# Patient Record
Sex: MALE | Race: BLACK OR AFRICAN AMERICAN | Employment: OTHER | URBAN - METROPOLITAN AREA
[De-identification: names, ages, dates, MRNs, and addresses within clinical notes are randomized per-mention and may not be internally consistent; named-entity substitution may affect disease eponyms.]

---

## 2019-02-22 ENCOUNTER — HOSPITAL ENCOUNTER (EMERGENCY)
Age: 54
Discharge: HOME | End: 2019-02-22
Attending: EMERGENCY MEDICINE
Payer: MEDICAID

## 2019-02-22 ENCOUNTER — APPOINTMENT (OUTPATIENT)
Dept: GENERAL RADIOLOGY | Age: 54
End: 2019-02-22
Attending: EMERGENCY MEDICINE
Payer: MEDICAID

## 2019-02-22 VITALS
SYSTOLIC BLOOD PRESSURE: 158 MMHG | OXYGEN SATURATION: 100 % | HEART RATE: 84 BPM | RESPIRATION RATE: 20 BRPM | WEIGHT: 253.09 LBS | TEMPERATURE: 98.4 F | HEIGHT: 75 IN | DIASTOLIC BLOOD PRESSURE: 108 MMHG | BODY MASS INDEX: 31.47 KG/M2

## 2019-02-22 DIAGNOSIS — I10 ESSENTIAL HYPERTENSION: ICD-10-CM

## 2019-02-22 DIAGNOSIS — R07.9 CHEST PAIN, UNSPECIFIED TYPE: Primary | ICD-10-CM

## 2019-02-22 LAB
ALBUMIN SERPL-MCNC: 3.8 G/DL (ref 3.5–5)
ALBUMIN/GLOB SERPL: 1.1 {RATIO} (ref 1.1–2.2)
ALP SERPL-CCNC: 84 U/L (ref 45–117)
ALT SERPL-CCNC: 73 U/L (ref 12–78)
ANION GAP SERPL CALC-SCNC: 9 MMOL/L (ref 5–15)
AST SERPL-CCNC: 51 U/L (ref 15–37)
ATRIAL RATE: 80 BPM
BASOPHILS # BLD: 0 K/UL (ref 0–0.1)
BASOPHILS NFR BLD: 1 % (ref 0–1)
BILIRUB SERPL-MCNC: 0.5 MG/DL (ref 0.2–1)
BUN SERPL-MCNC: 8 MG/DL (ref 6–20)
BUN/CREAT SERPL: 9 (ref 12–20)
CALCIUM SERPL-MCNC: 9.6 MG/DL (ref 8.5–10.1)
CALCULATED P AXIS, ECG09: 59 DEGREES
CALCULATED R AXIS, ECG10: 34 DEGREES
CALCULATED T AXIS, ECG11: 23 DEGREES
CHLORIDE SERPL-SCNC: 108 MMOL/L (ref 97–108)
CK MB CFR SERPL CALC: 0.4 % (ref 0–2.5)
CK MB SERPL-MCNC: 2.2 NG/ML (ref 5–25)
CK SERPL-CCNC: 622 U/L (ref 39–308)
CO2 SERPL-SCNC: 24 MMOL/L (ref 21–32)
CREAT SERPL-MCNC: 0.88 MG/DL (ref 0.7–1.3)
DIAGNOSIS, 93000: NORMAL
DIFFERENTIAL METHOD BLD: ABNORMAL
EOSINOPHIL # BLD: 0.3 K/UL (ref 0–0.4)
EOSINOPHIL NFR BLD: 3 % (ref 0–7)
ERYTHROCYTE [DISTWIDTH] IN BLOOD BY AUTOMATED COUNT: 14.4 % (ref 11.5–14.5)
GLOBULIN SER CALC-MCNC: 3.4 G/DL (ref 2–4)
GLUCOSE SERPL-MCNC: 93 MG/DL (ref 65–100)
HCT VFR BLD AUTO: 52.5 % (ref 36.6–50.3)
HGB BLD-MCNC: 17.5 G/DL (ref 12.1–17)
IMM GRANULOCYTES # BLD AUTO: 0 K/UL (ref 0–0.04)
IMM GRANULOCYTES NFR BLD AUTO: 0 % (ref 0–0.5)
LIPASE SERPL-CCNC: 265 U/L (ref 73–393)
LYMPHOCYTES # BLD: 2 K/UL (ref 0.8–3.5)
LYMPHOCYTES NFR BLD: 24 % (ref 12–49)
MCH RBC QN AUTO: 29.1 PG (ref 26–34)
MCHC RBC AUTO-ENTMCNC: 33.3 G/DL (ref 30–36.5)
MCV RBC AUTO: 87.2 FL (ref 80–99)
MONOCYTES # BLD: 0.6 K/UL (ref 0–1)
MONOCYTES NFR BLD: 7 % (ref 5–13)
NEUTS SEG # BLD: 5.4 K/UL (ref 1.8–8)
NEUTS SEG NFR BLD: 65 % (ref 32–75)
NRBC # BLD: 0 K/UL (ref 0–0.01)
NRBC BLD-RTO: 0 PER 100 WBC
P-R INTERVAL, ECG05: 172 MS
PLATELET # BLD AUTO: 231 K/UL (ref 150–400)
PMV BLD AUTO: 10.3 FL (ref 8.9–12.9)
POTASSIUM SERPL-SCNC: 4 MMOL/L (ref 3.5–5.1)
PROT SERPL-MCNC: 7.2 G/DL (ref 6.4–8.2)
Q-T INTERVAL, ECG07: 370 MS
QRS DURATION, ECG06: 80 MS
QTC CALCULATION (BEZET), ECG08: 426 MS
RBC # BLD AUTO: 6.02 M/UL (ref 4.1–5.7)
SODIUM SERPL-SCNC: 141 MMOL/L (ref 136–145)
TROPONIN I SERPL-MCNC: <0.05 NG/ML
VENTRICULAR RATE, ECG03: 80 BPM
WBC # BLD AUTO: 8.3 K/UL (ref 4.1–11.1)

## 2019-02-22 PROCEDURE — 84484 ASSAY OF TROPONIN QUANT: CPT

## 2019-02-22 PROCEDURE — 99284 EMERGENCY DEPT VISIT MOD MDM: CPT

## 2019-02-22 PROCEDURE — 80053 COMPREHEN METABOLIC PANEL: CPT

## 2019-02-22 PROCEDURE — 93005 ELECTROCARDIOGRAM TRACING: CPT

## 2019-02-22 PROCEDURE — 36415 COLL VENOUS BLD VENIPUNCTURE: CPT

## 2019-02-22 PROCEDURE — 82553 CREATINE MB FRACTION: CPT

## 2019-02-22 PROCEDURE — 83690 ASSAY OF LIPASE: CPT

## 2019-02-22 PROCEDURE — 82550 ASSAY OF CK (CPK): CPT

## 2019-02-22 PROCEDURE — 71046 X-RAY EXAM CHEST 2 VIEWS: CPT

## 2019-02-22 PROCEDURE — 85025 COMPLETE CBC W/AUTO DIFF WBC: CPT

## 2019-02-22 NOTE — ED PROVIDER NOTES
EMERGENCY DEPARTMENT HISTORY AND PHYSICAL EXAM 
 
 
Date: 2/22/2019 Patient Name: Aby Ulloa History of Presenting Illness Chief Complaint Patient presents with  Chest Pain Ambulatory to triage. c/o intermittent (for weeks) left sided chest pain radiating to left arm causing numbness to left hand. Recent bp medication additions/changes. h/o HTN. Recent stressors as he is raising a 9yr by himself as mother is drug addict-per pt.  Anxiety History Provided By: Patient HPI: Aby Ulloa, 48 y.o. male with PMHx significant for HTN, chronic back pain, arthritis, presents ambulatory to the ED with cc of intermittent, sharp L CP that lasted for <1 min, as well as LUE tightness onset PTA. Pt reports that he has been experiencing intermittent CP for the past few months. He reports being evaluated by [facility], but states that \"they only took [his] BP then sent [him] home. \" He states that he was seen by his PCP last week, and his BP meds changed. His PCP added Clonidine to his Lisinopril and Norvasc regimen, however, pt thought that he was suppose to only take Clonidine for his HTN meds. He states that he has been taking his prescribed HTN regimen for 3 days. He reports he has a PCP appt on 2/25. He also reports intermittent HA for the past week. Pt reports that he has been under a lot of stress at home lately. He denies lightheadedness or unilateral weakness. There are no other complaints, changes, or physical findings at this time. PCP: Bal Matthew MD 
 
Social Hx:  
Social History Tobacco Use Smoking Status Current Every Day Smoker  Packs/day: 1.50  Years: 10.00  Pack years: 15.00 Smokeless Tobacco Never Used  
,  
Social History Substance and Sexual Activity Alcohol Use Yes  Alcohol/week: 6.0 oz  Types: 12 Cans of beer per week  
, Social History Substance and Sexual Activity Drug Use No  
 
 
Past History Past Surgical History: Past Surgical History:  
Procedure Laterality Date  HX ORTHOPAEDIC Family History: 
History reviewed. No pertinent family history. Allergies: Allergies Allergen Reactions  Vicodin [Hydrocodone-Acetaminophen] Other (comments) Headache Review of Systems Review of Systems Constitutional: Negative for chills and fever. HENT: Negative for congestion, rhinorrhea and sore throat. Respiratory: Negative for cough and shortness of breath. Cardiovascular: Positive for chest pain (intermittent L). +Elevated BP Gastrointestinal: Negative for abdominal pain, nausea and vomiting. Genitourinary: Negative for dysuria and urgency. Musculoskeletal:  
     +LUE tightness, resolved Skin: Negative for rash. Neurological: Positive for numbness (L hand, resolved) and headaches (intermittent). Negative for dizziness and light-headedness. Psychiatric/Behavioral: The patient is nervous/anxious. All other systems reviewed and are negative. Physical Exam  
Physical Exam  
Constitutional: He is oriented to person, place, and time. He appears well-developed and well-nourished. No distress. HENT:  
Head: Normocephalic and atraumatic. Eyes: Conjunctivae and EOM are normal. Pupils are equal, round, and reactive to light. Neck: Normal range of motion. Cardiovascular: Normal rate, regular rhythm and intact distal pulses. Pulmonary/Chest: Effort normal and breath sounds normal. No stridor. No respiratory distress. Abdominal: Soft. He exhibits no distension. There is no tenderness. Musculoskeletal: Normal range of motion. Neurological: He is alert and oriented to person, place, and time. He has normal strength. No cranial nerve deficit or sensory deficit. Coordination normal. GCS eye subscore is 4. GCS verbal subscore is 5. GCS motor subscore is 6. Skin: Skin is warm and dry. Psychiatric: He has a normal mood and affect. Nursing note and vitals reviewed. Diagnostic Study Results Labs - Recent Results (from the past 12 hour(s)) EKG, 12 LEAD, INITIAL Collection Time: 02/22/19  1:48 AM  
Result Value Ref Range Ventricular Rate 80 BPM  
 Atrial Rate 80 BPM  
 P-R Interval 172 ms QRS Duration 80 ms  
 Q-T Interval 370 ms QTC Calculation (Bezet) 426 ms Calculated P Axis 59 degrees Calculated R Axis 34 degrees Calculated T Axis 23 degrees Diagnosis Normal sinus rhythm Normal ECG When compared with ECG of 29-APR-2013 09:01, No significant change was found CBC WITH AUTOMATED DIFF Collection Time: 02/22/19  2:58 AM  
Result Value Ref Range WBC 8.3 4.1 - 11.1 K/uL  
 RBC 6.02 (H) 4.10 - 5.70 M/uL  
 HGB 17.5 (H) 12.1 - 17.0 g/dL HCT 52.5 (H) 36.6 - 50.3 % MCV 87.2 80.0 - 99.0 FL  
 MCH 29.1 26.0 - 34.0 PG  
 MCHC 33.3 30.0 - 36.5 g/dL  
 RDW 14.4 11.5 - 14.5 % PLATELET 149 654 - 381 K/uL MPV 10.3 8.9 - 12.9 FL  
 NRBC 0.0 0  WBC ABSOLUTE NRBC 0.00 0.00 - 0.01 K/uL NEUTROPHILS 65 32 - 75 % LYMPHOCYTES 24 12 - 49 % MONOCYTES 7 5 - 13 % EOSINOPHILS 3 0 - 7 % BASOPHILS 1 0 - 1 % IMMATURE GRANULOCYTES 0 0.0 - 0.5 % ABS. NEUTROPHILS 5.4 1.8 - 8.0 K/UL  
 ABS. LYMPHOCYTES 2.0 0.8 - 3.5 K/UL  
 ABS. MONOCYTES 0.6 0.0 - 1.0 K/UL  
 ABS. EOSINOPHILS 0.3 0.0 - 0.4 K/UL  
 ABS. BASOPHILS 0.0 0.0 - 0.1 K/UL  
 ABS. IMM. GRANS. 0.0 0.00 - 0.04 K/UL  
 DF AUTOMATED METABOLIC PANEL, COMPREHENSIVE Collection Time: 02/22/19  2:58 AM  
Result Value Ref Range Sodium 141 136 - 145 mmol/L Potassium 4.0 3.5 - 5.1 mmol/L Chloride 108 97 - 108 mmol/L  
 CO2 24 21 - 32 mmol/L Anion gap 9 5 - 15 mmol/L Glucose 93 65 - 100 mg/dL BUN 8 6 - 20 MG/DL Creatinine 0.88 0.70 - 1.30 MG/DL  
 BUN/Creatinine ratio 9 (L) 12 - 20 GFR est AA >60 >60 ml/min/1.73m2 GFR est non-AA >60 >60 ml/min/1.73m2 Calcium 9.6 8.5 - 10.1 MG/DL  Bilirubin, total 0.5 0.2 - 1.0 MG/DL  
 ALT (SGPT) 73 12 - 78 U/L  
 AST (SGOT) 51 (H) 15 - 37 U/L Alk. phosphatase 84 45 - 117 U/L Protein, total 7.2 6.4 - 8.2 g/dL Albumin 3.8 3.5 - 5.0 g/dL Globulin 3.4 2.0 - 4.0 g/dL A-G Ratio 1.1 1.1 - 2.2 LIPASE Collection Time: 02/22/19  2:58 AM  
Result Value Ref Range Lipase 265 73 - 393 U/L  
TROPONIN I Collection Time: 02/22/19  2:58 AM  
Result Value Ref Range Troponin-I, Qt. <0.05 <0.05 ng/mL CK W/ REFLX CKMB Collection Time: 02/22/19  2:58 AM  
Result Value Ref Range  (H) 39 - 308 U/L  
CK-MB,QUANT. Collection Time: 02/22/19  2:58 AM  
Result Value Ref Range CK - MB 2.2 <3.6 NG/ML  
 CK-MB Index 0.4 0.0 - 2.5 Radiologic Studies -  
XR CHEST PA LAT Final Result IMPRESSION:  
  
Normal chest views. No change. CT Results  (Last 48 hours) None CXR Results  (Last 48 hours) 02/22/19 0343  XR CHEST PA LAT Final result Impression:  IMPRESSION:  
   
Normal chest views. No change. Narrative:  EXAM: XR CHEST PA LAT INDICATION: Left chest pain radiates into the left upper extremity COMPARISON: Chest views on 4/29/2013. TECHNIQUE: PA and lateral chest views FINDINGS: The cardiomediastinal and hilar contours are within normal limits. The  
pulmonary vasculature is within normal limits. The lungs and pleural spaces are clear. The visualized bones and upper abdomen  
are age-appropriate. Medical Decision Making I am the first provider for this patient. I reviewed the vital signs, available nursing notes, past medical history, past surgical history, family history and social history. Vital Signs-Reviewed the patient's vital signs. Patient Vitals for the past 12 hrs: 
 Temp Pulse Resp BP SpO2  
02/22/19 0400    (!) 158/108   
02/22/19 0341     100 % 02/22/19 0158 98.4 °F (36.9 °C) 84 20 (!) 194/128 97 % Pulse Oximetry Analysis - 100% on RA 
 
 EKG interpretation: (Preliminary) 01:48 Rhythm: normal sinus rhythm; and regular . Rate (approx.): 80; Axis: normal; DE interval: normal; QRS interval: normal ; ST/T wave: normal; Other findings: normal. 
Written by Julieta Guevara ED Scribe, as dictated by STEPHANIE Dempsey MD. Records Reviewed: Nursing Notes, Old Medical Records, Previous Radiology Studies and Previous Laboratory Studies Provider Notes (Medical Decision Making): DDx: Musculoskeletal CP, ACS, stress, anxiety The pt is unlikely to have PE. There is no pleuritic chest pain, no tachycardia, no hypoxia, pt non-smoker, no unilateral leg swelling, no hormone use, no recent travel/immobilization, no recent surgery. The pt is unlikely to have aortic dissection. The pulses are equal, there is no sharp tearing chest pain radiating to back. Zula Banana of brief, intermittent CP not consistent with PE, dissection, or ACS Will pursue cardiac work-up with EKG, troponin x 1 given sx duration, CXR. While the pt is less likely to have pneumonia as there is no cough and no fever, and less likely to have ptx, will order CXR to assess lung fields. Pt with a normal neuro exam, on further questioning, left hand \"numbness\" was more of a \"tightness\" no need for CT imaging at this time. ED Course:  
Initial assessment performed. The patients presenting problems have been discussed, and they are in agreement with the care plan formulated and outlined with them. I have encouraged them to ask questions as they arise throughout their visit. Critical Care Time:  
0 minutes. Disposition: 
Discharge Note: 
4:05 AM 
The patient has been re-evaluated and is ready for discharge. Reviewed available results with patient. Counseled patient/parent/guardian on diagnosis and care plan. Patient has expressed understanding, and all questions have been answered.  Patient agrees with plan and agrees to follow up as recommended, or return to the ED if their symptoms worsen. Discharge instructions have been provided and explained to the patient, along with reasons to return to the ED. PLAN: 
1. Current Discharge Medication List  
  
 
2. Follow-up Information Follow up With Specialties Details Why Contact Info Akosua Covarrubias MD Family Practice Schedule an appointment as soon as possible for a visit  [place] [address] 
[phone] [facility] EMERGENCY DEPT Emergency Medicine  As needed, If symptoms worsen [address] [facility] 
[phone] Return to ED if worse Diagnosis Clinical Impression: 1. Chest pain, unspecified type 2. Essential hypertension Attestations: This note is prepared by Betsy Price, acting as scribe for MD STEPHANIE Guy MD: The scribe's documentation has been prepared under my direction and personally reviewed by me in its entirety. I confirm that the note above accurately reflects all work, treatment, procedures, and medical decision making performed by me.

## 2019-02-22 NOTE — DISCHARGE INSTRUCTIONS
Patient Education   Local Primary Care Physicians     [facility] Physicians [phone]   Lenore Thorne, MD Bartolo Horton, MD Jeevan Andres MD [facility] Doctors [phone]   Lee Ayala, [facility]   Angel Gallo, MD Patrizia Stern MD [address] [facility] [phone]   Monet Bullock, MD Rica Waterman MD [address] [facility] [phone]   MD Aylin Bruce, MD Garcia Grullon MD     [facility] [phone]   OHRA DPTSXX BD, MD Katelyn Fulton, MD Jim Zamora, NP [address] [phone]   MD Ashly Dunn, MD Juju Wallaec, MD Ijeoma Dietz, MD Shirley Garzon, MD Susan Bermudez MD     [address] [facility]   Abimael Joseph MD    [facility] [phone]   MD Maya Glover, NP   Prasanna Weiner, MD Donna Andrade, MD Alphonso Mayorga, MD Stephanie Bruce MD   [address] [facility] [phone]   MD Osiris Young, [facility] Rodrigo, NP   MD Clive Maharaj, MD Andrea Ibarra MD   [facility] [phone]   Kirill Dacosta, MD Jackeline Beltrán, MD Mariella Bender, MD Mila Roth MD     [address] [phone]   MD Alma Delia Dong MD [place] [phone]   MD Angel Montes MD Hunt Ferron, MD     [facility] Physicians [phone]   Michael Curry, MD Terence Perea, MD Kaylen Berg, MD Violeta Cuellar, MD Bryon Solis, MD   Dorann EvelinaHODA sanchez MD     [address]   [phone]   MD Katie Bernal MD     [address] [facility] [phone]     Elizabeth Fritz MD Janelle Ayers, TAVO Cruz FNP Lonnie Lynn, PA-C Viki Moores, MD Lesli Postal, NP   Kristel Macias DO Miscellaneous:     [facility]    For adult and child immunizations, family planning, TB screening, STD testing and women's health services. [facility]: Meherrin [phone]   [facility] 25   657 [facility]   [address]   170 [facility]: Jacob 200 [facility] [phone]   [address] [facility]        [address] [place] [address]    For primary care services, woman and child wellness, and some clinics providing specialty care. [facility] -- [facility]. [address] [place] [address] [phone] [address] [facility],  [facility] [address] [place] [address] [phone]  339 [facility] [address] [place] [address] [phone]  [address]  [employer] [address] [place] [address] 5850  Community  [phone]  [address] [place] [address] [address] [place] [phone]  [facility] 81 [facility] [phone]  [address] [phone]  [facility]: [facility] [address], [address] [place] [address], [address] [phone]    [address] [phone]  [facility] Out[facility] 5850  Community  [phone]  [employer]  [address], [place] [address] ([email or web address]) [phone]       Sexual Health/Woman Wellness Clinics   For STD/HIV testing and treatment, pregnancy testing and services, men's health, birth control services, LGBT services, and hepatitis/HPV vaccine services. [employer] [facility] [employer] [address] [facility] 75 [facility] 1579 [address] [phone]  [address] [place] [address] Rd, 5th floor [phone]  P[facility] of 59 [facility] [address] [phone]       8260 [facility] 401 [facility],[address]   [phone]  [place]   [phone]    Women, Infant and Children's Services:   [address] [phone]  [address] [phone]  [facility]   [phone]  [address] [place] [address]   [phone]  [facility]     [phone]  [address] Crisis   [facility] 53 [phone]          Thank you for allowing us to provide you with excellent care today. We hope we addressed all of your concerns and needs. We strive to provide excellent quality care in the Emergency Department. Please rate us as excellent, as anything less than excellent does not meet our expectations. If you feel that you have not received excellent quality care or timely care, please ask to speak to the nurse manager. Please choose us in the future for your continued health care needs. The exam and treatment you received in the Emergency Department were for an urgent problem and are not intended as complete care. It is important that you follow up with a doctor, nurse practitioner, or physician assistant for ongoing care. If your symptoms become worse or you do not improve as expected and you are unable to reach your usual health care provider, you should return to the Emergency Department. We are available 24 hours a day. Take this sheet with you when you go to your follow-up visit. If you have any problem arranging the follow-up visit, contact the Emergency Department immediately. If a prescription has been provided, please have it filled as soon as possible to avoid a delay in treatment. Read the entire medication instruction sheet provided to you by the pharmacy.  If you have any questions or reservations about taking the medication due to side effects or interactions with other medications please call the ER or your primary care physician. Take this sheet with you when you go to your follow-up visit. Make an appointment with your family doctor or the physician you were referred to for follow-up of this visit, as this is mandatory follow-up. Return to the ER if you are unable to be seen or if you are unable to be seen in a timely manner. If you have any problem arranging the follow-up visit, contact the Emergency Department immediately. Chest Pain: Care Instructions  Your Care Instructions    There are many things that can cause chest pain. Some are not serious and will get better on their own in a few days. But some kinds of chest pain need more testing and treatment. Your doctor may have recommended a follow-up visit in the next 8 to 12 hours. If you are not getting better, you may need more tests or treatment. Even though your doctor has released you, you still need to watch for any problems. The doctor carefully checked you, but sometimes problems can develop later. If you have new symptoms or if your symptoms do not get better, get medical care right away. If you have worse or different chest pain or pressure that lasts more than 5 minutes or you passed out (lost consciousness), call 911 or seek other emergency help right away. A medical visit is only one step in your treatment. Even if you feel better, you still need to do what your doctor recommends, such as going to all suggested follow-up appointments and taking medicines exactly as directed. This will help you recover and help prevent future problems. How can you care for yourself at home? · Rest until you feel better. · Take your medicine exactly as prescribed. Call your doctor if you think you are having a problem with your medicine. · Do not drive after taking a prescription pain medicine.   When should you call for help? Call 911 if:    · You passed out (lost consciousness).     · You have severe difficulty breathing.     · You have symptoms of a heart attack. These may include:  ? Chest pain or pressure, or a strange feeling in your chest.  ? Sweating. ? Shortness of breath. ? Nausea or vomiting. ? Pain, pressure, or a strange feeling in your back, neck, jaw, or upper belly or in one or both shoulders or arms. ? Lightheadedness or sudden weakness. ? A fast or irregular heartbeat. After you call 911, the  may tell you to chew 1 adult-strength or 2 to 4 low-dose aspirin. Wait for an ambulance. Do not try to drive yourself.    Call your doctor today if:    · You have any trouble breathing.     · Your chest pain gets worse.     · You are dizzy or lightheaded, or you feel like you may faint.     · You are not getting better as expected.     · You are having new or different chest pain. Where can you learn more? Go to http://douglas-hans.info/. Enter A120 in the search box to learn more about \"Chest Pain: Care Instructions. \"  Current as of: September 23, 2018  Content Version: 11.9  © 4873-4374 [employer]. Care instructions adapted under license by [employer] (which disclaims liability or warranty for this information). If you have questions about a medical condition or this instruction, always ask your healthcare professional. [place] [address] any warranty or liability for your use of this information.

## 2019-03-10 ENCOUNTER — HOSPITAL ENCOUNTER (EMERGENCY)
Age: 54
Discharge: HOME | End: 2019-03-10
Attending: EMERGENCY MEDICINE
Payer: MEDICAID

## 2019-03-10 VITALS
WEIGHT: 252.21 LBS | BODY MASS INDEX: 31.36 KG/M2 | DIASTOLIC BLOOD PRESSURE: 86 MMHG | OXYGEN SATURATION: 98 % | RESPIRATION RATE: 20 BRPM | HEART RATE: 91 BPM | SYSTOLIC BLOOD PRESSURE: 140 MMHG | HEIGHT: 75 IN

## 2019-03-10 DIAGNOSIS — J30.9 ALLERGIC RHINITIS, UNSPECIFIED SEASONALITY, UNSPECIFIED TRIGGER: ICD-10-CM

## 2019-03-10 DIAGNOSIS — Z72.0 TOBACCO ABUSE: ICD-10-CM

## 2019-03-10 DIAGNOSIS — Z71.6 ENCOUNTER FOR SMOKING CESSATION COUNSELING: ICD-10-CM

## 2019-03-10 DIAGNOSIS — R09.81 SINUS CONGESTION: Primary | ICD-10-CM

## 2019-03-10 DIAGNOSIS — J34.2 DEVIATED SEPTUM: ICD-10-CM

## 2019-03-10 PROCEDURE — 74011250637 HC RX REV CODE- 250/637: Performed by: EMERGENCY MEDICINE

## 2019-03-10 PROCEDURE — 99283 EMERGENCY DEPT VISIT LOW MDM: CPT

## 2019-03-10 RX ORDER — METHYLPREDNISOLONE 4 MG/1
TABLET ORAL
Qty: 1 DOSE PACK | Refills: 0 | Status: SHIPPED | OUTPATIENT
Start: 2019-03-10 | End: 2019-04-15 | Stop reason: ALTCHOICE

## 2019-03-10 RX ORDER — OXYMETAZOLINE HCL 0.05 %
2 SPRAY, NON-AEROSOL (ML) NASAL ONCE
Status: COMPLETED | OUTPATIENT
Start: 2019-03-10 | End: 2019-03-10

## 2019-03-10 RX ADMIN — Medication 2 SPRAY: at 20:47

## 2019-03-11 ENCOUNTER — TELEPHONE (OUTPATIENT)
Dept: INTERNAL MEDICINE CLINIC | Age: 54
End: 2019-03-11

## 2019-03-11 NOTE — DISCHARGE INSTRUCTIONS
Thank you for allowing us to take care of you today! We hope we addressed all of your concerns and needs. We strive to provide excellent quality care in the Emergency Department. You will receive a survey after your visit to evaluate the care you were provided. Should you receive a survey from us, we invite you to share your experience and tell us what made it excellent. It was a pleasure serving you, we invite you to share your experience with us, in our pursuit for excellence, should you be selected to receive a survey. The exam and treatment you received in the Emergency Department were for an urgent problem and are not intended as complete care. It is important that you follow up with a doctor, nurse practitioner, or physician assistant for ongoing care. If your symptoms become worse or you do not improve as expected and you are unable to reach your usual health care provider, you should return to the Emergency Department. We are available 24 hours a day. Please take your discharge instructions with you when you go to your follow-up appointment. If you have any problem arranging a follow-up appointment, contact the Emergency Department immediately. If a prescription has been provided, please have it filled as soon as possible to prevent a delay in treatment. Read the entire medication instruction sheet provided to you by the pharmacy. If you have any questions or reservations about taking the medication due to side effects or interactions with other medications, please call your primary care physician or contact the ER to speak with the charge nurse. Make an appointment with your family doctor or the physician you were referred to for follow-up of this visit as instructed on your discharge paperwork, as this is mandatory follow-up. Return to the ER if you are unable to be seen or if you are unable to be seen in a timely manner.     If you have any problem arranging the follow-up visit, contact the Emergency Department immediately. I hope you feel better and thank you again for allow us to provide you with excellent care today at [facility]!       Warmest regards,    Aftab Meza MD  Emergency Medicine Physician  [facility]

## 2019-03-11 NOTE — ED PROVIDER NOTES
EMERGENCY DEPARTMENT HISTORY AND PHYSICAL EXAM      Date: 3/10/2019  Patient Name: Vinh Tena  History of Presenting Illness     Chief Complaint   Patient presents with    Sinus Pain     pt with c/o difficulty breathing out of his nose. pt denied chest pain. pt stated he has been seen and prescribed flonase and singulair with no relief. patient he received a steroid pack in the past and had some relief.  Anxiety     pt reported that his symptoms are causing anxiety       History Provided By: Patient    HPI: Vinh Tena, 48 y.o. male with PMHx significant for HTN, presents ambulatory to the ED with cc of new onset of nasal congestion. Pt c/o difficulty breathing out of his nose. Pt denies chest pain. Pt stated he has been seen his PCP and was prescribed flonase and singulair with no relief. Patient has steroid pack previously prescribed in the past and which provided some relief. Additionally, pt specifically denies any recent fever, chills, headache, nausea, vomiting, diarrhea, abdominal pain, CP, lightheadedness, SOB, dizziness, numbness, weakness, tingling, BLE swelling, heart palpitations, urinary sxs, changes in BM, changes in PO intake, melena, or hematochezia. PCP: Ivan Ibrahim MD    PMHx: Significant for HTN  Social Hx: +tobacco, +EtOH, -Illicit Drugs     There are no other complaints, changes or physical findings at this time. Past History   Past Medical History:  Past Medical History:   Diagnosis Date    Chronic low back pain     Hypertension        Past Surgical History:  Past Surgical History:   Procedure Laterality Date    HX ORTHOPAEDIC         Family History:  No family history on file. Social History:  Social History     Tobacco Use    Smoking status: Current Every Day Smoker     Packs/day: 1.50     Years: 10.00     Pack years: 15.00    Smokeless tobacco: Never Used   Substance Use Topics    Alcohol use:  Yes     Alcohol/week: 6.0 oz     Types: 12 Cans of beer per week    Drug use: No       Allergies: Allergies   Allergen Reactions    Vicodin [Hydrocodone-Acetaminophen] Other (comments)     Headache       Medications:  No current facility-administered medications on file prior to encounter. Current Outpatient Medications on File Prior to Encounter   Medication Sig Dispense Refill    aspirin 81 mg tablet Take 1 Tab by mouth daily. 30 Tab 1    lisinopril (PRINIVIL, ZESTRIL) 10 mg tablet Take 10 mg by mouth daily. Review of Systems   Review of Systems   Constitutional: Negative. Negative for chills and fever. HENT: Positive for congestion (nasal). Negative for facial swelling, rhinorrhea, sore throat, trouble swallowing and voice change. Eyes: Negative. Respiratory: Negative. Negative for apnea, cough, chest tightness, shortness of breath and wheezing. Cardiovascular: Negative. Negative for chest pain, palpitations and leg swelling. Gastrointestinal: Negative. Negative for abdominal distention, abdominal pain, blood in stool, constipation, diarrhea, nausea and vomiting. Endocrine: Negative. Negative for cold intolerance, heat intolerance and polyuria. Genitourinary: Negative. Negative for difficulty urinating, dysuria, flank pain, frequency, hematuria and urgency. Musculoskeletal: Negative. Negative for arthralgias, back pain, myalgias, neck pain and neck stiffness. Skin: Negative. Negative for color change and rash. Neurological: Negative. Negative for dizziness, syncope, facial asymmetry, speech difficulty, weakness, light-headedness, numbness and headaches. Hematological: Negative. Does not bruise/bleed easily. Psychiatric/Behavioral: Negative. Negative for confusion and self-injury. The patient is not nervous/anxious. Physical Exam   Physical Exam   Constitutional: He is oriented to person, place, and time. Vital signs are normal. He appears well-developed and well-nourished. He is cooperative.   Non-toxic appearance. HENT:   Head: Normocephalic and atraumatic. Mouth/Throat: Mucous membranes are normal. No posterior oropharyngeal erythema. Eyes: Conjunctivae and EOM are normal. Pupils are equal, round, and reactive to light. Neck: Normal range of motion. Cardiovascular: Normal rate, regular rhythm, normal heart sounds and intact distal pulses. Exam reveals no gallop and no friction rub. No murmur heard. Pulmonary/Chest: Effort normal and breath sounds normal. No respiratory distress. He has no wheezes. He has no rales. He exhibits no tenderness. Abdominal: Soft. Bowel sounds are normal. He exhibits no distension and no mass. There is no tenderness. There is no rebound and no guarding. Musculoskeletal: Normal range of motion. He exhibits no edema, tenderness or deformity. Neurological: He is alert and oriented to person, place, and time. He displays normal reflexes. No cranial nerve deficit. He exhibits normal muscle tone. Coordination normal.   Skin: Skin is warm. No rash noted. Psychiatric: His mood appears anxious. Nursing note and vitals reviewed. Medical Decision Making   I am the first provider for this patient. I reviewed the vital signs, available nursing notes, past medical history, past surgical history, family history and social history. Vital Signs-Reviewed the patient's vital signs. Patient Vitals for the past 24 hrs:   Pulse Resp BP SpO2   03/10/19 2025 91 20 140/86 98 %       Pulse Oximetry Analysis - 98% on RA    Cardiac Monitor:   Rate: 91 bpm  Rhythm: Normal Sinus Rhythm      Records Reviewed: Nursing Notes, Old Medical Records, Previous electrocardiograms, Previous Radiology Studies and Previous Laboratory Studies    Provider Notes (Medical Decision Making):   Pt presents with flu like symptoms including nasal congestion, rhinorrhea and sore throat. Pt also has non-productive cough without dyspnea or wheezing. Symptoms are consistent with an uncomplicated URI.   DDx: bacterial sinusitis vs. pharyngitis, migraine, flu. Symptomatic therapy suggested: antihistamine-decongestant of choice. Increase fluids, use vaporizer, stay in steamy bathroom tid 15 min prn severe cough, tylenol as needed, rest, avoid smoky areas. Lack of antibiotic effectiveness discussed with him. Symptomatic therapy suggested: gargle for sore throat, use mist at bedside for congestion. Apply facial warm packs for sinus pain or use nasal saline sprays. Follow up prn if not better in 72 hours. ED Course:   Initial assessment performed. The patients presenting problems have been discussed, and they are in agreement with the care plan formulated and outlined with them. I have encouraged them to ask questions as they arise throughout their visit. HYPERTENSION COUNSELING  Education was provided to the patient today regarding their hypertension. Patient is made aware of their elevated blood pressure and is instructed to follow up this week with their Primary Care for a recheck. Patient is counseled regarding consequences of chronic, uncontrolled hypertension including kidney disease, heart disease, stroke or even death. Patient states their understanding and agrees to follow up this week. Additionally, during their visit, I discussed sodium restriction, maintaining ideal body weight and regular exercise program as physiologic means to achieve blood pressure control. The patient will strive towards this. ALCOHOL/SUBSTANCE ABUSE COUNSELING:  Upon evaluation, pt endorsed recent alcohol/illicit drug use. For approximately 15 minutes, pt has been counseled on the dangers of alcohol and illicit drug use on their health, and they were encouraged to quit as soon as possible in order to decrease further risks to their health. Pt has conveyed their understanding of the risks involved should they continue to use these products.     TOBACCO COUNSELING:   Upon evaluation, pt expressed that they are a current tobacco user. For approximately 10 minutes, pt has been counseled on the dangers of smoking and was encouraged to quit as soon as possible in order to decrease further risks to their health. Pt has conveyed their understanding of the risks involved should they continue to use tobacco products. I reviewed our electronic medical record system for any past medical records that were available that may contribute to the patient's current condition, the nursing notes and vital signs from today's visit. Shaylee Koch MD    Medications Administered During ED Course:  Medications   oxymetazoline (AFRIN) 0.05 % nasal spray 2 Spray (2 Sprays Both Nostrils Given 3/10/19 2047)     Progress Note:  8:48 PM  Patient has been reassessed and reports feeling better and symptoms have improved after ED treatment. Nichol Jurado is able to tolerate PO and ambulate per baseline. Pierre Mi's final labs and imaging have been reviewed with him. He has been counseled regarding his diagnosis. He verbally conveys understanding and agreement of the signs, symptoms, diagnosis, treatment and prognosis and additionally agrees to follow up as recommended with Dr. Aury Adam MD in 24 - 48 hours. He also agrees with the care-plan and conveys that all of his questions have been answered. I have also put together some discharge instructions for him that include: 1) educational information regarding their diagnosis, 2) how to care for their diagnosis at home, as well a 3) list of reasons why they would want to return to the ED prior to their follow-up appointment, should their condition change. I have answered all questions to patient's satisfaction. He both understood and agreed with plan as discussed above. Vital signs stable for discharge. Critical Care Time: 0    Disposition:  Discharge Note:  8:48 PM  The pt is ready for discharge.  The pt's signs, symptoms, diagnosis, and discharge instructions have been discussed and pt has conveyed their understanding. The pt is to follow up as recommended or return to ER should their symptoms worsen. Plan has been discussed and pt is in agreement. PLAN:  1. Discharge Home  Discharge Medication List as of 3/10/2019  8:56 PM      CONTINUE these medications which have NOT CHANGED    Details   aspirin 81 mg tablet Take 1 Tab by mouth daily. Print, 81 mg, Disp-30 Tab, R-1      lisinopril (PRINIVIL, ZESTRIL) 10 mg tablet 10 mg, Oral, DAILY, Until Discontinued, Historical Med           2. Follow-up Information     Follow up With Specialties Details Why [address] [facility], [address] Viktoria Barajas MD [facility]   [address]  [phone]      [facility] [place] EM[facility]GENCY DEPT Emergency Medicine  As needed, If symptoms worsen [facility] [address] Chula Villarreal MD Otolaryngology Schedule an appointment as soon as possible for a visit in 1 day  [facility]  [phone]            Return to ED if worse  Diagnosis     Clinical Impression:   1. Sinus congestion    2. Deviated septum    3. Allergic rhinitis, unspecified seasonality, unspecified trigger    4. Encounter for smoking cessation counseling    5. Tobacco abuse        Attestations  This note is prepared by Oscar De Jesus, acting as Scribe for MD Radha Hernandez MD : The scribe's documentation has been prepared under my direction and personally reviewed by me in its entirety. I confirm that the note above accurately reflects all work, treatment, procedures, and medical decision making performed by me.    :  This note will not be viewable in [address] [place]

## 2019-03-14 ENCOUNTER — HOSPITAL ENCOUNTER (EMERGENCY)
Age: 54
Discharge: HOME | End: 2019-03-14
Attending: EMERGENCY MEDICINE
Payer: MEDICAID

## 2019-03-14 VITALS
DIASTOLIC BLOOD PRESSURE: 75 MMHG | HEIGHT: 75 IN | RESPIRATION RATE: 20 BRPM | OXYGEN SATURATION: 98 % | WEIGHT: 253.53 LBS | HEART RATE: 86 BPM | BODY MASS INDEX: 31.52 KG/M2 | SYSTOLIC BLOOD PRESSURE: 140 MMHG | TEMPERATURE: 97.5 F

## 2019-03-14 DIAGNOSIS — F17.200 SMOKER: ICD-10-CM

## 2019-03-14 DIAGNOSIS — J06.9 ACUTE URI: Primary | ICD-10-CM

## 2019-03-14 PROCEDURE — 74011000270 HC RX REV CODE- 270: Performed by: EMERGENCY MEDICINE

## 2019-03-14 PROCEDURE — 74011250637 HC RX REV CODE- 250/637: Performed by: EMERGENCY MEDICINE

## 2019-03-14 PROCEDURE — 99282 EMERGENCY DEPT VISIT SF MDM: CPT

## 2019-03-14 PROCEDURE — 94640 AIRWAY INHALATION TREATMENT: CPT

## 2019-03-14 RX ORDER — FLUTICASONE PROPIONATE 50 MCG
2 SPRAY, SUSPENSION (ML) NASAL DAILY
Qty: 1 BOTTLE | Refills: 0 | Status: SHIPPED | OUTPATIENT
Start: 2019-03-14 | End: 2019-07-17

## 2019-03-14 RX ORDER — ALBUTEROL SULFATE 90 UG/1
1 AEROSOL, METERED RESPIRATORY (INHALATION)
Status: COMPLETED | OUTPATIENT
Start: 2019-03-14 | End: 2019-03-14

## 2019-03-14 RX ADMIN — ALBUTEROL SULFATE 1 PUFF: 90 AEROSOL, METERED RESPIRATORY (INHALATION) at 13:07

## 2019-03-14 RX ADMIN — Medication: at 13:07

## 2019-03-14 NOTE — ED PROVIDER NOTES
EMERGENCY DEPARTMENT HISTORY AND PHYSICAL EXAM      Date: 3/14/2019  Patient Name: Jamra Solano    History of Presenting Illness     Chief Complaint   Patient presents with    Cough     Ambulatory c/o cough congestion x 4 AM today Pt seen in ED several days ago and Given steroid dose pack       History Provided By: Patient    HPI: Jamar Solano, 48 y.o. male  presents to the ED with URI complaints that started at 4 AM today. He was seen in the ED for the same thing several days ago but did not fill the steroid pack until yesterday. He is not taking any over the counter cough/cold medications for relief. He notes a cough and congestion and feels that he cannot breath through his nose due to the congestion. The patient notes that he does not work and is on disability. Thus, he is unable to afford his medications. He does have Medicaid and just wants prescriptions so that he does not have to pay for the medications. There are no other complaints, changes, or physical findings at this time. Social Hx: Tobacco (1 ppd), EtOH (social), Illicit drug use (denies)     PCP: Nakita Rodriguez MD    No current facility-administered medications on file prior to encounter. Current Outpatient Medications on File Prior to Encounter   Medication Sig Dispense Refill    methylPREDNISolone (MEDROL, ELISEO,) 4 mg tablet Take as prescribed 1 Dose Pack 0    Nasal Dilators (BREATHE RIGHT) strp 1 Box by Apply Externally route daily. 10 Strip 0    aspirin 81 mg tablet Take 1 Tab by mouth daily. 30 Tab 1    lisinopril (PRINIVIL, ZESTRIL) 10 mg tablet Take 10 mg by mouth daily. Past History     Past Medical History:  Past Medical History:   Diagnosis Date    Chronic low back pain     Hypertension        Past Surgical History:  Past Surgical History:   Procedure Laterality Date    HX ORTHOPAEDIC         Family History:  History reviewed. No pertinent family history.     Social History:  Social History Tobacco Use    Smoking status: Current Every Day Smoker     Packs/day: 1.50     Years: 10.00     Pack years: 15.00    Smokeless tobacco: Never Used   Substance Use Topics    Alcohol use: Yes     Alcohol/week: 6.0 oz     Types: 12 Cans of beer per week    Drug use: No       Allergies: Allergies   Allergen Reactions    Vicodin [Hydrocodone-Acetaminophen] Other (comments)     Headache         Review of Systems   Review of Systems   Constitutional: Negative for chills, diaphoresis and fever. HENT: Positive for congestion and rhinorrhea. Negative for ear pain and sore throat. Respiratory: Positive for cough. Negative for shortness of breath. Cardiovascular: Negative for chest pain. Gastrointestinal: Negative for abdominal pain, constipation, diarrhea, nausea and vomiting. Genitourinary: Negative for difficulty urinating, dysuria, frequency and hematuria. Musculoskeletal: Negative for arthralgias and myalgias. Neurological: Negative for headaches. All other systems reviewed and are negative. Physical Exam   Physical Exam   Constitutional: He is oriented to person, place, and time. He appears well-developed and well-nourished. No distress. 48 y.o. -American male    HENT:   Head: Normocephalic and atraumatic. Right Ear: Tympanic membrane, external ear and ear canal normal. No middle ear effusion. Left Ear: Tympanic membrane, external ear and ear canal normal.  No middle ear effusion. Nose: Nose normal. No rhinorrhea. Right sinus exhibits no maxillary sinus tenderness and no frontal sinus tenderness. Left sinus exhibits no maxillary sinus tenderness and no frontal sinus tenderness. Mouth/Throat: Uvula is midline, oropharynx is clear and moist and mucous membranes are normal. No oropharyngeal exudate, posterior oropharyngeal edema or posterior oropharyngeal erythema. Eyes: Conjunctivae are normal. Right eye exhibits no discharge. Left eye exhibits no discharge.    Neck: Normal range of motion. Neck supple. Cardiovascular: Normal rate, regular rhythm and normal heart sounds. No murmur heard. Pulmonary/Chest: Effort normal and breath sounds normal. No respiratory distress. The patient did not cough throughout the patient encounter. Lymphadenopathy:     He has no cervical adenopathy. Neurological: He is alert and oriented to person, place, and time. Skin: Skin is warm and dry. He is not diaphoretic. Psychiatric: He has a normal mood and affect. His behavior is normal.   Nursing note and vitals reviewed. Diagnostic Study Results     Labs - None    Radiologic Studies - None    Medical Decision Making   I am the first provider for this patient. I reviewed the vital signs, available nursing notes, past medical history, past surgical history, family history and social history. Vital Signs-Reviewed the patient's vital signs. Patient Vitals for the past 12 hrs:   Temp Pulse Resp BP SpO2   03/14/19 1248 97.5 °F (36.4 °C) 86 20 140/75 98 %       Records Reviewed: Nursing Notes and Old Medical Records    Provider Notes (Medical Decision Making):   Bronchitis, pneumonia, upper respiratory infection, sinusitis, seasonal allergies, allergic rhinitis,    ED Course:   Initial assessment performed. The patients presenting problems have been discussed, and they are in agreement with the care plan formulated and outlined with them. I have encouraged them to ask questions as they arise throughout their visit. Tobacco Counseling  Discussed the risks of smoking and the benefits of smoking cessation as well as the long term sequelae of smoking with the patient. The patient verbalized their understanding. Counseled patient for approximately 3 minutes. Critical Care Time: None    Disposition:  DISCHARGE NOTE:  1:11 PM  The pt is ready for discharge.  The pt's signs, symptoms, diagnosis, and discharge instructions have been discussed and pt has conveyed their understanding. The pt is to follow up as recommended or return to ER should their symptoms worsen. Plan has been discussed and pt is in agreement. PLAN:  1. Current Discharge Medication List      START taking these medications    Details   fluticasone propionate (FLONASE) 50 mcg/actuation nasal spray 2 Sprays by Both Nostrils route daily. Qty: 1 Bottle, Refills: 0      dextromethorphan-guaifenesin (ROBITUSSIN COUGH-CHEST DAVID DM) 5-50 mg/5 mL liqd Take 10 mL by mouth two (2) times a day. Qty: 1 Bottle, Refills: 0         CONTINUE these medications which have NOT CHANGED    Details   methylPREDNISolone (MEDROL, ELISEO,) 4 mg tablet Take as prescribed  Qty: 1 Dose Pack, Refills: 0      Nasal Dilators (BREATHE RIGHT) strp 1 Box by Apply Externally route daily. Qty: 10 Strip, Refills: 0      aspirin 81 mg tablet Take 1 Tab by mouth daily. Qty: 30 Tab, Refills: 1      lisinopril (PRINIVIL, ZESTRIL) 10 mg tablet Take 10 mg by mouth daily. 2.   Follow-up Information     Follow up With Specialties Details Why Contact Info    Adam Lundberg MD Family Practice In 1 week As needed [address] [facility]  [phone]          Return to ED if worse     Diagnosis     Clinical Impression:   1. Acute URI    2. Smoker        This note will not be viewable in [address]

## 2019-03-14 NOTE — DISCHARGE INSTRUCTIONS
Patient Education        Upper Respiratory Infection (Cold): Care Instructions  Your Care Instructions    An upper respiratory infection, or URI, is an infection of the nose, sinuses, or throat. URIs are spread by coughs, sneezes, and direct contact. The common cold is the most frequent kind of URI. The flu and sinus infections are other kinds of URIs. Almost all URIs are caused by viruses. Antibiotics won't cure them. But you can treat most infections with home care. This may include drinking lots of fluids and taking over-the-counter pain medicine. You will probably feel better in 4 to 10 days. The doctor has checked you carefully, but problems can develop later. If you notice any problems or new symptoms, get medical treatment right away. Follow-up care is a key part of your treatment and safety. Be sure to make and go to all appointments, and call your doctor if you are having problems. It's also a good idea to know your test results and keep a list of the medicines you take. How can you care for yourself at home? · To prevent dehydration, drink plenty of fluids, enough so that your urine is light yellow or clear like water. Choose water and other caffeine-free clear liquids until you feel better. If you have kidney, heart, or liver disease and have to limit fluids, talk with your doctor before you increase the amount of fluids you drink. · Take an over-the-counter pain medicine, such as acetaminophen (Tylenol), ibuprofen (Advil, Motrin), or naproxen (Aleve). Read and follow all instructions on the label. · Before you use cough and cold medicines, check the label. These medicines may not be safe for young children or for people with certain health problems. · Be careful when taking over-the-counter cold or flu medicines and Tylenol at the same time. Many of these medicines have acetaminophen, which is Tylenol. Read the labels to make sure that you are not taking more than the recommended dose.  Too much acetaminophen (Tylenol) can be harmful. · Get plenty of rest.  · Do not smoke or allow others to smoke around you. If you need help quitting, talk to your doctor about stop-smoking programs and medicines. These can increase your chances of quitting for good. When should you call for help? Call 911 anytime you think you may need emergency care. For example, call if:    · You have severe trouble breathing.    Call your doctor now or seek immediate medical care if:    · You seem to be getting much sicker.     · You have new or worse trouble breathing.     · You have a new or higher fever.     · You have a new rash.    Watch closely for changes in your health, and be sure to contact your doctor if:    · You have a new symptom, such as a sore throat, an earache, or sinus pain.     · You cough more deeply or more often, especially if you notice more mucus or a change in the color of your mucus.     · You do not get better as expected. Where can you learn more? Go to http://douglas-hans.info/. Enter K218 in the search box to learn more about \"Upper Respiratory Infection (Cold): Care Instructions. \"  Current as of: September 5, 2018  Content Version: 11.9  © 8714-4378 [employer]. Care instructions adapted under license by [employer] (which disclaims liability or warranty for this information). If you have questions about a medical condition or this instruction, always ask your healthcare professional. [place] [address] any warranty or liability for your use of this information.

## 2019-04-15 ENCOUNTER — OFFICE VISIT (OUTPATIENT)
Dept: INTERNAL MEDICINE CLINIC | Age: 54
End: 2019-04-15

## 2019-04-15 VITALS
HEART RATE: 94 BPM | SYSTOLIC BLOOD PRESSURE: 112 MMHG | TEMPERATURE: 97.9 F | OXYGEN SATURATION: 97 % | RESPIRATION RATE: 16 BRPM | HEIGHT: 75 IN | BODY MASS INDEX: 31.08 KG/M2 | DIASTOLIC BLOOD PRESSURE: 80 MMHG | WEIGHT: 250 LBS

## 2019-04-15 DIAGNOSIS — M51.36 DDD (DEGENERATIVE DISC DISEASE), LUMBAR: ICD-10-CM

## 2019-04-15 DIAGNOSIS — M54.50 CHRONIC MIDLINE LOW BACK PAIN WITHOUT SCIATICA: ICD-10-CM

## 2019-04-15 DIAGNOSIS — I10 ESSENTIAL HYPERTENSION: Primary | ICD-10-CM

## 2019-04-15 DIAGNOSIS — K21.9 GASTROESOPHAGEAL REFLUX DISEASE, ESOPHAGITIS PRESENCE NOT SPECIFIED: ICD-10-CM

## 2019-04-15 DIAGNOSIS — Z72.0 TOBACCO ABUSE: ICD-10-CM

## 2019-04-15 DIAGNOSIS — F41.9 ANXIETY: ICD-10-CM

## 2019-04-15 DIAGNOSIS — G89.29 CHRONIC MIDLINE LOW BACK PAIN WITHOUT SCIATICA: ICD-10-CM

## 2019-04-15 RX ORDER — CLONIDINE HYDROCHLORIDE 0.2 MG/1
0.2 TABLET ORAL 2 TIMES DAILY
COMMUNITY
End: 2020-01-15 | Stop reason: SDUPTHER

## 2019-04-15 RX ORDER — NICOTINE 7MG/24HR
1 PATCH, TRANSDERMAL 24 HOURS TRANSDERMAL EVERY 24 HOURS
Qty: 30 PATCH | Refills: 0 | Status: SHIPPED | OUTPATIENT
Start: 2019-04-15 | End: 2019-05-15

## 2019-04-15 RX ORDER — AMLODIPINE BESYLATE 10 MG/1
10 TABLET ORAL DAILY
COMMUNITY
End: 2019-11-12

## 2019-04-15 RX ORDER — CLONAZEPAM 1 MG/1
1 TABLET ORAL 2 TIMES DAILY
Qty: 60 TAB | Refills: 5 | Status: SHIPPED | OUTPATIENT
Start: 2019-04-15 | End: 2019-06-13 | Stop reason: DRUGHIGH

## 2019-04-15 RX ORDER — PANTOPRAZOLE SODIUM 40 MG/1
40 TABLET, DELAYED RELEASE ORAL DAILY
COMMUNITY
End: 2019-12-31 | Stop reason: SDUPTHER

## 2019-04-15 RX ORDER — MONTELUKAST SODIUM 10 MG/1
10 TABLET, FILM COATED ORAL DAILY
COMMUNITY
End: 2020-06-11 | Stop reason: SDUPTHER

## 2019-04-15 NOTE — PROGRESS NOTES
Reviewed record in preparation for visit and have obtained necessary documentation. Identified pt with two pt identifiers(name and ). Chief Complaint Patient presents with [address] [place] [address] Health Maintenance Due Topic Date Due  
 Hepatitis C Test  1965  Pneumococcal Vaccine (1 of 1 - PPSV23) 1971  
 DTaP/Tdap/Td  (1 - Tdap) 1986  Shingles Vaccine (1 of 2) 2015  Stool testing for trace blood  2015 Mr. Mell Otto has a reminder for a \"due or due soon\" health maintenance. I have asked that he discuss this further with his primary care provider for follow-up on this health maintenance. Coordination of Care Questionnaire: 
:  
 
1) Have you been to an emergency room, urgent care clinic since your last visit? no  
Hospitalized since your last visit? no          
 
2) Have you seen or consulted any other health care providers outside of [address] [place] [address] since your last visit? no  (Include any pap smears or colon screenings in this section.) 3) In the event something were to happen to you and you were unable to speak on your behalf, do you have an Advance Directive/ Living Will in place stating your wishes? NO Do you have an Advance Directive on file? no 
 
4) Are you interested in receiving information on Advance Directives? NO Patient is accompanied by self I have received verbal consent from Danya Brittle to discuss any/all medical information while they are present in the room.

## 2019-04-15 NOTE — PATIENT INSTRUCTIONS
Back Pain, Emergency or Urgent Symptoms: Care Instructions Your Care Instructions Many people have back pain at one time or another. In most cases, pain gets better with self-care that includes over-the-counter pain medicine, ice, heat, and exercises. Unless you have symptoms of a severe injury or heart attack, you may be able to give yourself a few days before you call a doctor. But some back problems are very serious. Do not ignore symptoms that need to be checked right away. Follow-up care is a key part of your treatment and safety. Be sure to make and go to all appointments, and call your doctor if you are having problems. It's also a good idea to know your test results and keep a list of the medicines you take. How can you care for yourself at home? · Sit or lie in positions that are most comfortable and that reduce your pain. Try one of these positions when you lie down: ? Lie on your back with your knees bent and supported by large pillows. ? Lie on the floor with your legs on the seat of a sofa or chair. ? Lie on your side with your knees and hips bent and a pillow between your legs. ? Lie on your stomach if it does not make pain worse. · Do not sit up in bed, and avoid soft couches and twisted positions. Bed rest can help relieve pain at first, but it delays healing. Avoid bed rest after the first day. · Change positions every 30 minutes. If you must sit for long periods of time, take breaks from sitting. Get up and walk around, or lie flat. · Try using a heating pad on a low or medium setting, for 15 to 20 minutes every 2 or 3 hours. Try a warm shower in place of one session with the heating pad. You can also buy single-use heat wraps that last up to 8 hours. You can also try ice or cold packs on your back for 10 to 20 minutes at a time, several times a day. (Put a thin cloth between the ice pack and your skin.) This reduces pain and makes it easier to be active and exercise. · Take pain medicines exactly as directed. ? If the doctor gave you a prescription medicine for pain, take it as prescribed. ? If you are not taking a prescription pain medicine, ask your doctor if you can take an over-the-counter medicine. When should you call for help? Call 911 anytime you think you may need emergency care. For example, call if: 
  · You are unable to move a leg at all.  
  · You have back pain with severe belly pain.  
  · You have symptoms of a heart attack. These may include: 
? Chest pain or pressure, or a strange feeling in the chest. 
? Sweating. ? Shortness of breath. ? Nausea or vomiting. ? Pain, pressure, or a strange feeling in the back, neck, jaw, or upper belly or in one or both shoulders or arms. ? Lightheadedness or sudden weakness. ? A fast or irregular heartbeat. After you call 911, the  may tell you to chew 1 adult-strength or 2 to 4 low-dose aspirin. Wait for an ambulance. Do not try to drive yourself.  
 Call your doctor now or seek immediate medical care if: 
  · You have new or worse symptoms in your arms, legs, chest, belly, or buttocks. Symptoms may include: 
? Numbness or tingling. ? Weakness. ? Pain.  
  · You lose bladder or bowel control.  
  · You have back pain and: ? You have injured your back while lifting or doing some other activity. Call if the pain is severe, has not gone away after 1 or 2 days, and you cannot do your normal daily activities. ? You have had a back injury before that needed treatment. ? Your pain has lasted longer than 4 weeks. ? You have had weight loss you cannot explain. ? You have a fever. ? You are age 48 or older. ? You have cancer now or have had it before.  
 Watch closely for changes in your health, and be sure to contact your doctor if you are not getting better as expected. Where can you learn more? Go to http://douglas-hans.info/. Enter D466 in the search box to learn more about \"Back Pain, Emergency or Urgent Symptoms: Care Instructions. \" Current as of: September 23, 2018 Content Version: 11.9 © 1519-3550 [employer]. Care instructions adapted under license by [employer] (which disclaims liability or warranty for this information). If you have questions about a medical condition or this instruction, always ask your healthcare professional. [place] [address] any warranty or liability for your use of this information.

## 2019-04-15 NOTE — PROGRESS NOTES
Anoop Krueger is a 48 y.o. male who presents for evaluation of new pt visit. Used to see dr Sayda Erickson at [facility], last saw him about 3 months ago. On disability from chronic LBP from DDD. Has never been to a spine specialist.  States [facility] Had been on ultram for back pain, with some minimal relief. No issues with loss of control of bowels or bladder. Used to be on klonopin to help with anxiety, but he could not longer afford to see psychiatry, dr Ilia Monae. Has cut back on smoking considerably, down to 2-3 cigarettes daily. His fiance is also my pt, pasquale starr. ROS: 
Constitutional: negative for fevers, chills, anorexia and weight loss Eyes:   negative for visual disturbance and irritation ENT:   negative for tinnitus,sore throat,nasal congestion,ear pain,hoarseness Respiratory:  negative for cough, hemoptysis, dyspnea,wheezing CV:   negative for chest pain, palpitations, lower extremity edema GI:   negative for nausea, vomiting, diarrhea, abdominal pain,melena Genitourinary: negative for frequency, dysuria and hematuria Musculoskel: negative for myalgias, arthralgias,  muscle weakness, joint pain. ++LBP Neurological:  negative for headaches, dizziness, focal weakness, numbness Psychiatric:     Negative for depression or anxiety Past Medical History:  
Diagnosis Date  Chronic low back pain  Hypertension Past Surgical History:  
Procedure Laterality Date  HX ORTHOPAEDIC Family History Problem Relation Age of Onset  Cancer Mother  Heart Disease Father Social History Socioeconomic History  Marital status: SINGLE Spouse name: Not on file  Number of children: Not on file  Years of education: Not on file  Highest education level: Not on file Occupational History  Not on file Social Needs  Financial resource strain: Not on file  Food insecurity: Worry: Not on file Inability: Not on file  Transportation needs:  
  Medical: Not on file Non-medical: Not on file Tobacco Use  Smoking status: Current Every Day Smoker Packs/day: 1.50 Years: 10.00 Pack years: 15.00  Smokeless tobacco: Never Used Substance and Sexual Activity  Alcohol use: Yes  Drug use: No  
 Sexual activity: Not on file Lifestyle  Physical activity:  
  Days per week: Not on file Minutes per session: Not on file  Stress: Not on file Relationships  Social connections:  
  Talks on phone: Not on file Gets together: Not on file Attends Spiritism service: Not on file Active member of club or organization: Not on file Attends meetings of clubs or organizations: Not on file Relationship status: Not on file  Intimate partner violence:  
  Fear of current or ex partner: Not on file Emotionally abused: Not on file Physically abused: Not on file Forced sexual activity: Not on file Other Topics Concern  Not on file Social History Narrative  Not on file Visit Vitals /80 (BP 1 Location: Left arm, BP Patient Position: Sitting) Pulse 94 Temp 97.9 °F (36.6 °C) (Oral) Resp 16 Ht 6' 2.5\" (1.892 m) Wt 250 lb (113.4 kg) SpO2 97% BMI 31.67 kg/m² Physical Examination:  
General - Well appearing male HEENT - PERRL, TM no erythema/opacification, normal nasal turbinates, no oropharyngeal erythema or exudate, MMM Neck - supple, no bruits, no thyroidomegaly, no lymphadenopathy Pulm - clear to auscultation bilaterally Cardio - RRR, normal S1 S2, no murmur Abd - soft, nontender, no masses, no HSM Extrem - no edema, +2 distal pulses Neuro-  No focal deficits, CN intact Assessment/Plan: 1.  htn--well controlled with norvasc, lisinopril, clonidine. Check tsh 2.  Tobacco abuse--rx given for nicotine patches 3.  Anxiety--rx for klonopin, which he has used before with good results 4. Chronic lumbar DDD, with low back pain--get records from [facility]. Referral to neurosx, dr Delaney Mejia 5.  gerd--on protonix 6. Seasonal allergies--continue flonase, singulair 
 
rtc 4 months. Had colon done at [facility].  
 
 
 
Marquis Ambriz III, DO

## 2019-04-16 LAB
CHOLEST SERPL-MCNC: 201 MG/DL (ref 100–199)
EST. AVERAGE GLUCOSE BLD GHB EST-MCNC: 120 MG/DL
HBA1C MFR BLD: 5.8 % (ref 4.8–5.6)
HCV AB S/CO SERPL IA: <0.1 S/CO RATIO (ref 0–0.9)
HDLC SERPL-MCNC: 55 MG/DL
HIV 1+2 AB+HIV1 P24 AG SERPL QL IA: NON REACTIVE
LDLC SERPL CALC-MCNC: 124 MG/DL (ref 0–99)
PSA SERPL-MCNC: 3.4 NG/ML (ref 0–4)
TRIGL SERPL-MCNC: 108 MG/DL (ref 0–149)
TSH SERPL DL<=0.005 MIU/L-ACNC: 0.9 UIU/ML (ref 0.45–4.5)
VLDLC SERPL CALC-MCNC: 22 MG/DL (ref 5–40)

## 2019-04-16 NOTE — PROGRESS NOTES
Cholesterol levels bit elevated, as is sugars, consistent with pre/borderline diabetes. Would benefit from some dietary education. Other labs look fine.

## 2019-04-16 NOTE — PROGRESS NOTES
Mobile: [phone] pt results note from Dr. Pippa Zarate. Pt scheduled appt for prediabetes education on 4/30/19 at 9:00.

## 2019-04-30 ENCOUNTER — TELEPHONE (OUTPATIENT)
Dept: INTERNAL MEDICINE CLINIC | Age: 54
End: 2019-04-30

## 2019-04-30 ENCOUNTER — PATIENT OUTREACH (OUTPATIENT)
Dept: INTERNAL MEDICINE CLINIC | Age: 54
End: 2019-04-30

## 2019-04-30 NOTE — PROGRESS NOTES
Pt missed appt this morning for prediabetes self management education. Pt rescheduled for Monday May 6th at 9:00. Pt stated when he gets his work schedule he will call if he has to work that day.

## 2019-05-01 RX ORDER — ALBUTEROL SULFATE 90 UG/1
AEROSOL, METERED RESPIRATORY (INHALATION)
COMMUNITY
Start: 2019-04-28 | End: 2019-05-01 | Stop reason: SDUPTHER

## 2019-05-02 RX ORDER — ALBUTEROL SULFATE 90 UG/1
2 AEROSOL, METERED RESPIRATORY (INHALATION)
Qty: 1 INHALER | Refills: 1 | Status: SHIPPED | OUTPATIENT
Start: 2019-05-02 | End: 2019-06-25 | Stop reason: SDUPTHER

## 2019-05-03 ENCOUNTER — PATIENT OUTREACH (OUTPATIENT)
Dept: INTERNAL MEDICINE CLINIC | Age: 54
End: 2019-05-03

## 2019-05-05 ENCOUNTER — APPOINTMENT (OUTPATIENT)
Dept: GENERAL RADIOLOGY | Age: 54
End: 2019-05-05
Attending: EMERGENCY MEDICINE
Payer: MEDICAID

## 2019-05-05 ENCOUNTER — HOSPITAL ENCOUNTER (EMERGENCY)
Age: 54
Discharge: HOME | End: 2019-05-05
Attending: EMERGENCY MEDICINE
Payer: MEDICAID

## 2019-05-05 VITALS
HEIGHT: 74 IN | BODY MASS INDEX: 31.77 KG/M2 | RESPIRATION RATE: 18 BRPM | SYSTOLIC BLOOD PRESSURE: 141 MMHG | HEART RATE: 82 BPM | DIASTOLIC BLOOD PRESSURE: 88 MMHG | WEIGHT: 247.58 LBS | OXYGEN SATURATION: 97 % | TEMPERATURE: 98 F

## 2019-05-05 DIAGNOSIS — K59.00 CONSTIPATION, UNSPECIFIED CONSTIPATION TYPE: Primary | ICD-10-CM

## 2019-05-05 DIAGNOSIS — I10 ACCELERATED HYPERTENSION: ICD-10-CM

## 2019-05-05 DIAGNOSIS — R10.9 ACUTE ABDOMINAL PAIN: ICD-10-CM

## 2019-05-05 DIAGNOSIS — R11.0 NAUSEA WITHOUT VOMITING: ICD-10-CM

## 2019-05-05 LAB
ALBUMIN SERPL-MCNC: 3.9 G/DL (ref 3.5–5)
ALBUMIN/GLOB SERPL: 1 {RATIO} (ref 1.1–2.2)
ALP SERPL-CCNC: 96 U/L (ref 45–117)
ALT SERPL-CCNC: 59 U/L (ref 12–78)
ANION GAP SERPL CALC-SCNC: 4 MMOL/L (ref 5–15)
APPEARANCE UR: CLEAR
AST SERPL-CCNC: 38 U/L (ref 15–37)
BASOPHILS # BLD: 0.1 K/UL (ref 0–0.1)
BASOPHILS NFR BLD: 1 % (ref 0–1)
BILIRUB SERPL-MCNC: 0.8 MG/DL (ref 0.2–1)
BILIRUB UR QL CFM: NEGATIVE
BUN SERPL-MCNC: 9 MG/DL (ref 6–20)
BUN/CREAT SERPL: 10 (ref 12–20)
CALCIUM SERPL-MCNC: 9.4 MG/DL (ref 8.5–10.1)
CHLORIDE SERPL-SCNC: 108 MMOL/L (ref 97–108)
CO2 SERPL-SCNC: 27 MMOL/L (ref 21–32)
COLOR UR: ABNORMAL
CREAT SERPL-MCNC: 0.92 MG/DL (ref 0.7–1.3)
DIFFERENTIAL METHOD BLD: ABNORMAL
EOSINOPHIL # BLD: 0.9 K/UL (ref 0–0.4)
EOSINOPHIL NFR BLD: 10 % (ref 0–7)
ERYTHROCYTE [DISTWIDTH] IN BLOOD BY AUTOMATED COUNT: 14.1 % (ref 11.5–14.5)
GLOBULIN SER CALC-MCNC: 3.8 G/DL (ref 2–4)
GLUCOSE SERPL-MCNC: 92 MG/DL (ref 65–100)
GLUCOSE UR STRIP.AUTO-MCNC: NEGATIVE MG/DL
HCT VFR BLD AUTO: 51 % (ref 36.6–50.3)
HGB BLD-MCNC: 17 G/DL (ref 12.1–17)
HGB UR QL STRIP: NEGATIVE
IMM GRANULOCYTES # BLD AUTO: 0 K/UL (ref 0–0.04)
IMM GRANULOCYTES NFR BLD AUTO: 0 % (ref 0–0.5)
KETONES UR QL STRIP.AUTO: ABNORMAL MG/DL
LEUKOCYTE ESTERASE UR QL STRIP.AUTO: NEGATIVE
LYMPHOCYTES # BLD: 1.7 K/UL (ref 0.8–3.5)
LYMPHOCYTES NFR BLD: 19 % (ref 12–49)
MCH RBC QN AUTO: 29.1 PG (ref 26–34)
MCHC RBC AUTO-ENTMCNC: 33.3 G/DL (ref 30–36.5)
MCV RBC AUTO: 87.2 FL (ref 80–99)
MONOCYTES # BLD: 0.7 K/UL (ref 0–1)
MONOCYTES NFR BLD: 8 % (ref 5–13)
NEUTS SEG # BLD: 5.5 K/UL (ref 1.8–8)
NEUTS SEG NFR BLD: 62 % (ref 32–75)
NITRITE UR QL STRIP.AUTO: NEGATIVE
NRBC # BLD: 0 K/UL (ref 0–0.01)
NRBC BLD-RTO: 0 PER 100 WBC
PH UR STRIP: 5.5 [PH] (ref 5–8)
PLATELET # BLD AUTO: 264 K/UL (ref 150–400)
PMV BLD AUTO: 10.5 FL (ref 8.9–12.9)
POTASSIUM SERPL-SCNC: 3.9 MMOL/L (ref 3.5–5.1)
PROT SERPL-MCNC: 7.7 G/DL (ref 6.4–8.2)
PROT UR STRIP-MCNC: NEGATIVE MG/DL
RBC # BLD AUTO: 5.85 M/UL (ref 4.1–5.7)
SODIUM SERPL-SCNC: 139 MMOL/L (ref 136–145)
SP GR UR REFRACTOMETRY: 1.03 (ref 1–1.03)
UROBILINOGEN UR QL STRIP.AUTO: 1 EU/DL (ref 0.2–1)
WBC # BLD AUTO: 8.9 K/UL (ref 4.1–11.1)

## 2019-05-05 PROCEDURE — 36415 COLL VENOUS BLD VENIPUNCTURE: CPT

## 2019-05-05 PROCEDURE — 80053 COMPREHEN METABOLIC PANEL: CPT

## 2019-05-05 PROCEDURE — 74011250637 HC RX REV CODE- 250/637: Performed by: EMERGENCY MEDICINE

## 2019-05-05 PROCEDURE — 99283 EMERGENCY DEPT VISIT LOW MDM: CPT

## 2019-05-05 PROCEDURE — 85025 COMPLETE CBC W/AUTO DIFF WBC: CPT

## 2019-05-05 PROCEDURE — 81003 URINALYSIS AUTO W/O SCOPE: CPT

## 2019-05-05 PROCEDURE — 74019 RADEX ABDOMEN 2 VIEWS: CPT

## 2019-05-05 RX ORDER — POLYETHYLENE GLYCOL 3350 17 G/17G
17 POWDER, FOR SOLUTION ORAL DAILY
Qty: 10 PACKET | Refills: 0 | Status: SHIPPED | OUTPATIENT
Start: 2019-05-05 | End: 2019-07-18 | Stop reason: SDUPTHER

## 2019-05-05 RX ORDER — DICLOFENAC SODIUM 50 MG/1
50 TABLET, DELAYED RELEASE ORAL 2 TIMES DAILY
Qty: 20 TAB | Refills: 0 | Status: SHIPPED | OUTPATIENT
Start: 2019-05-05 | End: 2019-06-13

## 2019-05-05 RX ORDER — MAGNESIUM CITRATE
296 SOLUTION, ORAL ORAL
Status: COMPLETED | OUTPATIENT
Start: 2019-05-05 | End: 2019-05-05

## 2019-05-05 RX ORDER — ONDANSETRON 2 MG/ML
4 INJECTION INTRAMUSCULAR; INTRAVENOUS
Status: DISCONTINUED | OUTPATIENT
Start: 2019-05-05 | End: 2019-05-05

## 2019-05-05 RX ORDER — DICLOFENAC SODIUM 10 MG/G
GEL TOPICAL 4 TIMES DAILY
Qty: 1 EACH | Refills: 0 | Status: SHIPPED | OUTPATIENT
Start: 2019-05-05 | End: 2019-06-13 | Stop reason: DRUGHIGH

## 2019-05-05 RX ADMIN — MAGNESIUM CITRATE 296 ML: 1.75 LIQUID ORAL at 19:52

## 2019-05-05 NOTE — ED NOTES
Assumed care from triage. Patient comes to the ED complaining of tightness in the patients abdomen. The patient has been not having regular bowel movements. Patient has been taking laxatives and will have a bowel movement. Patient states that he normally has a bowel movement once a day and now cant. Patient states that his abdomen is tight.

## 2019-05-05 NOTE — DISCHARGE INSTRUCTIONS
Thank you for allowing us to take care of you today! We hope we addressed all of your concerns and needs. We strive to provide excellent quality care in the Emergency Department. You will receive a survey after your visit to evaluate the care you were provided. Should you receive a survey from us, we invite you to share your experience and tell us what made it excellent. It was a pleasure serving you, we invite you to share your experience with us, in our pursuit for excellence, should you be selected to receive a survey. The exam and treatment you received in the Emergency Department were for an urgent problem and are not intended as complete care. It is important that you follow up with a doctor, nurse practitioner, or physician assistant for ongoing care. If your symptoms become worse or you do not improve as expected and you are unable to reach your usual health care provider, you should return to the Emergency Department. We are available 24 hours a day. Please take your discharge instructions with you when you go to your follow-up appointment. If you have any problem arranging a follow-up appointment, contact the Emergency Department immediately. If a prescription has been provided, please have it filled as soon as possible to prevent a delay in treatment. Read the entire medication instruction sheet provided to you by the pharmacy. If you have any questions or reservations about taking the medication due to side effects or interactions with other medications, please call your primary care physician or contact the ER to speak with the charge nurse. Make an appointment with your family doctor or the physician you were referred to for follow-up of this visit as instructed on your discharge paperwork, as this is mandatory follow-up. Return to the ER if you are unable to be seen or if you are unable to be seen in a timely manner.     If you have any problem arranging the follow-up visit, contact the Emergency Department immediately. I hope you feel better and thank you again for allow us to provide you with excellent care today at [facility]! Warmest regards,    Bhavesh Spain MD  Emergency Medicine Physician  [facility]              Patient Education        Constipation: Care Instructions  Your Care Instructions    Constipation means that you have a hard time passing stools (bowel movements). People pass stools from 3 times a day to once every 3 days. What is normal for you may be different. Constipation may occur with pain in the rectum and cramping. The pain may get worse when you try to pass stools. Sometimes there are small amounts of bright red blood on toilet paper or the surface of stools. This is because of enlarged veins near the rectum (hemorrhoids). A few changes in your diet and lifestyle may help you avoid ongoing constipation. Your doctor may also prescribe medicine to help loosen your stool. Some medicines can cause constipation. These include pain medicines and antidepressants. Tell your doctor about all the medicines you take. Your doctor may want to make a medicine change to ease your symptoms. Follow-up care is a key part of your treatment and safety. Be sure to make and go to all appointments, and call your doctor if you are having problems. It's also a good idea to know your test results and keep a list of the medicines you take. How can you care for yourself at home? · Drink plenty of fluids, enough so that your urine is light yellow or clear like water. If you have kidney, heart, or liver disease and have to limit fluids, talk with your doctor before you increase the amount of fluids you drink. · Include high-fiber foods in your diet each day. These include fruits, vegetables, beans, and whole grains. · Get at least 30 minutes of exercise on most days of the week. Walking is a good choice. You also may want to do other activities, such as running, swimming, cycling, or playing tennis or team sports. · Take a fiber supplement, such as Citrucel or Metamucil, every day. Read and follow all instructions on the label. · Schedule time each day for a bowel movement. A daily routine may help. Take your time having your bowel movement. · Support your feet with a small step stool when you sit on the toilet. This helps flex your hips and places your pelvis in a squatting position. · Your doctor may recommend an over-the-counter laxative to relieve your constipation. Examples are Milk of Magnesia and MiraLax. Read and follow all instructions on the label. Do not use laxatives on a long-term basis. When should you call for help? Call your doctor now or seek immediate medical care if:    · You have new or worse belly pain.     · You have new or worse nausea or vomiting.     · You have blood in your stools.    Watch closely for changes in your health, and be sure to contact your doctor if:    · Your constipation is getting worse.     · You do not get better as expected. Where can you learn more? Go to http://douglas-hans.info/. Enter 21 [phone] in the search box to learn more about \"Constipation: Care Instructions. \"  Current as of: September 23, 2018  Content Version: 11.9  © 3868-0180 [employer]. Care instructions adapted under license by [employer] (which disclaims liability or warranty for this information). If you have questions about a medical condition or this instruction, always ask your healthcare professional. [place] [address] any warranty or liability for your use of this information.

## 2019-05-06 ENCOUNTER — TELEPHONE (OUTPATIENT)
Dept: INTERNAL MEDICINE CLINIC | Age: 54
End: 2019-05-06

## 2019-05-06 NOTE — TELEPHONE ENCOUNTER
----- Message from Nataliia Jang sent at 5/3/2019  5:26 PM EDT -----  Regarding: Dr. Isael Ulloa   Pt is requesting a Rx that can help with is stomach issues. Patient also state he needs to discuss getting something prescribed for his constipation as Over The Counter is not working. Best contact number is [phone].       Message copied/pasted from Cayla Mcdaniel

## 2019-05-09 ENCOUNTER — TELEPHONE (OUTPATIENT)
Dept: INTERNAL MEDICINE CLINIC | Age: 54
End: 2019-05-09

## 2019-05-09 NOTE — TELEPHONE ENCOUNTER
Pt stated he went to [facility] ER a few days ago for constipation. He stated they wanted him to see a gastro doctor. He is requesting for you to call him. Pts number is [phone].        Message received & copied from [facility]

## 2019-05-10 NOTE — TELEPHONE ENCOUNTER
Called, spoke to pt. Two identifiers confirmed. Contact information given for Dr. Toshia Mitchell. Pt verbalized understanding of information discussed w/ no further questions at this time.

## 2019-05-12 NOTE — ED PROVIDER NOTES
EMERGENCY DEPARTMENT HISTORY AND PHYSICAL EXAM 
 
 
Date: 5/5/2019 Patient Name: Mandy Villanueva 
Patient Age and Sex: 48 y.o. male History of Presenting Illness Chief Complaint Patient presents with  Abdominal Pain  
  feels tight trying linzess; nausea no vomiting; the linzess worked but now not working  Constipation History Provided By: Patient HPI: Mandy Villanueva, 48 y.o. male with PMHx significant for chronic low back pain, HTN presents with c/o of two days of generalized abdominal cramping with constipation. Pt states he tried OTC Linzess with minimal relief of symptoms. He reports associated nausea but no emesis. His reports the pain is generalized and currently 4/10 intensity. Pt reports having irregular bowel movements and reports usually having at least one BM a day. He denies any chronic abdominal issues or seeing a GI. Pt denies any other alleviating or exacerbating factors. Additionally, pt specifically denies any recent fever, chills, headache, vomiting, diarrhea, CP, SOB, lightheadedness, dizziness, numbness, weakness, tingling, BLE swelling, heart palpitations, urinary sxs, changes in PO intake, melena, hematochezia, cough, or congestion. PCP: Herson Coles,  There are no other complaints, changes or physical findings at this time. Past History Past Medical History: 
Past Medical History:  
Diagnosis Date  Chronic low back pain  Hypertension Past Surgical History: 
Past Surgical History:  
Procedure Laterality Date  HX ORTHOPAEDIC Family History: 
Family History Problem Relation Age of Onset  Cancer Mother  Heart Disease Father Social History: 
Social History Tobacco Use  Smoking status: Current Every Day Smoker Packs/day: 1.50 Years: 10.00 Pack years: 15.00  Smokeless tobacco: Never Used Substance Use Topics  Alcohol use: Yes  Drug use: No  
 
 
Allergies: Allergies Allergen Reactions  Vicodin [Hydrocodone-Acetaminophen] Other (comments) Headache Medications: No current facility-administered medications on file prior to encounter. Current Outpatient Medications on File Prior to Encounter Medication Sig Dispense Refill  albuterol (PROVENTIL HFA, VENTOLIN HFA, PROAIR HFA) 90 mcg/actuation inhaler Take 2 Puffs by inhalation every six (6) hours as needed for Wheezing. 1 Inhaler 1  
 amLODIPine (NORVASC) 5 mg tablet Take 5 mg by mouth daily.  cloNIDine HCl (CATAPRES) 0.2 mg tablet Take  by mouth two (2) times a day.  montelukast (SINGULAIR) 10 mg tablet Take 10 mg by mouth daily.  pantoprazole (PROTONIX) 20 mg tablet Take 20 mg by mouth daily.  clonazePAM (KLONOPIN) 1 mg tablet Take 1 Tab by mouth two (2) times a day. Max Daily Amount: 2 mg. 60 Tab 5  
 nicotine (NICODERM CQ) 7 mg/24 hr 1 Patch by TransDERmal route every twenty-four (24) hours for 30 days. 30 Patch 0  
 fluticasone propionate (FLONASE) 50 mcg/actuation nasal spray 2 Sprays by Both Nostrils route daily. 1 Bottle 0  
 Nasal Dilators (BREATHE RIGHT) strp 1 Box by Apply Externally route daily. 10 Strip 0  
 aspirin 81 mg tablet Take 1 Tab by mouth daily. 30 Tab 1  
 lisinopril (PRINIVIL, ZESTRIL) 10 mg tablet Take 10 mg by mouth daily. Review of Systems Review of Systems Constitutional: Negative. Negative for chills and fever. HENT: Negative. Negative for congestion, facial swelling, rhinorrhea, sore throat, trouble swallowing and voice change. Eyes: Negative. Respiratory: Negative. Negative for apnea, cough, chest tightness, shortness of breath and wheezing. Cardiovascular: Negative. Negative for chest pain, palpitations and leg swelling. Gastrointestinal: Positive for abdominal pain, constipation and nausea. Negative for abdominal distention, blood in stool, diarrhea and vomiting. Endocrine: Negative. Negative for cold intolerance, heat intolerance and polyuria. Genitourinary: Negative. Negative for difficulty urinating, dysuria, flank pain, frequency, hematuria and urgency. Musculoskeletal: Negative. Negative for arthralgias, back pain, myalgias, neck pain and neck stiffness. Skin: Negative. Negative for color change and rash. Neurological: Negative. Negative for dizziness, syncope, facial asymmetry, speech difficulty, weakness, light-headedness, numbness and headaches. Hematological: Negative. Does not bruise/bleed easily. Psychiatric/Behavioral: Negative. Negative for confusion and self-injury. The patient is not nervous/anxious. Physical Exam  
Physical Exam  
Constitutional: He is oriented to person, place, and time. Vital signs are normal. He appears well-developed and well-nourished. He is cooperative. Non-toxic appearance. HENT:  
Head: Normocephalic and atraumatic. Mouth/Throat: Mucous membranes are normal. No posterior oropharyngeal erythema. Eyes: Pupils are equal, round, and reactive to light. Conjunctivae and EOM are normal.  
Neck: Normal range of motion. Cardiovascular: Normal rate, regular rhythm, normal heart sounds and intact distal pulses. Exam reveals no gallop and no friction rub. No murmur heard. Pulmonary/Chest: Effort normal and breath sounds normal. No respiratory distress. He has no wheezes. He has no rales. He exhibits no tenderness. Abdominal: Soft. Bowel sounds are normal. He exhibits no distension and no mass. There is no tenderness. There is no rebound and no guarding. Genitourinary:  
Genitourinary Comments: Procedure Note - Rectal Exam:  
Performed by: Chapin Quick MD 
Chaperoned by: Nikita Martinez Rectal exam performed. Brown stool was collected. Stool was collected and sent to the lab for Hemoccult testing. Other findings: no hard stool, no impaction, no hemorrhoids. The procedure took 1-15 minutes, and pt tolerated well. Musculoskeletal: Normal range of motion. He exhibits no edema, tenderness or deformity. Neurological: He is alert and oriented to person, place, and time. He displays normal reflexes. No cranial nerve deficit. He exhibits normal muscle tone. Coordination normal.  
Skin: Skin is warm. No rash noted. Psychiatric: He has a normal mood and affect. Nursing note and vitals reviewed. Diagnostic Study Results Labs - No results found for this or any previous visit (from the past 24 hour(s)). Radiologic Studies -  
XR ABD FLAT/ ERECT Final Result IMPRESSION: No evidence for bowel obstruction or free intraperitoneal air. CT Results  (Last 48 hours) None CXR Results  (Last 48 hours) None Medical Decision Making I am the first provider for this patient. I reviewed the vital signs, available nursing notes, past medical history, past surgical history, family history and social history. Vital Signs-Reviewed the patient's vital signs. Visit Vitals /88 Pulse 82 Temp 98 °F (36.7 °C) Resp 18 Ht 6' 2\" (1.88 m) Wt 112.3 kg (247 lb 9.2 oz) SpO2 97% BMI 31.79 kg/m² Pulse Oximetry Analysis - 97% on RA Cardiac Monitor:  
Rate: 82 bpm 
Rhythm: Normal Sinus Rhythm Records Reviewed: Nursing Notes, Old Medical Records, Previous electrocardiograms, Previous Radiology Studies and Previous Laboratory Studies Provider Notes (Medical Decision Making):  
Patient presents with decreased BM. Stable vitals and benign abdominal exam.   DDx: constipation, SBO, volvulus, hemorrhoids. Will obtain AXR series to r/o surgical pathology. Will perform rectal to evaluate for fecal impaction. Treat symptomatically and reassess.  
 
For the constipation, patient counseled to push fluids, use Dulcolax oral and/or suppository until bowel movement occurs, then Colace or Surfak bid-tid to maintain soft bowel movement until normal pattern ensues. Maintain a high fiber diet with plenty of roughage, and 6-8 large glasses of water daily to avoid constipation in the future. Timing elimination to occur after meals, or after a hot drink, can also improve the situation long term. 1 or 2 Fleet enemas may be necessary. Patient will call PCP symptoms persist or worsen. ED Course:  
Initial assessment performed. The patients presenting problems have been discussed, and they are in agreement with the care plan formulated and outlined with them. I have encouraged them to ask questions as they arise throughout their visit. TOBACCO COUNSELING:  
Upon evaluation, pt expressed that they are a current tobacco user. For approximately 10 minutes, pt has been counseled on the dangers of smoking and was encouraged to quit as soon as possible in order to decrease further risks to their health. Pt has conveyed their understanding of the risks involved should they continue to use tobacco products. ALCOHOL/SUBSTANCE ABUSE COUNSELING: 
Upon evaluation, pt endorsed recent alcohol/illicit drug use. For approximately 15 minutes, pt has been counseled on the dangers of alcohol and illicit drug use on their health, and they were encouraged to quit as soon as possible in order to decrease further risks to their health. Pt has conveyed their understanding of the risks involved should they continue to use these products. HYPERTENSION COUNSELING Education was provided to the patient today regarding their hypertension. Patient is made aware of their elevated blood pressure and is instructed to follow up this week with their Primary Care for a recheck. Patient is counseled regarding consequences of chronic, uncontrolled hypertension including kidney disease, heart disease, stroke or even death. Patient states their understanding and agrees to follow up this week. Additionally, during their visit, I discussed sodium restriction, maintaining ideal body weight and regular exercise program as physiologic means to achieve blood pressure control. The patient will strive towards this. I reviewed our electronic medical record system for any past medical records that were available that may contribute to the patient's current condition, the nursing notes and vital signs from today's visit. Esme Rodriguez MD 
Medications Administered During ED Course: 
Medications  
magnesium citrate solution 296 mL (296 mL Oral Given 5/5/19 1952) Procedure Note - Rectal Exam:  
Performed by: Celso Horne MD 
Chaperoned by: Tory Scott Rectal exam performed. Brown stool was collected. Stool was collected and sent to the lab for Hemoccult testing. Other findings: no hard stool, no impaction, no hemorrhoids. The procedure took 1-15 minutes, and pt tolerated well. Progress Note: 
Patient has been reassessed and reports feeling better and symptoms have improved after ED treatment. Tracey Cobb is able to tolerate PO and ambulate per baseline. Aravind Malave Jr's final labs and imaging have been reviewed with him. He has been counseled regarding his diagnosis. He verbally conveys understanding and agreement of the signs, symptoms, diagnosis, treatment and prognosis and additionally agrees to follow up as recommended with Dr. Lolly Meza DO in 24 - 48 hours. He also agrees with the care-plan and conveys that all of his questions have been answered. I have also put together some discharge instructions for him that include: 1) educational information regarding their diagnosis, 2) how to care for their diagnosis at home, as well a 3) list of reasons why they would want to return to the ED prior to their follow-up appointment, should their condition change. I have answered all questions to the patient's satisfaction.  Strict return precautions given. He both understood and agreed with plan as discussed above. Vital signs stable for discharge. Disposition: DISCHARGE The pt is ready for discharge. The pt's signs, symptoms, diagnosis, and discharge instructions have been discussed and pt has conveyed their understanding. The pt is to follow up as recommended or return to ER should their symptoms worsen. Plan has been discussed and pt is in agreement. PLAN: 
1. Discharge Medication List as of 5/5/2019  7:59 PM  
  
CONTINUE these medications which have NOT CHANGED Details  
albuterol (PROVENTIL HFA, VENTOLIN HFA, PROAIR HFA) 90 mcg/actuation inhaler Take 2 Puffs by inhalation every six (6) hours as needed for Wheezing., Normal, Disp-1 Inhaler, R-1  
  
amLODIPine (NORVASC) 5 mg tablet Take 5 mg by mouth daily. , Historical Med  
  
cloNIDine HCl (CATAPRES) 0.2 mg tablet Take  by mouth two (2) times a day., Historical Med  
  
montelukast (SINGULAIR) 10 mg tablet Take 10 mg by mouth daily. , Historical Med  
  
pantoprazole (PROTONIX) 20 mg tablet Take 20 mg by mouth daily. , Historical Med  
  
clonazePAM (KLONOPIN) 1 mg tablet Take 1 Tab by mouth two (2) times a day. Max Daily Amount: 2 mg., Print, Disp-60 Tab, R-5  
  
nicotine (NICODERM CQ) 7 mg/24 hr 1 Patch by TransDERmal route every twenty-four (24) hours for 30 days. , Normal, Disp-30 Patch, R-0  
  
fluticasone propionate (FLONASE) 50 mcg/actuation nasal spray 2 Sprays by Both Nostrils route daily. , Normal, Disp-1 Bottle, R-0 Nasal Dilators (BREATHE RIGHT) strp 1 Box by Apply Externally route daily. , Print, Disp-10 Strip, R-0  
  
aspirin 81 mg tablet Take 1 Tab by mouth daily. Print, 81 mg, Disp-30 Tab, R-1  
  
lisinopril (PRINIVIL, ZESTRIL) 10 mg tablet 10 mg, Oral, DAILY, Until Discontinued, Historical Med 2. Follow-up Information Follow up With Specialties Details Why Contact Info Reggie Brizuela, DO Internal Medicine   [address] [facility] 
[phone] [facility] EMERGENCY DEPT Emergency Medicine  As needed, If symptoms worsen [address] [facility] 
[phone] Return to ED if worse Diagnosis Clinical Impression: 1. Constipation, unspecified constipation type 2. Acute abdominal pain 3. Nausea without vomiting 4. Accelerated hypertension Attestation: 
 
I personally performed the services described in this documentation on this date 5/5/2019 for patient Link Becka. I have reviewed and verified that the information is accurate and complete. Pat Greene MD 
 
Please note that this dictation was completed with [employer] the computer voice recognition software. Quite often unanticipated grammatical, syntax, homophones, and other interpretive errors are inadvertently transcribed by the computer software. Please disregard these errors. Please excuse any errors that have escaped final proofreading. Thank you. This note will not be viewable in [address]

## 2019-05-23 ENCOUNTER — APPOINTMENT (OUTPATIENT)
Dept: GENERAL RADIOLOGY | Age: 54
End: 2019-05-23
Attending: EMERGENCY MEDICINE
Payer: MEDICAID

## 2019-05-23 ENCOUNTER — HOSPITAL ENCOUNTER (EMERGENCY)
Age: 54
Discharge: HOME | End: 2019-05-23
Attending: EMERGENCY MEDICINE
Payer: MEDICAID

## 2019-05-23 VITALS
DIASTOLIC BLOOD PRESSURE: 90 MMHG | RESPIRATION RATE: 13 BRPM | TEMPERATURE: 98.4 F | SYSTOLIC BLOOD PRESSURE: 144 MMHG | HEART RATE: 91 BPM | BODY MASS INDEX: 30.48 KG/M2 | WEIGHT: 245.15 LBS | HEIGHT: 75 IN | OXYGEN SATURATION: 95 %

## 2019-05-23 DIAGNOSIS — R06.03 ACUTE RESPIRATORY DISTRESS: Primary | ICD-10-CM

## 2019-05-23 LAB
ALBUMIN SERPL-MCNC: 4.2 G/DL (ref 3.5–5)
ALBUMIN/GLOB SERPL: 1.1 {RATIO} (ref 1.1–2.2)
ALP SERPL-CCNC: 94 U/L (ref 45–117)
ALT SERPL-CCNC: 62 U/L (ref 12–78)
ANION GAP SERPL CALC-SCNC: 5 MMOL/L (ref 5–15)
AST SERPL-CCNC: 40 U/L (ref 15–37)
ATRIAL RATE: 88 BPM
BASOPHILS # BLD: 0.1 K/UL (ref 0–0.1)
BASOPHILS NFR BLD: 1 % (ref 0–1)
BILIRUB SERPL-MCNC: 0.5 MG/DL (ref 0.2–1)
BNP SERPL-MCNC: 46 PG/ML
BUN SERPL-MCNC: 7 MG/DL (ref 6–20)
BUN/CREAT SERPL: 8 (ref 12–20)
CALCIUM SERPL-MCNC: 9.4 MG/DL (ref 8.5–10.1)
CALCULATED P AXIS, ECG09: 65 DEGREES
CALCULATED R AXIS, ECG10: 60 DEGREES
CALCULATED T AXIS, ECG11: 45 DEGREES
CHLORIDE SERPL-SCNC: 111 MMOL/L (ref 97–108)
CO2 SERPL-SCNC: 27 MMOL/L (ref 21–32)
CREAT SERPL-MCNC: 0.93 MG/DL (ref 0.7–1.3)
DIAGNOSIS, 93000: NORMAL
DIFFERENTIAL METHOD BLD: ABNORMAL
EOSINOPHIL # BLD: 1.3 K/UL (ref 0–0.4)
EOSINOPHIL NFR BLD: 15 % (ref 0–7)
ERYTHROCYTE [DISTWIDTH] IN BLOOD BY AUTOMATED COUNT: 14.7 % (ref 11.5–14.5)
GLOBULIN SER CALC-MCNC: 3.7 G/DL (ref 2–4)
GLUCOSE SERPL-MCNC: 104 MG/DL (ref 65–100)
HCT VFR BLD AUTO: 51.9 % (ref 36.6–50.3)
HGB BLD-MCNC: 17.2 G/DL (ref 12.1–17)
IMM GRANULOCYTES # BLD AUTO: 0 K/UL (ref 0–0.04)
IMM GRANULOCYTES NFR BLD AUTO: 0 % (ref 0–0.5)
LYMPHOCYTES # BLD: 2 K/UL (ref 0.8–3.5)
LYMPHOCYTES NFR BLD: 23 % (ref 12–49)
MCH RBC QN AUTO: 29.2 PG (ref 26–34)
MCHC RBC AUTO-ENTMCNC: 33.1 G/DL (ref 30–36.5)
MCV RBC AUTO: 88.1 FL (ref 80–99)
MONOCYTES # BLD: 0.5 K/UL (ref 0–1)
MONOCYTES NFR BLD: 6 % (ref 5–13)
NEUTS SEG # BLD: 4.9 K/UL (ref 1.8–8)
NEUTS SEG NFR BLD: 55 % (ref 32–75)
NRBC # BLD: 0 K/UL (ref 0–0.01)
NRBC BLD-RTO: 0 PER 100 WBC
P-R INTERVAL, ECG05: 140 MS
PLATELET # BLD AUTO: 254 K/UL (ref 150–400)
PMV BLD AUTO: 10.5 FL (ref 8.9–12.9)
POTASSIUM SERPL-SCNC: 4.1 MMOL/L (ref 3.5–5.1)
PROT SERPL-MCNC: 7.9 G/DL (ref 6.4–8.2)
Q-T INTERVAL, ECG07: 378 MS
QRS DURATION, ECG06: 78 MS
QTC CALCULATION (BEZET), ECG08: 452 MS
RBC # BLD AUTO: 5.89 M/UL (ref 4.1–5.7)
RBC MORPH BLD: ABNORMAL
SODIUM SERPL-SCNC: 143 MMOL/L (ref 136–145)
VENTRICULAR RATE, ECG03: 86 BPM
WBC # BLD AUTO: 8.8 K/UL (ref 4.1–11.1)

## 2019-05-23 PROCEDURE — 94640 AIRWAY INHALATION TREATMENT: CPT

## 2019-05-23 PROCEDURE — 85025 COMPLETE CBC W/AUTO DIFF WBC: CPT

## 2019-05-23 PROCEDURE — 80053 COMPREHEN METABOLIC PANEL: CPT

## 2019-05-23 PROCEDURE — 74011000250 HC RX REV CODE- 250: Performed by: EMERGENCY MEDICINE

## 2019-05-23 PROCEDURE — 71045 X-RAY EXAM CHEST 1 VIEW: CPT

## 2019-05-23 PROCEDURE — 77030029684 HC NEB SM VOL KT MONA -A

## 2019-05-23 PROCEDURE — 93005 ELECTROCARDIOGRAM TRACING: CPT

## 2019-05-23 PROCEDURE — 99284 EMERGENCY DEPT VISIT MOD MDM: CPT

## 2019-05-23 PROCEDURE — 83880 ASSAY OF NATRIURETIC PEPTIDE: CPT

## 2019-05-23 PROCEDURE — 36415 COLL VENOUS BLD VENIPUNCTURE: CPT

## 2019-05-23 PROCEDURE — 96374 THER/PROPH/DIAG INJ IV PUSH: CPT

## 2019-05-23 PROCEDURE — 74011250636 HC RX REV CODE- 250/636

## 2019-05-23 RX ORDER — NEBULIZER AND COMPRESSOR
1 EACH MISCELLANEOUS
Qty: 1 EACH | Refills: 0 | Status: SHIPPED | OUTPATIENT
Start: 2019-05-23 | End: 2019-06-13

## 2019-05-23 RX ORDER — ALBUTEROL SULFATE 1.25 MG/3ML
1.25 SOLUTION RESPIRATORY (INHALATION)
Qty: 25 EACH | Refills: 0 | Status: SHIPPED | OUTPATIENT
Start: 2019-05-23 | End: 2019-09-18 | Stop reason: SDUPTHER

## 2019-05-23 RX ORDER — PREDNISONE 20 MG/1
20 TABLET ORAL DAILY
Qty: 7 TAB | Refills: 0 | Status: SHIPPED | OUTPATIENT
Start: 2019-05-23 | End: 2019-05-30

## 2019-05-23 RX ORDER — ALBUTEROL SULFATE 0.83 MG/ML
5 SOLUTION RESPIRATORY (INHALATION)
Status: COMPLETED | OUTPATIENT
Start: 2019-05-23 | End: 2019-05-23

## 2019-05-23 RX ORDER — IPRATROPIUM BROMIDE AND ALBUTEROL SULFATE 2.5; .5 MG/3ML; MG/3ML
3 SOLUTION RESPIRATORY (INHALATION)
Status: COMPLETED | OUTPATIENT
Start: 2019-05-23 | End: 2019-05-23

## 2019-05-23 RX ADMIN — IPRATROPIUM BROMIDE AND ALBUTEROL SULFATE 3 ML: .5; 3 SOLUTION RESPIRATORY (INHALATION) at 03:09

## 2019-05-23 RX ADMIN — IPRATROPIUM BROMIDE AND ALBUTEROL SULFATE 3 ML: .5; 3 SOLUTION RESPIRATORY (INHALATION) at 02:25

## 2019-05-23 RX ADMIN — METHYLPREDNISOLONE SODIUM SUCCINATE 125 MG: 125 INJECTION, POWDER, FOR SOLUTION INTRAMUSCULAR; INTRAVENOUS at 02:25

## 2019-05-23 RX ADMIN — ALBUTEROL SULFATE 5 MG: 2.5 SOLUTION RESPIRATORY (INHALATION) at 03:40

## 2019-05-23 NOTE — DISCHARGE INSTRUCTIONS
Patient Education        Wheezing or Bronchoconstriction: Care Instructions  Your Care Instructions  Wheezing is a whistling noise made during breathing. It occurs when the small airways, or bronchial tubes, that lead to your lungs swell or contract (spasm) and become narrow. This narrowing is called bronchoconstriction. When your airways constrict, it is hard for air to pass through and this makes it hard for you to breathe. Wheezing and bronchoconstriction can be caused by many problems, including:  · An infection such as the flu or a cold. · Allergies such as hay fever. · Diseases such as asthma or chronic obstructive pulmonary disease. · Smoking. Treatment for your wheezing depends on what is causing the problem. Your wheezing may get better without treatment. But you may need to pay attention to things that cause your wheezing and avoid them. Or you may need medicine to help treat the wheezing and to reduce the swelling or to relieve spasms in your lungs. Follow-up care is a key part of your treatment and safety. Be sure to make and go to all appointments, and call your doctor if you are having problems. It is also a good idea to know your test results and keep a list of the medicines you take. How can you care for yourself at home? · Take your medicine exactly as prescribed. Call your doctor if you think you are having a problem with your medicine. You will get more details on the specific medicine your doctor prescribes. · If your doctor prescribed antibiotics, take them as directed. Do not stop taking them just because you feel better. You need to take the full course of antibiotics. · Breathe moist air from a humidifier, hot shower, or sink filled with hot water. This may help ease your symptoms and make it easier for you to breathe. · If you have congestion in your nose and throat, drinking plenty of fluids, especially hot fluids, may help relieve your symptoms.  If you have kidney, heart, or liver disease and have to limit fluids, talk with your doctor before you increase the amount of fluids you drink. · If you have mucus in your airways, it may help to breathe deeply and cough. · Do not smoke or allow others to smoke around you. Smoking can make your wheezing worse. If you need help quitting, talk to your doctor about stop-smoking programs and medicines. These can increase your chances of quitting for good. · Avoid things that may cause your wheezing. These may include colds, smoke, air pollution, dust, pollen, pets, cockroaches, stress, and cold air. When should you call for help? Call 911 anytime you think you may need emergency care. For example, call if:    · You have severe trouble breathing.     · You passed out (lost consciousness).    Call your doctor now or seek immediate medical care if:    · You cough up yellow, dark brown, or bloody mucus (sputum).     · You have new or worse shortness of breath.     · Your wheezing is not getting better or it gets worse after you start taking your medicine.    Watch closely for changes in your health, and be sure to contact your doctor if:    · You do not get better as expected. Where can you learn more? Go to http://douglas-hans.info/. Enter 997 8510 in the search box to learn more about \"Wheezing or Bronchoconstriction: Care Instructions. \"  Current as of: September 5, 2018  Content Version: 11.9  © 7859-6424 [employer]. Care instructions adapted under license by [employer] (which disclaims liability or warranty for this information). If you have questions about a medical condition or this instruction, always ask your healthcare professional. [place] [address] any warranty or liability for your use of this information. Patient Education        Bronchitis: Care Instructions  Your Care Instructions    Bronchitis is inflammation of the bronchial tubes, which carry air to the lungs.  The tubes swell and produce mucus, or phlegm. The mucus and inflamed bronchial tubes make you cough. You may have trouble breathing. Most cases of bronchitis are caused by viruses like those that cause colds. Antibiotics usually do not help and they may be harmful. Bronchitis usually develops rapidly and lasts about 2 to 3 weeks in otherwise healthy people. Follow-up care is a key part of your treatment and safety. Be sure to make and go to all appointments, and call your doctor if you are having problems. It's also a good idea to know your test results and keep a list of the medicines you take. How can you care for yourself at home? · Take all medicines exactly as prescribed. Call your doctor if you think you are having a problem with your medicine. · Get some extra rest.  · Take an over-the-counter pain medicine, such as acetaminophen (Tylenol), ibuprofen (Advil, Motrin), or naproxen (Aleve) to reduce fever and relieve body aches. Read and follow all instructions on the label. · Do not take two or more pain medicines at the same time unless the doctor told you to. Many pain medicines have acetaminophen, which is Tylenol. Too much acetaminophen (Tylenol) can be harmful. · Take an over-the-counter cough medicine that contains dextromethorphan to help quiet a dry, hacking cough so that you can sleep. Avoid cough medicines that have more than one active ingredient. Read and follow all instructions on the label. · Breathe moist air from a humidifier, hot shower, or sink filled with hot water. The heat and moisture will thin mucus so you can cough it out. · Do not smoke. Smoking can make bronchitis worse. If you need help quitting, talk to your doctor about stop-smoking programs and medicines. These can increase your chances of quitting for good. When should you call for help? Call 911 anytime you think you may need emergency care.  For example, call if:    · You have severe trouble breathing.    Call your doctor now or seek immediate medical care if:    · You have new or worse trouble breathing.     · You cough up dark brown or bloody mucus (sputum).     · You have a new or higher fever.     · You have a new rash.    Watch closely for changes in your health, and be sure to contact your doctor if:    · You cough more deeply or more often, especially if you notice more mucus or a change in the color of your mucus.     · You are not getting better as expected. Where can you learn more? Go to http://douglas-hans.info/. Enter H333 in the search box to learn more about \"Bronchitis: Care Instructions. \"  Current as of: September 5, 2018  Content Version: 11.9  © 9226-1545 [employer]. Care instructions adapted under license by [employer] (which disclaims liability or warranty for this information). If you have questions about a medical condition or this instruction, always ask your healthcare professional. [address] any warranty or liability for your use of this information.

## 2019-05-23 NOTE — ED NOTES
Pt c/o SOB, inspiratory and expiratory wheezes noted in all lung fields. Pt reports he is currently taking antibiotics for bronchitis but is not aware of what day he began them. Pt reports \"quitting smoking today\".

## 2019-05-23 NOTE — ED PROVIDER NOTES
EMERGENCY DEPARTMENT HISTORY AND PHYSICAL EXAM          Date: 5/23/2019  Patient Name: Santa Kirkpatrick    History of Presenting Illness     Chief Complaint   Patient presents with    Cough     pt c/o cough; pt \"quit\" smoking today; pt on antibiotics for bronchitis       History Provided By: Patient    HPI: Santa Kirkpatrick is a 47 y.o. male, pmhx HTN, chronic pain and constipation, who presents ambulatory to the ED c/o SOB    Patient states he has been SOB since waking up on Wednesday. He denies any fever, chills, leg swelling, nausea, vomiting, diarrhea (after taking lynzess for constipation) but admits to cough with phlegm. He started abx and albuterol MDI which he received from the [facility] emergency room but doesn't remember when he went to the doctor and doesn't know the name of the medications. PCP: Zenaida Parker DO    Allergies: vicodin  Social Hx: quit todaytobacco, occasional EtOH, occasional Illicit Drugs (currently on suboxone but used heroine earlier today)    There are no other complaints, changes, or physical findings at this time. Current Outpatient Medications   Medication Sig Dispense Refill    albuterol (ACCUNEB) 1.25 mg/3 mL nebu Take 3 mL by inhalation every four (4) hours as needed for Cough (wheezing, shortness of breath). 25 Each 0    Nebulizer & Compressor machine 1 Each by Does Not Apply route every four (4) hours as needed for Cough. As directed 1 Each 0    predniSONE (DELTASONE) 20 mg tablet Take 20 mg by mouth daily for 7 days. With Breakfast 7 Tab 0    polyethylene glycol (MIRALAX) 17 gram packet Take 1 Packet by mouth daily. 10 Packet 0    diclofenac EC (VOLTAREN) 50 mg EC tablet Take 1 Tab by mouth two (2) times a day. 20 Tab 0    diclofenac (VOLTAREN) 1 % gel Apply  to affected area four (4) times daily.  1 Each 0    albuterol (PROVENTIL HFA, VENTOLIN HFA, PROAIR HFA) 90 mcg/actuation inhaler Take 2 Puffs by inhalation every six (6) hours as needed for Wheezing. 1 Inhaler 1    amLODIPine (NORVASC) 5 mg tablet Take 5 mg by mouth daily.  cloNIDine HCl (CATAPRES) 0.2 mg tablet Take  by mouth two (2) times a day.  montelukast (SINGULAIR) 10 mg tablet Take 10 mg by mouth daily.  pantoprazole (PROTONIX) 20 mg tablet Take 20 mg by mouth daily.  clonazePAM (KLONOPIN) 1 mg tablet Take 1 Tab by mouth two (2) times a day. Max Daily Amount: 2 mg. 60 Tab 5    fluticasone propionate (FLONASE) 50 mcg/actuation nasal spray 2 Sprays by Both Nostrils route daily. 1 Bottle 0    Nasal Dilators (BREATHE RIGHT) strp 1 Box by Apply Externally route daily. 10 Strip 0    aspirin 81 mg tablet Take 1 Tab by mouth daily. 30 Tab 1    lisinopril (PRINIVIL, ZESTRIL) 10 mg tablet Take 10 mg by mouth daily. Past History     Past Medical History:  Past Medical History:   Diagnosis Date    Chronic low back pain     Hypertension        Past Surgical History:  Past Surgical History:   Procedure Laterality Date    HX ORTHOPAEDIC         Family History:  Family History   Problem Relation Age of Onset    Cancer Mother     Heart Disease Father        Social History:  Social History     Tobacco Use    Smoking status: Current Every Day Smoker     Packs/day: 1.50     Years: 10.00     Pack years: 15.00    Smokeless tobacco: Never Used   Substance Use Topics    Alcohol use: Yes    Drug use: No       Allergies: Allergies   Allergen Reactions    Vicodin [Hydrocodone-Acetaminophen] Other (comments)     Headache         Review of Systems   Review of Systems   Constitutional: Negative for activity change, appetite change, chills, fever and unexpected weight change. HENT: Negative for congestion. Eyes: Negative for pain and visual disturbance. Respiratory: Positive for cough and shortness of breath. Cardiovascular: Negative for chest pain, palpitations and leg swelling. Gastrointestinal: Negative for abdominal pain, diarrhea, nausea and vomiting. Genitourinary: Negative for dysuria. Musculoskeletal: Negative for back pain. Skin: Negative for rash. Neurological: Negative for headaches. Physical Exam   Physical Exam   Constitutional: He is oriented to person, place, and time. He appears well-developed and well-nourished. Tall, middle aged, overweight male currently in mild distress. HENT:   Head: Normocephalic and atraumatic. Mouth/Throat: Oropharynx is clear and moist.   Eyes: Pupils are equal, round, and reactive to light. Conjunctivae and EOM are normal. Right eye exhibits no discharge. Left eye exhibits no discharge. Neck: Normal range of motion. Neck supple. Cardiovascular: Normal rate, regular rhythm, normal heart sounds and intact distal pulses. Exam reveals no gallop and no friction rub. No murmur heard. Pulmonary/Chest: He is in respiratory distress (mild). He has wheezes. He has no rales. Abdominal: Soft. Bowel sounds are normal. He exhibits no distension and no mass. There is no tenderness. There is no rebound and no guarding. Musculoskeletal: Normal range of motion. He exhibits no edema. Neurological: He is alert and oriented to person, place, and time. No cranial nerve deficit. He exhibits normal muscle tone. Skin: Skin is warm and dry. No rash noted. He is not diaphoretic. Abrasions rt pretibial region   Nursing note and vitals reviewed. Diagnostic Study Results     Labs -   No results found for this or any previous visit (from the past 12 hour(s)). Radiologic Studies -   XR CHEST PORT   Final Result   No acute process. CT Results  (Last 48 hours)    None        CXR Results  (Last 48 hours)               05/23/19 0235  XR CHEST PORT Final result    Impression:  No acute process.                         Narrative:  PORTABLE CHEST RADIOGRAPH/S: 5/23/2019 2:35 AM       Clinical history: Dyspnea   INDICATION:   sob   COMPARISON: 2/22/2019       FINDINGS:   AP portable upright view of the chest demonstrates stable  cardiopericardial   silhouette. The lungs are adequately expanded. There is no edema, effusion,   consolidation, or pneumothorax. The osseous structures are unremarkable. Patient   is on a cardiac monitor. Medical Decision Making   I am the first provider for this patient. I reviewed the vital signs, available nursing notes, past medical history, past surgical history, family history and social history. Vital Signs-Reviewed the patient's vital signs. No data found. Pulse Oximetry Analysis - 95% on RA    Cardiac Monitor:   Rate: 84bpm  Rhythm: Normal Sinus Rhythm      Records Reviewed: Nursing Notes, Old Medical Records, Previous Radiology Studies and Previous Laboratory Studies    Provider Notes (Medical Decision Making):   MDM: Smoker presenting with acute distress; history difficult to obtain as patient does not know his medications and does not seem to be compliant as he cannot tell me what he has been taking her home any days. He does continue to smoke but states he just stopped tonight as he knows that he needs to stop smoking. Low suspicion for pneumonia will initiate treatment for reactive airway disease and monitor for need for admission. ED Course:   Initial assessment performed. The patients presenting problems have been discussed, and they are in agreement with the care plan formulated and outlined with them. I have encouraged them to ask questions as they arise throughout their visit. PROGRESS NOTE:  3:45 AM  Pt appears improved. He is breathing easier and able to speak in full sentences. Discussed home medications as well as follow-up and the importance of taking his daily medications. Nebulizer machine prescribed with solution to be used as outpatient. Critical Care Time:   0      Diagnosis     Clinical Impression:   1. Acute respiratory distress        PLAN:  1.    Discharge Medication List as of 5/23/2019  3:47 AM      START taking these medications    Details   albuterol (ACCUNEB) 1.25 mg/3 mL nebu Take 3 mL by inhalation every four (4) hours as needed for Cough (wheezing, shortness of breath). , Normal, Disp-25 Each, R-0      Nebulizer & Compressor machine 1 Each by Does Not Apply route every four (4) hours as needed for Cough. As directed, Normal, Disp-1 Each, R-0      predniSONE (DELTASONE) 20 mg tablet Take 20 mg by mouth daily for 7 days. With Breakfast, Normal, Disp-7 Tab, R-0         CONTINUE these medications which have NOT CHANGED    Details   polyethylene glycol (MIRALAX) 17 gram packet Take 1 Packet by mouth daily. , Print, Disp-10 Packet, R-0      diclofenac EC (VOLTAREN) 50 mg EC tablet Take 1 Tab by mouth two (2) times a day., Print, Disp-20 Tab, R-0      diclofenac (VOLTAREN) 1 % gel Apply  to affected area four (4) times daily. , Print, Disp-1 Each, R-0      albuterol (PROVENTIL HFA, VENTOLIN HFA, PROAIR HFA) 90 mcg/actuation inhaler Take 2 Puffs by inhalation every six (6) hours as needed for Wheezing., Normal, Disp-1 Inhaler, R-1      amLODIPine (NORVASC) 5 mg tablet Take 5 mg by mouth daily. , Historical Med      cloNIDine HCl (CATAPRES) 0.2 mg tablet Take  by mouth two (2) times a day., Historical Med      montelukast (SINGULAIR) 10 mg tablet Take 10 mg by mouth daily. , Historical Med      pantoprazole (PROTONIX) 20 mg tablet Take 20 mg by mouth daily. , Historical Med      clonazePAM (KLONOPIN) 1 mg tablet Take 1 Tab by mouth two (2) times a day. Max Daily Amount: 2 mg., Print, Disp-60 Tab, R-5      fluticasone propionate (FLONASE) 50 mcg/actuation nasal spray 2 Sprays by Both Nostrils route daily. , Normal, Disp-1 Bottle, R-0      Nasal Dilators (BREATHE RIGHT) strp 1 Box by Apply Externally route daily. , Print, Disp-10 Strip, R-0      aspirin 81 mg tablet Take 1 Tab by mouth daily. Print, 81 mg, Disp-30 Tab, R-1      lisinopril (PRINIVIL, ZESTRIL) 10 mg tablet 10 mg, Oral, DAILY, Until Discontinued, Historical Med 2.   Follow-up Information     Follow up With Specialties Details Why Contact Info    [facility] EMERGENCY DEPT Emergency Medicine  If symptoms worsen [address] [address]  [address], [address] Internal Medicine Schedule an appointment as soon as possible for a visit today for further pulmonology care [address] [address]  [address]  [phone]          Return to ED if worse     Disposition:  home      Please note, this dictation was completed with [employer], the computer voice recognition software. Quite often unanticipated grammatical, syntax, homophones, and other interpretive errors are inadvertently transcribed by the computer software. Please disregard these errors. Please excuse any errors that have escaped final proof reading.

## 2019-05-24 ENCOUNTER — TELEPHONE (OUTPATIENT)
Dept: INTERNAL MEDICINE CLINIC | Age: 54
End: 2019-05-24

## 2019-05-24 NOTE — TELEPHONE ENCOUNTER
Patient states he needs a call back to get an appt for a ED [facility] ER 5/23/19 Follow up for Breathing issues. No appts available. Please call to discuss.  Thank you

## 2019-06-03 NOTE — TELEPHONE ENCOUNTER
#[phone] pt states he missed 5-31-19 with the pulmonologist and needs that name and number as he doesn't remember who it was.      Pt states he needs an appt with Dr. Gabi Ordonez to fill out a form for a lifetime fishing license     Pt is asking for a call back on these matters

## 2019-06-04 ENCOUNTER — PATIENT OUTREACH (OUTPATIENT)
Dept: INTERNAL MEDICINE CLINIC | Age: 54
End: 2019-06-04

## 2019-06-04 NOTE — PROGRESS NOTES
Pt has an appointment on 7/17/19 at 8:00 to see Dr. Renea Bravo and will see this CDE at 9:00 for prediabetes education. Regarding the pulmonary appt pt stated he missed on 5/31/19, pt was given the phone number to pulmonary associates [phone] to call. It is unknown by pt who he missed the appt with and there is no information in his chart. Regarding the form for a life time fishing license, advised pt that a $25 fee may incur to complete the form and to allow 7-10 business days if he drops the form off. Pt stated he does not have the money to pay for form completion at this time. [employer] (1:43 PM)         #[phone] pt states you have been trying to reach him for an appt for education. Please call him. Thanks.

## 2019-06-04 NOTE — TELEPHONE ENCOUNTER
Called, spoke to pt. Two identifiers confirmed. Notified pt I am unable to find who he was referred to for pulmonary. Pt stated he was referred by the ED. After looking again through ED records, still unable to find. Offered to give contact information to another pulm office. Pt declined.

## 2019-06-12 ENCOUNTER — HOSPITAL ENCOUNTER (OUTPATIENT)
Age: 54
Setting detail: OBSERVATION
Discharge: HOME | DRG: 133 | End: 2019-06-14
Attending: EMERGENCY MEDICINE | Admitting: INTERNAL MEDICINE
Payer: MEDICAID

## 2019-06-12 DIAGNOSIS — J96.01 ACUTE RESPIRATORY FAILURE WITH HYPOXIA (HCC): Primary | ICD-10-CM

## 2019-06-12 PROCEDURE — 94640 AIRWAY INHALATION TREATMENT: CPT

## 2019-06-12 PROCEDURE — 99281 EMR DPT VST MAYX REQ PHY/QHP: CPT

## 2019-06-12 PROCEDURE — 99285 EMERGENCY DEPT VISIT HI MDM: CPT

## 2019-06-12 PROCEDURE — 93005 ELECTROCARDIOGRAM TRACING: CPT

## 2019-06-13 ENCOUNTER — APPOINTMENT (OUTPATIENT)
Dept: GENERAL RADIOLOGY | Age: 54
DRG: 133 | End: 2019-06-13
Attending: EMERGENCY MEDICINE
Payer: MEDICAID

## 2019-06-13 PROBLEM — J20.9 ACUTE BRONCHITIS: Status: ACTIVE | Noted: 2019-06-13

## 2019-06-13 PROBLEM — J96.01 ACUTE RESPIRATORY FAILURE WITH HYPOXIA (HCC): Status: ACTIVE | Noted: 2019-06-13

## 2019-06-13 LAB
ALBUMIN SERPL-MCNC: 4.1 G/DL (ref 3.5–5)
ALBUMIN/GLOB SERPL: 1.1 {RATIO} (ref 1.1–2.2)
ALP SERPL-CCNC: 87 U/L (ref 45–117)
ALT SERPL-CCNC: 51 U/L (ref 12–78)
ANION GAP SERPL CALC-SCNC: 7 MMOL/L (ref 5–15)
AST SERPL-CCNC: 30 U/L (ref 15–37)
ATRIAL RATE: 101 BPM
BASOPHILS # BLD: 0 K/UL (ref 0–0.1)
BASOPHILS NFR BLD: 0 % (ref 0–1)
BILIRUB SERPL-MCNC: 0.4 MG/DL (ref 0.2–1)
BUN SERPL-MCNC: 11 MG/DL (ref 6–20)
BUN/CREAT SERPL: 10 (ref 12–20)
CALCIUM SERPL-MCNC: 9.6 MG/DL (ref 8.5–10.1)
CALCULATED P AXIS, ECG09: 78 DEGREES
CALCULATED R AXIS, ECG10: 73 DEGREES
CALCULATED T AXIS, ECG11: 48 DEGREES
CHLORIDE SERPL-SCNC: 108 MMOL/L (ref 97–108)
CK MB CFR SERPL CALC: 1.2 % (ref 0–2.5)
CK MB SERPL-MCNC: 5.9 NG/ML (ref 5–25)
CK SERPL-CCNC: 434 U/L (ref 39–308)
CK SERPL-CCNC: 513 U/L (ref 39–308)
CO2 SERPL-SCNC: 25 MMOL/L (ref 21–32)
CREAT SERPL-MCNC: 1.14 MG/DL (ref 0.7–1.3)
DIAGNOSIS, 93000: NORMAL
DIFFERENTIAL METHOD BLD: ABNORMAL
EOSINOPHIL # BLD: 0.9 K/UL (ref 0–0.4)
EOSINOPHIL NFR BLD: 9 % (ref 0–7)
ERYTHROCYTE [DISTWIDTH] IN BLOOD BY AUTOMATED COUNT: 14 % (ref 11.5–14.5)
GLOBULIN SER CALC-MCNC: 3.6 G/DL (ref 2–4)
GLUCOSE SERPL-MCNC: 105 MG/DL (ref 65–100)
HCT VFR BLD AUTO: 49.4 % (ref 36.6–50.3)
HGB BLD-MCNC: 16 G/DL (ref 12.1–17)
IMM GRANULOCYTES # BLD AUTO: 0 K/UL (ref 0–0.04)
IMM GRANULOCYTES NFR BLD AUTO: 0 % (ref 0–0.5)
LYMPHOCYTES # BLD: 1.5 K/UL (ref 0.8–3.5)
LYMPHOCYTES NFR BLD: 15 % (ref 12–49)
MCH RBC QN AUTO: 28.7 PG (ref 26–34)
MCHC RBC AUTO-ENTMCNC: 32.4 G/DL (ref 30–36.5)
MCV RBC AUTO: 88.7 FL (ref 80–99)
MONOCYTES # BLD: 0.8 K/UL (ref 0–1)
MONOCYTES NFR BLD: 8 % (ref 5–13)
NEUTS SEG # BLD: 7 K/UL (ref 1.8–8)
NEUTS SEG NFR BLD: 68 % (ref 32–75)
NRBC # BLD: 0 K/UL (ref 0–0.01)
NRBC BLD-RTO: 0 PER 100 WBC
P-R INTERVAL, ECG05: 148 MS
PLATELET # BLD AUTO: 245 K/UL (ref 150–400)
PMV BLD AUTO: 10.5 FL (ref 8.9–12.9)
POTASSIUM SERPL-SCNC: 3.9 MMOL/L (ref 3.5–5.1)
PROT SERPL-MCNC: 7.7 G/DL (ref 6.4–8.2)
Q-T INTERVAL, ECG07: 342 MS
QRS DURATION, ECG06: 80 MS
QTC CALCULATION (BEZET), ECG08: 443 MS
RBC # BLD AUTO: 5.57 M/UL (ref 4.1–5.7)
SODIUM SERPL-SCNC: 140 MMOL/L (ref 136–145)
TROPONIN I SERPL-MCNC: <0.05 NG/ML
TROPONIN I SERPL-MCNC: <0.05 NG/ML
VENTRICULAR RATE, ECG03: 101 BPM
WBC # BLD AUTO: 10.3 K/UL (ref 4.1–11.1)

## 2019-06-13 PROCEDURE — 82550 ASSAY OF CK (CPK): CPT

## 2019-06-13 PROCEDURE — 85025 COMPLETE CBC W/AUTO DIFF WBC: CPT

## 2019-06-13 PROCEDURE — 96361 HYDRATE IV INFUSION ADD-ON: CPT

## 2019-06-13 PROCEDURE — 74011000250 HC RX REV CODE- 250: Performed by: INTERNAL MEDICINE

## 2019-06-13 PROCEDURE — 71046 X-RAY EXAM CHEST 2 VIEWS: CPT

## 2019-06-13 PROCEDURE — 96375 TX/PRO/DX INJ NEW DRUG ADDON: CPT

## 2019-06-13 PROCEDURE — 96376 TX/PRO/DX INJ SAME DRUG ADON: CPT

## 2019-06-13 PROCEDURE — 74011000250 HC RX REV CODE- 250: Performed by: EMERGENCY MEDICINE

## 2019-06-13 PROCEDURE — 74011250636 HC RX REV CODE- 250/636: Performed by: INTERNAL MEDICINE

## 2019-06-13 PROCEDURE — 96372 THER/PROPH/DIAG INJ SC/IM: CPT

## 2019-06-13 PROCEDURE — 96365 THER/PROPH/DIAG IV INF INIT: CPT

## 2019-06-13 PROCEDURE — 65660000000 HC RM CCU STEPDOWN

## 2019-06-13 PROCEDURE — 74011250636 HC RX REV CODE- 250/636: Performed by: EMERGENCY MEDICINE

## 2019-06-13 PROCEDURE — 74011250637 HC RX REV CODE- 250/637: Performed by: EMERGENCY MEDICINE

## 2019-06-13 PROCEDURE — 82553 CREATINE MB FRACTION: CPT

## 2019-06-13 PROCEDURE — 94640 AIRWAY INHALATION TREATMENT: CPT

## 2019-06-13 PROCEDURE — 74011636637 HC RX REV CODE- 636/637: Performed by: EMERGENCY MEDICINE

## 2019-06-13 PROCEDURE — 99218 HC RM OBSERVATION: CPT

## 2019-06-13 PROCEDURE — 36415 COLL VENOUS BLD VENIPUNCTURE: CPT

## 2019-06-13 PROCEDURE — 80053 COMPREHEN METABOLIC PANEL: CPT

## 2019-06-13 PROCEDURE — 77010033678 HC OXYGEN DAILY

## 2019-06-13 PROCEDURE — 87070 CULTURE OTHR SPECIMN AEROBIC: CPT

## 2019-06-13 PROCEDURE — 84484 ASSAY OF TROPONIN QUANT: CPT

## 2019-06-13 PROCEDURE — 74011250637 HC RX REV CODE- 250/637: Performed by: INTERNAL MEDICINE

## 2019-06-13 PROCEDURE — 77030029684 HC NEB SM VOL KT MONA -A

## 2019-06-13 RX ORDER — PANTOPRAZOLE SODIUM 40 MG/1
40 TABLET, DELAYED RELEASE ORAL DAILY
Status: DISCONTINUED | OUTPATIENT
Start: 2019-06-13 | End: 2019-06-14 | Stop reason: HOSPADM

## 2019-06-13 RX ORDER — CLONAZEPAM 1 MG/1
1 TABLET ORAL
COMMUNITY
End: 2019-11-12 | Stop reason: SDUPTHER

## 2019-06-13 RX ORDER — BUPRENORPHINE AND NALOXONE 8; 2 MG/1; MG/1
1 FILM, SOLUBLE BUCCAL; SUBLINGUAL 2 TIMES DAILY
COMMUNITY
End: 2019-07-17

## 2019-06-13 RX ORDER — IPRATROPIUM BROMIDE AND ALBUTEROL SULFATE 2.5; .5 MG/3ML; MG/3ML
3 SOLUTION RESPIRATORY (INHALATION) ONCE
Status: COMPLETED | OUTPATIENT
Start: 2019-06-13 | End: 2019-06-13

## 2019-06-13 RX ORDER — ALBUTEROL SULFATE 0.83 MG/ML
SOLUTION RESPIRATORY (INHALATION)
Status: DISPENSED
Start: 2019-06-13 | End: 2019-06-14

## 2019-06-13 RX ORDER — FLUTICASONE FUROATE AND VILANTEROL 200; 25 UG/1; UG/1
1 POWDER RESPIRATORY (INHALATION) DAILY
Status: DISCONTINUED | OUTPATIENT
Start: 2019-06-13 | End: 2019-06-14 | Stop reason: HOSPADM

## 2019-06-13 RX ORDER — POLYETHYLENE GLYCOL 3350 17 G/17G
17 POWDER, FOR SOLUTION ORAL DAILY
Status: DISCONTINUED | OUTPATIENT
Start: 2019-06-14 | End: 2019-06-14 | Stop reason: HOSPADM

## 2019-06-13 RX ORDER — PREDNISONE 20 MG/1
60 TABLET ORAL
Status: COMPLETED | OUTPATIENT
Start: 2019-06-13 | End: 2019-06-13

## 2019-06-13 RX ORDER — SODIUM CHLORIDE 0.9 % (FLUSH) 0.9 %
5-40 SYRINGE (ML) INJECTION EVERY 8 HOURS
Status: DISCONTINUED | OUTPATIENT
Start: 2019-06-13 | End: 2019-06-14 | Stop reason: HOSPADM

## 2019-06-13 RX ORDER — ENOXAPARIN SODIUM 100 MG/ML
40 INJECTION SUBCUTANEOUS EVERY 24 HOURS
Status: DISCONTINUED | OUTPATIENT
Start: 2019-06-13 | End: 2019-06-14 | Stop reason: HOSPADM

## 2019-06-13 RX ORDER — IPRATROPIUM BROMIDE 0.5 MG/2.5ML
SOLUTION RESPIRATORY (INHALATION)
Status: DISPENSED
Start: 2019-06-13 | End: 2019-06-14

## 2019-06-13 RX ORDER — LEVOFLOXACIN 750 MG/1
750 TABLET ORAL ONCE
Status: COMPLETED | OUTPATIENT
Start: 2019-06-13 | End: 2019-06-13

## 2019-06-13 RX ORDER — BENZONATATE 100 MG/1
200 CAPSULE ORAL
Status: DISCONTINUED | OUTPATIENT
Start: 2019-06-13 | End: 2019-06-14 | Stop reason: HOSPADM

## 2019-06-13 RX ORDER — CLONAZEPAM 0.5 MG/1
1 TABLET ORAL
Status: DISCONTINUED | OUTPATIENT
Start: 2019-06-13 | End: 2019-06-13

## 2019-06-13 RX ORDER — SODIUM CHLORIDE 0.9 % (FLUSH) 0.9 %
5-40 SYRINGE (ML) INJECTION AS NEEDED
Status: DISCONTINUED | OUTPATIENT
Start: 2019-06-13 | End: 2019-06-14 | Stop reason: HOSPADM

## 2019-06-13 RX ORDER — CITALOPRAM 10 MG/1
10 TABLET ORAL DAILY
COMMUNITY
End: 2019-07-17

## 2019-06-13 RX ORDER — ACETAMINOPHEN 325 MG/1
650 TABLET ORAL
Status: DISCONTINUED | OUTPATIENT
Start: 2019-06-13 | End: 2019-06-14 | Stop reason: HOSPADM

## 2019-06-13 RX ORDER — CLONIDINE HYDROCHLORIDE 0.1 MG/1
0.2 TABLET ORAL 2 TIMES DAILY
Status: DISCONTINUED | OUTPATIENT
Start: 2019-06-13 | End: 2019-06-14 | Stop reason: HOSPADM

## 2019-06-13 RX ORDER — CALCIUM CARBONATE 200(500)MG
200 TABLET,CHEWABLE ORAL ONCE
Status: COMPLETED | OUTPATIENT
Start: 2019-06-13 | End: 2019-06-13

## 2019-06-13 RX ORDER — ALBUTEROL SULFATE 0.83 MG/ML
5 SOLUTION RESPIRATORY (INHALATION)
Status: COMPLETED | OUTPATIENT
Start: 2019-06-13 | End: 2019-06-13

## 2019-06-13 RX ORDER — IBUPROFEN 600 MG/1
600 TABLET ORAL
Status: DISCONTINUED | OUTPATIENT
Start: 2019-06-13 | End: 2019-06-14 | Stop reason: HOSPADM

## 2019-06-13 RX ORDER — AMLODIPINE BESYLATE 5 MG/1
5 TABLET ORAL DAILY
Status: DISCONTINUED | OUTPATIENT
Start: 2019-06-13 | End: 2019-06-14 | Stop reason: HOSPADM

## 2019-06-13 RX ORDER — LEVOFLOXACIN 5 MG/ML
500 INJECTION, SOLUTION INTRAVENOUS EVERY 24 HOURS
Status: DISCONTINUED | OUTPATIENT
Start: 2019-06-14 | End: 2019-06-14 | Stop reason: HOSPADM

## 2019-06-13 RX ORDER — CLONAZEPAM 1 MG/1
1 TABLET ORAL
Status: DISCONTINUED | OUTPATIENT
Start: 2019-06-13 | End: 2019-06-14 | Stop reason: HOSPADM

## 2019-06-13 RX ORDER — GUAIFENESIN 600 MG/1
600 TABLET, EXTENDED RELEASE ORAL EVERY 12 HOURS
Status: DISCONTINUED | OUTPATIENT
Start: 2019-06-13 | End: 2019-06-14 | Stop reason: HOSPADM

## 2019-06-13 RX ORDER — DICLOFENAC SODIUM 10 MG/G
GEL TOPICAL
COMMUNITY
End: 2019-10-14

## 2019-06-13 RX ORDER — ONDANSETRON 2 MG/ML
4 INJECTION INTRAMUSCULAR; INTRAVENOUS
Status: DISCONTINUED | OUTPATIENT
Start: 2019-06-13 | End: 2019-06-14 | Stop reason: HOSPADM

## 2019-06-13 RX ORDER — LISINOPRIL 10 MG/1
10 TABLET ORAL DAILY
Status: DISCONTINUED | OUTPATIENT
Start: 2019-06-13 | End: 2019-06-14 | Stop reason: HOSPADM

## 2019-06-13 RX ORDER — MAGNESIUM SULFATE HEPTAHYDRATE 40 MG/ML
2 INJECTION, SOLUTION INTRAVENOUS ONCE
Status: COMPLETED | OUTPATIENT
Start: 2019-06-13 | End: 2019-06-13

## 2019-06-13 RX ORDER — IPRATROPIUM BROMIDE AND ALBUTEROL SULFATE 2.5; .5 MG/3ML; MG/3ML
3 SOLUTION RESPIRATORY (INHALATION)
Status: DISCONTINUED | OUTPATIENT
Start: 2019-06-13 | End: 2019-06-14 | Stop reason: HOSPADM

## 2019-06-13 RX ORDER — GUAIFENESIN 100 MG/5ML
81 LIQUID (ML) ORAL DAILY
Status: DISCONTINUED | OUTPATIENT
Start: 2019-06-13 | End: 2019-06-14 | Stop reason: HOSPADM

## 2019-06-13 RX ADMIN — Medication 10 ML: at 21:03

## 2019-06-13 RX ADMIN — IPRATROPIUM BROMIDE AND ALBUTEROL SULFATE 3 ML: .5; 3 SOLUTION RESPIRATORY (INHALATION) at 08:41

## 2019-06-13 RX ADMIN — CALCIUM CARBONATE (ANTACID) CHEW TAB 500 MG 200 MG: 500 CHEW TAB at 12:39

## 2019-06-13 RX ADMIN — MAGNESIUM SULFATE HEPTAHYDRATE 2 G: 40 INJECTION, SOLUTION INTRAVENOUS at 00:13

## 2019-06-13 RX ADMIN — ACETAMINOPHEN 650 MG: 325 TABLET ORAL at 22:53

## 2019-06-13 RX ADMIN — IPRATROPIUM BROMIDE AND ALBUTEROL SULFATE 3 ML: .5; 3 SOLUTION RESPIRATORY (INHALATION) at 12:11

## 2019-06-13 RX ADMIN — IPRATROPIUM BROMIDE AND ALBUTEROL SULFATE 3 ML: .5; 3 SOLUTION RESPIRATORY (INHALATION) at 00:14

## 2019-06-13 RX ADMIN — IPRATROPIUM BROMIDE AND ALBUTEROL SULFATE 3 ML: .5; 3 SOLUTION RESPIRATORY (INHALATION) at 15:51

## 2019-06-13 RX ADMIN — UMECLIDINIUM 1 PUFF: 62.5 AEROSOL, POWDER ORAL at 08:42

## 2019-06-13 RX ADMIN — ASPIRIN 81 MG 81 MG: 81 TABLET ORAL at 08:41

## 2019-06-13 RX ADMIN — GUAIFENESIN 600 MG: 600 TABLET, EXTENDED RELEASE ORAL at 08:41

## 2019-06-13 RX ADMIN — FLUTICASONE FUROATE AND VILANTEROL TRIFENATATE 1 PUFF: 200; 25 POWDER RESPIRATORY (INHALATION) at 08:42

## 2019-06-13 RX ADMIN — GUAIFENESIN 600 MG: 600 TABLET, EXTENDED RELEASE ORAL at 21:02

## 2019-06-13 RX ADMIN — METHYLPREDNISOLONE SODIUM SUCCINATE 60 MG: 125 INJECTION, POWDER, FOR SOLUTION INTRAMUSCULAR; INTRAVENOUS at 12:09

## 2019-06-13 RX ADMIN — CLONIDINE HYDROCHLORIDE 0.2 MG: 0.1 TABLET ORAL at 17:44

## 2019-06-13 RX ADMIN — ALBUTEROL SULFATE 5 MG: 2.5 SOLUTION RESPIRATORY (INHALATION) at 05:47

## 2019-06-13 RX ADMIN — IPRATROPIUM BROMIDE AND ALBUTEROL SULFATE 3 ML: .5; 3 SOLUTION RESPIRATORY (INHALATION) at 00:13

## 2019-06-13 RX ADMIN — PREDNISONE 60 MG: 20 TABLET ORAL at 00:14

## 2019-06-13 RX ADMIN — METHYLPREDNISOLONE SODIUM SUCCINATE 60 MG: 125 INJECTION, POWDER, FOR SOLUTION INTRAMUSCULAR; INTRAVENOUS at 23:01

## 2019-06-13 RX ADMIN — CLONIDINE HYDROCHLORIDE 0.2 MG: 0.1 TABLET ORAL at 08:41

## 2019-06-13 RX ADMIN — LISINOPRIL 10 MG: 10 TABLET ORAL at 08:41

## 2019-06-13 RX ADMIN — METHYLPREDNISOLONE SODIUM SUCCINATE 60 MG: 125 INJECTION, POWDER, FOR SOLUTION INTRAMUSCULAR; INTRAVENOUS at 06:10

## 2019-06-13 RX ADMIN — LINACLOTIDE 290 MCG: 290 CAPSULE, GELATIN COATED ORAL at 17:44

## 2019-06-13 RX ADMIN — PANTOPRAZOLE SODIUM 40 MG: 40 TABLET, DELAYED RELEASE ORAL at 08:41

## 2019-06-13 RX ADMIN — ENOXAPARIN SODIUM 40 MG: 40 INJECTION SUBCUTANEOUS at 08:41

## 2019-06-13 RX ADMIN — SODIUM CHLORIDE 1000 ML: 900 INJECTION, SOLUTION INTRAVENOUS at 05:47

## 2019-06-13 RX ADMIN — IPRATROPIUM BROMIDE AND ALBUTEROL SULFATE 3 ML: .5; 3 SOLUTION RESPIRATORY (INHALATION) at 21:26

## 2019-06-13 RX ADMIN — AMLODIPINE BESYLATE 5 MG: 5 TABLET ORAL at 08:41

## 2019-06-13 RX ADMIN — Medication 10 ML: at 15:43

## 2019-06-13 RX ADMIN — LEVOFLOXACIN 750 MG: 750 TABLET, FILM COATED ORAL at 05:47

## 2019-06-13 RX ADMIN — SODIUM CHLORIDE 1000 ML: 900 INJECTION, SOLUTION INTRAVENOUS at 01:00

## 2019-06-13 RX ADMIN — Medication 10 ML: at 11:05

## 2019-06-13 RX ADMIN — METHYLPREDNISOLONE SODIUM SUCCINATE 60 MG: 125 INJECTION, POWDER, FOR SOLUTION INTRAMUSCULAR; INTRAVENOUS at 17:45

## 2019-06-13 NOTE — PROGRESS NOTES
Bedside shift change report given to [facility] (oncoming nurse) by Adamaris Mckenna (offgoing nurse). Report included the following information SBAR, Kardex, Procedure Summary, Intake/Output, MAR and Recent Results.

## 2019-06-13 NOTE — H&P
Hospitalist Admission Note    NAME: Suzan Olivas   :  1965   MRN:  [id number]     Date/Time:  2019 5:53 AM    Patient PCP: Wing Roberts, DO  ______________________________________________________________________  Given the patient's current clinical presentation, I have a high level of concern for decompensation if discharged from the emergency department. Complex decision making was performed, which includes reviewing the patient's available past medical records, laboratory results, and x-ray films. My assessment of this patient's clinical condition and my plan of care is as follows. Assessment / Plan:  Hypoxia on exertion (O2 sat upper 80s per ED report upon ambulation)  Due to Acute Bronchitis with suspect underlying COPD with exacerbation (active chronic smoker)  Failed OP treatment (was seen in ED couple of weeks ago)  Admit  IV Steroids  IV Levaquin  Schaduled Nebs  Mucolytics and antitussives  Started Breo, Recently started on singular and flonase, will stop those as doesn't seem to have asthma  Will need PFTs as an OP when feeling better  Try to wean off O2 as able to  CXR without ASD    Elevated CK / Mild Rhabdomyolysis  Suspect due to above illness  Not on statins  Given multiple IVF boluses in ED, will trend CK    Tobacco abuse  Counseled cesation    Essential Hypertension   Continue Clonidine, Norvasc and Lisinopril  PRN nitropaste    GERD (gastroesophageal reflux disease)   Continue PPIs    Code Status: Full  Surrogate Decision Maker: Dave Fonseca     DVT Prophylaxis: Lovenox    Baseline: functional      Subjective:   CHIEF COMPLAINT: cough and shortness of breath    HISTORY OF PRESENT ILLNESS:     Linda Sheehan is a 47 y.o.  male who presents with cough and shortness of breath. As per patient, he is been feeling sick for past couple of weeks.  He reported worsening non productive cough, difficulty breathing with worsening on exertion. Pt denies any fever, chills, nausea, vomiting, diarrhea, chest pain, abdominal pain, problems urination. Pt was seen in ED couple of weeks ago and discharge from ED with steroids and Nebs but pt reported never felt better instead continue to get worsen. Pt noted O2 sat upper 80s upon ambulation per ED report and started on O2. We were asked to admit for work up and evaluation of the above problems. Past Medical History:   Diagnosis Date    Chronic low back pain     Hypertension         Past Surgical History:   Procedure Laterality Date    HX ORTHOPAEDIC         Social History     Tobacco Use    Smoking status: Current Every Day Smoker     Packs/day: 1.50     Years: 10.00     Pack years: 15.00    Smokeless tobacco: Never Used   Substance Use Topics    Alcohol use: Yes        Family History   Problem Relation Age of Onset    Cancer Mother     Heart Disease Father      Allergies   Allergen Reactions    Vicodin [Hydrocodone-Acetaminophen] Other (comments)     Headache        Prior to Admission medications    Medication Sig Start Date End Date Taking? Authorizing Provider   albuterol (ACCUNEB) 1.25 mg/3 mL nebu Take 3 mL by inhalation every four (4) hours as needed for Cough (wheezing, shortness of breath). 5/23/19   Debbie Moe MD   Nebulizer & Compressor machine 1 Each by Does Not Apply route every four (4) hours as needed for Cough. As directed 5/23/19   Debbie Moe MD   polyethylene glycol (MIRALAX) 17 gram packet Take 1 Packet by mouth daily. 5/5/19   Sushma Harrington MD   diclofenac EC (VOLTAREN) 50 mg EC tablet Take 1 Tab by mouth two (2) times a day. 5/5/19   Sushma Harrington MD   diclofenac (VOLTAREN) 1 % gel Apply  to affected area four (4) times daily. 5/5/19   Sushma Harrington MD   albuterol (PROVENTIL HFA, VENTOLIN HFA, PROAIR HFA) 90 mcg/actuation inhaler Take 2 Puffs by inhalation every six (6) hours as needed for Wheezing.  5/2/19   Lul Ann DO   amLODIPine (NORVASC) 5 mg tablet Take 5 mg by mouth daily. Provider, Historical   cloNIDine HCl (CATAPRES) 0.2 mg tablet Take  by mouth two (2) times a day. Provider, Historical   montelukast (SINGULAIR) 10 mg tablet Take 10 mg by mouth daily. Provider, Historical   pantoprazole (PROTONIX) 20 mg tablet Take 20 mg by mouth daily. Provider, Historical   clonazePAM (KLONOPIN) 1 mg tablet Take 1 Tab by mouth two (2) times a day. Max Daily Amount: 2 mg. Patient taking differently: Take 1 mg by mouth two (2) times daily as needed. 4/15/19   Lakhwinder Rivas III, DO   fluticasone propionate (FLONASE) 50 mcg/actuation nasal spray 2 Sprays by Both Nostrils route daily. 3/14/19   MARIEL Laura   Nasal Dilators (BREATHE RIGHT) strp 1 Box by Apply Externally route daily. 3/10/19   Didi Hernandez MD   aspirin 81 mg tablet Take 1 Tab by mouth daily. 4/29/13   Alex Worley MD   lisinopril (PRINIVIL, ZESTRIL) 10 mg tablet Take 10 mg by mouth daily. Other, MD Estrada       REVIEW OF SYSTEMS:     I am not able to complete the review of systems because:    The patient is intubated and sedated    The patient has altered mental status due to his acute medical problems    The patient has baseline aphasia from prior stroke(s)    The patient has baseline dementia and is not reliable historian    The patient is in acute medical distress and unable to provide information           Total of 12 systems reviewed as follows:       POSITIVE= underlined text  Negative = text not underlined  General:  fever, chills, sweats, generalized weakness, weight loss/gain,      loss of appetite   Eyes:    blurred vision, eye pain, loss of vision, double vision  ENT:    rhinorrhea, pharyngitis   Respiratory:   cough, sputum production, SOB, BRICENO, wheezing, pleuritic pain   Cardiology:   chest pain, palpitations, orthopnea, PND, edema, syncope   Gastrointestinal:  abdominal pain , N/V, diarrhea, dysphagia, constipation, bleeding   Genitourinary:  frequency, urgency, dysuria, hematuria, incontinence   Muskuloskeletal :  arthralgia, myalgia, back pain  Hematology:  easy bruising, nose or gum bleeding, lymphadenopathy   Dermatological: rash, ulceration, pruritis, color change / jaundice  Endocrine:   hot flashes or polydipsia   Neurological:  headache, dizziness, confusion, focal weakness, paresthesia,     Speech difficulties, memory loss, gait difficulty  Psychological: Feelings of anxiety, depression, agitation    Objective:   VITALS:    Visit Vitals  /90 (BP 1 Location: Left arm, BP Patient Position: At rest)   Pulse 97   Temp 97.9 °F (36.6 °C)   Resp 20   Ht 6' 3\" (1.905 m)   Wt 109.8 kg (242 lb)   SpO2 97%   BMI 30.25 kg/m²       PHYSICAL EXAM:    General:    Alert, cooperative, no distress, appears stated age. HEENT: Atraumatic, anicteric sclerae, pink conjunctivae     No oral ulcers, mucosa dry, throat clear, dentition fair  Neck:  Supple, symmetrical,  thyroid: non tender  Lungs:   Wheezing. No rales. Chest wall:  No tenderness  No Accessory muscle use. Heart:   Regular  rhythm,  No  murmur   No edema  Abdomen:   Soft, non-tender. Not distended. Bowel sounds normal  Extremities: No cyanosis. No clubbing,      Skin turgor normal, Capillary refill normal, Radial dial pulse 2+  Skin:     Not pale. Not Jaundiced  No rashes   Psych:  Good insight. Not depressed. Not anxious or agitated. Neurologic: EOMs intact. No facial asymmetry. No aphasia or slurred speech. Symmetrical strength, Sensation grossly intact.  Alert and oriented X 4.     _______________________________________________________________________  Care Plan discussed with:    Comments   Patient y    Family      RN y    Care Manager                    Consultant:  quin WEINER physician   _______________________________________________________________________  Expected  Disposition:   Home with Family y   HH/PT/OT/RN    ELIZABETH/LTC    JAISON ________________________________________________________________________  TOTAL TIME: 60 Minutes    Critical Care Provided     Minutes non procedure based      Comments    y Reviewed previous records   >50% of visit spent in counseling and coordination of care y Discussion with patient and questions answered       ________________________________________________________________________  Signed: Moncho Cadet MD    Procedures: see electronic medical records for all procedures/Xrays and details which were not copied into this note but were reviewed prior to creation of Plan. LAB DATA REVIEWED:    Recent Results (from the past 24 hour(s))   EKG, 12 LEAD, INITIAL    Collection Time: 06/12/19 11:28 PM   Result Value Ref Range    Ventricular Rate 101 BPM    Atrial Rate 101 BPM    P-R Interval 148 ms    QRS Duration 80 ms    Q-T Interval 342 ms    QTC Calculation (Bezet) 443 ms    Calculated P Axis 78 degrees    Calculated R Axis 73 degrees    Calculated T Axis 48 degrees    Diagnosis       Sinus tachycardia  When compared with ECG of 23-MAY-2019 02:27,  No significant change was found     CBC WITH AUTOMATED DIFF    Collection Time: 06/13/19 12:01 AM   Result Value Ref Range    WBC 10.3 4.1 - 11.1 K/uL    RBC 5.57 4.10 - 5.70 M/uL    HGB 16.0 12.1 - 17.0 g/dL    HCT 49.4 36.6 - 50.3 %    MCV 88.7 80.0 - 99.0 FL    MCH 28.7 26.0 - 34.0 PG    MCHC 32.4 30.0 - 36.5 g/dL    RDW 14.0 11.5 - 14.5 %    PLATELET 146 823 - 736 K/uL    MPV 10.5 8.9 - 12.9 FL    NRBC 0.0 0  WBC    ABSOLUTE NRBC 0.00 0.00 - 0.01 K/uL    NEUTROPHILS 68 32 - 75 %    LYMPHOCYTES 15 12 - 49 %    MONOCYTES 8 5 - 13 %    EOSINOPHILS 9 (H) 0 - 7 %    BASOPHILS 0 0 - 1 %    IMMATURE GRANULOCYTES 0 0.0 - 0.5 %    ABS. NEUTROPHILS 7.0 1.8 - 8.0 K/UL    ABS. LYMPHOCYTES 1.5 0.8 - 3.5 K/UL    ABS. MONOCYTES 0.8 0.0 - 1.0 K/UL    ABS. EOSINOPHILS 0.9 (H) 0.0 - 0.4 K/UL    ABS. BASOPHILS 0.0 0.0 - 0.1 K/UL    ABS. IMM.  GRANS. 0.0 0.00 - 0.04 K/UL    DF AUTOMATED     METABOLIC PANEL, COMPREHENSIVE    Collection Time: 06/13/19 12:01 AM   Result Value Ref Range    Sodium 140 136 - 145 mmol/L    Potassium 3.9 3.5 - 5.1 mmol/L    Chloride 108 97 - 108 mmol/L    CO2 25 21 - 32 mmol/L    Anion gap 7 5 - 15 mmol/L    Glucose 105 (H) 65 - 100 mg/dL    BUN 11 6 - 20 MG/DL    Creatinine 1.14 0.70 - 1.30 MG/DL    BUN/Creatinine ratio 10 (L) 12 - 20      GFR est AA >60 >60 ml/min/1.73m2    GFR est non-AA >60 >60 ml/min/1.73m2    Calcium 9.6 8.5 - 10.1 MG/DL    Bilirubin, total 0.4 0.2 - 1.0 MG/DL    ALT (SGPT) 51 12 - 78 U/L    AST (SGOT) 30 15 - 37 U/L    Alk. phosphatase 87 45 - 117 U/L    Protein, total 7.7 6.4 - 8.2 g/dL    Albumin 4.1 3.5 - 5.0 g/dL    Globulin 3.6 2.0 - 4.0 g/dL    A-G Ratio 1.1 1.1 - 2.2     TROPONIN I    Collection Time: 06/13/19 12:01 AM   Result Value Ref Range    Troponin-I, Qt. <0.05 <0.05 ng/mL   CK W/ REFLX CKMB    Collection Time: 06/13/19 12:01 AM   Result Value Ref Range     (H) 39 - 308 U/L   CK-MB,QUANT.     Collection Time: 06/13/19 12:01 AM   Result Value Ref Range    CK - MB 5.9 (H) <3.6 NG/ML    CK-MB Index 1.2 0.0 - 2.5     CK    Collection Time: 06/13/19  5:02 AM   Result Value Ref Range     (H) 39 - 308 U/L   TROPONIN I    Collection Time: 06/13/19  5:02 AM   Result Value Ref Range    Troponin-I, Qt. <0.05 <0.05 ng/mL

## 2019-06-13 NOTE — PROGRESS NOTES
TRANSFER - OUT REPORT:    Verbal report given to wORTH(name) on Suzanna Nichols  being transferred to Neuro(unit) for routine progression of care       Report consisted of patients Situation, Background, Assessment and   Recommendations(SBAR). Information from the following report(s) SBAR, Kardex, Intake/Output and MAR was reviewed with the receiving nurse. Lines:   Peripheral IV 06/12/19 Right Antecubital (Active)   Site Assessment Clean, dry, & intact 6/13/2019 11:05 AM   Phlebitis Assessment 0 6/13/2019 11:05 AM   Infiltration Assessment 0 6/13/2019 11:05 AM   Dressing Status Clean, dry, & intact 6/13/2019 11:05 AM   Dressing Type Tape;Transparent 6/13/2019 11:05 AM   Hub Color/Line Status Green 6/13/2019 11:05 AM        Opportunity for questions and clarification was provided.       Patient transported with:   [employer]

## 2019-06-13 NOTE — PROGRESS NOTES
Paged Dr Sarah Santiago. Patient complains of stomach tightening. Patient states he takes Linzess 290 daily and miralax QID. Waiting for a call back to [phone]: Dr Sarah Santiago made aware.  Stated she will put in orders after she has reviewed the chart

## 2019-06-13 NOTE — PROGRESS NOTES
Pharmacy Clarification of the Prior to Admission Medication Regimen Retrospective to the Admission Medication Reconciliation    The patient was interviewed regarding clarification of the prior to admission medication regimen and was questioned regarding use of any other inhalers, topical products, over the counter medications, herbal medications, vitamin products or ophthalmic/nasal/otic medication use. MHT called the outpatient pharmacy, [facility], [phone], and spoke with Ciera Warren, [facility], who was able to verify the patient's Suboxone Dose and directions. [facility] confirmed the patients dose as being 1 (8-2mg) Film TID. Patient stated he only takes it BID. Information Obtained From: RX Query, Patient, Outpatient Pharmacy    Recommendations/Findings: The following amendments were made to the patient's active medication list on file at [address]:     1) Additions:   buprenorphine-naloxone (SUBOXONE) 8-2 mg film sublingaul film  citalopram (CELEXA) 10 mg tablet      2) Removals:   Diclofenac 50mg      3) Changes:  amLODIPine (NORVASC) (Old regimen: (strength 5mg) Take 5 mg by mouth daily. Vasquez Yarelis regimen: (strength 10mg) Take 10 mg by mouth daily.)  cloNIDine HCl (CATAPRES) 0.2 mg tablet (Old regimen: BID /New regimen: Take 0.2 mg by mouth two (2) times a day.)  lisinopril (PRINIVIL, ZESTRIL) (Old regimen: (strength 10mg) Take 10 mg by mouth daily. Vasquez Yarelis regimen:  (strength 20mg)  Take 20 mg by mouth daily.)  pantoprazole (PROTONIX) (Old regimen: (strength 20mg) Take 20 mg by mouth daily. Vasquez Yarelis regimen: (strength 40mg) Take 40 mg by mouth daily.)      4) Pertinent Pharmacy Findings:  buprenorphine-naloxone (SUBOXONE) 8-2 mg film sublingaul film : patient stated he ran out of this agent on 6/11/19. Patient stated he only takes this agent BID. Patient was not a reliable historian due to him falling asleep multiple times during the interview.  MHT had to keep waking patient up and when he woke he appeared confused and could not remember the last question MHT had asked him. PTA medication list was corrected to the following:     Prior to Admission Medications   Prescriptions Last Dose Informant Patient Reported? Taking? albuterol (ACCUNEB) 1.25 mg/3 mL nebu 2019 at Unknown time Self No Yes   Sig: Take 3 mL by inhalation every four (4) hours as needed for Cough (wheezing, shortness of breath). albuterol (PROVENTIL HFA, VENTOLIN HFA, PROAIR HFA) 90 mcg/actuation inhaler 2019 at Unknown time Self No Yes   Sig: Take 2 Puffs by inhalation every six (6) hours as needed for Wheezing. amLODIPine (NORVASC) 10 mg tablet 2019 at Unknown time Self Yes Yes   Sig: Take 10 mg by mouth daily. aspirin 81 mg tablet 2019 at Unknown time Self No Yes   Sig: Take 1 Tab by mouth daily. buprenorphine-naloxone (SUBOXONE) 8-2 mg film sublingaul film 6/10/2019 at Unknown time Self Yes Yes   Si Film by SubLINGual route two (2) times a day. citalopram (CELEXA) 10 mg tablet 2019 at Unknown time Self Yes Yes   Sig: Take 10 mg by mouth daily. cloNIDine HCl (CATAPRES) 0.2 mg tablet 2019 at Unknown time Self Yes Yes   Sig: Take 0.2 mg by mouth two (2) times a day. clonazePAM (KLONOPIN) 1 mg tablet 2019 at Unknown time Self Yes Yes   Sig: Take 1 mg by mouth two (2) times daily as needed (Anxiety). diclofenac (VOLTAREN) 1 % gel 6/10/2019 at Unknown time Self Yes Yes   Sig: Apply  to affected area four (4) times daily as needed for Pain. fluticasone propionate (FLONASE) 50 mcg/actuation nasal spray 2019 at Unknown time Self No Yes   Si Sprays by Both Nostrils route daily. linaclotide (LINZESS) 290 mcg cap capsule 2019 at Unknown time Self Yes Yes   Sig: Take 290 mcg by mouth Daily (before breakfast). lisinopril (PRINIVIL, ZESTRIL) 20 mg tablet 2019 at Unknown time Self Yes Yes   Sig: Take 20 mg by mouth daily.    montelukast (SINGULAIR) 10 mg tablet 2019 at Unknown time Self Yes Yes   Sig: Take 10 mg by mouth daily. pantoprazole (PROTONIX) 40 mg tablet 6/12/2019 at Unknown time Self Yes Yes   Sig: Take 40 mg by mouth daily. polyethylene glycol (MIRALAX) 17 gram packet 6/12/2019 at Unknown time Self No Yes   Sig: Take 1 Packet by mouth daily.       Facility-Administered Medications: None          Thank you,  Jada Brown) Lynda Bullock CPhT  Medication History Pharmacy Technician

## 2019-06-13 NOTE — ED NOTES
Pt sleeping at this time. Pt has n/c for comfort. Pt denies needs at this time. Pt on monitors x 2. Lights off for comfort. Call bell within reach.

## 2019-06-13 NOTE — PROGRESS NOTES
Patient reports that he had been on Suboxone but was kicked out of the program so has not been on it at this point until he finds a new program.  He has a problem with constipation and is requesting to resume his Linzess and would like to continue care physician gives him he is trying to quit smoking. He has had a cough productive of some green gray-yellow sputum this morning. Breathing is still tough but improving and is still requiring oxygen. We will continue nebulizers, IV steroids and antibiotics. We will try to obtain a sputum specimen and wean his oxygen as tolerated. Encourage smoking cessation. Chest x-ray was negative. And will mobilize as tolerated. Please see this morning's H&P for further care plan details.

## 2019-06-13 NOTE — ED PROVIDER NOTES
EMERGENCY DEPARTMENT HISTORY AND PHYSICAL EXAM      Date: 6/12/2019  Patient Name: Santa Kirkpatrick    History of Presenting Illness     Chief Complaint   Patient presents with    Shortness of Breath     onset 2 weeks ago which is gradually getting worse. No hx of COPD or asthma    Cough       History Provided By: Patient    HPI: Santa Kirkpatrick, 47 y.o. male former smoker presents to the ED with worsening SOB, chest tightness, wheezing. He has a h/o COPD and was seen in the ED less than a week ago for same symptoms and diagnosed with a copd exacerbation. He has never been intubated and has infrequent flairs of the disease. However, over the past few weeks, he has been repeatedly exposed to gasoline smell and exhaust smell, which he believes triggered his sob a week ago. He was then treated in the ED with nebs and steroids, went home feeling better. Over the past few days, he has once again been exposed to the same caustic fumes at work. He began feeling SOB yesterday, but this time, his q2h albuterol at home no longer helped with his symptoms. No fevers/chills. Dry cough. No chest pain other than the chest tightness which he usually feels during a copd exacerbation. No abdominal pain, n/v. There are no other complaints, changes, or physical findings at this time. PCP: Zenaida Parker, DO    No current facility-administered medications on file prior to encounter. Current Outpatient Medications on File Prior to Encounter   Medication Sig Dispense Refill    albuterol (ACCUNEB) 1.25 mg/3 mL nebu Take 3 mL by inhalation every four (4) hours as needed for Cough (wheezing, shortness of breath). 25 Each 0    Nebulizer & Compressor machine 1 Each by Does Not Apply route every four (4) hours as needed for Cough. As directed 1 Each 0    polyethylene glycol (MIRALAX) 17 gram packet Take 1 Packet by mouth daily.  10 Packet 0    diclofenac EC (VOLTAREN) 50 mg EC tablet Take 1 Tab by mouth two (2) times a day. 20 Tab 0    diclofenac (VOLTAREN) 1 % gel Apply  to affected area four (4) times daily. 1 Each 0    albuterol (PROVENTIL HFA, VENTOLIN HFA, PROAIR HFA) 90 mcg/actuation inhaler Take 2 Puffs by inhalation every six (6) hours as needed for Wheezing. 1 Inhaler 1    amLODIPine (NORVASC) 5 mg tablet Take 5 mg by mouth daily.  cloNIDine HCl (CATAPRES) 0.2 mg tablet Take  by mouth two (2) times a day.  montelukast (SINGULAIR) 10 mg tablet Take 10 mg by mouth daily.  pantoprazole (PROTONIX) 20 mg tablet Take 20 mg by mouth daily.  clonazePAM (KLONOPIN) 1 mg tablet Take 1 Tab by mouth two (2) times a day. Max Daily Amount: 2 mg. 60 Tab 5    fluticasone propionate (FLONASE) 50 mcg/actuation nasal spray 2 Sprays by Both Nostrils route daily. 1 Bottle 0    Nasal Dilators (BREATHE RIGHT) strp 1 Box by Apply Externally route daily. 10 Strip 0    aspirin 81 mg tablet Take 1 Tab by mouth daily. 30 Tab 1    lisinopril (PRINIVIL, ZESTRIL) 10 mg tablet Take 10 mg by mouth daily. Past History     Past Medical History:  Past Medical History:   Diagnosis Date    Chronic low back pain     Hypertension        Past Surgical History:  Past Surgical History:   Procedure Laterality Date    HX ORTHOPAEDIC         Family History:  Family History   Problem Relation Age of Onset    Cancer Mother     Heart Disease Father        Social History:  Social History     Tobacco Use    Smoking status: Current Every Day Smoker     Packs/day: 1.50     Years: 10.00     Pack years: 15.00    Smokeless tobacco: Never Used   Substance Use Topics    Alcohol use: Yes    Drug use: No       Allergies: Allergies   Allergen Reactions    Vicodin [Hydrocodone-Acetaminophen] Other (comments)     Headache         Review of Systems   Review of Systems   Constitutional: Positive for chills, diaphoresis, fatigue and fever. Negative for appetite change.    Respiratory: Positive for cough, chest tightness and shortness of breath. Cardiovascular: Negative for chest pain, palpitations and leg swelling. Gastrointestinal: Negative for abdominal distention, abdominal pain, blood in stool, constipation, diarrhea, nausea and vomiting. Genitourinary: Negative for decreased urine volume, difficulty urinating, dysuria, flank pain, frequency and hematuria. Musculoskeletal: Negative for arthralgias, joint swelling, myalgias, neck pain and neck stiffness. Skin: Negative for color change. Neurological: Negative for dizziness, weakness, light-headedness, numbness and headaches. Hematological: Negative for adenopathy. All other systems reviewed and are negative. Physical Exam   Physical Exam   Constitutional: He is oriented to person, place, and time. He appears well-developed and well-nourished. He appears distressed. HENT:   Head: Atraumatic. Mouth/Throat: Oropharynx is clear and moist.   Eyes: Pupils are equal, round, and reactive to light. Conjunctivae and EOM are normal. No scleral icterus. Neck: Normal range of motion. Neck supple. No JVD present. No tracheal deviation present. Cardiovascular: Normal rate, regular rhythm, normal heart sounds and intact distal pulses. Exam reveals no gallop and no friction rub. No murmur heard. Pulmonary/Chest: No stridor. He is in respiratory distress. He has wheezes. He has no rales. He exhibits no tenderness. Increased work of breathing with mild supraclavicular retractions. Speaks in 3-4 word sentences. Expiratory phase of respiratory cycle markedly prolonged. Diffuse expiratory wheezes throughout both lung fields. Abdominal: Soft. Bowel sounds are normal. He exhibits no distension. There is no tenderness. Musculoskeletal: Normal range of motion. He exhibits no edema or tenderness. Neurological: He is alert and oriented to person, place, and time. No cranial nerve deficit. Skin: Skin is warm and dry. He is not diaphoretic.    Nursing note and vitals reviewed. Diagnostic Study Results     Labs -     Recent Results (from the past 24 hour(s))   EKG, 12 LEAD, INITIAL    Collection Time: 06/12/19 11:28 PM   Result Value Ref Range    Ventricular Rate 101 BPM    Atrial Rate 101 BPM    P-R Interval 148 ms    QRS Duration 80 ms    Q-T Interval 342 ms    QTC Calculation (Bezet) 443 ms    Calculated P Axis 78 degrees    Calculated R Axis 73 degrees    Calculated T Axis 48 degrees    Diagnosis       Sinus tachycardia  When compared with ECG of 23-MAY-2019 02:27,  No significant change was found     CBC WITH AUTOMATED DIFF    Collection Time: 06/13/19 12:01 AM   Result Value Ref Range    WBC 10.3 4.1 - 11.1 K/uL    RBC 5.57 4.10 - 5.70 M/uL    HGB 16.0 12.1 - 17.0 g/dL    HCT 49.4 36.6 - 50.3 %    MCV 88.7 80.0 - 99.0 FL    MCH 28.7 26.0 - 34.0 PG    MCHC 32.4 30.0 - 36.5 g/dL    RDW 14.0 11.5 - 14.5 %    PLATELET 507 330 - 829 K/uL    MPV 10.5 8.9 - 12.9 FL    NRBC 0.0 0  WBC    ABSOLUTE NRBC 0.00 0.00 - 0.01 K/uL    NEUTROPHILS 68 32 - 75 %    LYMPHOCYTES 15 12 - 49 %    MONOCYTES 8 5 - 13 %    EOSINOPHILS 9 (H) 0 - 7 %    BASOPHILS 0 0 - 1 %    IMMATURE GRANULOCYTES 0 0.0 - 0.5 %    ABS. NEUTROPHILS 7.0 1.8 - 8.0 K/UL    ABS. LYMPHOCYTES 1.5 0.8 - 3.5 K/UL    ABS. MONOCYTES 0.8 0.0 - 1.0 K/UL    ABS. EOSINOPHILS 0.9 (H) 0.0 - 0.4 K/UL    ABS. BASOPHILS 0.0 0.0 - 0.1 K/UL    ABS. IMM.  GRANS. 0.0 0.00 - 0.04 K/UL    DF AUTOMATED     METABOLIC PANEL, COMPREHENSIVE    Collection Time: 06/13/19 12:01 AM   Result Value Ref Range    Sodium 140 136 - 145 mmol/L    Potassium 3.9 3.5 - 5.1 mmol/L    Chloride 108 97 - 108 mmol/L    CO2 25 21 - 32 mmol/L    Anion gap 7 5 - 15 mmol/L    Glucose 105 (H) 65 - 100 mg/dL    BUN 11 6 - 20 MG/DL    Creatinine 1.14 0.70 - 1.30 MG/DL    BUN/Creatinine ratio 10 (L) 12 - 20      GFR est AA >60 >60 ml/min/1.73m2    GFR est non-AA >60 >60 ml/min/1.73m2    Calcium 9.6 8.5 - 10.1 MG/DL    Bilirubin, total 0.4 0.2 - 1.0 MG/DL    ALT (SGPT) 51 12 - 78 U/L    AST (SGOT) 30 15 - 37 U/L    Alk. phosphatase 87 45 - 117 U/L    Protein, total 7.7 6.4 - 8.2 g/dL    Albumin 4.1 3.5 - 5.0 g/dL    Globulin 3.6 2.0 - 4.0 g/dL    A-G Ratio 1.1 1.1 - 2.2     TROPONIN I    Collection Time: 06/13/19 12:01 AM   Result Value Ref Range    Troponin-I, Qt. <0.05 <0.05 ng/mL   CK W/ REFLX CKMB    Collection Time: 06/13/19 12:01 AM   Result Value Ref Range     (H) 39 - 308 U/L   CK-MB,QUANT. Collection Time: 06/13/19 12:01 AM   Result Value Ref Range    CK - MB 5.9 (H) <3.6 NG/ML    CK-MB Index 1.2 0.0 - 2.5     CK    Collection Time: 06/13/19  5:02 AM   Result Value Ref Range     (H) 39 - 308 U/L   TROPONIN I    Collection Time: 06/13/19  5:02 AM   Result Value Ref Range    Troponin-I, Qt. <0.05 <0.05 ng/mL       Radiologic Studies -   XR CHEST PA LAT   Final Result   IMPRESSION: No acute cardiopulmonary disease. CT Results  (Last 48 hours)    None        CXR Results  (Last 48 hours)    None            Medical Decision Making   I am the first provider for this patient. I reviewed the vital signs, available nursing notes, past medical history, past surgical history, family history and social history. Vital Signs-Reviewed the patient's vital signs.   Patient Vitals for the past 24 hrs:   Temp Pulse Resp BP SpO2   06/13/19 1025  (!) 106 17 115/51 95 %   06/13/19 1000  (!) 104 18 96/48 95 %   06/13/19 0900 97.6 °F (36.4 °C) (!) 101 16 148/69 97 %   06/13/19 0800    152/90 96 %   06/13/19 0700    136/66 99 %   06/13/19 0605 97.6 °F (36.4 °C) 98 18 (!) 157/94 96 %   06/13/19 0330  97 20 128/90 97 %   06/13/19 0300  (!) 108 22 141/78 96 %   06/13/19 0230  (!) 102  115/76 90 %   06/13/19 0130  100 20 123/53    06/13/19 0100    117/52 90 %   06/13/19 0030  94 22 124/63 100 %   06/13/19 0024     94 %   06/13/19 0007    149/80 92 %   06/12/19 2325 97.9 °F (36.6 °C) (!) 107 26 161/79 93 %       Records Reviewed: Nursing Notes and Old Medical Records    Differential Diagnosis: copd exacerbation, pna, pneumothorax, mi    Provider Notes (Medical Decision Making):   Mr. Thierno Sanderson returns to the ED in hypoxemic respiratory failure after being discharged from here less than a week ago for similar, but milder symptoms. He is a former smoker and has mild to moderate copd with infrequent flair-ups that have never required prolonged hospitalization or intubation. However, over the past week, he has been exposed to noxious fumes that seem to have been the trigger leading to a copd exacerbation. On arrival to the ED, his oxygen saturation was in the low 90s and he initially had quite poor air movement throughout both lungs. This significantly improved after getting 3 nebs, magnesium and steroids, however, at the end of the treatments, he is still wheezing, working to breathe and his oxygen saturation drops to 89% and hr increases to 120s with even minimal exertion. Based on this, he will require hospital admission for regular neb treatments and close observation. He has been stable in the ED and his oxygen requirement at rest has been max 2LNC. I believe he is stable for further mgmt on the floor. Of note, his ck was slightly elevated. I have sent a repeat level after he has had some fluids. Results still pending. Doubt rhabdo, but please follow up with results. Thank you for resuming the care of this patient. Please don't hesitate to contact me in the emergency department if you  have any additional questions. Hannah Muñoz MD      ED Course:     Initial assessment performed. The patients presenting problems have been discussed, and they are in agreement with the care plan formulated and outlined with them. I have encouraged them to ask questions as they arise throughout their visit.     ED Course as of Jun 13 1039   Thu Jun 13, 2019   0523 Tried to ambulate patient, oxygen drops down to 89% with minimal exertion and HR increased to 120s. No chest pain. Still diffuse expiratory wheezes with prolonged expiratory phase. I have a low suspicion for PE as he has no chest pain and his history of exposure to toxic fumes in setting of known obstructive pulmonary disease makes COPD exacerbation much more likely. He has also improved significantly with nebs. Given that he is here for the second time a in a week, I recommend inpatient treatment with q4h nebs, close observation. [TZ]      ED Course User Index  [TZ] Radha Carbajal MD       Critical Care Time:     0    Disposition:  admit      Diagnosis     Clinical Impression:   1.  Acute respiratory failure with hypoxia ([facility] [address]

## 2019-06-13 NOTE — ED NOTES
Pt presents to ED c/o SOB, pt audibly wheezing at this time. Pt states SOB x 1 week Pt denies CP, denies n/v/d. PT AOX4 and ambulatory. Pt placed on monitors x 2. WCTM. Call bell within reach.

## 2019-06-13 NOTE — TELEPHONE ENCOUNTER
Patient is requesting to be put on a waiting list if a earlier new patient appt opens up before April the 15 Best contact [phone]       [facility] & [facility]
Returned call to patient. Notified patient our office does not have a waiting list and advised patient to call office to inquire about new patient cancellations. Patient verbalized understanding and does not have any further questions or concerns at this time.
- - -

## 2019-06-13 NOTE — PROGRESS NOTES
1456- attempted to call and get report. No answer from the ED. My ext is     TRANSFER - IN REPORT:    Verbal report received from Heath(name) on Suzan Olivas  being received from ED(unit) for routine progression of care      Report consisted of patients Situation, Background, Assessment and   Recommendations(SBAR). Information from the following report(s) SBAR, Kardex, ED Summary, MAR, Recent Results and Cardiac Rhythm Sinus Tach was reviewed with the receiving nurse. Opportunity for questions and clarification was provided.       Assessment completed upon patients arrival to unit and care assumed.         '

## 2019-06-14 VITALS
HEART RATE: 99 BPM | SYSTOLIC BLOOD PRESSURE: 113 MMHG | WEIGHT: 242 LBS | HEIGHT: 75 IN | BODY MASS INDEX: 30.09 KG/M2 | OXYGEN SATURATION: 95 % | RESPIRATION RATE: 23 BRPM | DIASTOLIC BLOOD PRESSURE: 63 MMHG | TEMPERATURE: 97.9 F

## 2019-06-14 LAB
ALBUMIN SERPL-MCNC: 3.4 G/DL (ref 3.5–5)
ALBUMIN/GLOB SERPL: 0.9 {RATIO} (ref 1.1–2.2)
ALP SERPL-CCNC: 78 U/L (ref 45–117)
ALT SERPL-CCNC: 41 U/L (ref 12–78)
ANION GAP SERPL CALC-SCNC: 8 MMOL/L (ref 5–15)
AST SERPL-CCNC: 21 U/L (ref 15–37)
BASOPHILS # BLD: 0 K/UL (ref 0–0.1)
BASOPHILS NFR BLD: 0 % (ref 0–1)
BILIRUB SERPL-MCNC: 0.3 MG/DL (ref 0.2–1)
BUN SERPL-MCNC: 13 MG/DL (ref 6–20)
BUN/CREAT SERPL: 14 (ref 12–20)
CALCIUM SERPL-MCNC: 9.1 MG/DL (ref 8.5–10.1)
CHLORIDE SERPL-SCNC: 107 MMOL/L (ref 97–108)
CK MB CFR SERPL CALC: 1.1 % (ref 0–2.5)
CK MB SERPL-MCNC: 2.5 NG/ML (ref 5–25)
CK SERPL-CCNC: 228 U/L (ref 39–308)
CO2 SERPL-SCNC: 23 MMOL/L (ref 21–32)
CREAT SERPL-MCNC: 0.95 MG/DL (ref 0.7–1.3)
DIFFERENTIAL METHOD BLD: ABNORMAL
EOSINOPHIL # BLD: 0 K/UL (ref 0–0.4)
EOSINOPHIL NFR BLD: 0 % (ref 0–7)
ERYTHROCYTE [DISTWIDTH] IN BLOOD BY AUTOMATED COUNT: 14 % (ref 11.5–14.5)
GLOBULIN SER CALC-MCNC: 3.6 G/DL (ref 2–4)
GLUCOSE SERPL-MCNC: 142 MG/DL (ref 65–100)
HCT VFR BLD AUTO: 48.2 % (ref 36.6–50.3)
HGB BLD-MCNC: 15.8 G/DL (ref 12.1–17)
IMM GRANULOCYTES # BLD AUTO: 0.2 K/UL (ref 0–0.04)
IMM GRANULOCYTES NFR BLD AUTO: 1 % (ref 0–0.5)
LYMPHOCYTES # BLD: 0.9 K/UL (ref 0.8–3.5)
LYMPHOCYTES NFR BLD: 5 % (ref 12–49)
MAGNESIUM SERPL-MCNC: 2.2 MG/DL (ref 1.6–2.4)
MCH RBC QN AUTO: 28.8 PG (ref 26–34)
MCHC RBC AUTO-ENTMCNC: 32.8 G/DL (ref 30–36.5)
MCV RBC AUTO: 88 FL (ref 80–99)
MONOCYTES # BLD: 0.3 K/UL (ref 0–1)
MONOCYTES NFR BLD: 2 % (ref 5–13)
NEUTS SEG # BLD: 16 K/UL (ref 1.8–8)
NEUTS SEG NFR BLD: 92 % (ref 32–75)
NRBC # BLD: 0 K/UL (ref 0–0.01)
NRBC BLD-RTO: 0 PER 100 WBC
PHOSPHATE SERPL-MCNC: 2.3 MG/DL (ref 2.6–4.7)
PLATELET # BLD AUTO: 257 K/UL (ref 150–400)
PMV BLD AUTO: 11.1 FL (ref 8.9–12.9)
POTASSIUM SERPL-SCNC: 4.7 MMOL/L (ref 3.5–5.1)
PROT SERPL-MCNC: 7 G/DL (ref 6.4–8.2)
RBC # BLD AUTO: 5.48 M/UL (ref 4.1–5.7)
RBC MORPH BLD: ABNORMAL
SODIUM SERPL-SCNC: 138 MMOL/L (ref 136–145)
WBC # BLD AUTO: 17.4 K/UL (ref 4.1–11.1)

## 2019-06-14 PROCEDURE — 36415 COLL VENOUS BLD VENIPUNCTURE: CPT

## 2019-06-14 PROCEDURE — 74011250636 HC RX REV CODE- 250/636: Performed by: INTERNAL MEDICINE

## 2019-06-14 PROCEDURE — 94640 AIRWAY INHALATION TREATMENT: CPT

## 2019-06-14 PROCEDURE — 82550 ASSAY OF CK (CPK): CPT

## 2019-06-14 PROCEDURE — 74011000250 HC RX REV CODE- 250: Performed by: INTERNAL MEDICINE

## 2019-06-14 PROCEDURE — 82553 CREATINE MB FRACTION: CPT

## 2019-06-14 PROCEDURE — 99218 HC RM OBSERVATION: CPT

## 2019-06-14 PROCEDURE — 96376 TX/PRO/DX INJ SAME DRUG ADON: CPT

## 2019-06-14 PROCEDURE — 74011250637 HC RX REV CODE- 250/637: Performed by: EMERGENCY MEDICINE

## 2019-06-14 PROCEDURE — 85025 COMPLETE CBC W/AUTO DIFF WBC: CPT

## 2019-06-14 PROCEDURE — 77030027138 HC INCENT SPIROMETER -A

## 2019-06-14 PROCEDURE — 74011250637 HC RX REV CODE- 250/637: Performed by: INTERNAL MEDICINE

## 2019-06-14 PROCEDURE — 84100 ASSAY OF PHOSPHORUS: CPT

## 2019-06-14 PROCEDURE — 80053 COMPREHEN METABOLIC PANEL: CPT

## 2019-06-14 PROCEDURE — 83735 ASSAY OF MAGNESIUM: CPT

## 2019-06-14 PROCEDURE — 94760 N-INVAS EAR/PLS OXIMETRY 1: CPT

## 2019-06-14 RX ORDER — LEVOFLOXACIN 750 MG/1
750 TABLET ORAL DAILY
Qty: 5 TAB | Refills: 0 | Status: SHIPPED | OUTPATIENT
Start: 2019-06-14 | End: 2019-07-17 | Stop reason: ALTCHOICE

## 2019-06-14 RX ORDER — GUAIFENESIN 600 MG/1
600 TABLET, EXTENDED RELEASE ORAL EVERY 12 HOURS
Qty: 14 TAB | Refills: 0 | Status: SHIPPED | OUTPATIENT
Start: 2019-06-14 | End: 2020-03-04

## 2019-06-14 RX ORDER — PREDNISONE 20 MG/1
20 TABLET ORAL
Qty: 37 TAB | Refills: 0 | Status: SHIPPED | OUTPATIENT
Start: 2019-06-14 | End: 2019-07-17 | Stop reason: ALTCHOICE

## 2019-06-14 RX ADMIN — Medication 10 ML: at 14:17

## 2019-06-14 RX ADMIN — POLYETHYLENE GLYCOL 3350 17 G: 17 POWDER, FOR SOLUTION ORAL at 08:24

## 2019-06-14 RX ADMIN — Medication 10 ML: at 05:09

## 2019-06-14 RX ADMIN — IPRATROPIUM BROMIDE AND ALBUTEROL SULFATE 3 ML: .5; 3 SOLUTION RESPIRATORY (INHALATION) at 11:27

## 2019-06-14 RX ADMIN — AMLODIPINE BESYLATE 5 MG: 5 TABLET ORAL at 08:23

## 2019-06-14 RX ADMIN — PANTOPRAZOLE SODIUM 40 MG: 40 TABLET, DELAYED RELEASE ORAL at 08:23

## 2019-06-14 RX ADMIN — GUAIFENESIN 600 MG: 600 TABLET, EXTENDED RELEASE ORAL at 08:24

## 2019-06-14 RX ADMIN — IPRATROPIUM BROMIDE AND ALBUTEROL SULFATE 3 ML: .5; 3 SOLUTION RESPIRATORY (INHALATION) at 08:00

## 2019-06-14 RX ADMIN — CLONAZEPAM 1 MG: 1 TABLET ORAL at 08:38

## 2019-06-14 RX ADMIN — UMECLIDINIUM 1 PUFF: 62.5 AEROSOL, POWDER ORAL at 14:16

## 2019-06-14 RX ADMIN — METHYLPREDNISOLONE SODIUM SUCCINATE 60 MG: 125 INJECTION, POWDER, FOR SOLUTION INTRAMUSCULAR; INTRAVENOUS at 12:09

## 2019-06-14 RX ADMIN — FLUTICASONE FUROATE AND VILANTEROL TRIFENATATE 1 PUFF: 200; 25 POWDER RESPIRATORY (INHALATION) at 09:29

## 2019-06-14 RX ADMIN — ACETAMINOPHEN 650 MG: 325 TABLET ORAL at 08:24

## 2019-06-14 RX ADMIN — LEVOFLOXACIN 500 MG: 5 INJECTION, SOLUTION INTRAVENOUS at 03:23

## 2019-06-14 RX ADMIN — METHYLPREDNISOLONE SODIUM SUCCINATE 60 MG: 125 INJECTION, POWDER, FOR SOLUTION INTRAMUSCULAR; INTRAVENOUS at 05:09

## 2019-06-14 RX ADMIN — ASPIRIN 81 MG 81 MG: 81 TABLET ORAL at 08:23

## 2019-06-14 RX ADMIN — IPRATROPIUM BROMIDE AND ALBUTEROL SULFATE 3 ML: .5; 3 SOLUTION RESPIRATORY (INHALATION) at 03:18

## 2019-06-14 RX ADMIN — CLONIDINE HYDROCHLORIDE 0.2 MG: 0.1 TABLET ORAL at 08:23

## 2019-06-14 RX ADMIN — LISINOPRIL 10 MG: 10 TABLET ORAL at 08:23

## 2019-06-14 NOTE — PROGRESS NOTES
Patient does not wants to stay in the hospital.He wants to go home,saying that he has all the equipment needed for treatment at home.     Lanre

## 2019-06-14 NOTE — PROGRESS NOTES
All in agreement with d/c to home today without d/c needs. Please have pt contact family for transport.

## 2019-06-14 NOTE — PROGRESS NOTES
Prescriptions, discharge instructions and follow up appointments reviewed with patient who verbalized an understanding. An opportunity for questions and clarification was provided for.

## 2019-06-14 NOTE — DISCHARGE SUMMARY
Discharge Summary    Patient: Lorena Bob               Sex: male          DOA: 6/12/2019         YOB: 1965      Age:  47 y.o.        LOS:  LOS: 1 day                Admit Date: 6/12/2019    Discharge Date: 6/14/2019    Admission Diagnoses: Acute respiratory failure with hypoxia ([facility] [address] [J96.01]    Discharge Diagnoses:    Problem List as of 6/14/2019 Date Reviewed: 6/13/2019          Codes Class Noted - Resolved    Acute respiratory failure with hypoxia [facility]) ICD-10-CM: J96.01  ICD-9-CM: 518.81  6/13/2019 - Present        Acute bronchitis ICD-10-CM: J20.9  ICD-9-CM: 466.0  6/13/2019 - Present        Tobacco abuse (Chronic) ICD-10-CM: Z72.0  ICD-9-CM: 305.1  4/15/2019 - Present        Essential hypertension (Chronic) ICD-10-CM: I10  ICD-9-CM: 401.9  4/15/2019 - Present        GERD (gastroesophageal reflux disease) (Chronic) ICD-10-CM: K21.9  ICD-9-CM: 530.81  4/15/2019 - Present        Chronic midline low back pain without sciatica (Chronic) ICD-10-CM: M54.5, G89.29  ICD-9-CM: 724.2, 338.29  4/15/2019 - Present              Discharge Medications:     Current Discharge Medication List      START taking these medications    Details   guaiFENesin ER (MUCINEX) 600 mg ER tablet Take 1 Tab by mouth every twelve (12) hours. Qty: 14 Tab, Refills: 0      predniSONE (DELTASONE) 20 mg tablet Take 20 mg by mouth daily (with breakfast). 60 mg po daily x 1 day,then 50 mg po daily x 2 days,then 40 mg po daily x 2 days,then 30 mg po daily x 2 days,then 20 mg po daily x 2 days,then 10 mg po daily x 2 days. Qty: 37 Tab, Refills: 0      levoFLOXacin (LEVAQUIN) 750 mg tablet Take 1 Tab by mouth daily. Qty: 5 Tab, Refills: 0         CONTINUE these medications which have NOT CHANGED    Details   linaclotide (LINZESS) 290 mcg cap capsule Take 290 mcg by mouth Daily (before breakfast). clonazePAM (KLONOPIN) 1 mg tablet Take 1 mg by mouth two (2) times daily as needed (Anxiety).       buprenorphine-naloxone (SUBOXONE) 8-2 mg film sublingaul film 1 Film by SubLINGual route two (2) times a day. Comments: .      citalopram (CELEXA) 10 mg tablet Take 10 mg by mouth daily. diclofenac (VOLTAREN) 1 % gel Apply  to affected area four (4) times daily as needed for Pain. albuterol (ACCUNEB) 1.25 mg/3 mL nebu Take 3 mL by inhalation every four (4) hours as needed for Cough (wheezing, shortness of breath). Qty: 25 Each, Refills: 0      polyethylene glycol (MIRALAX) 17 gram packet Take 1 Packet by mouth daily. Qty: 10 Packet, Refills: 0      albuterol (PROVENTIL HFA, VENTOLIN HFA, PROAIR HFA) 90 mcg/actuation inhaler Take 2 Puffs by inhalation every six (6) hours as needed for Wheezing. Qty: 1 Inhaler, Refills: 1      amLODIPine (NORVASC) 10 mg tablet Take 10 mg by mouth daily. cloNIDine HCl (CATAPRES) 0.2 mg tablet Take 0.2 mg by mouth two (2) times a day. montelukast (SINGULAIR) 10 mg tablet Take 10 mg by mouth daily. pantoprazole (PROTONIX) 40 mg tablet Take 40 mg by mouth daily. fluticasone propionate (FLONASE) 50 mcg/actuation nasal spray 2 Sprays by Both Nostrils route daily. Qty: 1 Bottle, Refills: 0      aspirin 81 mg tablet Take 1 Tab by mouth daily. Qty: 30 Tab, Refills: 1      lisinopril (PRINIVIL, ZESTRIL) 20 mg tablet Take 20 mg by mouth daily.              Follow-up:PCP    Discharge Condition: Stable    Activity: Activity as tolerated    Diet: Resume previous diet    Wound Care: None needed    Labs:  Labs: Results:       Chemistry Recent Labs     06/14/19 0317 06/13/19  0001   * 105*    140   K 4.7 3.9    108   CO2 23 25   BUN 13 11   CREA 0.95 1.14   CA 9.1 9.6   AGAP 8 7   BUCR 14 10*   AP 78 87   TP 7.0 7.7   ALB 3.4* 4.1   GLOB 3.6 3.6   AGRAT 0.9* 1.1      CBC w/Diff Recent Labs     06/14/19  0317 06/13/19  0001   WBC 17.4* 10.3   RBC 5.48 5.57   HGB 15.8 16.0   HCT 48.2 49.4    245   GRANS 92* 68   LYMPH 5* 15   EOS 0 9*      Cardiac Enzymes Recent Labs     06/14/19  0317 06/13/19  0502    434*      Coagulation No results for input(s): PTP, INR, APTT in the last 72 hours. No lab exists for component: INREXT    Lipid Panel Lab Results   Component Value Date/Time    Cholesterol, total 201 (H) 04/15/2019 10:40 AM    HDL Cholesterol 55 04/15/2019 10:40 AM    LDL, calculated 124 (H) 04/15/2019 10:40 AM    VLDL, calculated 22 04/15/2019 10:40 AM    Triglyceride 108 04/15/2019 10:40 AM      BNP No results for input(s): BNPP in the last 72 hours. Liver Enzymes Recent Labs     06/14/19 0317   TP 7.0   ALB 3.4*   AP 78   SGOT 21      Thyroid Studies Lab Results   Component Value Date/Time    TSH 0.899 04/15/2019 10:40 AM          Imaging:  CXR   No acute cardiopulmonary disease    Consults: None    Treatment Team: Treatment Team: Attending Provider: Elvira Purdy MD; Utilization Review: Melody Boudreaux    Significant Diagnostic Studies: labs: see recent results    HISTORY OF PRESENT ILLNESS:     Janae Hall is a 47 y.o.  male who presents with cough and shortness of breath. As per patient, he is been feeling sick for past couple of weeks. He reported worsening non productive cough, difficulty breathing with worsening on exertion. Pt denies any fever, chills, nausea, vomiting, diarrhea, chest pain, abdominal pain, problems urination. Pt was seen in ED couple of weeks ago and discharge from ED with steroids and Nebs but pt reported never felt better instead continue to get worsen.  Pt noted O2 sat upper 80s upon ambulation per ED report and started on O2.      Hospital Course:  Hypoxia on exertion (O2 sat upper 80s per ED report upon ambulation)  Due to Acute Bronchitis with suspect underlying COPD with exacerbation (active chronic smoker)  Failed OP treatment (was seen in ED couple of weeks ago)  Admitted  IV Steroids  IV Levaquin  Scheduled Nebs  Mucolytics and antitussives  Started Breo, Recently started on singular and flonase, will stop those as doesn't seem to have asthma  Will need PFTs as an OP when feeling better  CXR without ASD  Today patient says that he is feeling better and wants to be discharged. He says that he has all the nebulizers at home and can continue his treatment. Patient tolerated the walking challenge with O2sat 93% on RA  He is instructed to continue prednisone as prescribed and atbx     Elevated CK / Mild Rhabdomyolysis  Resolved  Not on statins  Given multiple IVF boluses in ED, will trend CK     Tobacco abuse  Counseled cesation     Essential Hypertension   Continue Clonidine, Norvasc and Lisinopril     GERD (gastroesophageal reflux disease)   Continue PPIs    Patient evaluated and he is stable. He is advised to continue treatment in hospital for 1 more day but declined,wants to continue treatment at home. He will be discharged. Discharged plan discussed with patient,nurse,cm. Time for discharge:40 minutes.     Angel Campos MD  June 14, 2019

## 2019-06-14 NOTE — PROGRESS NOTES
Bedside and Verbal shift change report given to [place] (oncoming nurse) by Azael Carreno RN (offgoing nurse). Report included the following information SBAR, Kardex, MAR and Recent Results.

## 2019-06-15 LAB
BACTERIA SPEC CULT: NORMAL
GRAM STN SPEC: NORMAL
SERVICE CMNT-IMP: NORMAL

## 2019-07-17 ENCOUNTER — OFFICE VISIT (OUTPATIENT)
Dept: INTERNAL MEDICINE CLINIC | Age: 54
End: 2019-07-17

## 2019-07-17 VITALS
WEIGHT: 256 LBS | DIASTOLIC BLOOD PRESSURE: 76 MMHG | TEMPERATURE: 97.8 F | SYSTOLIC BLOOD PRESSURE: 118 MMHG | OXYGEN SATURATION: 98 % | RESPIRATION RATE: 18 BRPM | BODY MASS INDEX: 31.83 KG/M2 | HEART RATE: 86 BPM | HEIGHT: 75 IN

## 2019-07-17 DIAGNOSIS — Z72.0 TOBACCO ABUSE: ICD-10-CM

## 2019-07-17 DIAGNOSIS — M51.36 DDD (DEGENERATIVE DISC DISEASE), LUMBAR: ICD-10-CM

## 2019-07-17 DIAGNOSIS — G89.29 CHRONIC MIDLINE LOW BACK PAIN WITHOUT SCIATICA: Primary | ICD-10-CM

## 2019-07-17 DIAGNOSIS — Z12.11 SCREEN FOR COLON CANCER: ICD-10-CM

## 2019-07-17 DIAGNOSIS — M54.50 CHRONIC MIDLINE LOW BACK PAIN WITHOUT SCIATICA: Primary | ICD-10-CM

## 2019-07-17 DIAGNOSIS — I10 ESSENTIAL HYPERTENSION: ICD-10-CM

## 2019-07-17 DIAGNOSIS — J42 CHRONIC BRONCHITIS, UNSPECIFIED CHRONIC BRONCHITIS TYPE (HCC): ICD-10-CM

## 2019-07-17 RX ORDER — TRAMADOL HYDROCHLORIDE 50 MG/1
50 TABLET ORAL
Qty: 90 TAB | Refills: 3 | Status: SHIPPED | OUTPATIENT
Start: 2019-07-17 | End: 2019-08-16

## 2019-07-17 NOTE — PATIENT INSTRUCTIONS
Office Policies    Phone calls/patient messages:            Please allow up to 24 hours for someone in the office to contact you about your call or message. Be mindful your provider may be out of the office or your message may require further review. We encourage you to use [employer] for your messages as this is a faster, more efficient way to communicate with our office                         Medication Refills:            Prescription medications require 48-72 business hours to process. We encourage you to use [employer] for your refills. For controlled medications: Please allow 72 business hours to process. Certain medications may require you to  a written prescription at our office. NO narcotic/controlled medications will be prescribed after 4pm Monday through Friday or on weekends              Form/Paperwork Completion:            Please note a $25 fee may incur for all paperwork for completed by our providers. We ask that you allow 7-10 business days. Pre-payment is due prior to picking up/faxing the completed form. You may also download your forms to [employer] to have your doctor print off. Using a Metered-Dose Inhaler: Care Instructions  Your Care Instructions    A metered-dose inhaler lets you breathe medicine into your lungs quickly. Inhaled medicine works faster than the same medicine in a pill. An inhaler allows you to take less medicine than you would need if you took it as a pill. \"Metered-dose\" means that the inhaler gives a measured amount of medicine each time you use it. A metered-dose inhaler gives medicine in the form of a liquid mist.  Your doctor may want you to use a spacer with your inhaler. A spacer is a chamber that you attach to the inhaler. The chamber holds the medicine before you inhale it. That way, you can inhale the medicine in as many breaths as you need.  Doctors recommend using a spacer with most metered-dose inhalers, especially those with corticosteroid medicines. Follow-up care is a key part of your treatment and safety. Be sure to make and go to all appointments, and call your doctor if you are having problems. It's also a good idea to know your test results and keep a list of the medicines you take. How can you care for yourself at home? To get started using your inhaler  · Talk with your health care provider to be sure you are using your inhaler the right way. It might help if you practice using it in front of a mirror. Use the inhaler exactly as prescribed. · Check that you have the correct medicine. If you use more than one inhaler, put a label on each one. This will let you know which one to use at the right time. · Keep track of how much medicine is in the inhaler. Check the label to see how many doses are in the container. If you know how many puffs you can take, you can replace the inhaler before you run out. Ask your health care provider how you can keep track of how much medicine is left. · Use a spacer if you have problems pressing the inhaler and breathing in at the same time. You also may need a spacer if you are using corticosteroid medicines. · If you are using a corticosteroid inhaler, gargle and rinse out your mouth with water after use. Do not swallow the water. Swallowing the water will increase the chance that the medicine will get into your bloodstream. This may make it more likely that you will have side effects. To use a spacer with an inhaler  1. Shake the inhaler. Remove the inhaler cap, and place the mouthpiece of the inhaler into the spacer. Check the inhaler instructions to see if you need to prime your inhaler before you use it. If it needs priming, follow the instructions on how to prime your inhaler. 2. Remove the cap from the spacer. 3. Hold the inhaler upright with the mouthpiece at the bottom. 4. Tilt your head back a little, and breathe out slowly and completely.   5. Place the spacer's mouthpiece in your mouth.  6. Press down on the inhaler to spray one puff of medicine into the spacer, and then start breathing in slowly. Wait to inhale until after you have pressed down on the inhaler. Some spacers have a whistle. If you hear it, you should breathe in more slowly. 7. Hold your breath for 10 seconds. This will let the medicine settle in your lungs. 8. If you need to take a second dose, wait 30 to 60 seconds to allow the inhaler valve to refill. To use an inhaler without a spacer  1. Shake the inhaler as directed. Remove the cap. Check the instructions to see if you need to prime your inhaler before you use it. If it needs priming, follow the instructions on how to prime your inhaler. 2. Hold the inhaler upright with the mouthpiece at the bottom. 3. Tilt your head back a little, and breathe out slowly and completely. 4. Position the inhaler in one of two ways:  ? You can place the inhaler in your mouth. This is easier for most people. And it lowers the risk that any of the medicine will get into your eyes. ? Or you can place the inhaler 1 to 2 inches in front of your open mouth, without closing your lips over it. Try to open your mouth as wide as you can. Placing the inhaler in front of your open mouth may be better for getting the medicine into your lungs. But some people may find this too hard to do. 5. Start taking slow, even breaths through your mouth. Press down on the inhaler once, then inhale fully. 6. Hold your breath for 10 seconds. This will let the medicine settle in your lungs. 7. If you need to take a second dose, wait 30 to 60 seconds to allow the inhaler valve to refill. Where can you learn more? Go to http://douglas-hans.info/. Enter K111 in the search box to learn more about \"Using a Metered-Dose Inhaler: Care Instructions. \"  Current as of: September 5, 2018  Content Version: 11.9  © 1677-6853 [employer], [place].  Care instructions adapted under license by [employer] (which disclaims liability or warranty for this information). If you have questions about a medical condition or this instruction, always ask your healthcare professional. [address] any warranty or liability for your use of this information.

## 2019-07-17 NOTE — PROGRESS NOTES
Rupesh Pulido is a 47 y.o. male who presents for evaluation of hospital follow up. Last seen by me April 15, 2019 in npv. Was inpt June 12-14 with acute bronchitis, copd exac. Much better now, finished levaquin and prednisone taper. Still smokes some, but continues to work on cutting back. Has chronic back pain, asks for ultram.      ROS:  Constitutional: negative for fevers, chills, anorexia and weight loss  Eyes:   negative for visual disturbance and irritation  ENT:   negative for tinnitus,sore throat,nasal congestion,ear pain,hoarseness  Respiratory:  negative for cough, hemoptysis, dyspnea,wheezing  CV:   negative for chest pain, palpitations, lower extremity edema  GI:   negative for nausea, vomiting, diarrhea, abdominal pain,melena  Genitourinary: negative for frequency, dysuria and hematuria  Musculoskel: negative for myalgias, arthralgias,  muscle weakness, joint pain.   ++chronic back pain  Neurological:  negative for headaches, dizziness, focal weakness, numbness  Psychiatric:     Negative for depression or anxiety--doing well with klonopin      Past Medical History:   Diagnosis Date    Chronic low back pain     Hypertension        Past Surgical History:   Procedure Laterality Date    HX ORTHOPAEDIC         Family History   Problem Relation Age of Onset    Cancer Mother     Heart Disease Father        Social History     Socioeconomic History    Marital status: SINGLE     Spouse name: Not on file    Number of children: Not on file    Years of education: Not on file    Highest education level: Not on file   Occupational History    Not on file   Social Needs    Financial resource strain: Not on file    Food insecurity:     Worry: Not on file     Inability: Not on file    Transportation needs:     Medical: Not on file     Non-medical: Not on file   Tobacco Use    Smoking status: Current Some Day Smoker     Packs/day: 0.25     Years: 10.00     Pack years: 2.50    Smokeless tobacco: Never Used   Substance and Sexual Activity    Alcohol use: Yes    Drug use: No    Sexual activity: Yes     Partners: Female   Lifestyle    Physical activity:     Days per week: Not on file     Minutes per session: Not on file    Stress: Not on file   Relationships    Social connections:     Talks on phone: Not on file     Gets together: Not on file     Attends [employer] service: Not on file     Active member of club or organization: Not on file     Attends meetings of clubs or organizations: Not on file     Relationship status: Not on file    Intimate partner violence:     Fear of current or ex partner: Not on file     Emotionally abused: Not on file     Physically abused: Not on file     Forced sexual activity: Not on file   Other Topics Concern    Not on file   Social History Narrative    Not on file            Visit Vitals  /76 (BP 1 Location: Right arm, BP Patient Position: Sitting)   Pulse 86   Temp 97.8 °F (36.6 °C) (Oral)   Resp 18   Ht 6' 3\" (1.905 m)   Wt 256 lb (116.1 kg)   SpO2 98%   BMI 32.00 kg/m²       Physical Examination:   General - Well appearing male  HEENT - PERRL, TM no erythema/opacification, normal nasal turbinates, no oropharyngeal erythema or exudate, MMM  Neck - supple, no bruits, no thyroidomegaly, no lymphadenopathy  Pulm - clear to auscultation bilaterally  Cardio - RRR, normal S1 S2, no murmur  Abd - soft, nontender, no masses, no HSM  Extrem - no edema, +2 distal pulses  Neuro-  No focal deficits, CN intact     Assessment/Plan:    1. Copd--sounds like he is on advair now, and prn albuterol. Follows with pulm, dr Jerrod Encarnacion  2. Tobacco abuse--encouraged to keep working on quitting  3. Chronic back pain--rx given for ultram q 8 hours  4.  htn--controlled with norvasc, clonidine, lisinopril  5. Anxiety--doing well with klonopin  6. Seasonal allergies--was told in past he had deviated septum and needed sx. Referral to gi for screening colon.   rtc 3 months        Maxine John Evelyn CAMARGO, DO

## 2019-07-17 NOTE — PROGRESS NOTES
Reviewed record in preparation for visit and have obtained necessary documentation. Identified pt with two pt identifiers(name and ). Chief Complaint   Patient presents with    Hypertension     3 month follow up       Health Maintenance Due   Topic Date Due    Pneumococcal 0-64 years (1 of 1 - PPSV23) 1971    DTaP/Tdap/Td series (1 - Tdap) 1986    Shingrix Vaccine Age 50> (1 of 2) 2015    FOBT Q 1 YEAR AGE 50-75  2015       Mr. Keyona Thao has a reminder for a \"due or due soon\" health maintenance. I have asked that he discuss health maintenance topic(s) due with His  primary care provider. Coordination of Care Questionnaire:  :     1) Have you been to an emergency room, urgent care clinic since your last visit? no   Hospitalized since your last visit? no             2) Have you seen or consulted any other health care providers outside of [address] [place] [address] since your last visit? no  (Include any pap smears or colon screenings in this section.)    3) Do you have an Advance Directive on file? no    4) Are you interested in receiving information on Advance Directives? NO    Patient is accompanied by self I have received verbal consent from Andreas Ware to discuss any/all medical information while they are present in the room.

## 2019-07-18 RX ORDER — POLYETHYLENE GLYCOL 3350 17 G/17G
17 POWDER, FOR SOLUTION ORAL DAILY
Qty: 30 PACKET | Refills: 11 | Status: SHIPPED | OUTPATIENT
Start: 2019-07-18 | End: 2020-03-04 | Stop reason: SDUPTHER

## 2019-07-22 ENCOUNTER — PATIENT OUTREACH (OUTPATIENT)
Dept: INTERNAL MEDICINE CLINIC | Age: 54
End: 2019-07-22

## 2019-07-22 NOTE — PROGRESS NOTES
Pt could not stay for his diabetes education appt on 7/17/19 and was supposed to call to confirm that he could make an appt on 7/24/19 at 9 AM. Pt has not called. Left message for pt reminding him of a tentative appt for him for diabetes self management education on 7/24/19 at 9 AM and asked pt to please call back to confirm.

## 2019-07-24 ENCOUNTER — PATIENT OUTREACH (OUTPATIENT)
Dept: INTERNAL MEDICINE CLINIC | Age: 54
End: 2019-07-24

## 2019-07-26 ENCOUNTER — TELEPHONE (OUTPATIENT)
Dept: INTERNAL MEDICINE CLINIC | Age: 54
End: 2019-07-26

## 2019-07-26 NOTE — TELEPHONE ENCOUNTER
#[phone] pt is requesting a new DMV handi cap placard form as his placard has . Pt is also requesting a life time fishing license. Pt wants to know if we have those forms? If not please call him so he can get the form.

## 2019-07-26 NOTE — TELEPHONE ENCOUNTER
#[phone] pt is requesting a scrip for Cialis. He states there may need to be a prior auth done through insurance.

## 2019-07-29 RX ORDER — TADALAFIL 5 MG/1
5 TABLET ORAL
Qty: 20 TAB | Refills: 3 | Status: SHIPPED | OUTPATIENT
Start: 2019-07-29 | End: 2019-11-12 | Stop reason: SDUPTHER

## 2019-07-29 NOTE — TELEPHONE ENCOUNTER
Monica Aldana, DO Jacob Olivia LPN 3 days ago     I read thru my last note, and don't know why he needs either the dmv or free fishing license.     Routing comment

## 2019-08-09 RX ORDER — DICLOFENAC SODIUM 50 MG/1
50 TABLET, DELAYED RELEASE ORAL 2 TIMES DAILY
Qty: 60 TAB | Refills: 1 | Status: SHIPPED | OUTPATIENT
Start: 2019-08-09 | End: 2019-10-14

## 2019-08-19 ENCOUNTER — HOSPITAL ENCOUNTER (EMERGENCY)
Age: 54
Discharge: HOME | End: 2019-08-19
Attending: EMERGENCY MEDICINE
Payer: MEDICAID

## 2019-08-19 ENCOUNTER — APPOINTMENT (OUTPATIENT)
Dept: GENERAL RADIOLOGY | Age: 54
End: 2019-08-19
Attending: EMERGENCY MEDICINE
Payer: MEDICAID

## 2019-08-19 VITALS
HEART RATE: 101 BPM | SYSTOLIC BLOOD PRESSURE: 156 MMHG | OXYGEN SATURATION: 96 % | RESPIRATION RATE: 26 BRPM | BODY MASS INDEX: 32.33 KG/M2 | DIASTOLIC BLOOD PRESSURE: 93 MMHG | WEIGHT: 260 LBS | TEMPERATURE: 98.2 F | HEIGHT: 75 IN

## 2019-08-19 DIAGNOSIS — R06.02 SHORTNESS OF BREATH: ICD-10-CM

## 2019-08-19 DIAGNOSIS — F19.10 SUBSTANCE ABUSE (HCC): ICD-10-CM

## 2019-08-19 DIAGNOSIS — F17.200 TOBACCO DEPENDENCE: ICD-10-CM

## 2019-08-19 DIAGNOSIS — J45.41 MODERATE PERSISTENT ASTHMA WITH ACUTE EXACERBATION: Primary | ICD-10-CM

## 2019-08-19 LAB
ALBUMIN SERPL-MCNC: 4.1 G/DL (ref 3.5–5)
ALBUMIN/GLOB SERPL: 1.2 {RATIO} (ref 1.1–2.2)
ALP SERPL-CCNC: 77 U/L (ref 45–117)
ALT SERPL-CCNC: 52 U/L (ref 12–78)
ANION GAP SERPL CALC-SCNC: 6 MMOL/L (ref 5–15)
AST SERPL-CCNC: 30 U/L (ref 15–37)
ATRIAL RATE: 99 BPM
BASOPHILS # BLD: 0 K/UL (ref 0–0.1)
BASOPHILS NFR BLD: 1 % (ref 0–1)
BILIRUB SERPL-MCNC: 0.4 MG/DL (ref 0.2–1)
BUN SERPL-MCNC: 11 MG/DL (ref 6–20)
BUN/CREAT SERPL: 10 (ref 12–20)
CALCIUM SERPL-MCNC: 9 MG/DL (ref 8.5–10.1)
CALCULATED P AXIS, ECG09: 76 DEGREES
CALCULATED R AXIS, ECG10: 66 DEGREES
CALCULATED T AXIS, ECG11: 54 DEGREES
CHLORIDE SERPL-SCNC: 106 MMOL/L (ref 97–108)
CO2 SERPL-SCNC: 26 MMOL/L (ref 21–32)
CREAT SERPL-MCNC: 1.09 MG/DL (ref 0.7–1.3)
D DIMER PPP FEU-MCNC: 0.26 MG/L FEU (ref 0–0.65)
DIAGNOSIS, 93000: NORMAL
DIFFERENTIAL METHOD BLD: ABNORMAL
EOSINOPHIL # BLD: 0.7 K/UL (ref 0–0.4)
EOSINOPHIL NFR BLD: 8 % (ref 0–7)
ERYTHROCYTE [DISTWIDTH] IN BLOOD BY AUTOMATED COUNT: 13.4 % (ref 11.5–14.5)
GLOBULIN SER CALC-MCNC: 3.4 G/DL (ref 2–4)
GLUCOSE SERPL-MCNC: 103 MG/DL (ref 65–100)
HCT VFR BLD AUTO: 46.8 % (ref 36.6–50.3)
HGB BLD-MCNC: 15.5 G/DL (ref 12.1–17)
IMM GRANULOCYTES # BLD AUTO: 0 K/UL (ref 0–0.04)
IMM GRANULOCYTES NFR BLD AUTO: 0 % (ref 0–0.5)
LYMPHOCYTES # BLD: 2.2 K/UL (ref 0.8–3.5)
LYMPHOCYTES NFR BLD: 26 % (ref 12–49)
MCH RBC QN AUTO: 29.2 PG (ref 26–34)
MCHC RBC AUTO-ENTMCNC: 33.1 G/DL (ref 30–36.5)
MCV RBC AUTO: 88.3 FL (ref 80–99)
MONOCYTES # BLD: 0.8 K/UL (ref 0–1)
MONOCYTES NFR BLD: 9 % (ref 5–13)
NEUTS SEG # BLD: 4.7 K/UL (ref 1.8–8)
NEUTS SEG NFR BLD: 56 % (ref 32–75)
NRBC # BLD: 0 K/UL (ref 0–0.01)
NRBC BLD-RTO: 0 PER 100 WBC
P-R INTERVAL, ECG05: 146 MS
PLATELET # BLD AUTO: 233 K/UL (ref 150–400)
PMV BLD AUTO: 10.2 FL (ref 8.9–12.9)
POTASSIUM SERPL-SCNC: 3.7 MMOL/L (ref 3.5–5.1)
PROT SERPL-MCNC: 7.5 G/DL (ref 6.4–8.2)
Q-T INTERVAL, ECG07: 340 MS
QRS DURATION, ECG06: 82 MS
QTC CALCULATION (BEZET), ECG08: 436 MS
RBC # BLD AUTO: 5.3 M/UL (ref 4.1–5.7)
SODIUM SERPL-SCNC: 138 MMOL/L (ref 136–145)
TROPONIN I SERPL-MCNC: <0.05 NG/ML
VENTRICULAR RATE, ECG03: 99 BPM
WBC # BLD AUTO: 8.4 K/UL (ref 4.1–11.1)

## 2019-08-19 PROCEDURE — 74011000250 HC RX REV CODE- 250: Performed by: EMERGENCY MEDICINE

## 2019-08-19 PROCEDURE — 94640 AIRWAY INHALATION TREATMENT: CPT

## 2019-08-19 PROCEDURE — 85379 FIBRIN DEGRADATION QUANT: CPT

## 2019-08-19 PROCEDURE — 80053 COMPREHEN METABOLIC PANEL: CPT

## 2019-08-19 PROCEDURE — 93005 ELECTROCARDIOGRAM TRACING: CPT

## 2019-08-19 PROCEDURE — 85025 COMPLETE CBC W/AUTO DIFF WBC: CPT

## 2019-08-19 PROCEDURE — 36415 COLL VENOUS BLD VENIPUNCTURE: CPT

## 2019-08-19 PROCEDURE — 99285 EMERGENCY DEPT VISIT HI MDM: CPT

## 2019-08-19 PROCEDURE — 84484 ASSAY OF TROPONIN QUANT: CPT

## 2019-08-19 PROCEDURE — 96374 THER/PROPH/DIAG INJ IV PUSH: CPT

## 2019-08-19 PROCEDURE — 77030029684 HC NEB SM VOL KT MONA -A

## 2019-08-19 PROCEDURE — 71046 X-RAY EXAM CHEST 2 VIEWS: CPT

## 2019-08-19 PROCEDURE — 74011250636 HC RX REV CODE- 250/636: Performed by: EMERGENCY MEDICINE

## 2019-08-19 RX ORDER — IPRATROPIUM BROMIDE AND ALBUTEROL SULFATE 2.5; .5 MG/3ML; MG/3ML
3 SOLUTION RESPIRATORY (INHALATION)
Status: COMPLETED | OUTPATIENT
Start: 2019-08-19 | End: 2019-08-19

## 2019-08-19 RX ORDER — PREDNISONE 10 MG/1
TABLET ORAL
Qty: 48 TAB | Refills: 0 | Status: SHIPPED | OUTPATIENT
Start: 2019-08-19 | End: 2019-11-12 | Stop reason: ALTCHOICE

## 2019-08-19 RX ORDER — CODEINE PHOSPHATE AND GUAIFENESIN 10; 100 MG/5ML; MG/5ML
5 SOLUTION ORAL
Qty: 118 ML | Refills: 0 | Status: SHIPPED | OUTPATIENT
Start: 2019-08-19 | End: 2019-08-22

## 2019-08-19 RX ADMIN — IPRATROPIUM BROMIDE AND ALBUTEROL SULFATE 3 ML: .5; 3 SOLUTION RESPIRATORY (INHALATION) at 02:07

## 2019-08-19 RX ADMIN — IPRATROPIUM BROMIDE AND ALBUTEROL SULFATE 3 ML: .5; 3 SOLUTION RESPIRATORY (INHALATION) at 04:20

## 2019-08-19 RX ADMIN — IPRATROPIUM BROMIDE AND ALBUTEROL SULFATE 3 ML: .5; 3 SOLUTION RESPIRATORY (INHALATION) at 05:40

## 2019-08-19 RX ADMIN — SODIUM CHLORIDE 1000 ML: 900 INJECTION, SOLUTION INTRAVENOUS at 04:18

## 2019-08-19 RX ADMIN — METHYLPREDNISOLONE SODIUM SUCCINATE 125 MG: 125 INJECTION, POWDER, FOR SOLUTION INTRAMUSCULAR; INTRAVENOUS at 02:07

## 2019-08-19 RX ADMIN — SODIUM CHLORIDE 1000 ML: 900 INJECTION, SOLUTION INTRAVENOUS at 02:07

## 2019-08-19 NOTE — DISCHARGE INSTRUCTIONS
Patient Education        Asthma Attack: Care Instructions  Your Care Instructions    During an asthma attack, the airways swell and narrow. This makes it hard to breathe. Severe asthma attacks can be life-threatening, but you can help prevent them by keeping your asthma under control and treating symptoms before they get bad. Symptoms include being short of breath, having chest tightness, coughing, and wheezing. Noting and treating these symptoms can also help you avoid future trips to the emergency room. The doctor has checked you carefully, but problems can develop later. If you notice any problems or new symptoms, get medical treatment right away. Follow-up care is a key part of your treatment and safety. Be sure to make and go to all appointments, and call your doctor if you are having problems. It's also a good idea to know your test results and keep a list of the medicines you take. How can you care for yourself at home? · Follow your asthma action plan to prevent and treat attacks. If you don't have an asthma action plan, work with your doctor to create one. · Take your asthma medicines exactly as prescribed. Talk to your doctor right away if you have any questions about how to take them. ? Use your quick-relief medicine when you have symptoms of an attack. Quick-relief medicine is usually an albuterol inhaler. Some people need to use quick-relief medicine before they exercise. ? Take your controller medicine every day, not just when you have symptoms. Controller medicine is usually an inhaled corticosteroid. The goal is to prevent problems before they occur. Don't use your controller medicine to treat an attack that has already started. It doesn't work fast enough to help. ? If your doctor prescribed corticosteroid pills to use during an attack, take them exactly as prescribed. It may take hours for the pills to work, but they may make the episode shorter and help you breathe better. ?  Keep your quick-relief medicine with you at all times. · Talk to your doctor before using other medicines. Some medicines, such as aspirin, can cause asthma attacks in some people. · If you have a peak flow meter, use it to check how well you are breathing. This can help you predict when an asthma attack is going to occur. Then you can take medicine to prevent the asthma attack or make it less severe. · Do not smoke or allow others to smoke around you. Avoid smoky places. Smoking makes asthma worse. If you need help quitting, talk to your doctor about stop-smoking programs and medicines. These can increase your chances of quitting for good. · Learn what triggers an asthma attack for you, and avoid the triggers when you can. Common triggers include colds, smoke, air pollution, dust, pollen, mold, pets, cockroaches, stress, and cold air. · Avoid colds and the flu. Get a pneumococcal vaccine shot. If you have had one before, ask your doctor if you need a second dose. Get a flu vaccine every fall. If you must be around people with colds or the flu, wash your hands often. When should you call for help? Call 911 anytime you think you may need emergency care. For example, call if:    · You have severe trouble breathing.    Call your doctor now or seek immediate medical care if:    · Your symptoms do not get better after you have followed your asthma action plan.     · You have new or worse trouble breathing.     · Your coughing and wheezing get worse.     · You cough up dark brown or bloody mucus (sputum).     · You have a new or higher fever.    Watch closely for changes in your health, and be sure to contact your doctor if:    · You need to use quick-relief medicine on more than 2 days a week (unless it is just for exercise).     · You cough more deeply or more often, especially if you notice more mucus or a change in the color of your mucus.     · You are not getting better as expected. Where can you learn more?   Go to http://douglas-hans.info/. Enter I097 in the search box to learn more about \"Asthma Attack: Care Instructions. \"  Current as of: September 5, 2018  Content Version: 12.1  © 9158-1402 [employer]. Care instructions adapted under license by [employer] (which disclaims liability or warranty for this information). If you have questions about a medical condition or this instruction, always ask your healthcare professional. [place] [address] any warranty or liability for your use of this information. Patient Education        Asthma in Adults: Care Instructions  Your Care Instructions    During an asthma attack, your airways swell and narrow as a reaction to certain things (triggers). This makes it hard to breathe. You may be able to prevent asthma attacks if you avoid the things that set off your asthma symptoms. Keeping your asthma under control and treating symptoms before they get bad can help you avoid severe attacks. If you can control your asthma, you may be able to do all of your normal daily activities. You may also avoid asthma attacks and trips to the hospital.  Follow-up care is a key part of your treatment and safety. Be sure to make and go to all appointments, and call your doctor if you are having problems. It's also a good idea to know your test results and keep a list of the medicines you take. How can you care for yourself at home? · Follow your asthma action plan so you can manage your symptoms at home. An asthma action plan will help you prevent and control airway reactions and will tell you what to do during an asthma attack. If you do not have an asthma action plan, work with your doctor to build one. · Take your asthma medicine exactly as prescribed. Medicine plays an important role in controlling asthma. Talk to your doctor right away if you have any questions about what to take and how to take it. ?  Use your quick-relief medicine when you have symptoms of an attack. Quick-relief medicine often is an albuterol inhaler. Some people need to use quick-relief medicine before they exercise. ? Take your controller medicine every day, not just when you have symptoms. Controller medicine is usually an inhaled corticosteroid. The goal is to prevent problems before they occur. Do not use your controller medicine to try to treat an attack that has already started. It does not work fast enough to help. ? If your doctor prescribed corticosteroid pills to use during an attack, take them as directed. They may take hours to work, but they may shorten the attack and help you breathe better. ? Keep your quick-relief medicine with you at all times. · Talk to your doctor before using other medicines. Some medicines, such as aspirin, can cause asthma attacks in some people. · Check yourself for asthma symptoms to know which step to follow in your action plan. Watch for things like being short of breath, having chest tightness, coughing, and wheezing. Also notice if symptoms wake you up at night or if you get tired quickly when you exercise. · If you have a peak flow meter, use it to check how well you are breathing. This can help you predict when an asthma attack is going to occur. Then you can take medicine to prevent the asthma attack or make it less severe. · See your doctor regularly. These visits will help you learn more about asthma and what you can do to control it. Your doctor will monitor your treatment to make sure the medicine is helping you. · Keep track of your asthma attacks and your treatment. After you have had an attack, write down what triggered it, what helped end it, and any concerns you have about your asthma action plan. Take your diary when you see your doctor. You can then review your asthma action plan and decide if it is working. · Do not smoke or allow others to smoke around you. Avoid smoky places.  Smoking makes asthma worse. If you need help quitting, talk to your doctor about stop-smoking programs and medicines. These can increase your chances of quitting for good. · Learn what triggers an asthma attack for you, and avoid the triggers when you can. Common triggers include colds, smoke, air pollution, dust, pollen, mold, pets, cockroaches, stress, and cold air. · Avoid colds and the flu. Get a pneumococcal vaccine shot. If you have had one before, ask your doctor whether you need a second dose. Get a flu vaccine every fall. If you must be around people with colds or the flu, wash your hands often. When should you call for help? Call 911 anytime you think you may need emergency care. For example, call if:    · You have severe trouble breathing.    Call your doctor now or seek immediate medical care if:    · Your symptoms do not get better after you have followed your asthma action plan.     · You cough up yellow, dark brown, or bloody mucus (sputum).    Watch closely for changes in your health, and be sure to contact your doctor if:    · Your coughing and wheezing get worse.     · You need to use quick-relief medicine on more than 2 days a week (unless it is just for exercise).     · You need help figuring out what is triggering your asthma attacks. Where can you learn more? Go to http://douglas-hans.info/. Enter P597 in the search box to learn more about \"Asthma in Adults: Care Instructions. \"  Current as of: September 5, 2018  Content Version: 12.1  © 9955-0828 [employer]. Care instructions adapted under license by [employer] (which disclaims liability or warranty for this information). If you have questions about a medical condition or this instruction, always ask your healthcare professional. [place] [address] any warranty or liability for your use of this information.          Patient Education        Shortness of Breath: Care Instructions  Your Care Instructions  Shortness of breath has many causes. Sometimes conditions such as anxiety can lead to shortness of breath. Some people get mild shortness of breath when they exercise. Trouble breathing also can be a symptom of a serious problem, such as asthma, lung disease, emphysema, heart problems, and pneumonia. If your shortness of breath continues, you may need tests and treatment. Watch for any changes in your breathing and other symptoms. Follow-up care is a key part of your treatment and safety. Be sure to make and go to all appointments, and call your doctor if you are having problems. It's also a good idea to know your test results and keep a list of the medicines you take. How can you care for yourself at home? · Do not smoke or allow others to smoke around you. If you need help quitting, talk to your doctor about stop-smoking programs and medicines. These can increase your chances of quitting for good. · Get plenty of rest and sleep. · Take your medicines exactly as prescribed. Call your doctor if you think you are having a problem with your medicine. · Find healthy ways to deal with stress. ? Exercise daily. ? Get plenty of sleep. ? Eat regularly and well. When should you call for help? Call 911 anytime you think you may need emergency care. For example, call if:    · You have severe shortness of breath.     · You have symptoms of a heart attack. These may include:  ? Chest pain or pressure, or a strange feeling in the chest.  ? Sweating. ? Shortness of breath. ? Nausea or vomiting. ? Pain, pressure, or a strange feeling in the back, neck, jaw, or upper belly or in one or both shoulders or arms. ? Lightheadedness or sudden weakness. ? A fast or irregular heartbeat. After you call 911, the  may tell you to chew 1 adult-strength or 2 to 4 low-dose aspirin. Wait for an ambulance.  Do not try to drive yourself.    Call your doctor now or seek immediate medical care if:    · Your shortness of breath gets worse or you start to wheeze. Wheezing is a high-pitched sound when you breathe.     · You wake up at night out of breath or have to prop your head up on several pillows to breathe.     · You are short of breath after only light activity or while at rest.    Watch closely for changes in your health, and be sure to contact your doctor if:    · You do not get better over the next 1 to 2 days. Where can you learn more? Go to http://douglas-hans.info/. Enter S780 in the search box to learn more about \"Shortness of Breath: Care Instructions. \"  Current as of: September 5, 2018  Content Version: 12.1  © 8526-3650 [employer]. Care instructions adapted under license by [employer] (which disclaims liability or warranty for this information). If you have questions about a medical condition or this instruction, always ask your healthcare professional. [place] [address] any warranty or liability for your use of this information. Patient Education        Learning About Nebulizers  What is a nebulizer? A nebulizer is a tool that delivers liquid medicine as a fine mist. You breathe in the medicine through a mouthpiece or face mask. This sends the medicine directly to your airways and lungs. You breathe in the medicine for a few minutes. What is it used for? A nebulizer may be used to treat respiratory problems. These include asthma and chronic obstructive pulmonary disease (COPD). If you have asthma, it can be used with daily controller medicines or with quick-relief medicine during an attack or flare-up. A nebulizer can make inhaling medicines easier. It may be very helpful if it is hard for you to breathe or use an inhaler. How do you use a nebulizer? 1. Put the medicine into the medicine container. Be sure to measure the right amount. 2. Make sure that the container is connected to the mouthpiece or face mask.   3. Turn on the air compressor. 4. Take deep, slow breaths through the mouthpiece or mask. Hold each breath for about 2 seconds. 5. Continue breathing until the medicine is gone from the container. When the medicine is gone, there will be no more mist coming out. This may take about 10 minutes. 6. Shake the container to make sure you get all the medicine. Where can you learn more? Go to http://douglas-hans.info/. Enter E584 in the search box to learn more about \"Learning About Nebulizers. \"  Current as of: September 5, 2018  Content Version: 12.1  © 4618-7692 [employer]. Care instructions adapted under license by [employer] (which disclaims liability or warranty for this information). If you have questions about a medical condition or this instruction, always ask your healthcare professional. [address] any warranty or liability for your use of this information.

## 2019-08-19 NOTE — ED NOTES
Dr. Lazara Rodas has reviewed discharge instructions with the patient and family. The patient and family verbalized understanding. Patient ambulated to waiting room with family in no distress.

## 2019-08-19 NOTE — LETTER
[facility] EMERGENCY DEPT 
[address] [facility] [place] [id number] [phone] Work/School Note Date: 8/19/2019 To Whom It May concern: 
 
Bertha Nagy was seen and treated today in the emergency room by the following provider(s): 
Attending Provider: Yari Garcia MD. Bertha Nagy may return to work on 8/20/19.  
 
Sincerely, 
 
 
 
 
Josie Salas MD

## 2019-08-19 NOTE — ED PROVIDER NOTES
EMERGENCY DEPARTMENT HISTORY AND PHYSICAL EXAM           Date: 8/19/2019  Patient Name: Rolande Harada    History of Presenting Illness     Chief Complaint   Patient presents with    Shortness of Breath     Pt is coming from home. Reports SOB for a couple days. Little relief with neb txs/albuterol inhaler. Reports SOB. Denies N/V/HA. Hx asthma. Reports heroin usage yesterday. History Provided By:  Patient    HPI: Rolande Harada is a 47 y.o. male, with significant pmhx of Asthma, HTN, who presents ambulatory to the ED with c/o shortness of breath for the last 2 days. Recent diagnosis of asthma. Patient reports having nebulizers and rescue inhalers at home but he is used 4-5 times today without much relief. Patient reports having a few minutes of \"nagging type pain\" to the left chest area 3 days ago with spontaneous resolution. Patient notes associated cough which is productive of yellow sputum at times. Patient specifically denies any associated fevers, chills, nausea, vomiting, diarrhea, abd pain, CP, urinary sxs, changes in BM, or headache. PCP: Linh Carter DO    Social Hx: + tobacco  Daily 24oz EtOH , heroin, snorting, last use yesterday afternoon Illicit Drugs    There are no other complaints, changes, or physical findings at this time. Allergies   Allergen Reactions    Vicodin [Hydrocodone-Acetaminophen] Other (comments)     Headache         Current Outpatient Medications   Medication Sig Dispense Refill    predniSONE (STERAPRED DS) 10 mg dose pack Take as directed on packaging 48 Tab 0    guaiFENesin-codeine (CHERATUSSIN AC) 100-10 mg/5 mL solution Take 5 mL by mouth three (3) times daily as needed for Cough for up to 3 days. Max Daily Amount: 15 mL. 118 mL 0    diclofenac EC (VOLTAREN) 50 mg EC tablet Take 1 Tab by mouth two (2) times a day. 60 Tab 1    tadalafil (CIALIS) 5 mg tablet Take 1 Tab by mouth daily as needed (ed).  20 Tab 3    polyethylene glycol (MIRALAX) 17 gram packet Take 1 Packet by mouth daily. 30 Packet 11    albuterol (PROVENTIL HFA, VENTOLIN HFA, PROAIR HFA) 90 mcg/actuation inhaler Take 2 Puffs by inhalation every six (6) hours as needed for Wheezing. 1 Inhaler 1    guaiFENesin ER (MUCINEX) 600 mg ER tablet Take 1 Tab by mouth every twelve (12) hours. 14 Tab 0    clonazePAM (KLONOPIN) 1 mg tablet Take 1 mg by mouth two (2) times daily as needed (Anxiety).  diclofenac (VOLTAREN) 1 % gel Apply  to affected area four (4) times daily as needed for Pain.  albuterol (ACCUNEB) 1.25 mg/3 mL nebu Take 3 mL by inhalation every four (4) hours as needed for Cough (wheezing, shortness of breath). 25 Each 0    amLODIPine (NORVASC) 10 mg tablet Take 10 mg by mouth daily.  cloNIDine HCl (CATAPRES) 0.2 mg tablet Take 0.2 mg by mouth two (2) times a day.  montelukast (SINGULAIR) 10 mg tablet Take 10 mg by mouth daily.  pantoprazole (PROTONIX) 40 mg tablet Take 40 mg by mouth daily.  aspirin 81 mg tablet Take 1 Tab by mouth daily. 30 Tab 1    lisinopril (PRINIVIL, ZESTRIL) 20 mg tablet Take 20 mg by mouth daily. Past History     Past Medical History:  Past Medical History:   Diagnosis Date    Arthritis     Chronic low back pain     Hypertension        Past Surgical History:  Past Surgical History:   Procedure Laterality Date    HX ORTHOPAEDIC         Family History:  Family History   Problem Relation Age of Onset    Cancer Mother     Heart Disease Father        Social History:  Social History     Tobacco Use    Smoking status: Current Some Day Smoker     Packs/day: 0.25     Years: 10.00     Pack years: 2.50    Smokeless tobacco: Never Used   Substance Use Topics    Alcohol use: Yes    Drug use: No       Allergies: Allergies   Allergen Reactions    Vicodin [Hydrocodone-Acetaminophen] Other (comments)     Headache         Review of Systems   Review of Systems   Constitutional: Negative for chills and fever.    HENT: Negative. Eyes: Negative. Respiratory: Positive for shortness of breath. Negative for cough and chest tightness. Cardiovascular: Negative for chest pain and leg swelling. Gastrointestinal: Negative for abdominal pain, diarrhea, nausea and vomiting. Endocrine: Negative. Genitourinary: Negative for difficulty urinating and dysuria. Musculoskeletal: Negative for myalgias. Skin: Negative. Neurological: Negative. Psychiatric/Behavioral: Negative. All other systems reviewed and are negative. Physical Exam   Physical Exam   Constitutional: He is oriented to person, place, and time. He appears well-developed and well-nourished. No distress. HENT:   Head: Normocephalic and atraumatic. Nose: Nose normal.   Mouth/Throat: No oropharyngeal exudate. Eyes: Pupils are equal, round, and reactive to light. Conjunctivae and EOM are normal.   Neck: Normal range of motion. Neck supple. No JVD present. Cardiovascular: Normal rate, regular rhythm, normal heart sounds and intact distal pulses. Exam reveals no friction rub. No murmur heard. Pulmonary/Chest: Effort normal. No stridor. Tachypnea noted. No respiratory distress. He has wheezes. He has no rales. Abdominal: Soft. Bowel sounds are normal. He exhibits no distension. There is no tenderness. There is no rebound. Musculoskeletal: Normal range of motion. He exhibits no tenderness. Neurological: He is alert and oriented to person, place, and time. No cranial nerve deficit. Skin: Skin is warm and dry. No rash noted. He is not diaphoretic. Psychiatric: He has a normal mood and affect. His speech is normal and behavior is normal. Judgment and thought content normal. Cognition and memory are normal.   Nursing note and vitals reviewed.         Diagnostic Study Results     Labs -     Recent Results (from the past 12 hour(s))   EKG, 12 LEAD, INITIAL    Collection Time: 08/19/19 12:59 AM   Result Value Ref Range    Ventricular Rate 99 BPM Atrial Rate 99 BPM    P-R Interval 146 ms    QRS Duration 82 ms    Q-T Interval 340 ms    QTC Calculation (Bezet) 436 ms    Calculated P Axis 76 degrees    Calculated R Axis 66 degrees    Calculated T Axis 54 degrees    Diagnosis       Normal sinus rhythm  Normal ECG  When compared with ECG of 12-JUN-2019 23:28,  No significant change was found     CBC WITH AUTOMATED DIFF    Collection Time: 08/19/19  1:06 AM   Result Value Ref Range    WBC 8.4 4.1 - 11.1 K/uL    RBC 5.30 4. 10 - 5.70 M/uL    HGB 15.5 12.1 - 17.0 g/dL    HCT 46.8 36.6 - 50.3 %    MCV 88.3 80.0 - 99.0 FL    MCH 29.2 26.0 - 34.0 PG    MCHC 33.1 30.0 - 36.5 g/dL    RDW 13.4 11.5 - 14.5 %    PLATELET 956 979 - 640 K/uL    MPV 10.2 8.9 - 12.9 FL    NRBC 0.0 0  WBC    ABSOLUTE NRBC 0.00 0.00 - 0.01 K/uL    NEUTROPHILS 56 32 - 75 %    LYMPHOCYTES 26 12 - 49 %    MONOCYTES 9 5 - 13 %    EOSINOPHILS 8 (H) 0 - 7 %    BASOPHILS 1 0 - 1 %    IMMATURE GRANULOCYTES 0 0.0 - 0.5 %    ABS. NEUTROPHILS 4.7 1.8 - 8.0 K/UL    ABS. LYMPHOCYTES 2.2 0.8 - 3.5 K/UL    ABS. MONOCYTES 0.8 0.0 - 1.0 K/UL    ABS. EOSINOPHILS 0.7 (H) 0.0 - 0.4 K/UL    ABS. BASOPHILS 0.0 0.0 - 0.1 K/UL    ABS. IMM. GRANS. 0.0 0.00 - 0.04 K/UL    DF AUTOMATED     METABOLIC PANEL, COMPREHENSIVE    Collection Time: 08/19/19  1:06 AM   Result Value Ref Range    Sodium 138 136 - 145 mmol/L    Potassium 3.7 3.5 - 5.1 mmol/L    Chloride 106 97 - 108 mmol/L    CO2 26 21 - 32 mmol/L    Anion gap 6 5 - 15 mmol/L    Glucose 103 (H) 65 - 100 mg/dL    BUN 11 6 - 20 MG/DL    Creatinine 1.09 0.70 - 1.30 MG/DL    BUN/Creatinine ratio 10 (L) 12 - 20      GFR est AA >60 >60 ml/min/1.73m2    GFR est non-AA >60 >60 ml/min/1.73m2    Calcium 9.0 8.5 - 10.1 MG/DL    Bilirubin, total 0.4 0.2 - 1.0 MG/DL    ALT (SGPT) 52 12 - 78 U/L    AST (SGOT) 30 15 - 37 U/L    Alk.  phosphatase 77 45 - 117 U/L    Protein, total 7.5 6.4 - 8.2 g/dL    Albumin 4.1 3.5 - 5.0 g/dL    Globulin 3.4 2.0 - 4.0 g/dL    A-G Ratio 1.2 1.1 - 2.2     TROPONIN I    Collection Time: 08/19/19  1:06 AM   Result Value Ref Range    Troponin-I, Qt. <0.05 <0.05 ng/mL   D DIMER    Collection Time: 08/19/19  2:08 AM   Result Value Ref Range    D-dimer 0.26 0.00 - 0.65 mg/L FEU       Radiologic Studies -   XR CHEST PA LAT   Final Result   IMPRESSION: No acute process. Stable exam.            CT Results  (Last 48 hours)    None        CXR Results  (Last 48 hours)               08/19/19 0217  XR CHEST PA LAT Final result    Impression:  IMPRESSION: No acute process. Stable exam.            Narrative:  EXAM:  CR chest PA lateral       INDICATION: Shortness of breath       COMPARISON: 6/13/2019. TECHNIQUE: Frontal and lateral chest views       FINDINGS: The cardiomediastinal contours are stable. The lungs and pleural   spaces are clear. There is no pneumothorax. The bones and upper abdomen are   stable. Medical Decision Making   I am the first provider for this patient. I reviewed the vital signs, available nursing notes, past medical history, past surgical history, family history and social history. Vital Signs-Reviewed the patient's vital signs.   Patient Vitals for the past 12 hrs:   Temp Pulse Resp BP SpO2   08/19/19 0615  (!) 101 26  96 %   08/19/19 0600  (!) 112 21 (!) 156/93 91 %   08/19/19 0530  (!) 113 21 (!) 170/94 97 %   08/19/19 0500  (!) 107 20 160/77 96 %   08/19/19 0416     93 %   08/19/19 0414     (!) 88 %   08/19/19 0330    131/76 90 %   08/19/19 0300  98 16 137/82 92 %   08/19/19 0230    128/72 97 %   08/19/19 0130    (!) 141/102 (!) 89 %   08/19/19 0100    141/83 91 %   08/19/19 0050     92 %   08/19/19 0046 98.2 °F (36.8 °C) 100 26 138/78 93 %       Pulse Oximetry Analysis - 92% on RA    Cardiac Monitor:   Rate: 98 bpm  Rhythm: nsr    Records Reviewed: Nursing Notes, Old Medical Records, Previous Radiology Studies and Previous Laboratory Studies    Provider Notes (Medical Decision Making):     DDX:  Acute asthma exacerbation, pneumonia, COPD, pleural effusion, arrhythmia, polysubstance abuse    Plan:  EKG, labs, chest x-ray, DuoNeb, steroids, IV fluids    Impression:  Acute asthma exacerbation    ED Course:   Initial assessment performed. The patients presenting problems have been discussed, and they are in agreement with the care plan formulated and outlined with them. I have encouraged them to ask questions as they arise throughout their visit. I reviewed our electronic medical record system for any past medical records that were available that may contribute to the patients current condition, the nursing notes and and vital signs from today's visit    Nursing notes will be reviewed as they become available in realtime while the pt has been in the ED. Lu Holder MD      TOBACCO COUNSELING:  During evaluation pt reported that they are a current tobacco user. I have spent 3 minutes discussing the medical risks of prolonged smoking habits and advised the patient of the benefits of the cessation of smoking, providing specific suggestions on how to quit. Pt has been counseled and encouraged to quit as soon as possible in order to decrease further risks to their health. Pt has conveyed their understanding of the risks involved should they continue to use tobacco products. Lu Holder MD    EKG interpretation 8436: NSR, nl Axis, rate 99; , QRS 82, QTc 436; no acute ischemia; interpreted by Lu Holder MD    I personally reviewed pt's imaging. Official read by radiology noted above. Lu Holder MD    PROGRESS NOTE:  4:00 AM  Pt noted to have some relief after initial DuoNeb. Will provide second and reevaluate. Lu Holder MD             6:39 AM  Progress note:  Pt noted to be feeling better after multiple duo nebs although mildly tachycardic (likely side effect of albuterol.) Pt ready for discharge.  Discussed lab and imaging findings with pt and/or family, specifically noting negative cxr and d dimer. Pt will follow up with PCP as instructed. All questions have been answered, pt voiced understanding and agreement with plan. Specific return precautions provided in addition to instructions for pt to return to the ED immediately should sx worsen at any time. Benton Borrero MD      Critical Care Time:     none      Diagnosis     Clinical Impression:   1. Moderate persistent asthma with acute exacerbation    2. Shortness of breath    3. Substance abuse ([address]    4. Tobacco dependence        PLAN:  1. Discharge Medication List as of 8/19/2019  6:53 AM      START taking these medications    Details   predniSONE (STERAPRED DS) 10 mg dose pack Take as directed on packaging, Normal, Disp-48 Tab, R-0      guaiFENesin-codeine (CHERATUSSIN AC) 100-10 mg/5 mL solution Take 5 mL by mouth three (3) times daily as needed for Cough for up to 3 days. Max Daily Amount: 15 mL. , Print, Disp-118 mL, R-0         CONTINUE these medications which have NOT CHANGED    Details   diclofenac EC (VOLTAREN) 50 mg EC tablet Take 1 Tab by mouth two (2) times a day., Normal, Disp-60 Tab, R-1      tadalafil (CIALIS) 5 mg tablet Take 1 Tab by mouth daily as needed (ed)., Normal, Disp-20 Tab, R-3      polyethylene glycol (MIRALAX) 17 gram packet Take 1 Packet by mouth daily. , Normal, Disp-30 Packet, R-11      albuterol (PROVENTIL HFA, VENTOLIN HFA, PROAIR HFA) 90 mcg/actuation inhaler Take 2 Puffs by inhalation every six (6) hours as needed for Wheezing., Normal, Disp-1 Inhaler, R-1      guaiFENesin ER (MUCINEX) 600 mg ER tablet Take 1 Tab by mouth every twelve (12) hours. , Print, Disp-14 Tab, R-0      clonazePAM (KLONOPIN) 1 mg tablet Take 1 mg by mouth two (2) times daily as needed (Anxiety). , Historical Med      diclofenac (VOLTAREN) 1 % gel Apply  to affected area four (4) times daily as needed for Pain., Historical Med      albuterol (ACCUNEB) 1.25 mg/3 mL nebu Take 3 mL by inhalation every four (4) hours as needed for Cough (wheezing, shortness of breath). , Normal, Disp-25 Each, R-0      amLODIPine (NORVASC) 10 mg tablet Take 10 mg by mouth daily. , Historical Med      cloNIDine HCl (CATAPRES) 0.2 mg tablet Take 0.2 mg by mouth two (2) times a day., Historical Med      montelukast (SINGULAIR) 10 mg tablet Take 10 mg by mouth daily. , Historical Med      pantoprazole (PROTONIX) 40 mg tablet Take 40 mg by mouth daily. , Historical Med      aspirin 81 mg tablet Take 1 Tab by mouth daily. , Print, Disp-30 Tab, R-1      lisinopril (PRINIVIL, ZESTRIL) 20 mg tablet Take 20 mg by mouth daily. , Historical Med           2. Follow-up Information     Follow up With Specialties Details Why Contact Pat Rodriguez DO Internal Medicine   [address]  [facility] [address]  [address] [facility]      Layla Blunt MD Pulmonary Disease In 2 days  [address]  [facility]  Luis Mccarty [address] [place] [address]  [phone]          Return to ED if worse     Disposition:  1785  The patient's results have been reviewed with family and/or caregiver. They verbally convey their understanding and agreement of the patient's signs, symptoms, diagnosis, treatment and prognosis and additionally agree to follow up as recommended in the discharge instructions or to return to the Emergency Room should the patient's condition change prior to their follow-up appointment. The family and/or caregiver verbally agrees with the care-plan and all of their questions have been answered. The discharge instructions have also been provided to the them with educational information regarding the patient's diagnosis as well a list of reasons why the patient would want to return to the ER prior to their follow-up appointment should their condition change. Santi Cobian MD          Please note that this dictation was completed with [employer], the [employer] voice recognition software. Quite often unanticipated grammatical, syntax, homophones, and other interpretive errors are inadvertently transcribed by the computer software. Please disregard these errors. Please excuse any errors that have escaped final proofreading    This note will not be viewable in [employer].

## 2019-08-30 RX ORDER — LISINOPRIL 20 MG/1
20 TABLET ORAL DAILY
Qty: 90 TAB | Refills: 3 | Status: SHIPPED | OUTPATIENT
Start: 2019-08-30 | End: 2019-12-31 | Stop reason: SDUPTHER

## 2019-09-03 ENCOUNTER — TELEPHONE (OUTPATIENT)
Dept: INTERNAL MEDICINE CLINIC | Age: 54
End: 2019-09-03

## 2019-09-03 DIAGNOSIS — N52.8 OTHER MALE ERECTILE DYSFUNCTION: Primary | ICD-10-CM

## 2019-09-03 RX ORDER — TADALAFIL 20 MG/1
20 TABLET ORAL AS NEEDED
Qty: 20 TAB | Refills: 3 | Status: SHIPPED | OUTPATIENT
Start: 2019-09-03 | End: 2020-03-02 | Stop reason: DRUGHIGH

## 2019-09-03 NOTE — TELEPHONE ENCOUNTER
Called, spoke to pt. Two identifiers confirmed. Appointment scheduled for 9/23 @ 130 with Dr. Hiwot Gan.   Pt also requesting a stronger dose of cialis. Pt stated the 5mg does not help at all. Notified pt I would send request to Dr. Hiwot Gan.   Pt verbalized understanding of information discussed w/ no further questions at this time.

## 2019-09-03 NOTE — TELEPHONE ENCOUNTER
----- Message from Angelita Stephens sent at 9/3/2019 10:40 AM EDT -----  Regarding: Dr. Tomy Carter  Appointment not available    Caller's first and last name and relationship to patient (if not the patient):      Best contact number:      Preferred date and time:      Scheduled appointment date and time:      Reason for appointment: meds running out      Details to clarify the request: Patient would like to be seen sooner than October 23, 2019 he will run out of his medications. His number is [phone].       Message copied/pasted from [employer]

## 2019-09-13 ENCOUNTER — TELEPHONE (OUTPATIENT)
Dept: INTERNAL MEDICINE CLINIC | Age: 54
End: 2019-09-13

## 2019-09-13 NOTE — TELEPHONE ENCOUNTER
----- Message from Damion Silva sent at 9/13/2019  2:53 PM EDT -----  Regarding: Dr. Uriel Fischer Message/Vendor Calls    Caller's first and last name:      Reason for call:      Jasvir Nava required yes/no and why: yes      Best contact number(s): [phone]      Details to clarify the request: Patient needs to have the fluid drawn from his knee please call him to set up the process.       Damion Silva

## 2019-09-18 NOTE — TELEPHONE ENCOUNTER
Pt request for a call back from the practice in regards to a status update for his nebulizer. Best contact number is [phone].      Message received & copied from [facility]

## 2019-09-19 RX ORDER — ALBUTEROL SULFATE 1.25 MG/3ML
SOLUTION RESPIRATORY (INHALATION)
Qty: 150 ML | Refills: 0 | Status: SHIPPED | OUTPATIENT
Start: 2019-09-19 | End: 2019-10-04 | Stop reason: SDUPTHER

## 2019-09-25 NOTE — TELEPHONE ENCOUNTER
Patient states he needs a call back to discuss getting an ER Follow up appt than his upcoming appt on 10/23/19. Please call. Thank you    Patient also states he needs to know what Lisinopril dosage to take.

## 2019-09-27 NOTE — TELEPHONE ENCOUNTER
Called, spoke to pt. Two identifiers confirmed. Appointment scheduled for 10/3 @ 21 [phone] with Dr. Brian Rothman.   Pt verbalized understanding of information discussed w/ no further questions at this time.

## 2019-09-27 NOTE — TELEPHONE ENCOUNTER
Patient states he's returning your call in reference to getting an appt sooner than available for ER follow up. Please call. Thank you      Patient states he also needs to check the Status of getting a Tramadol refill.

## 2019-09-30 ENCOUNTER — TELEPHONE (OUTPATIENT)
Dept: INTERNAL MEDICINE CLINIC | Age: 54
End: 2019-09-30

## 2019-09-30 NOTE — TELEPHONE ENCOUNTER
Called, spoke to pt. Two identifiers confirmed. Pt stated his GI doctor filled medication. Pt verbalized understanding of information discussed w/ no further questions at this time.

## 2019-09-30 NOTE — TELEPHONE ENCOUNTER
----- Message from Lasha Valentino sent at 9/30/2019  7:38 AM EDT -----  Regarding: GAUDENCIO Mares/ Rx Request  Contact: [phone]  Medication Refill    Best contact number(s): [phone]      Name of medication and dosage if known: LINZESS, 290MCG CAPS, ONE EVERYDAY BEFORE BREAKFAST (30 CAPSULES)      Is patient out of this medication (yes/no): YES      Pharmacy name: [facility] listed in chart? (yes/no): YES  Pharmacy phone number: [phone]    Details to clarify the request: Patient need a refill on his medication. The medication was given to him by his Gastrologist however he does not have the number to her. The patient's insurance company needs a prior authorization every time this Rx needs a refill. Patient is requesting if Dr. Madhu Townsend could fill the Rx for him at his pharmacy, he is out of the medication and needs a refill. Patient would like a call regarding this issue.       Lasha Valentino        Copy/paste envera

## 2019-10-03 ENCOUNTER — OFFICE VISIT (OUTPATIENT)
Dept: INTERNAL MEDICINE CLINIC | Age: 54
End: 2019-10-03

## 2019-10-06 RX ORDER — ALBUTEROL SULFATE 1.25 MG/3ML
SOLUTION RESPIRATORY (INHALATION)
Qty: 150 ML | Refills: 0 | Status: SHIPPED | OUTPATIENT
Start: 2019-10-06 | End: 2019-11-19 | Stop reason: SDUPTHER

## 2019-10-07 RX ORDER — CLONAZEPAM 1 MG/1
TABLET ORAL
Qty: 60 TAB | Refills: 0 | OUTPATIENT
Start: 2019-10-07

## 2019-10-07 RX ORDER — ALBUTEROL SULFATE 1.25 MG/3ML
SOLUTION RESPIRATORY (INHALATION)
Qty: 150 ML | Refills: 0 | Status: CANCELLED | OUTPATIENT
Start: 2019-10-07

## 2019-10-07 NOTE — TELEPHONE ENCOUNTER
Called, spoke to pt. Two identifiers confirmed. Notified pt he missed the last 2 scheduled appointments therefore Dr. Campos Overall will not fill medications until he comes in. Pt scheduled 10/23. Also advised pt d/t requesting an increased dose of klonopin, pt should start looking for a psychiatrist again. Pt stated he will get information when he comes in for appointment. Pt verbalized understanding of information discussed w/ no further questions at this time.

## 2019-10-07 NOTE — TELEPHONE ENCOUNTER
Pt states he is waiting for this medication to be filled as he is out. Pt needs this as soon as possible. Pt states he needs at least qty of 90 per month as he has to take that extra pill due to anxiety with his breathing. He states this calms him down with his breathing. Pt also has to have the neb treatment medication as well. Pt wants all of this today he states. Pt would like a call to let him know what is going on with this he states.

## 2019-10-13 ENCOUNTER — HOSPITAL ENCOUNTER (EMERGENCY)
Age: 54
Discharge: HOME | End: 2019-10-14
Attending: EMERGENCY MEDICINE
Payer: MEDICAID

## 2019-10-13 DIAGNOSIS — M54.2 NECK PAIN: ICD-10-CM

## 2019-10-13 DIAGNOSIS — M79.89 LEG SWELLING: Primary | ICD-10-CM

## 2019-10-13 DIAGNOSIS — M50.30 DEGENERATIVE DISC DISEASE, CERVICAL: ICD-10-CM

## 2019-10-13 LAB
APPEARANCE UR: CLEAR
BACTERIA URNS QL MICRO: NEGATIVE /HPF
BILIRUB UR QL CFM: NEGATIVE
COLOR UR: ABNORMAL
EPITH CASTS URNS QL MICRO: ABNORMAL /LPF
GLUCOSE UR STRIP.AUTO-MCNC: NEGATIVE MG/DL
HGB UR QL STRIP: NEGATIVE
HYALINE CASTS URNS QL MICRO: ABNORMAL /LPF (ref 0–5)
KETONES UR QL STRIP.AUTO: NEGATIVE MG/DL
LEUKOCYTE ESTERASE UR QL STRIP.AUTO: NEGATIVE
NITRITE UR QL STRIP.AUTO: NEGATIVE
PH UR STRIP: 5 [PH] (ref 5–8)
PROT UR STRIP-MCNC: NEGATIVE MG/DL
RBC #/AREA URNS HPF: ABNORMAL /HPF (ref 0–5)
SP GR UR REFRACTOMETRY: >1.03 (ref 1–1.03)
UA: UC IF INDICATED,UAUC: ABNORMAL
UROBILINOGEN UR QL STRIP.AUTO: 0.2 EU/DL (ref 0.2–1)
WBC URNS QL MICRO: ABNORMAL /HPF (ref 0–4)

## 2019-10-13 PROCEDURE — 99283 EMERGENCY DEPT VISIT LOW MDM: CPT

## 2019-10-13 PROCEDURE — 96375 TX/PRO/DX INJ NEW DRUG ADDON: CPT

## 2019-10-13 PROCEDURE — 81001 URINALYSIS AUTO W/SCOPE: CPT

## 2019-10-13 PROCEDURE — 96374 THER/PROPH/DIAG INJ IV PUSH: CPT

## 2019-10-13 RX ORDER — KETOROLAC TROMETHAMINE 30 MG/ML
15 INJECTION, SOLUTION INTRAMUSCULAR; INTRAVENOUS
Status: COMPLETED | OUTPATIENT
Start: 2019-10-13 | End: 2019-10-14

## 2019-10-13 RX ORDER — IPRATROPIUM BROMIDE AND ALBUTEROL SULFATE 2.5; .5 MG/3ML; MG/3ML
3 SOLUTION RESPIRATORY (INHALATION)
Status: COMPLETED | OUTPATIENT
Start: 2019-10-13 | End: 2019-10-14

## 2019-10-13 RX ORDER — DIAZEPAM 10 MG/2ML
2 INJECTION INTRAMUSCULAR
Status: COMPLETED | OUTPATIENT
Start: 2019-10-13 | End: 2019-10-14

## 2019-10-14 ENCOUNTER — APPOINTMENT (OUTPATIENT)
Dept: GENERAL RADIOLOGY | Age: 54
End: 2019-10-14
Attending: EMERGENCY MEDICINE
Payer: MEDICAID

## 2019-10-14 VITALS
DIASTOLIC BLOOD PRESSURE: 104 MMHG | WEIGHT: 271.83 LBS | SYSTOLIC BLOOD PRESSURE: 160 MMHG | BODY MASS INDEX: 33.8 KG/M2 | RESPIRATION RATE: 20 BRPM | OXYGEN SATURATION: 97 % | HEIGHT: 75 IN | HEART RATE: 86 BPM | TEMPERATURE: 98.2 F

## 2019-10-14 LAB
ALBUMIN SERPL-MCNC: 3.5 G/DL (ref 3.5–5)
ALBUMIN/GLOB SERPL: 1.1 {RATIO} (ref 1.1–2.2)
ALP SERPL-CCNC: 68 U/L (ref 45–117)
ALT SERPL-CCNC: 43 U/L (ref 12–78)
ANION GAP SERPL CALC-SCNC: 3 MMOL/L (ref 5–15)
AST SERPL-CCNC: 24 U/L (ref 15–37)
ATRIAL RATE: 81 BPM
BASOPHILS # BLD: 0 K/UL (ref 0–0.1)
BASOPHILS NFR BLD: 1 % (ref 0–1)
BILIRUB SERPL-MCNC: 0.4 MG/DL (ref 0.2–1)
BNP SERPL-MCNC: 20 PG/ML
BUN SERPL-MCNC: 9 MG/DL (ref 6–20)
BUN/CREAT SERPL: 10 (ref 12–20)
CALCIUM SERPL-MCNC: 8.7 MG/DL (ref 8.5–10.1)
CALCULATED P AXIS, ECG09: 66 DEGREES
CALCULATED R AXIS, ECG10: 32 DEGREES
CALCULATED T AXIS, ECG11: 25 DEGREES
CHLORIDE SERPL-SCNC: 111 MMOL/L (ref 97–108)
CK MB CFR SERPL CALC: 1.1 % (ref 0–2.5)
CK MB SERPL-MCNC: 3.8 NG/ML (ref 5–25)
CK SERPL-CCNC: 353 U/L (ref 39–308)
CO2 SERPL-SCNC: 28 MMOL/L (ref 21–32)
CREAT SERPL-MCNC: 0.91 MG/DL (ref 0.7–1.3)
DIAGNOSIS, 93000: NORMAL
DIFFERENTIAL METHOD BLD: ABNORMAL
EOSINOPHIL # BLD: 0.9 K/UL (ref 0–0.4)
EOSINOPHIL NFR BLD: 14 % (ref 0–7)
ERYTHROCYTE [DISTWIDTH] IN BLOOD BY AUTOMATED COUNT: 12.9 % (ref 11.5–14.5)
GLOBULIN SER CALC-MCNC: 3.2 G/DL (ref 2–4)
GLUCOSE SERPL-MCNC: 103 MG/DL (ref 65–100)
HCT VFR BLD AUTO: 43.2 % (ref 36.6–50.3)
HGB BLD-MCNC: 14.3 G/DL (ref 12.1–17)
IMM GRANULOCYTES # BLD AUTO: 0 K/UL (ref 0–0.04)
IMM GRANULOCYTES NFR BLD AUTO: 0 % (ref 0–0.5)
LYMPHOCYTES # BLD: 1.5 K/UL (ref 0.8–3.5)
LYMPHOCYTES NFR BLD: 23 % (ref 12–49)
MCH RBC QN AUTO: 29.5 PG (ref 26–34)
MCHC RBC AUTO-ENTMCNC: 33.1 G/DL (ref 30–36.5)
MCV RBC AUTO: 89.3 FL (ref 80–99)
MONOCYTES # BLD: 0.6 K/UL (ref 0–1)
MONOCYTES NFR BLD: 10 % (ref 5–13)
NEUTS SEG # BLD: 3.4 K/UL (ref 1.8–8)
NEUTS SEG NFR BLD: 52 % (ref 32–75)
NRBC # BLD: 0 K/UL (ref 0–0.01)
NRBC BLD-RTO: 0 PER 100 WBC
P-R INTERVAL, ECG05: 154 MS
PLATELET # BLD AUTO: 220 K/UL (ref 150–400)
PMV BLD AUTO: 9.9 FL (ref 8.9–12.9)
POTASSIUM SERPL-SCNC: 3.9 MMOL/L (ref 3.5–5.1)
PROT SERPL-MCNC: 6.7 G/DL (ref 6.4–8.2)
Q-T INTERVAL, ECG07: 394 MS
QRS DURATION, ECG06: 80 MS
QTC CALCULATION (BEZET), ECG08: 457 MS
RBC # BLD AUTO: 4.84 M/UL (ref 4.1–5.7)
SODIUM SERPL-SCNC: 142 MMOL/L (ref 136–145)
TROPONIN I SERPL-MCNC: <0.05 NG/ML
VENTRICULAR RATE, ECG03: 81 BPM
WBC # BLD AUTO: 6.5 K/UL (ref 4.1–11.1)

## 2019-10-14 PROCEDURE — 77030029684 HC NEB SM VOL KT MONA -A

## 2019-10-14 PROCEDURE — 84484 ASSAY OF TROPONIN QUANT: CPT

## 2019-10-14 PROCEDURE — 80053 COMPREHEN METABOLIC PANEL: CPT

## 2019-10-14 PROCEDURE — 85025 COMPLETE CBC W/AUTO DIFF WBC: CPT

## 2019-10-14 PROCEDURE — 36415 COLL VENOUS BLD VENIPUNCTURE: CPT

## 2019-10-14 PROCEDURE — 93005 ELECTROCARDIOGRAM TRACING: CPT

## 2019-10-14 PROCEDURE — 82550 ASSAY OF CK (CPK): CPT

## 2019-10-14 PROCEDURE — 83880 ASSAY OF NATRIURETIC PEPTIDE: CPT

## 2019-10-14 PROCEDURE — 74011000250 HC RX REV CODE- 250: Performed by: EMERGENCY MEDICINE

## 2019-10-14 PROCEDURE — 74011250636 HC RX REV CODE- 250/636: Performed by: EMERGENCY MEDICINE

## 2019-10-14 PROCEDURE — 72050 X-RAY EXAM NECK SPINE 4/5VWS: CPT

## 2019-10-14 RX ORDER — DICLOFENAC SODIUM 50 MG/1
50 TABLET, DELAYED RELEASE ORAL 2 TIMES DAILY
Qty: 60 TAB | Refills: 1 | Status: SHIPPED | OUTPATIENT
Start: 2019-10-14 | End: 2020-03-04

## 2019-10-14 RX ORDER — CYCLOBENZAPRINE HCL 10 MG
10 TABLET ORAL
Qty: 10 TAB | Refills: 0 | Status: SHIPPED | OUTPATIENT
Start: 2019-10-14 | End: 2020-01-15 | Stop reason: SDUPTHER

## 2019-10-14 RX ORDER — LIDOCAINE 50 MG/G
PATCH TOPICAL
Qty: 5 EACH | Refills: 0 | Status: SHIPPED | OUTPATIENT
Start: 2019-10-14 | End: 2020-01-15 | Stop reason: SDUPTHER

## 2019-10-14 RX ADMIN — KETOROLAC TROMETHAMINE 15 MG: 30 INJECTION, SOLUTION INTRAMUSCULAR at 00:17

## 2019-10-14 RX ADMIN — DIAZEPAM 2 MG: 5 INJECTION, SOLUTION INTRAMUSCULAR; INTRAVENOUS at 00:17

## 2019-10-14 RX ADMIN — IPRATROPIUM BROMIDE AND ALBUTEROL SULFATE 3 ML: .5; 3 SOLUTION RESPIRATORY (INHALATION) at 00:17

## 2019-10-14 NOTE — ED PROVIDER NOTES
EMERGENCY DEPARTMENT HISTORY AND PHYSICAL EXAM      Date: 10/13/2019  Patient Name: Titus Lazar. History of Presenting Illness     Chief Complaint   Patient presents with    Ankle swelling     Ankle swelling since yesterday. He also had some SOB.  Wheezing     He was recently diagnosed with asthma.  Shoulder Pain     Left shoulder pain for a couple of weeks       History Provided By: Patient    HPI: Titus Lazar., 47 y.o. male with PMHx significant for asthma, opiate dependence, hypertension, and anxiety presents to the emergency room with chief complaint of bilateral leg swelling (right greater than left). 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  Some started the week that he was working there. He also complains of left shoulder and neck pain that he describes as a squeezing him in a tingling that feels like muscle spasms and some tingling that goes down his arm. He denies any chest pain,nausea, vomiting, diarrhea. He does report some shortness of breath and wheezing that has been going on throughout the day that he attributes to smoking marijuana earlier. He has been using his nebulizer without significant relief. Patient reports he is on chronic methadone and was not taking it last week when he was out of town working in [place]. He was started back on it today. He is also out of his Klonopin that he takes for anxiety and feels like he has been having increased anxiety. PCP: Jackie Moreau, DO    No current facility-administered medications on file prior to encounter. Current Outpatient Medications on File Prior to Encounter   Medication Sig Dispense Refill    lisinopril (PRINIVIL, ZESTRIL) 20 mg tablet Take 1 Tab by mouth daily. 90 Tab 3    clonazePAM (KLONOPIN) 1 mg tablet Take 1 mg by mouth two (2) times daily as needed (Anxiety).       amLODIPine (NORVASC) 10 mg tablet Take 10 mg by mouth daily.  cloNIDine HCl (CATAPRES) 0.2 mg tablet Take 0.2 mg by mouth two (2) times a day.  montelukast (SINGULAIR) 10 mg tablet Take 10 mg by mouth daily.  aspirin 81 mg tablet Take 1 Tab by mouth daily. 30 Tab 1    albuterol (ACCUNEB) 1.25 mg/3 mL nebu USE 1 VIAL, VIA NEBULIZER TREATMENT EVERY 4 HOURS AS NEEDED FOR COUGH 150 mL 0    tadalafil (CIALIS) 20 mg tablet Take 1 Tab by mouth as needed (erectile dysfunction). 20 Tab 3    predniSONE (STERAPRED DS) 10 mg dose pack Take as directed on packaging 48 Tab 0    [DISCONTINUED] diclofenac EC (VOLTAREN) 50 mg EC tablet Take 1 Tab by mouth two (2) times a day. 60 Tab 1    tadalafil (CIALIS) 5 mg tablet Take 1 Tab by mouth daily as needed (ed). 20 Tab 3    polyethylene glycol (MIRALAX) 17 gram packet Take 1 Packet by mouth daily. 30 Packet 11    albuterol (PROVENTIL HFA, VENTOLIN HFA, PROAIR HFA) 90 mcg/actuation inhaler Take 2 Puffs by inhalation every six (6) hours as needed for Wheezing. 1 Inhaler 1    guaiFENesin ER (MUCINEX) 600 mg ER tablet Take 1 Tab by mouth every twelve (12) hours. 14 Tab 0    [DISCONTINUED] diclofenac (VOLTAREN) 1 % gel Apply  to affected area four (4) times daily as needed for Pain.  pantoprazole (PROTONIX) 40 mg tablet Take 40 mg by mouth daily. Past History     Past Medical History:  Past Medical History:   Diagnosis Date    Arthritis     Chronic low back pain     Hypertension        Past Surgical History:  Past Surgical History:   Procedure Laterality Date    HX ORTHOPAEDIC         Family History:  Family History   Problem Relation Age of Onset    Cancer Mother     Heart Disease Father        Social History:  Social History     Tobacco Use    Smoking status: Current Some Day Smoker     Packs/day: 0.25     Years: 10.00     Pack years: 2.50    Smokeless tobacco: Never Used   Substance Use Topics    Alcohol use: Yes    Drug use: No       Allergies:   Allergies   Allergen Reactions    Vicodin [Hydrocodone-Acetaminophen] Other (comments)     Headache         Review of Systems   Review of Systems   Constitutional: Negative for chills and fever. HENT: Negative for congestion, ear pain, sinus pain and sore throat. Eyes: Negative. Respiratory: Positive for shortness of breath and wheezing. Negative for cough. Cardiovascular: Negative for chest pain, palpitations and leg swelling. Gastrointestinal: Negative for abdominal pain, diarrhea, nausea and vomiting. Genitourinary: Negative for dysuria, flank pain and hematuria. Musculoskeletal: Positive for arthralgias, myalgias and neck pain. Negative for back pain, joint swelling and neck stiffness. Skin: Negative for rash and wound. Neurological: Negative for dizziness, seizures, syncope and headaches. Psychiatric/Behavioral: Negative for confusion. The patient is nervous/anxious.           Physical Exam    General appearance - well nourished, well appearing, and in no distress  Eyes - pupils equal and reactive, extraocular eye movements intact  ENT - mucous membranes moist, pharynx normal without lesions  Neck - supple, no significant adenopathy; tender to palpation left paracervical musculature   chest - clear to auscultation, no wheezes, rales or rhonchi; non-tender to palpation  Heart - normal rate and regular rhythm, S1 and S2 normal, no murmurs noted  Abdomen - soft, nontender, nondistended, no masses or organomegaly  Musculoskeletal - no joint tenderness, deformity or swelling; normal ROM  Extremities - peripheral pulses normal, 1+ bilateral pedal edema  Skin - normal coloration and turgor, no rashes  Neurological - alert, oriented x3, normal speech, no focal findings or movement disorder noted    Diagnostic Study Results     Labs -     Recent Results (from the past 12 hour(s))   URINALYSIS W/ REFLEX CULTURE    Collection Time: 10/13/19 10:42 PM   Result Value Ref Range    Color YELLOW/STRAW      Appearance CLEAR CLEAR      Specific gravity >1.030 (H) 1.003 - 1.030    pH (UA) 5.0 5.0 - 8.0      Protein NEGATIVE  NEG mg/dL    Glucose NEGATIVE  NEG mg/dL    Ketone NEGATIVE  NEG mg/dL    Blood NEGATIVE  NEG      Urobilinogen 0.2 0.2 - 1.0 EU/dL    Nitrites NEGATIVE  NEG      Leukocyte Esterase NEGATIVE  NEG      UA:UC IF INDICATED CULTURE NOT INDICATED BY UA RESULT CNI      WBC 0-4 0 - 4 /hpf    RBC 0-5 0 - 5 /hpf    Epithelial cells FEW FEW /lpf    Bacteria NEGATIVE  NEG /hpf    Hyaline cast 0-2 0 - 5 /lpf   BILIRUBIN, CONFIRM    Collection Time: 10/13/19 10:42 PM   Result Value Ref Range    Bilirubin UA, confirm NEGATIVE  NEG     CBC WITH AUTOMATED DIFF    Collection Time: 10/14/19 12:03 AM   Result Value Ref Range    WBC 6.5 4.1 - 11.1 K/uL    RBC 4.84 4.10 - 5.70 M/uL    HGB 14.3 12.1 - 17.0 g/dL    HCT 43.2 36.6 - 50.3 %    MCV 89.3 80.0 - 99.0 FL    MCH 29.5 26.0 - 34.0 PG    MCHC 33.1 30.0 - 36.5 g/dL    RDW 12.9 11.5 - 14.5 %    PLATELET 448 462 - 963 K/uL    MPV 9.9 8.9 - 12.9 FL    NRBC 0.0 0  WBC    ABSOLUTE NRBC 0.00 0.00 - 0.01 K/uL    NEUTROPHILS 52 32 - 75 %    LYMPHOCYTES 23 12 - 49 %    MONOCYTES 10 5 - 13 %    EOSINOPHILS 14 (H) 0 - 7 %    BASOPHILS 1 0 - 1 %    IMMATURE GRANULOCYTES 0 0.0 - 0.5 %    ABS. NEUTROPHILS 3.4 1.8 - 8.0 K/UL    ABS. LYMPHOCYTES 1.5 0.8 - 3.5 K/UL    ABS. MONOCYTES 0.6 0.0 - 1.0 K/UL    ABS. EOSINOPHILS 0.9 (H) 0.0 - 0.4 K/UL    ABS. BASOPHILS 0.0 0.0 - 0.1 K/UL    ABS. IMM.  GRANS. 0.0 0.00 - 0.04 K/UL    DF AUTOMATED     METABOLIC PANEL, COMPREHENSIVE    Collection Time: 10/14/19 12:03 AM   Result Value Ref Range    Sodium 142 136 - 145 mmol/L    Potassium 3.9 3.5 - 5.1 mmol/L    Chloride 111 (H) 97 - 108 mmol/L    CO2 28 21 - 32 mmol/L    Anion gap 3 (L) 5 - 15 mmol/L    Glucose 103 (H) 65 - 100 mg/dL    BUN 9 6 - 20 MG/DL    Creatinine 0.91 0.70 - 1.30 MG/DL    BUN/Creatinine ratio 10 (L) 12 - 20      GFR est AA >60 >60 ml/min/1.73m2    GFR est non-AA >60 >60 ml/min/1.73m2 Calcium 8.7 8.5 - 10.1 MG/DL    Bilirubin, total 0.4 0.2 - 1.0 MG/DL    ALT (SGPT) 43 12 - 78 U/L    AST (SGOT) 24 15 - 37 U/L    Alk. phosphatase 68 45 - 117 U/L    Protein, total 6.7 6.4 - 8.2 g/dL    Albumin 3.5 3.5 - 5.0 g/dL    Globulin 3.2 2.0 - 4.0 g/dL    A-G Ratio 1.1 1.1 - 2.2     NT-PRO BNP    Collection Time: 10/14/19 12:03 AM   Result Value Ref Range    NT pro-BNP 20 <125 PG/ML   CK W/ CKMB & INDEX    Collection Time: 10/14/19 12:03 AM   Result Value Ref Range     (H) 39 - 308 U/L    CK - MB 3.8 (H) <3.6 NG/ML    CK-MB Index 1.1 0.0 - 2.5     TROPONIN I    Collection Time: 10/14/19 12:03 AM   Result Value Ref Range    Troponin-I, Qt. <0.05 <0.05 ng/mL   EKG, 12 LEAD, INITIAL    Collection Time: 10/14/19 12:32 AM   Result Value Ref Range    Ventricular Rate 81 BPM    Atrial Rate 81 BPM    P-R Interval 154 ms    QRS Duration 80 ms    Q-T Interval 394 ms    QTC Calculation (Bezet) 457 ms    Calculated P Axis 66 degrees    Calculated R Axis 32 degrees    Calculated T Axis 25 degrees    Diagnosis       Normal sinus rhythm  Normal ECG  When compared with ECG of 19-AUG-2019 00:59,  No significant change was found         Radiologic Studies -   XR SPINE CERV 4 OR 5 V   Final Result   IMPRESSION: Multilevel degenerative disc disease. No acute fracture seen. Incidental bilateral coronary artery disease. CT Results  (Last 48 hours)    None        CXR Results  (Last 48 hours)    None            Medical Decision Making   I am the first provider for this patient. I reviewed the vital signs, available nursing notes, past medical history, past surgical history, family history and social history. Vital Signs-Reviewed the patient's vital signs.   Patient Vitals for the past 12 hrs:   Temp Pulse Resp BP SpO2   10/13/19 2246     100 %   10/13/19 2219 98.2 °F (36.8 °C) 86 20 (!) 160/104 96 %       EKG: Normal sinus rhythm, 81 bpm, normal axis, normal ND, QRS, QTc intervals, no ischemic changes. Records Reviewed: Nursing Notes and Old Medical Records    Provider Notes (Medical Decision Making):   Differential diagnosis: Dependent edema, CHF, arthritis, cervical radiculopathy, asthma exacerbation    ED Course:   Initial assessment performed. The patients presenting problems have been discussed, and they are in agreement with the care plan formulated and outlined with them. I have encouraged them to ask questions as they arise throughout their visit. Progress Notes:   X-ray shows degenerative disease in cervical spine. Lab work is unremarkable. Will encourage patient to keep legs elevated and to wear support stockings if he is going to be on his feet. Will treat with analgesics, muscle relaxers, and lighted Ornelas patch for his cervical pain. Patient will follow up with methadone clinic in the morning. Disposition:  Discharge home    PLAN:  1. Current Discharge Medication List      START taking these medications    Details   lidocaine (LIDODERM) 5 % Apply patch to the affected area for 12 hours a day and remove for 12 hours a day. Qty: 5 Each, Refills: 0      cyclobenzaprine (FLEXERIL) 10 mg tablet Take 1 Tab by mouth three (3) times daily as needed for Muscle Spasm(s). Qty: 10 Tab, Refills: 0         CONTINUE these medications which have CHANGED    Details   diclofenac EC (VOLTAREN) 50 mg EC tablet Take 1 Tab by mouth two (2) times a day.   Qty: 60 Tab, Refills: 1         STOP taking these medications       diclofenac (VOLTAREN) 1 % gel Comments:   Reason for Stoppin.   Follow-up Information     Follow up With Specialties Details Why Contact Info    [facility] EMERGENCY DEPT Emergency Medicine  If symptoms worsen [address] [place] [address]  [phone]    Marjorie Veras, DO Internal Medicine Call today  [address]  [place] [address]  [address] [phone]          Return to ED if worse     Diagnosis     Clinical Impression:   1. Leg swelling    2. Neck pain    3.  Degenerative disc disease, cervical

## 2019-10-14 NOTE — DISCHARGE INSTRUCTIONS
Patient Education   Patient Education        Leg and Ankle Edema: Care Instructions  Your Care Instructions  Swelling in the legs, ankles, and feet is called edema. It is common after you sit or stand for a while. Long plane flights or car rides often cause swelling in the legs and feet. You may also have swelling if you have to stand for long periods of time at your job. Problems with the veins in the legs (varicose veins) and changes in hormones can also cause swelling. Sometimes the swelling in the ankles and feet is caused by a more serious problem, such as heart failure, infection, blood clots, or liver or kidney disease. Follow-up care is a key part of your treatment and safety. Be sure to make and go to all appointments, and call your doctor if you are having problems. It's also a good idea to know your test results and keep a list of the medicines you take. How can you care for yourself at home? · If your doctor gave you medicine, take it as prescribed. Call your doctor if you think you are having a problem with your medicine. · Whenever you are resting, raise your legs up. Try to keep the swollen area higher than the level of your heart. · Take breaks from standing or sitting in one position. ? Walk around to increase the blood flow in your lower legs. ? Move your feet and ankles often while you stand, or tighten and relax your leg muscles. · Wear support stockings. Put them on in the morning, before swelling gets worse. · Eat a balanced diet. Lose weight if you need to. · Limit the amount of salt (sodium) in your diet. Salt holds fluid in the body and may increase swelling. When should you call for help? Call 911 anytime you think you may need emergency care. For example, call if:    · You have symptoms of a blood clot in your lung (called a pulmonary embolism). These may include:  ? Sudden chest pain. ? Trouble breathing. ?  Coughing up blood.    Call your doctor now or seek immediate medical care if:    · You have signs of a blood clot, such as:  ? Pain in your calf, back of the knee, thigh, or groin. ? Redness and swelling in your leg or groin.     · You have symptoms of infection, such as:  ? Increased pain, swelling, warmth, or redness. ? Red streaks or pus. ? A fever.    Watch closely for changes in your health, and be sure to contact your doctor if:    · Your swelling is getting worse.     · You have new or worsening pain in your legs.     · You do not get better as expected. Where can you learn more? Go to http://douglas-hans.info/. Enter Q907 in the search box to learn more about \"Leg and Ankle Edema: Care Instructions. \"  Current as of: June 26, 2019  Content Version: 12.2  © 3353-6013 [employer]. Care instructions adapted under license by [employer] (which disclaims liability or warranty for this information). If you have questions about a medical condition or this instruction, always ask your healthcare professional. [place] [address] any warranty or liability for your use of this information. Neck Pain: Care Instructions  Your Care Instructions    You can have neck pain anywhere from the bottom of your head to the top of your shoulders. It can spread to the upper back or arms. Injuries, painting a ceiling, sleeping with your neck twisted, staying in one position for too long, and many other activities can cause neck pain. Most neck pain gets better with home care. Your doctor may recommend medicine to relieve pain or relax your muscles. He or she may suggest exercise and physical therapy to increase flexibility and relieve stress. You may need to wear a special (cervical) collar to support your neck for a day or two. Follow-up care is a key part of your treatment and safety. Be sure to make and go to all appointments, and call your doctor if you are having problems.  It's also a good idea to know your test results and keep a list of the medicines you take. How can you care for yourself at home? · Try using a heating pad on a low or medium setting for 15 to 20 minutes every 2 or 3 hours. Try a warm shower in place of one session with the heating pad. · You can also try an ice pack for 10 to 15 minutes every 2 to 3 hours. Put a thin cloth between the ice and your skin. · Take pain medicines exactly as directed. ? If the doctor gave you a prescription medicine for pain, take it as prescribed. ? If you are not taking a prescription pain medicine, ask your doctor if you can take an over-the-counter medicine. · If your doctor recommends a cervical collar, wear it exactly as directed. When should you call for help? Call your doctor now or seek immediate medical care if:    · You have new or worsening numbness in your arms, buttocks or legs.     · You have new or worsening weakness in your arms or legs. (This could make it hard to stand up.)     · You lose control of your bladder or bowels.    Watch closely for changes in your health, and be sure to contact your doctor if:    · Your neck pain is getting worse.     · You are not getting better after 1 week.     · You do not get better as expected. Where can you learn more? Go to http://douglas-hans.info/. Enter [id number] in the search box to learn more about \"Neck Pain: Care Instructions. \"  Current as of: June 26, 2019  Content Version: 12.2  © 1390-3153 [employer]. Care instructions adapted under license by [employer] (which disclaims liability or warranty for this information). If you have questions about a medical condition or this instruction, always ask your healthcare professional. [place] [address] any warranty or liability for your use of this information.

## 2019-10-16 NOTE — TELEPHONE ENCOUNTER
Reason for call:   Medication     Callback required yes/no and why:   Yes, pt second attempt to reach office.  Pt states they will not be able to wait until appt date for refill     Best contact number(s):   [phone]     Details to clarify the request:       Shlomo Harmon

## 2019-10-18 RX ORDER — CLONAZEPAM 1 MG/1
1 TABLET ORAL
Status: CANCELLED | OUTPATIENT
Start: 2019-10-18

## 2019-10-18 RX ORDER — CLONAZEPAM 1 MG/1
TABLET ORAL
Qty: 60 TAB | Refills: 0 | OUTPATIENT
Start: 2019-10-18

## 2019-10-18 NOTE — TELEPHONE ENCOUNTER
Pt states he is leaving [facility] now and needs an appt as soon as possible. Also needs this script to last until that appt. He can come to any appt. Any day/anytime. Please call pt to let him know. Pt states he is really having high anxiety attacks due to no medication. He is trying everything to help the anxiety but it isn't working. Pt is requesting a call back today he states.

## 2019-10-18 NOTE — TELEPHONE ENCOUNTER
Kelsie Dae [facility] Front Office Pool             Pt called to let the office know that he missed his appointment because he had an asthma attack and was in hospital, he was just released on 10/18. He is requested the next available appointment. I tried to connect him to the office but was told it was not a triage call.  Best contact number is [phone]       Message received & copied from [facility]

## 2019-10-22 NOTE — TELEPHONE ENCOUNTER
Pt states he wants to see Dr. Luisa Suazo and wants to know why he was turned away. Pt states he is still trying to get his medication and an appt with Dr. Luisa Suazo.     #[phone]

## 2019-10-31 ENCOUNTER — TELEPHONE (OUTPATIENT)
Dept: INTERNAL MEDICINE CLINIC | Age: 54
End: 2019-10-31

## 2019-10-31 NOTE — TELEPHONE ENCOUNTER
Pt already has a year supply on prescription medications. Dr. Marivel Strickland will not be refilling narcotics for pt.

## 2019-10-31 NOTE — TELEPHONE ENCOUNTER
Pt states he needs one month of all medications sent to [facility] [place]. He is only asking for one month until he finds another pcp. Pt did not give me any medications.   Just said \"all of them\"

## 2019-11-01 ENCOUNTER — HOSPITAL ENCOUNTER (EMERGENCY)
Age: 54
Discharge: HOME | End: 2019-11-02
Attending: EMERGENCY MEDICINE
Payer: MEDICAID

## 2019-11-01 ENCOUNTER — APPOINTMENT (OUTPATIENT)
Dept: GENERAL RADIOLOGY | Age: 54
End: 2019-11-01
Attending: EMERGENCY MEDICINE
Payer: MEDICAID

## 2019-11-01 DIAGNOSIS — J45.21 MILD INTERMITTENT ASTHMA WITH ACUTE EXACERBATION: Primary | ICD-10-CM

## 2019-11-01 DIAGNOSIS — R60.9 PERIPHERAL EDEMA: ICD-10-CM

## 2019-11-01 DIAGNOSIS — Z72.0 TOBACCO ABUSE: ICD-10-CM

## 2019-11-01 DIAGNOSIS — I10 ACCELERATED HYPERTENSION: ICD-10-CM

## 2019-11-01 DIAGNOSIS — F41.1 ANXIETY STATE: ICD-10-CM

## 2019-11-01 LAB
ALBUMIN SERPL-MCNC: 3.6 G/DL (ref 3.5–5)
ALBUMIN/GLOB SERPL: 1 {RATIO} (ref 1.1–2.2)
ALP SERPL-CCNC: 92 U/L (ref 45–117)
ALT SERPL-CCNC: 51 U/L (ref 12–78)
ANION GAP SERPL CALC-SCNC: 6 MMOL/L (ref 5–15)
AST SERPL-CCNC: 39 U/L (ref 15–37)
BASOPHILS # BLD: 0 K/UL (ref 0–0.1)
BASOPHILS NFR BLD: 0 % (ref 0–1)
BILIRUB SERPL-MCNC: 0.4 MG/DL (ref 0.2–1)
BUN SERPL-MCNC: 9 MG/DL (ref 6–20)
BUN/CREAT SERPL: 10 (ref 12–20)
CALCIUM SERPL-MCNC: 9 MG/DL (ref 8.5–10.1)
CHLORIDE SERPL-SCNC: 105 MMOL/L (ref 97–108)
CO2 SERPL-SCNC: 27 MMOL/L (ref 21–32)
CREAT SERPL-MCNC: 0.9 MG/DL (ref 0.7–1.3)
DIFFERENTIAL METHOD BLD: ABNORMAL
EOSINOPHIL # BLD: 0.6 K/UL (ref 0–0.4)
EOSINOPHIL NFR BLD: 5 % (ref 0–7)
ERYTHROCYTE [DISTWIDTH] IN BLOOD BY AUTOMATED COUNT: 12.3 % (ref 11.5–14.5)
GLOBULIN SER CALC-MCNC: 3.6 G/DL (ref 2–4)
GLUCOSE SERPL-MCNC: 96 MG/DL (ref 65–100)
HCT VFR BLD AUTO: 46.4 % (ref 36.6–50.3)
HGB BLD-MCNC: 15 G/DL (ref 12.1–17)
IMM GRANULOCYTES # BLD AUTO: 0 K/UL (ref 0–0.04)
IMM GRANULOCYTES NFR BLD AUTO: 0 % (ref 0–0.5)
LYMPHOCYTES # BLD: 1.9 K/UL (ref 0.8–3.5)
LYMPHOCYTES NFR BLD: 17 % (ref 12–49)
MCH RBC QN AUTO: 29 PG (ref 26–34)
MCHC RBC AUTO-ENTMCNC: 32.3 G/DL (ref 30–36.5)
MCV RBC AUTO: 89.7 FL (ref 80–99)
MONOCYTES # BLD: 0.9 K/UL (ref 0–1)
MONOCYTES NFR BLD: 8 % (ref 5–13)
NEUTS SEG # BLD: 7.8 K/UL (ref 1.8–8)
NEUTS SEG NFR BLD: 70 % (ref 32–75)
NRBC # BLD: 0 K/UL (ref 0–0.01)
NRBC BLD-RTO: 0 PER 100 WBC
PLATELET # BLD AUTO: 256 K/UL (ref 150–400)
PMV BLD AUTO: 11 FL (ref 8.9–12.9)
POTASSIUM SERPL-SCNC: 4.6 MMOL/L (ref 3.5–5.1)
PROT SERPL-MCNC: 7.2 G/DL (ref 6.4–8.2)
RBC # BLD AUTO: 5.17 M/UL (ref 4.1–5.7)
SODIUM SERPL-SCNC: 138 MMOL/L (ref 136–145)
WBC # BLD AUTO: 11.3 K/UL (ref 4.1–11.1)

## 2019-11-01 PROCEDURE — 80053 COMPREHEN METABOLIC PANEL: CPT

## 2019-11-01 PROCEDURE — 99284 EMERGENCY DEPT VISIT MOD MDM: CPT

## 2019-11-01 PROCEDURE — 36415 COLL VENOUS BLD VENIPUNCTURE: CPT

## 2019-11-01 PROCEDURE — 71046 X-RAY EXAM CHEST 2 VIEWS: CPT

## 2019-11-01 PROCEDURE — 96375 TX/PRO/DX INJ NEW DRUG ADDON: CPT

## 2019-11-01 PROCEDURE — 96374 THER/PROPH/DIAG INJ IV PUSH: CPT

## 2019-11-01 PROCEDURE — 83880 ASSAY OF NATRIURETIC PEPTIDE: CPT

## 2019-11-01 PROCEDURE — 94640 AIRWAY INHALATION TREATMENT: CPT

## 2019-11-01 PROCEDURE — 85025 COMPLETE CBC W/AUTO DIFF WBC: CPT

## 2019-11-01 PROCEDURE — 74011000250 HC RX REV CODE- 250: Performed by: EMERGENCY MEDICINE

## 2019-11-01 PROCEDURE — 74011250636 HC RX REV CODE- 250/636: Performed by: EMERGENCY MEDICINE

## 2019-11-01 RX ORDER — LORAZEPAM 2 MG/ML
0.5 INJECTION INTRAMUSCULAR ONCE
Status: COMPLETED | OUTPATIENT
Start: 2019-11-01 | End: 2019-11-01

## 2019-11-01 RX ORDER — IPRATROPIUM BROMIDE AND ALBUTEROL SULFATE 2.5; .5 MG/3ML; MG/3ML
3 SOLUTION RESPIRATORY (INHALATION)
Status: COMPLETED | OUTPATIENT
Start: 2019-11-01 | End: 2019-11-01

## 2019-11-01 RX ORDER — FUROSEMIDE 10 MG/ML
40 INJECTION INTRAMUSCULAR; INTRAVENOUS
Status: COMPLETED | OUTPATIENT
Start: 2019-11-01 | End: 2019-11-01

## 2019-11-01 RX ADMIN — FUROSEMIDE 40 MG: 10 INJECTION, SOLUTION INTRAMUSCULAR; INTRAVENOUS at 23:56

## 2019-11-01 RX ADMIN — IPRATROPIUM BROMIDE AND ALBUTEROL SULFATE 3 ML: .5; 3 SOLUTION RESPIRATORY (INHALATION) at 23:54

## 2019-11-01 RX ADMIN — LORAZEPAM 0.5 MG: 2 INJECTION INTRAMUSCULAR; INTRAVENOUS at 00:00

## 2019-11-02 ENCOUNTER — APPOINTMENT (OUTPATIENT)
Dept: ULTRASOUND IMAGING | Age: 54
End: 2019-11-02
Attending: EMERGENCY MEDICINE
Payer: MEDICAID

## 2019-11-02 VITALS
TEMPERATURE: 97.9 F | WEIGHT: 276.68 LBS | HEART RATE: 84 BPM | RESPIRATION RATE: 24 BRPM | BODY MASS INDEX: 34.4 KG/M2 | OXYGEN SATURATION: 92 % | DIASTOLIC BLOOD PRESSURE: 96 MMHG | SYSTOLIC BLOOD PRESSURE: 165 MMHG | HEIGHT: 75 IN

## 2019-11-02 LAB — BNP SERPL-MCNC: 15 PG/ML

## 2019-11-02 PROCEDURE — 74011000250 HC RX REV CODE- 250: Performed by: EMERGENCY MEDICINE

## 2019-11-02 PROCEDURE — 96375 TX/PRO/DX INJ NEW DRUG ADDON: CPT

## 2019-11-02 PROCEDURE — 93970 EXTREMITY STUDY: CPT

## 2019-11-02 PROCEDURE — 74011250636 HC RX REV CODE- 250/636: Performed by: EMERGENCY MEDICINE

## 2019-11-02 RX ORDER — PREDNISONE 10 MG/1
TABLET ORAL
Qty: 21 TAB | Refills: 0 | Status: SHIPPED | OUTPATIENT
Start: 2019-11-02 | End: 2019-11-12 | Stop reason: ALTCHOICE

## 2019-11-02 RX ORDER — IPRATROPIUM BROMIDE AND ALBUTEROL SULFATE 2.5; .5 MG/3ML; MG/3ML
SOLUTION RESPIRATORY (INHALATION)
Status: DISCONTINUED
Start: 2019-11-02 | End: 2019-11-02 | Stop reason: HOSPADM

## 2019-11-02 RX ORDER — IPRATROPIUM BROMIDE AND ALBUTEROL SULFATE 2.5; .5 MG/3ML; MG/3ML
3 SOLUTION RESPIRATORY (INHALATION)
Status: COMPLETED | OUTPATIENT
Start: 2019-11-02 | End: 2019-11-02

## 2019-11-02 RX ORDER — ALBUTEROL SULFATE 0.83 MG/ML
2.5 SOLUTION RESPIRATORY (INHALATION)
Qty: 30 NEBULE | Refills: 0 | Status: SHIPPED | OUTPATIENT
Start: 2019-11-02 | End: 2019-11-19 | Stop reason: SDUPTHER

## 2019-11-02 RX ORDER — HYDROXYZINE 50 MG/1
50 TABLET, FILM COATED ORAL
Qty: 20 TAB | Refills: 0 | Status: SHIPPED | OUTPATIENT
Start: 2019-11-02 | End: 2019-11-12

## 2019-11-02 RX ORDER — FUROSEMIDE 20 MG/1
20 TABLET ORAL DAILY
Qty: 20 TAB | Refills: 0 | Status: SHIPPED | OUTPATIENT
Start: 2019-11-02 | End: 2019-11-12 | Stop reason: SDUPTHER

## 2019-11-02 RX ADMIN — IPRATROPIUM BROMIDE AND ALBUTEROL SULFATE 3 ML: .5; 3 SOLUTION RESPIRATORY (INHALATION) at 01:23

## 2019-11-02 RX ADMIN — METHYLPREDNISOLONE SODIUM SUCCINATE 125 MG: 125 INJECTION, POWDER, FOR SOLUTION INTRAMUSCULAR; INTRAVENOUS at 01:23

## 2019-11-02 NOTE — DISCHARGE INSTRUCTIONS
Thank you for allowing us to take care of you today! We hope we addressed all of your concerns and needs. We strive to provide excellent quality care in the Emergency Department. You will receive a survey after your visit to evaluate the care you were provided. Should you receive a survey from us, we invite you to share your experience and tell us what made it excellent. It was a pleasure serving you, we invite you to share your experience with us, in our pursuit for excellence, should you be selected to receive a survey. The exam and treatment you received in the Emergency Department were for an urgent problem and are not intended as complete care. It is important that you follow up with a doctor, nurse practitioner, or physician assistant for ongoing care. If your symptoms become worse or you do not improve as expected and you are unable to reach your usual health care provider, you should return to the Emergency Department. We are available 24 hours a day. Please take your discharge instructions with you when you go to your follow-up appointment. If you have any problem arranging a follow-up appointment, contact the Emergency Department immediately. If a prescription has been provided, please have it filled as soon as possible to prevent a delay in treatment. Read the entire medication instruction sheet provided to you by the pharmacy. If you have any questions or reservations about taking the medication due to side effects or interactions with other medications, please call your primary care physician or contact the ER to speak with the charge nurse. Make an appointment with your family doctor or the physician you were referred to for follow-up of this visit as instructed on your discharge paperwork, as this is mandatory follow-up. Return to the ER if you are unable to be seen or if you are unable to be seen in a timely manner.     If you have any problem arranging the follow-up visit, contact the Emergency Department immediately. I hope you feel better and thank you again for allow us to provide you with excellent care today at [facility]! Warmest regards,    Elizabeth Mancuso MD  Emergency Medicine Physician  [facility]        _____________________________________________________________________________________________________________    Vitals:    11/02/19 0200 11/02/19 0201 11/02/19 0203 11/02/19 0215   BP: (!) 165/96      BP 1 Location:       BP Patient Position:       Pulse:       Resp:       Temp:       SpO2: 92% (!) 88% 95% 92%   Weight:       Height:           Recent Results (from the past 12 hour(s))   CBC WITH AUTOMATED DIFF    Collection Time: 11/01/19 10:06 PM   Result Value Ref Range    WBC 11.3 (H) 4.1 - 11.1 K/uL    RBC 5.17 4.10 - 5.70 M/uL    HGB 15.0 12.1 - 17.0 g/dL    HCT 46.4 36.6 - 50.3 %    MCV 89.7 80.0 - 99.0 FL    MCH 29.0 26.0 - 34.0 PG    MCHC 32.3 30.0 - 36.5 g/dL    RDW 12.3 11.5 - 14.5 %    PLATELET 855 031 - 370 K/uL    MPV 11.0 8.9 - 12.9 FL    NRBC 0.0 0  WBC    ABSOLUTE NRBC 0.00 0.00 - 0.01 K/uL    NEUTROPHILS 70 32 - 75 %    LYMPHOCYTES 17 12 - 49 %    MONOCYTES 8 5 - 13 %    EOSINOPHILS 5 0 - 7 %    BASOPHILS 0 0 - 1 %    IMMATURE GRANULOCYTES 0 0.0 - 0.5 %    ABS. NEUTROPHILS 7.8 1.8 - 8.0 K/UL    ABS. LYMPHOCYTES 1.9 0.8 - 3.5 K/UL    ABS. MONOCYTES 0.9 0.0 - 1.0 K/UL    ABS. EOSINOPHILS 0.6 (H) 0.0 - 0.4 K/UL    ABS. BASOPHILS 0.0 0.0 - 0.1 K/UL    ABS. IMM.  GRANS. 0.0 0.00 - 0.04 K/UL    DF AUTOMATED     METABOLIC PANEL, COMPREHENSIVE    Collection Time: 11/01/19 10:06 PM   Result Value Ref Range    Sodium 138 136 - 145 mmol/L    Potassium 4.6 3.5 - 5.1 mmol/L    Chloride 105 97 - 108 mmol/L    CO2 27 21 - 32 mmol/L    Anion gap 6 5 - 15 mmol/L    Glucose 96 65 - 100 mg/dL    BUN 9 6 - 20 MG/DL    Creatinine 0.90 0.70 - 1.30 MG/DL    BUN/Creatinine ratio 10 (L) 12 - 20 GFR est AA >60 >60 ml/min/1.73m2    GFR est non-AA >60 >60 ml/min/1.73m2    Calcium 9.0 8.5 - 10.1 MG/DL    Bilirubin, total 0.4 0.2 - 1.0 MG/DL    ALT (SGPT) 51 12 - 78 U/L    AST (SGOT) 39 (H) 15 - 37 U/L    Alk. phosphatase 92 45 - 117 U/L    Protein, total 7.2 6.4 - 8.2 g/dL    Albumin 3.6 3.5 - 5.0 g/dL    Globulin 3.6 2.0 - 4.0 g/dL    A-G Ratio 1.0 (L) 1.1 - 2.2     NT-PRO BNP    Collection Time: 11/01/19 10:06 PM   Result Value Ref Range    NT pro-BNP 15 <125 PG/ML       XR CHEST PA LAT   Final Result    impression: No acute changes.         CT Results  (Last 48 hours)    None          Local Primary Care Physicians   [facility] [phone]  MD Lulu Rosas MD Eldonna Banco, MD [facility] [phone]  Rufina Spurling, [facility]  MD Margaux Mijares MD Marquette Kid, MD [address] [address] [phone]  [place] [address] [facility] [phone]  MD Lorene Goode MD Namon Flora, MD Marcelyn Haley, MD   [facility] [phone]  [facility] AFDYXRENEE KEITA, MD Mandeep Ballard, NP [address] [phone]  MD Randy Dyer MD Ellin Argyle, MD Juanita Isbell, MD Nita Charles, MD Shin Mchugh MD   [facility]  Richi Paige MD [facility] [phone]  MD Zurdo Brown, NP  Erik Brody, MD Thom Lesch, MD Maurita Console, MD Janes Bruno MD   [address] [facility] [phone]  Armani Junior, MD Rosy Hoffman, FNP  Jolanta Harp, NP  MD Darinel Glasgow MD Lael Berger, MD Alexia Netter, MD [facility] [phone]  MD Leonardo Allison, MD Andrew Najera MD Alyne Chapel, MD   [address] [phone]  Susan Lagos, MD Wilfrido Loja MD [facility] [phone]  MD Akhil Adams, MD Kavon Ivy MD   [facility] [phone]  MD Daniel Guzmán, MD Roldan Saavge, MD Marja Spurling, MD Delma Kc, NP  Wandy Smith MD [address] [facility]   [phone]  MD Dang Slade MD Neysa Dais, MD     [address] [facility] [phone]  Matheus Coughlin, MD Hugo Fitch, FNP  Aurelia Plants, PAEdiliaC  Aurelia Plants, FNP  [facility], PAEdiliaC  MD Bert oS, HODA Russo DO   Miscellaneous:  Jolayne Rubinstein, MD [facility]   For adult and child immunizations, family planning, TB screening, STD testing and women's health services. [facility] [place] [phone]     [place] [address]   [address]: Raysa Maki [address] [facility] [address] [phone]     [address] [facility]        [address] [address]  For primary care services, woman and child wellness, and some clinics providing specialty care. [facility] [address] [facility]. [address] [place] [address] [phone]   [address] [place] [address] [facility] [address] [facility] [address] [facility] [phone]   [address] [place] [address] [address]  [phone]   [address] [facility] [address] [place] [address] [address]  Community  [phone]   [address] [facility] [address]  [address] [address] [phone]   [address] [phone]   [facility] [address] [place] [address] [phone]   [facility]: [facility]     [place] [facility]  [phone]   [employer]  [address] ([email or web address]/[email or web address])         Sexual Health/Woman Wellness Clinics   For STD/HIV testing and treatment, pregnancy testing and services, men's health, birth control services, LGBT services, and hepatitis/HPV vaccine services. [employer] [facility] [employer] [address] [facility] [address] [facility] [address] [facility] [phone]   [facility] [address], 5th floor [phone]   Pregnancy [address] [facility] [address] [phone]        [place] [address] [facility] [address]   [phone]   [place]   [phone]   Women, Infant and Children's Services: [address] [phone]       [facility] [address]    [phone]   [address]   [phone]   [address]   [address] [facility] [address]       Patient Education        Anxiety Disorder: Care Instructions  Your Care Instructions    Anxiety is a normal reaction to stress. Difficult situations can cause you to have symptoms such as sweaty palms and a nervous feeling. In an anxiety disorder, the symptoms are far more severe. Constant worry, muscle tension, trouble sleeping, nausea and diarrhea, and other symptoms can make normal daily activities difficult or impossible. These symptoms may occur for no reason, and they can affect your work, school, or social life. Medicines, counseling, and self-care can all help. Follow-up care is a key part of your treatment and safety. Be sure to make and go to all appointments, and call your doctor if you are having problems. It's also a good idea to know your test results and keep a list of the medicines you take.   How can you care for yourself at home? · Take medicines exactly as directed. Call your doctor if you think you are having a problem with your medicine. · Go to your counseling sessions and follow-up appointments. · Recognize and accept your anxiety. Then, when you are in a situation that makes you anxious, say to yourself, \"This is not an emergency. I feel uncomfortable, but I am not in danger. I can keep going even if I feel anxious. \"  · Be kind to your body:  ? Relieve tension with exercise or a massage. ? Get enough rest.  ? Avoid alcohol, caffeine, nicotine, and illegal drugs. They can increase your anxiety level and cause sleep problems. ? Learn and do relaxation techniques. See below for more about these techniques. · Engage your mind. Get out and do something you enjoy. Go to a funny movie, or take a walk or hike. Plan your day. Having too much or too little to do can make you anxious. · Keep a record of your symptoms. Discuss your fears with a good friend or family member, or join a support group for people with similar problems. Talking to others sometimes relieves stress. · Get involved in social groups, or volunteer to help others. Being alone sometimes makes things seem worse than they are. · Get at least 30 minutes of exercise on most days of the week to relieve stress. Walking is a good choice. You also may want to do other activities, such as running, swimming, cycling, or playing tennis or team sports. Relaxation techniques  Do relaxation exercises 10 to 20 minutes a day. You can play soothing, relaxing music while you do them, if you wish. · Tell others in your house that you are going to do your relaxation exercises. Ask them not to disturb you. · Find a comfortable place, away from all distractions and noise. · Lie down on your back, or sit with your back straight. · Focus on your breathing. Make it slow and steady. · Breathe in through your nose.  Breathe out through either your nose or mouth.  · Breathe deeply, filling up the area between your navel and your rib cage. Breathe so that your belly goes up and down. · Do not hold your breath. · Breathe like this for 5 to 10 minutes. Notice the feeling of calmness throughout your whole body. As you continue to breathe slowly and deeply, relax by doing the following for another 5 to 10 minutes:  · Tighten and relax each muscle group in your body. You can begin at your toes and work your way up to your head. · Imagine your muscle groups relaxing and becoming heavy. · Empty your mind of all thoughts. · Let yourself relax more and more deeply. · Become aware of the state of calmness that surrounds you. · When your relaxation time is over, you can bring yourself back to alertness by moving your fingers and toes and then your hands and feet and then stretching and moving your entire body. Sometimes people fall asleep during relaxation, but they usually wake up shortly afterward. · Always give yourself time to return to full alertness before you drive a car or do anything that might cause an accident if you are not fully alert. Never play a relaxation tape while you drive a car. When should you call for help? Call 911 anytime you think you may need emergency care. For example, call if:    · You feel you cannot stop from hurting yourself or someone else.   Derrell Moritz the numbers for these national suicide hotlines: 6-122-905-TALK (0-890.853.4724) and 1-763-TDPMOXB (8-132.456.8208). If you or someone you know talks about suicide or feeling hopeless, get help right away.   Watch closely for changes in your health, and be sure to contact your doctor if:    · You have anxiety or fear that affects your life.     · You have symptoms of anxiety that are new or different from those you had before. Where can you learn more? Go to http://douglas-hans.info/.   Enter P754 in the search box to learn more about \"Anxiety Disorder: Care Instructions. \"  Current as of: May 28, 2019  Content Version: 12.2  © 9863-4701 [employer]. Care instructions adapted under license by [employer] (which disclaims liability or warranty for this information). If you have questions about a medical condition or this instruction, always ask your healthcare professional. [address] any warranty or liability for your use of this information. Patient Education     Asthma Action Plan: After Your Visit  Your Care Instructions  An asthma action plan is based on peak flow and asthma symptoms. Sorting symptoms and peak flow into red, yellow, and green \"zones\" can help you know how bad your asthma is and what actions you should take. Work with your doctor to make your plan. An action plan may include:  · The peak flow readings and symptoms for each zone. · What medicines to take in each zone. · When to call a doctor. · A list of emergency contact numbers. · A list of your asthma triggers. Follow-up care is a key part of your treatment and safety. Be sure to make and go to all appointments, and call your doctor if you are having problems. It's also a good idea to know your test results and keep a list of the medicines you take. How can you care for yourself at home? · Take your daily medicines to help minimize long-term damage and avoid asthma attacks. · Check your peak flow every morning and evening. This is the best way to know how well your lungs are working. · Check your action plan to see what zone you are in.  ¨ If you are in the green zone, keep taking your daily asthma medicines as prescribed. ¨ If you are in the yellow zone, you may be having or will soon have an asthma attack. You may not have any symptoms, but your lungs are not working as well as they should. Take the medicines listed in your action plan. If you stay in the yellow zone, your doctor may need to increase the dose or add a medicine.   ¨ If you are in the red zone, follow your action plan. If your symptoms or peak flow don't improve soon, you may need to go to the emergency room or be admitted to the hospital.  · Use an asthma diary. Write down your peak flow readings in the asthma diary. If you have an attack, write down what caused it (if you know), the symptoms, and what medicine you took. · Make sure you know how and when to call your doctor or go to the hospital.  · Take both the asthma action plan and the asthma diary--along with your peak flow meter and medicines--when you see your doctor. Tell your doctor if you are having trouble following your action plan. When should you call for help? Call 911 anytime you think you may need emergency care. For example, call if:  · You have severe trouble breathing. Call your doctor now or seek immediate medical care if:  · Your symptoms do not get better after you have followed your asthma action plan. · You cough up yellow, dark brown, or bloody mucus (sputum). Watch closely for changes in your health, and be sure to contact your doctor if:  · Your coughing and wheezing get worse. · You need to use quick-relief medicine on more than 2 days a week (unless it is just for exercise). · You need help figuring out what is triggering your asthma attacks. Where can you learn more? Go to [employer].be  Enter B511 in the search box to learn more about \"Asthma Action Plan: After Your Visit. \"   © 2734-5753 Healthwise, Incorporated. Care instructions adapted under license by Fouzia Anderson (which disclaims liability or warranty for this information). This care instruction is for use with your licensed healthcare professional. If you have questions about a medical condition or this instruction, always ask your healthcare professional. [place] [address] any warranty or liability for your use of this information. Content Version: 90.4.567907;  Last Revised: March 9, 2012

## 2019-11-02 NOTE — ED NOTES
Pt presents c/obilateral leg edema for the past couple of days, sts after walking back from ay he is now SOB and feels like he needs a breathing treatment - hx asthma. Pt sts he was dropped from his PCP about a week ago and has been out of all medications and inhalers for approx. 1.5wks.

## 2019-11-02 NOTE — ED NOTES
Dr. Emiliana Garland updated pt and informed him that he would like to admit the patient for asthma exacerbation. Pt refusing at this time. RN to trial pt back off of O2.

## 2019-11-02 NOTE — ED NOTES
Dr. Jhonny Cross has reviewed discharge instructions with the patient. The patient verbalized understanding and awareness of risks of leaving at this time. Pt left with stable vital signs.

## 2019-11-02 NOTE — ED PROVIDER NOTES
EMERGENCY DEPARTMENT HISTORY AND PHYSICAL EXAM      Please note that this dictation was completed with [employer], the computer voice recognition software. Quite often unanticipated grammatical, syntax, homophones, and other interpretive errors are inadvertently transcribed by the computer software. Please disregard these errors and any errors that have escaped final proofreading. Thank you. Date: 11/1/2019  Patient Name: Kinza Ford  Patient Age and Sex: 47 y.o. male    History of Presenting Illness     Chief Complaint   Patient presents with    Foot Swelling      swelling to BL LE x several days; pt denies hx of CHF; denies hx of swelling in the past       History Provided By: Patient    HPI: Kinza Ford, 47 y.o. male with past medical history as documented below presents to the ED with c/o of 2 days of progressive bilateral lower extremity edema. Patient states that he is also wheezing and feels short of breath. Patient states that he was dropped from his primary care doctor about a week ago and has been out of his medications including his inhalers. He reports edema is worse at nighttime. He denies any pleuritic type chest pain. He denies any recent prolonged travel, history of DVT or PE. He does continue to smoke cigarettes but has had to cut back the past couple days secondary to his symptoms. Pt also states that he feels very anxious and has been out of his anxiety medications for several months now. Denies any fevers, rash. Pt denies any other alleviating or exacerbating factors. Additionally, pt specifically denies any recent fever, chills, headache, nausea, vomiting, abdominal pain, CP, lightheadedness, dizziness, numbness, weakness, heart palpitations, urinary sxs, diarrhea, constipation, melena, hematochezia. There are no other complaints, changes or physical findings at this time.      PCP: Sirena Lemus DO    Past History   Past Medical History:  Past Medical History:   Diagnosis Date    Arthritis     Chronic low back pain     Hypertension        Past Surgical History:  Past Surgical History:   Procedure Laterality Date    HX ORTHOPAEDIC         Family History:  Family History   Problem Relation Age of Onset    Cancer Mother     Heart Disease Father        Social History:  Social History     Tobacco Use    Smoking status: Current Some Day Smoker     Packs/day: 0.25     Years: 10.00     Pack years: 2.50    Smokeless tobacco: Never Used   Substance Use Topics    Alcohol use: Yes    Drug use: No       Allergies: Allergies   Allergen Reactions    Vicodin [Hydrocodone-Acetaminophen] Other (comments)     Headache       Current Medications:  No current facility-administered medications on file prior to encounter. Current Outpatient Medications on File Prior to Encounter   Medication Sig Dispense Refill    diclofenac EC (VOLTAREN) 50 mg EC tablet Take 1 Tab by mouth two (2) times a day. 60 Tab 1    lidocaine (LIDODERM) 5 % Apply patch to the affected area for 12 hours a day and remove for 12 hours a day. 5 Each 0    cyclobenzaprine (FLEXERIL) 10 mg tablet Take 1 Tab by mouth three (3) times daily as needed for Muscle Spasm(s). 10 Tab 0    albuterol (ACCUNEB) 1.25 mg/3 mL nebu USE 1 VIAL, VIA NEBULIZER TREATMENT EVERY 4 HOURS AS NEEDED FOR COUGH 150 mL 0    tadalafil (CIALIS) 20 mg tablet Take 1 Tab by mouth as needed (erectile dysfunction). 20 Tab 3    lisinopril (PRINIVIL, ZESTRIL) 20 mg tablet Take 1 Tab by mouth daily. 90 Tab 3    predniSONE (STERAPRED DS) 10 mg dose pack Take as directed on packaging 48 Tab 0    tadalafil (CIALIS) 5 mg tablet Take 1 Tab by mouth daily as needed (ed). 20 Tab 3    polyethylene glycol (MIRALAX) 17 gram packet Take 1 Packet by mouth daily. 30 Packet 11    albuterol (PROVENTIL HFA, VENTOLIN HFA, PROAIR HFA) 90 mcg/actuation inhaler Take 2 Puffs by inhalation every six (6) hours as needed for Wheezing.  1 Inhaler 1    guaiFENesin ER (MUCINEX) 600 mg ER tablet Take 1 Tab by mouth every twelve (12) hours. 14 Tab 0    clonazePAM (KLONOPIN) 1 mg tablet Take 1 mg by mouth two (2) times daily as needed (Anxiety).  amLODIPine (NORVASC) 10 mg tablet Take 10 mg by mouth daily.  cloNIDine HCl (CATAPRES) 0.2 mg tablet Take 0.2 mg by mouth two (2) times a day.  montelukast (SINGULAIR) 10 mg tablet Take 10 mg by mouth daily.  pantoprazole (PROTONIX) 40 mg tablet Take 40 mg by mouth daily.  aspirin 81 mg tablet Take 1 Tab by mouth daily. 30 Tab 1       Review of Systems   Review of Systems   Constitutional: Negative. Negative for chills and fever. HENT: Negative. Negative for congestion, facial swelling, rhinorrhea, sore throat, trouble swallowing and voice change. Eyes: Negative. Respiratory: Positive for cough and wheezing. Negative for apnea, chest tightness and shortness of breath. Cardiovascular: Positive for leg swelling. Negative for chest pain and palpitations. Gastrointestinal: Negative. Negative for abdominal distention, abdominal pain, blood in stool, constipation, diarrhea, nausea and vomiting. Endocrine: Negative. Negative for cold intolerance, heat intolerance and polyuria. Genitourinary: Negative. Negative for difficulty urinating, dysuria, flank pain, frequency, hematuria and urgency. Musculoskeletal: Negative. Negative for arthralgias, back pain, myalgias, neck pain and neck stiffness. Skin: Negative. Negative for color change and rash. Neurological: Negative. Negative for dizziness, syncope, facial asymmetry, speech difficulty, weakness, light-headedness, numbness and headaches. Hematological: Negative. Does not bruise/bleed easily. Psychiatric/Behavioral: Negative. Negative for confusion and self-injury. The patient is not nervous/anxious. Physical Exam   Physical Exam   Constitutional: He is oriented to person, place, and time.  Vital signs are normal. He appears well-developed and well-nourished. He is cooperative. Non-toxic appearance. HENT:   Head: Normocephalic and atraumatic. Mouth/Throat: Mucous membranes are normal. No posterior oropharyngeal erythema. Eyes: Pupils are equal, round, and reactive to light. Conjunctivae and EOM are normal.   Neck: Normal range of motion. Cardiovascular: Normal rate, regular rhythm, normal heart sounds and intact distal pulses. Exam reveals no gallop and no friction rub. No murmur heard. Pulmonary/Chest: Effort normal. No respiratory distress. He has wheezes. He has no rales. He exhibits no tenderness. Abdominal: Soft. Bowel sounds are normal. He exhibits no distension and no mass. There is no tenderness. There is no rebound and no guarding. Musculoskeletal: Normal range of motion. He exhibits no edema, tenderness or deformity. Neurological: He is alert and oriented to person, place, and time. He displays normal reflexes. No cranial nerve deficit. He exhibits normal muscle tone. Coordination normal.   Skin: Skin is warm. No rash noted. Psychiatric: He has a normal mood and affect. Nursing note and vitals reviewed. Diagnostic Study Results     Labs -  Recent Results (from the past 24 hour(s))   CBC WITH AUTOMATED DIFF    Collection Time: 11/01/19 10:06 PM   Result Value Ref Range    WBC 11.3 (H) 4.1 - 11.1 K/uL    RBC 5.17 4.10 - 5.70 M/uL    HGB 15.0 12.1 - 17.0 g/dL    HCT 46.4 36.6 - 50.3 %    MCV 89.7 80.0 - 99.0 FL    MCH 29.0 26.0 - 34.0 PG    MCHC 32.3 30.0 - 36.5 g/dL    RDW 12.3 11.5 - 14.5 %    PLATELET 804 626 - 307 K/uL    MPV 11.0 8.9 - 12.9 FL    NRBC 0.0 0  WBC    ABSOLUTE NRBC 0.00 0.00 - 0.01 K/uL    NEUTROPHILS 70 32 - 75 %    LYMPHOCYTES 17 12 - 49 %    MONOCYTES 8 5 - 13 %    EOSINOPHILS 5 0 - 7 %    BASOPHILS 0 0 - 1 %    IMMATURE GRANULOCYTES 0 0.0 - 0.5 %    ABS. NEUTROPHILS 7.8 1.8 - 8.0 K/UL    ABS. LYMPHOCYTES 1.9 0.8 - 3.5 K/UL    ABS.  MONOCYTES 0.9 0.0 - 1.0 K/UL ABS. EOSINOPHILS 0.6 (H) 0.0 - 0.4 K/UL    ABS. BASOPHILS 0.0 0.0 - 0.1 K/UL    ABS. IMM. GRANS. 0.0 0.00 - 0.04 K/UL    DF AUTOMATED     METABOLIC PANEL, COMPREHENSIVE    Collection Time: 11/01/19 10:06 PM   Result Value Ref Range    Sodium 138 136 - 145 mmol/L    Potassium 4.6 3.5 - 5.1 mmol/L    Chloride 105 97 - 108 mmol/L    CO2 27 21 - 32 mmol/L    Anion gap 6 5 - 15 mmol/L    Glucose 96 65 - 100 mg/dL    BUN 9 6 - 20 MG/DL    Creatinine 0.90 0.70 - 1.30 MG/DL    BUN/Creatinine ratio 10 (L) 12 - 20      GFR est AA >60 >60 ml/min/1.73m2    GFR est non-AA >60 >60 ml/min/1.73m2    Calcium 9.0 8.5 - 10.1 MG/DL    Bilirubin, total 0.4 0.2 - 1.0 MG/DL    ALT (SGPT) 51 12 - 78 U/L    AST (SGOT) 39 (H) 15 - 37 U/L    Alk. phosphatase 92 45 - 117 U/L    Protein, total 7.2 6.4 - 8.2 g/dL    Albumin 3.6 3.5 - 5.0 g/dL    Globulin 3.6 2.0 - 4.0 g/dL    A-G Ratio 1.0 (L) 1.1 - 2.2     NT-PRO BNP    Collection Time: 11/01/19 10:06 PM   Result Value Ref Range    NT pro-BNP 15 <125 PG/ML       Radiologic Studies -   XR CHEST PA LAT   Final Result    impression: No acute changes. CT Results  (Last 48 hours)    None        CXR Results  (Last 48 hours)               11/01/19 2316  XR CHEST PA LAT Final result    Impression:   impression: No acute changes. Narrative:  Clinical indication: Swelling. Frontal and lateral views of the chest obtained compared to August 19. The    heart size is normal. There is no acute infiltrate. Medical Decision Making   I am the first provider for this patient. I reviewed the vital signs, available nursing notes, past medical history, past surgical history, family history and social history. Vital Signs-Reviewed the patient's vital signs.   Patient Vitals for the past 24 hrs:   Temp Pulse Resp BP SpO2   11/02/19 0215     92 %   11/02/19 0203     95 %   11/02/19 0201     (!) 88 %   11/02/19 0200    (!) 165/96 92 %   11/02/19 0157     92 % 11/02/19 0155     92 %   11/02/19 0126     90 %   11/02/19 0125     91 %   11/02/19 0111     90 %   11/02/19 0109     90 %   11/02/19 0104     90 %   11/02/19 0100    (!) 156/99 92 %   11/02/19 0055     90 %   11/02/19 0052     91 %   11/02/19 0051     (!) 88 %   11/02/19 0050     (!) 88 %   11/01/19 2345     90 %   11/01/19 2330  84  146/82 90 %   11/01/19 2201 97.9 °F (36.6 °C) 86 24 146/82 95 %       Pulse Oximetry Analysis - 95% on RA    Cardiac Monitor:   Rate: 86 bpm  Rhythm: Normal Sinus Rhythm      Records Reviewed: Nursing Notes, Old Medical Records, Previous electrocardiograms, Previous Radiology Studies and Previous Laboratory Studies    Provider Notes (Medical Decision Making):   Patient presents with acute dyspnea. DDx: asthma, copd, pna, pulmonary edema, acute bronchitis, ACS, ptx, pna. Will obtain EKG, labs, CXR, provide O2 as needed for hypoxia, treat symptomatically and reassess. Will continue to monitor closely in ED. Pt presents with acute leg swelling; stable vitals; PMS intact in affected limb. DDx: lymphedema, muscle strain vs sprain. The pt is unlikely to have DVT. There is no recent travel, h/o PE/DVT, neg mike, no hormone use, non-smoker. Will rule out with duplex U/S. There is no trauma, deformity to warrant x-ray. The leg is not cold to touch, there is strong peripheral pulse, no paralysis or parathesia, no pallor to suggest compartment syndrome. There are no rashes, excessive warmth, fever, induration of skin to suggest cellulitis/osteo. The pt is motor and sensory intact. Unlikely to be strong or compressed nerve. Will provide symptomatic treatment and reassess. Anticipate discharge home with close PCP follow-up. ED Course:   Initial assessment performed. The patients presenting problems have been discussed, and they are in agreement with the care plan formulated and outlined with them.   I have encouraged them to ask questions as they arise throughout their visit. TOBACCO COUNSELING:   Upon evaluation, pt expressed that they are a current tobacco user. For approximately 10 minutes, pt has been counseled on the dangers of smoking and was encouraged to quit as soon as possible in order to decrease further risks to their health. Pt has conveyed their understanding of the risks involved should they continue to use tobacco products. ALCOHOL/SUBSTANCE ABUSE COUNSELING:  Upon evaluation, pt endorsed recent alcohol/illicit drug use. For approximately 15 minutes, pt has been counseled on the dangers of alcohol and illicit drug use on their health, and they were encouraged to quit as soon as possible in order to decrease further risks to their health. Pt has conveyed their understanding of the risks involved should they continue to use these products. HYPERTENSION COUNSELING   Education was provided to the patient today regarding their hypertension. Patient is made aware of their elevated blood pressure and is instructed to follow up this week with their Primary Care for a recheck. Patient is counseled regarding consequences of chronic, uncontrolled hypertension including kidney disease, heart disease, stroke or even death. Patient states their understanding and agrees to follow up this week. Additionally, during their visit, I discussed sodium restriction, maintaining ideal body weight and regular exercise program as physiologic means to achieve blood pressure control. The patient will strive towards this. I reviewed our electronic medical record system for any past medical records that were available that may contribute to the patient's current condition, the nursing notes and vital signs from today's visit.   Abel Warner MD    ED Orders Placed :  Orders Placed This Encounter    XR CHEST PA LAT    CBC WITH AUTOMATED DIFF    METABOLIC PANEL, COMPREHENSIVE    NT-PRO BNP    INSERT PERIPHERAL IV ONE TIME STAT    albuterol-ipratropium (DUO-NEB) 2.5 MG-0.5 MG/3 ML    furosemide (LASIX) injection 40 mg    LORazepam (ATIVAN) injection 0.5 mg    albuterol-ipratropium (DUO-NEB) 2.5 MG-0.5 MG/3 ML    methylPREDNISolone (PF) (Solu-MEDROL) injection 125 mg    DISCONTD: methylPREDNISolone (PF) (Solu-MEDROL) 125 mg/2 mL injection    DISCONTD: albuterol-ipratropium (DUO-NEB) 2.5 mg-0.5 mg/3 ml nebulizer solution    albuterol (PROVENTIL VENTOLIN) 2.5 mg /3 mL (0.083 %) nebu    albuterol sulfate 90 mcg/actuation aepb    predniSONE (STERAPRED DS) 10 mg dose pack    furosemide (LASIX) 20 mg tablet    hydrOXYzine HCl (ATARAX) 50 mg tablet     ED Medications Administered:  Medications   albuterol-ipratropium (DUO-NEB) 2.5 MG-0.5 MG/3 ML (3 mL Nebulization Given 11/1/19 2354)   furosemide (LASIX) injection 40 mg (40 mg IntraVENous Given 11/1/19 2356)   LORazepam (ATIVAN) injection 0.5 mg (0.5 mg IntraVENous Given 11/1/19 0000)   albuterol-ipratropium (DUO-NEB) 2.5 MG-0.5 MG/3 ML (3 mL Nebulization Given 11/2/19 0123)   methylPREDNISolone (PF) (Solu-MEDROL) injection 125 mg (125 mg IntraVENous Given 11/2/19 0123)         Progress Note:  The patient has been re-examined after nebulizer treatments and states that they are feeling better and have no new complaints. Stable vitals without hypoxia. On auscultation, wheezing is significantly improved. The patient expresses understanding and agreement with diagnosis, care plan; including oral steroids, nebulizer or inhaler use, follow up and return instructions. The patient agrees to return in 24 hours if their symptoms are not improving or immediately if they have any change in their condition including worsening wheezing or any signs of increasing work of breathing. Progress Note:  Patient has been reassessed and reports feeling better and symptoms have improved significantly after ED treatment. Patient feels comfortable going home with close follow-up.  Jeancarlos Martines Jr's final labs and imaging have been reviewed with him and available family and/or caregiver. They have been counseled regarding his diagnosis. He verbally conveys understanding and agreement of the signs, symptoms, diagnosis, treatment and prognosis and additionally agrees to follow up as recommended with Dr. Paola Wright,  and/or specialist in 24 - 48 hours. He also agrees with the care-plan we created together and conveys that all of his questions have been answered. I have also put together some discharge instructions for him that include: 1) educational information regarding their diagnosis, 2) how to care for their diagnosis at home, as well a 3) list of reasons why they would want to return to the ED prior to their follow-up appointment should the patient's condition change or symptoms worsen. I have answered all questions to the patient's satisfaction. Strict return precautions given. He both understood and agreed with plan as discussed. Vital signs stable for discharge. Disposition: DISCHARGE  The pt is ready for discharge. The pt's signs, symptoms, diagnosis, and discharge instructions have been discussed and pt has conveyed their understanding. The pt is to follow up as recommended or return to ER should their symptoms worsen. Plan has been discussed and pt is in agreement. PLAN:  1. Return precautions as discussed. 2.   Discharge Medication List as of 11/2/2019  2:28 AM      CONTINUE these medications which have NOT CHANGED    Details   diclofenac EC (VOLTAREN) 50 mg EC tablet Take 1 Tab by mouth two (2) times a day., Print, Disp-60 Tab, R-1      lidocaine (LIDODERM) 5 % Apply patch to the affected area for 12 hours a day and remove for 12 hours a day., Print, Disp-5 Each, R-0      cyclobenzaprine (FLEXERIL) 10 mg tablet Take 1 Tab by mouth three (3) times daily as needed for Muscle Spasm(s). , Print, Disp-10 Tab, R-0      albuterol (ACCUNEB) 1.25 mg/3 mL nebu USE 1 VIAL, VIA NEBULIZER TREATMENT EVERY 4 HOURS AS NEEDED FOR COUGH, Normal$Disp-150 mL, R-0      !! tadalafil (CIALIS) 20 mg tablet Take 1 Tab by mouth as needed (erectile dysfunction). , Normal, Disp-20 Tab, R-3      lisinopril (PRINIVIL, ZESTRIL) 20 mg tablet Take 1 Tab by mouth daily. , Normal, Disp-90 Tab, R-3      predniSONE (STERAPRED DS) 10 mg dose pack Take as directed on packaging, Normal, Disp-48 Tab, R-0      !! tadalafil (CIALIS) 5 mg tablet Take 1 Tab by mouth daily as needed (ed)., Normal, Disp-20 Tab, R-3      polyethylene glycol (MIRALAX) 17 gram packet Take 1 Packet by mouth daily. , Normal, Disp-30 Packet, R-11      albuterol (PROVENTIL HFA, VENTOLIN HFA, PROAIR HFA) 90 mcg/actuation inhaler Take 2 Puffs by inhalation every six (6) hours as needed for Wheezing., Normal, Disp-1 Inhaler, R-1      guaiFENesin ER (MUCINEX) 600 mg ER tablet Take 1 Tab by mouth every twelve (12) hours. , Print, Disp-14 Tab, R-0      clonazePAM (KLONOPIN) 1 mg tablet Take 1 mg by mouth two (2) times daily as needed (Anxiety). , Historical Med      amLODIPine (NORVASC) 10 mg tablet Take 10 mg by mouth daily. , Historical Med      cloNIDine HCl (CATAPRES) 0.2 mg tablet Take 0.2 mg by mouth two (2) times a day., Historical Med      montelukast (SINGULAIR) 10 mg tablet Take 10 mg by mouth daily. , Historical Med      pantoprazole (PROTONIX) 40 mg tablet Take 40 mg by mouth daily. , Historical Med      aspirin 81 mg tablet Take 1 Tab by mouth daily. , Print, Disp-30 Tab, R-1       !! - Potential duplicate medications found. Please discuss with provider.         3.   Follow-up Information     Follow up With Specialties Details Why Contact Info    Paola Mayfield DO Internal Medicine   [address] [facility]  [phone]      [facility] EMERGENCY DEPT Emergency Medicine  As needed, If symptoms worsen [address] [place] [address] Drive  [address] [facility]  [phone]          Return to ED if worse  Diagnosis     Clinical Impression:   1. Mild intermittent asthma with acute exacerbation    2. Peripheral edema    3. Accelerated hypertension    4. Anxiety state    5. Tobacco abuse        Attestation:  I personally performed the services described in this documentation on this date 11/1/2019 for patient, Susanne Reyes. Kal Saravia MD      This note will not be viewable in [address]

## 2019-11-12 ENCOUNTER — OFFICE VISIT (OUTPATIENT)
Dept: INTERNAL MEDICINE CLINIC | Age: 54
End: 2019-11-12

## 2019-11-12 VITALS
DIASTOLIC BLOOD PRESSURE: 76 MMHG | WEIGHT: 286 LBS | BODY MASS INDEX: 35.56 KG/M2 | OXYGEN SATURATION: 95 % | TEMPERATURE: 97.8 F | SYSTOLIC BLOOD PRESSURE: 130 MMHG | HEIGHT: 75 IN | RESPIRATION RATE: 16 BRPM | HEART RATE: 90 BPM

## 2019-11-12 DIAGNOSIS — G47.00 INSOMNIA, UNSPECIFIED TYPE: ICD-10-CM

## 2019-11-12 DIAGNOSIS — R60.0 EDEMA OF BOTH LEGS: ICD-10-CM

## 2019-11-12 DIAGNOSIS — J45.909 ASTHMA, UNSPECIFIED ASTHMA SEVERITY, UNSPECIFIED WHETHER COMPLICATED, UNSPECIFIED WHETHER PERSISTENT: ICD-10-CM

## 2019-11-12 DIAGNOSIS — G89.29 CHRONIC MIDLINE LOW BACK PAIN WITHOUT SCIATICA: ICD-10-CM

## 2019-11-12 DIAGNOSIS — F10.10 ALCOHOL ABUSE, DAILY USE: ICD-10-CM

## 2019-11-12 DIAGNOSIS — I10 ESSENTIAL HYPERTENSION: Primary | Chronic | ICD-10-CM

## 2019-11-12 DIAGNOSIS — M54.50 CHRONIC MIDLINE LOW BACK PAIN WITHOUT SCIATICA: ICD-10-CM

## 2019-11-12 DIAGNOSIS — F32.A DEPRESSION, UNSPECIFIED DEPRESSION TYPE: ICD-10-CM

## 2019-11-12 DIAGNOSIS — F41.9 ANXIETY: ICD-10-CM

## 2019-11-12 DIAGNOSIS — E66.01 SEVERE OBESITY (HCC): ICD-10-CM

## 2019-11-12 DIAGNOSIS — M25.50 ARTHRALGIA, UNSPECIFIED JOINT: ICD-10-CM

## 2019-11-12 DIAGNOSIS — Z76.89 ESTABLISHING CARE WITH NEW DOCTOR, ENCOUNTER FOR: ICD-10-CM

## 2019-11-12 LAB
BILIRUB UR QL STRIP: NEGATIVE
CHOLEST SERPL-MCNC: 154 MG/DL
GLUCOSE POC: 132 MG/DL
GLUCOSE UR-MCNC: NEGATIVE MG/DL
HBA1C MFR BLD HPLC: 5.4 %
HDLC SERPL-MCNC: 82 MG/DL
KETONES P FAST UR STRIP-MCNC: NEGATIVE MG/DL
LDL CHOLESTEROL POC: 49 MG/DL
NON-HDL CHOLESTEROL, 011976: 73
PH UR STRIP: 6 [PH] (ref 4.6–8)
PROT UR QL STRIP: NEGATIVE
SP GR UR STRIP: 1.02 (ref 1–1.03)
TCHOL/HDL RATIO (POC): 1.9
TRIGL SERPL-MCNC: 116 MG/DL
UA UROBILINOGEN AMB POC: NORMAL (ref 0.2–1)
URINALYSIS CLARITY POC: CLEAR
URINALYSIS COLOR POC: YELLOW
URINE BLOOD POC: NEGATIVE
URINE LEUKOCYTES POC: NEGATIVE
URINE NITRITES POC: NEGATIVE

## 2019-11-12 RX ORDER — CLONAZEPAM 1 MG/1
1 TABLET ORAL
Qty: 60 TAB | Refills: 1 | Status: SHIPPED | OUTPATIENT
Start: 2019-11-12 | End: 2020-01-15 | Stop reason: SDUPTHER

## 2019-11-12 RX ORDER — AMLODIPINE BESYLATE 5 MG/1
5 TABLET ORAL DAILY
Qty: 90 TAB | Refills: 3 | Status: SHIPPED | OUTPATIENT
Start: 2019-11-12 | End: 2020-06-24 | Stop reason: SDUPTHER

## 2019-11-12 RX ORDER — FUROSEMIDE 20 MG/1
TABLET ORAL
Qty: 60 TAB | Refills: 1 | Status: SHIPPED | OUTPATIENT
Start: 2019-11-12 | End: 2020-02-11 | Stop reason: SDUPTHER

## 2019-11-12 NOTE — PROGRESS NOTES
CC: Establish Care and f/u ED visit 11/1/19 for Asthma    HPI:     Santa Kirkpatrick is a 47 y.o. male who presents with a chief complaint of Establish Care and f/u ED visit 11/1/19 for Asthma; and with other medical problems:    ASTHMA  - seen in the ED multiple times over the past year for asthma exacerbation  - seen 10 days ago; tx'd & d/c'd w/ steroid taper  - states he was only recently dx'd w/ asthma  - had bronchitis for a long time, off & on   - past dx in chart: acute respiratory failure w/ hypoxia,6/2019    HYPERTENSION  LEG EDEMA  - no chest pain, BRICENO  - has SOB he attributes to asthma and leg edema to beer consumption    OBESITY  - Increased wt rapidly x 3-4 months; since on steroids for new dx of asthma    ANXIETY  DEPRESSION  INSOMNIA  ALCOHOL ABUSE  - taking Klonopin  - hasn't seen psychiatrist recently; was $200./visit   - drinks 4-16 oz beer/d  - can't turn down beer; loves taste of it; swells his feet up    BACK PAIN  ARTHRALGIA  - chronic back pain from DDD  - disability for back pain if stands long  - Sleeps on 2 pillows or on side due to back pain  - Joint pain: knees, shoulders, elbows x 15-20 yrs  - 15 yrs ago, he was in Pain [facility] per pt; none recently  - more recent pain med: Tramadol from [facility], cont'd by [facility]; none recently. SMOKER  - smoked half to third ppd; since past 2 mos, only 2-3 cigarettess/wk      Allergies   Allergen Reactions    Vicodin [Hydrocodone-Acetaminophen] Other (comments)     Headache      Current Outpatient Medications on File Prior to Visit   Medication Sig Dispense Refill    tadalafil (CIALIS) 20 mg tablet Take 1 Tab by mouth as needed (erectile dysfunction). 20 Tab 3    polyethylene glycol (MIRALAX) 17 gram packet Take 1 Packet by mouth daily. 30 Packet 11    montelukast (SINGULAIR) 10 mg tablet Take 10 mg by mouth daily.  aspirin 81 mg tablet Take 1 Tab by mouth daily.  30 Tab 1    diclofenac EC (VOLTAREN) 50 mg EC tablet Take 1 Tab by mouth two (2) times a day. 60 Tab 1    guaiFENesin ER (MUCINEX) 600 mg ER tablet Take 1 Tab by mouth every twelve (12) hours. 14 Tab 0     No current facility-administered medications on file prior to visit. ASSESSMENT  TREATMENT  PLAN:    1. Asthma, unspecified asthma severity, unspecified whether complicated, unspecified whether persistent  Controlled w/ recent steroid taper  - contin asthma meds, inhaler, nebs as directed; return PRN    2. Essential hypertension  3. Edema of both legs  Controlled; contin meds for now; reassess amlodipine prn edema; temporary increase Lasix:  - amLODIPine (NORVASC) 5 mg tablet; Take 1 Tab by mouth daily. Indications: high blood pressure, Normal, Disp-90 Tab, R-3  - furosemide (LASIX) 20 mg tablet ; Take 2 pills PO at HS for 5 days, then one pill each am., Normal, Disp-60 Tab, R-1  - TSH 3RD GENERATION  - CBC W/O DIFF  - METABOLIC PANEL, COMPREHENSIVE    4. Severe obesity ([address]  Likely exacerbated by steroids  - review in detail at future visit    5. Anxiety, severe  6. Depression, unspecified depression type, moderately severe  7. Insomnia, unspecified type  Uncontrolled  - VITAMIN B12  - VITAMIN D, 25 HYDROXY  - TSH 3RD GENERATION  - T4, FREE  - T3 TOTAL  - meds w/ adjustment prn pending lab eval    8. Alcohol abuse, daily use  Uncontrolled  - Pt ed: over 12 oz/ 24 ozs is excessive; pt will contemplate    9. Chronic midline low back pain without sciatica  10. Arthralgia, unspecified joint  Stable  - ref to Pain MGT in future if needed    11.  Establishing care with new doctor, encounter for  - AMB POC LIPID PROFILE  - AMB POC GLUCOSE BLOOD, BY GLUCOSE MONITORING DEVICE  - AMB POC HEMOGLOBIN A1C  - AMB POC URINALYSIS DIP STICK MANUAL W/O MICRO  - CBC W/O DIFF  - METABOLIC PANEL, COMPREHENSIVE    Patient Instructions   · Take fluid pill, furosemide 20 mg:  Take 2 pills at bedtime for 5 days then 1 pill a day in the morning.    --------------------------------------------------------------------    Return to get your blood work done as soon as possible on:    Monday - Thursday , 8 am to 12:00 noon. Tell the [facility] you are just here for the lab.   ----------------------------------------------      Follow-up and Dispositions    · Return in about 1 week (around 11/19/2019) for 740 am if avail, bp, get labs. EXAM:    CONSTITUTIONAL:     Vitals:    11/12/19 1355   BP: 130/76   Pulse: 90   Resp: 16   Temp: 97.8 °F (36.6 °C)   TempSrc: Oral   SpO2: 95%   Weight: 286 lb (129.7 kg)   Height: 6' 3\" (1.905 m)     General:  WD obese appropriately groomed male in NAD. EYES:   POSITIVE:  none. Except for Positive findings listed, this system was WNL. My WNL exam this visit:   Conjunctivae & lids w/o erythema or discharge. EARS, NOSE, MOUTH & THROAT:   POSITIVE:  none. Except for Positive findings listed, this system was WNL. My WNL exam this visit:  External ears & nose WNL w/o scars, lesions or masses.  Lips WNL    HEAD & NECK:   POSITIVE:  none. Except for Positive findings listed, this system was WNL. My WNL exam this visit:  Supple. Atraumatic, normocephalic. Symmetrical .    RESPIRATORY:   POSITIVE:  none. Except for Positive findings listed, this system was WNL. My WNL exam this visit:  Effort WNL; no intercostal retractions or accessory muscle use; diaphragmatic movement WNL.  Breath sounds: good air entry, no adventitious sounds; no rales, wheezes, rhonchi or rubs.  No dullness, flatness or hyperresonance. CARDIOVASCULAR:  POSITIVE:  PMI nonpalpable. Bilateral moderate to severe LE pitting edema. Except for Positive findings listed, this system was WNL. My WNL exam this visit:    Heart - w/o thrills. PMI non-displaced.  S1, S2 normal rate, regular rhythm. No murmurs, gallops, clicks or rubs.  Vascular  carotid pulses WNL; no bruits  abdominal aorta size WNL; no bruits  Extremities negative for edema or varicosities. GASTROINTESTINAL (Abdomen):   POSITIVE:  Obese. Except for Positive findings listed, this system was WNL. My WNL exam this visit:  Flat; NABS w/o masses or tenderness  Liver 7 cm in RMCL. Liver & spleen w/o organomegaly.  No hernias palpable. GENITOURINARY (Urinary Only):  POSITIVE:  none. Except for Positive findings listed, this system was WNL. My WNL exam this visit:    No CVA or suprapubic tenderness. MUSCULOSKELETAL:   POSITIVE: No acute joints. Moving all extremities appropriately. Tender over central low back. Except for Positive findings listed, this system was WNL. My WNL exam this visit:    Gait & station WNL. Joints, Bones and Muscles of   Head & Neck:  Spine, Ribs and Pelvis-no rom:  RUE:  LUE:  RLE:  LLE:   - No misalignment, asymmetry, crepitation, defects, tenderness, masses or effusions.   - ROM full w/o pain, crepitation or contracture. - Joints stable w/o dislocation (luxation), subluxation or laxity.  - Muscle strength 5/5, tone WNL w/o flaccidity, cog wheel or spasticity. No atrophy or abnormal movements. SKIN & SUBCUTANEOUS TISSUE:  POSITIVE:  none. Except for Positive findings listed, this system was WNL. My WNL exam this visit:   No rashes, lesions, ulcers.  No induration, subcutaneous nodules, tightening. NEUROLOGIC:   POSITIVE:  none. Except for Positive findings listed, this system was WNL. My WNL exam this visit:   Cranial nerves 2-12 intact.  DTRs: Biceps, patellar, Achilles symmetrical and WNL. PSYCHIATRIC:  POSITIVE findings:  None. No SI/HI. Speech, SOT/COT WNL. Except for Positive findings listed, this system was WNL.  My WNL exam:  · Speech: rate, volume, articulation, coherence, and spontaneity WNL w/o perseveration, paucity of language or pressured speech  · Thought processes: Rate of thoughts, content of thoughts WNL; logical, not tangential.   · Associations: Intact; not loose, tangential, circumstantial. · Abnormal or psychotic thoughts not currently present. No homicidal or suicidal ideation. No hallucinations, delusions, preoccupation with violence, obsessions   · Judgment concerning everyday activities & social situations, good. Insight concerning psychiatric condition, good  Mental status:  · Oriented to person, place and time. · Recent and remote memory good for recall of events, times and discussions during visit. · Attention span and concentration good. · Fund of knowledge: vocabulary, good. · Mood and affect: Not depressed or anxious w/o agitation, anger, aggression, hypomania or lability. RHONDA   RHONDA 7 11/12/2019   Over the past 2 weeks how often have you felt nervous, anxious, or on edge? 3   Over the past 2 weeks how often have you not been able to stop or control worrying? 3   In the past 2 weeks how often have you worried too much about different things? 2   Over the past 2 weeks, how often have you had trouble relaxing? 3   Over the last 2 weeks how often have you been so restless that it is hard to sit still? 3   Over the last 2 weeks how often have you been easily annoyed or irritable? 3   Over the last 2 weeks, how often have you felt afraid as if something awful might happen?  3   BSHSI AMB RHONDA-7 SCORE 20     PHQ   3 most recent PHQ Screens 11/12/2019 7/17/2019 4/15/2019   Little interest or pleasure in doing things Several days Not at all Not at all   Feeling down, depressed, irritable, or hopeless Several days Not at all Not at all   Total Score PHQ 2 2 0 0   Trouble falling or staying asleep, or sleeping too much Nearly every day - -   Feeling tired or having little energy More than half the days - -   Poor appetite, weight loss, or overeating Nearly every day - -   Feeling bad about yourself - or that you are a failure or have let yourself or your family down Nearly every day - -   Trouble concentrating on things such as school, work, reading, or watching TV More than half the days - - Moving or speaking so slowly that other people could have noticed; or the opposite being so fidgety that others notice Nearly every day - -   Thoughts of being better off dead, or hurting yourself in some way Not at all - -   PHQ 9 Score 18 - -       HISTORY - ROS  PAST  FAMILY  SURGICAL  SOCIAL  with PROBLEM LIST:    ROS - Review of Systems    EXCEPT FOR POSITIVES LISTED, the Review of Systems below was negative or within normal limits  CONSTITUTIONAL:  Positive for: Night sweats, wt gain from steroids. Fever  chills  night sweats  fatigue  malaise  weakness  anorexia  wt loss or gain  EYES:  Positive for: Double vision. Vision loss, blurred, double  color blind  discharge  irritation  glasses/contacts  eye exam:___/___ /____  H&NENT:  Positive for: Hay fever, sinus, nasal blockage. Head trauma  deafness  tinnitus  vertigo  ear discharge or pain  rhinitis  sinusitis  nasal drainage or congestion  epistaxis  sore mouth / tongue / throat  tonsillitis  voice changes  hoarseness  bad teeth or gums  RESPIRATORY:  Positive for: SOB, wheezing, cough, asthma/COPD. SOB  wheezing  cough  sputum  hemoptysis  asthma / COPD  pneumonia  TB/TB exposure  pleuritis  CARDIOVASCULAR:  Positivefor: Edema. Chest pain  diaphoresis  BRICENO  tachycardia  palpitations  LE edema  orthopnea  PND  lightheaded  syncope  GASTROINTESTINAL:  Positive for: Constipation relieved w/ Miralax. Acid reflux/GERD since childhood: Nexium helped. Nausea  vomiting  constipation  diarrhea  abdominal pain  hematochezia  melena  hematemesis  dysphagia  indigestion  acid reflux/GERD  hepatitis  liver problems  jaundice  GENITOURINARY:  Positive for: none. Frequency  dysuria  hematuria  urine odor  urethral/vaginal discharge  urgency  hesitancy  incomplete emptying  weak stream  SKINBREASTS:  Positive for: none.   Rash  itching  whelps  bruises  sores  breast lumps  MUSCULOSKELETAL: Positive for: Sleeps on 2 pillows or on side due to back pain. Joint pain: knees, shoulders, elbows x 15-20 yrs. Joint pain, stiffness, effusion, limited movement  muscle pain, weakness  back neck pain  fracture(s)  NEUROLOGICAL:  Positive for: Headaches: every couple months, severe 810; relieved w/ Tylenol, quiet. Headaches  migraines  dizzy, lightheaded  vertigo  seizure  memory loss  gait problems   HEMELYMPH:  Positive for: none. Bruise, bleed easily  bleeding gums  lymphadenopathy  ENDOCRINE:  Positive for: none. Polyuria  polydipsia  polyphagia  heat or cold intolerance  goiter  thyroid problems  PSYCHIATRIC:  Posiive for: Anxiety since ~ 10 yrs: sister passed he was really, really close to in her sleep at age 46; now has fear.   Anxiety  depression  suicidal or homicidal thoughts  mood swings    PFSSH:  Active Ambulatory Problems     Diagnosis Date Noted    Tobacco abuse 04/15/2019    Essential hypertension 04/15/2019    GERD (gastroesophageal reflux disease) 04/15/2019    Chronic midline low back pain without sciatica 04/15/2019    Acute respiratory failure with hypoxia (HCC) 06/13/2019    Acute bronchitis 06/13/2019    Anxiety 11/22/2019    Severe obesity ([address] 01/15/2020     Resolved Ambulatory Problems     Diagnosis Date Noted    Chronic bronchitis ([address] 11/22/2019     Past Medical History:   Diagnosis Date    Arthritis     Asthma     Chronic low back pain     Hypertension      Past Surgical History:   Procedure Laterality Date    HX ORTHOPAEDIC       Social History     Tobacco Use    Smoking status: Current Some Day Smoker     Packs/day: 0.25     Years: 10.00     Pack years: 2.50    Smokeless tobacco: Never Used   Substance Use Topics    Alcohol use: Yes     Comment: occ     Family History   Problem Relation Age of Onset    Cancer Mother     Heart Disease Father         RESULTS - This Visit Only (only some results were available at the time of visit)  Results for orders placed or performed in visit on 11/12/19   VITAMIN B12   Result Value Ref Range    Vitamin B12 577 232 - 1,245 pg/mL    Narrative    Performed at:  [address]  [id number]  : Josh Pelayo MD, Phone:  [phone]   VITAMIN D, 25 HYDROXY   Result Value Ref Range    VITAMIN D, 25-HYDROXY 10.8 (L) 30.0 - 100.0 ng/mL    Narrative    Performed at:  [address]  [id number]  : Josh Pelayo MD, Phone:  [phone]   TSH 3RD GENERATION   Result Value Ref Range    TSH 1.630 0.450 - 4.500 uIU/mL    Narrative    Performed at:  [address]  [id number]  : Josh Pelayo MD, Phone:  [phone]   CBC W/O DIFF   Result Value Ref Range    WBC 9.2 3.4 - 10.8 x10E3/uL    RBC 5.60 4.14 - 5.80 x10E6/uL    HGB 16.1 13.0 - 17.7 g/dL    HCT 48.4 37.5 - 51.0 %    MCV 86 79 - 97 fL    MCH 28.8 26.6 - 33.0 pg    MCHC 33.3 31.5 - 35.7 g/dL    RDW 12.5 12.3 - 15.4 %    PLATELET 877 393 - 701 x10E3/uL    Narrative    Performed at:  [address]  [id number]  : Josh Pelayo MD, Phone:  [phone]   METABOLIC PANEL, COMPREHENSIVE   Result Value Ref Range    Glucose 101 (H) 65 - 99 mg/dL    BUN 12 6 - 24 mg/dL    Creatinine 1.01 0.76 - 1.27 mg/dL    GFR est non-AA 84 >59 mL/min/1.73    GFR est AA 97 >59 mL/min/1.73    BUN/Creatinine ratio 12 9 - 20    Sodium 140 134 - 144 mmol/L    Potassium 4.6 3.5 - 5.2 mmol/L    Chloride 97 96 - 106 mmol/L    CO2 26 20 - 29 mmol/L    Calcium 10.6 (H) 8.7 - 10.2 mg/dL    Protein, total 7.5 6.0 - 8.5 g/dL    Albumin 4.9 3.5 - 5.5 g/dL    GLOBULIN, TOTAL 2.6 1.5 - 4.5 g/dL    A-G Ratio 1.9 1.2 - 2.2    Bilirubin, total 0.5 0.0 - 1.2 mg/dL    Alk.  phosphatase 84 39 - 117 IU/L    AST (SGOT) 30 0 - 40 IU/L    ALT (SGPT) 40 0 - 44 IU/L    Narrative    Performed at:  [address] [place] [address] [address]  [id number]  : Yareli Pineda MD, Phone:  [phone]   T4, FREE   Result Value Ref Range    T4, Free 1.24 0.82 - 1.77 ng/dL    Narrative    Performed at:  [address]  [id number]  : Yareli Pineda MD, Phone:  [phone]   T3 TOTAL   Result Value Ref Range    T3, total 170 71 - 180 ng/dL    Narrative    Performed at:  [address]  [id number]  : Yareli Pineda MD, Phone:  [phone]   AMB POC LIPID PROFILE   Result Value Ref Range    Cholesterol (POC) 154     Triglycerides (POC) 116     HDL Cholesterol (POC) 82     Non-HDL Cholesterol 73     LDL Cholesterol (POC) 49 MG/DL    TChol/HDL Ratio (POC) 1.9    AMB POC GLUCOSE BLOOD, BY GLUCOSE MONITORING DEVICE   Result Value Ref Range    Glucose  mg/dL   AMB POC HEMOGLOBIN A1C   Result Value Ref Range    Hemoglobin A1c (POC) 5.4 %   AMB POC URINALYSIS DIP STICK MANUAL W/O MICRO   Result Value Ref Range    Color (UA POC) Yellow     Clarity (UA POC) Clear     Glucose (UA POC) Negative Negative    Bilirubin (UA POC) Negative Negative    Ketones (UA POC) Negative Negative    Specific gravity (UA POC) 1.025 1.001 - 1.035    Blood (UA POC) Negative Negative    pH (UA POC) 6.0 4.6 - 8.0    Protein (UA POC) Negative Negative    Urobilinogen (UA POC) 1 mg/dL 0.2 - 1    Nitrites (UA POC) Negative Negative    Leukocyte esterase (UA POC) Negative Negative

## 2019-11-12 NOTE — PROGRESS NOTES
1. Have you been to the ER, urgent care clinic since your last visit? Hospitalized since your last visit? YES? ASTHMA/[facility]    2. Have you seen or consulted any other health care providers outside of the [address] [place] [address] since your last visit? Include any pap smears or colon screening. NO    3 most recent PHQ Screens 7/17/2019   Little interest or pleasure in doing things Not at all   Feeling down, depressed, irritable, or hopeless Not at all   Total Score PHQ 2 0     Chief Complaint   Patient presents with   [address]       verbal order given for needed amb poc labs.

## 2019-11-12 NOTE — PATIENT INSTRUCTIONS
· Take fluid pill, furosemide 20 mg:  Take 2 pills at bedtime for 5 days then 1 pill a day in the morning. 
 
-------------------------------------------------------------------- Return to get your blood work done as soon as possible on: 
 
Monday - Thursday , 8 am to 12:00 noon.   
Tell the [employer] [employer] you are just here for the lab. 
 ----------------------------------------------

## 2019-11-19 ENCOUNTER — OFFICE VISIT (OUTPATIENT)
Dept: INTERNAL MEDICINE CLINIC | Age: 54
End: 2019-11-19

## 2019-11-19 VITALS
WEIGHT: 281.5 LBS | BODY MASS INDEX: 35 KG/M2 | HEIGHT: 75 IN | TEMPERATURE: 97.1 F | OXYGEN SATURATION: 97 % | SYSTOLIC BLOOD PRESSURE: 138 MMHG | HEART RATE: 86 BPM | RESPIRATION RATE: 16 BRPM | DIASTOLIC BLOOD PRESSURE: 80 MMHG

## 2019-11-19 DIAGNOSIS — Z53.20 PATIENT LEFT BEFORE TREATMENT COMPLETED: ICD-10-CM

## 2019-11-19 DIAGNOSIS — I10 ESSENTIAL HYPERTENSION: ICD-10-CM

## 2019-11-19 DIAGNOSIS — J42 CHRONIC BRONCHITIS, UNSPECIFIED CHRONIC BRONCHITIS TYPE (HCC): Primary | ICD-10-CM

## 2019-11-19 DIAGNOSIS — F41.9 ANXIETY: ICD-10-CM

## 2019-11-19 LAB
25(OH)D3+25(OH)D2 SERPL-MCNC: 10.8 NG/ML (ref 30–100)
ALBUMIN SERPL-MCNC: 4.9 G/DL (ref 3.5–5.5)
ALBUMIN/GLOB SERPL: 1.9 {RATIO} (ref 1.2–2.2)
ALP SERPL-CCNC: 84 IU/L (ref 39–117)
ALT SERPL-CCNC: 40 IU/L (ref 0–44)
AST SERPL-CCNC: 30 IU/L (ref 0–40)
BILIRUB SERPL-MCNC: 0.5 MG/DL (ref 0–1.2)
BUN SERPL-MCNC: 12 MG/DL (ref 6–24)
BUN/CREAT SERPL: 12 (ref 9–20)
CALCIUM SERPL-MCNC: 10.6 MG/DL (ref 8.7–10.2)
CHLORIDE SERPL-SCNC: 97 MMOL/L (ref 96–106)
CO2 SERPL-SCNC: 26 MMOL/L (ref 20–29)
CREAT SERPL-MCNC: 1.01 MG/DL (ref 0.76–1.27)
ERYTHROCYTE [DISTWIDTH] IN BLOOD BY AUTOMATED COUNT: 12.5 % (ref 12.3–15.4)
GLOBULIN SER CALC-MCNC: 2.6 G/DL (ref 1.5–4.5)
GLUCOSE SERPL-MCNC: 101 MG/DL (ref 65–99)
HCT VFR BLD AUTO: 48.4 % (ref 37.5–51)
HGB BLD-MCNC: 16.1 G/DL (ref 13–17.7)
MCH RBC QN AUTO: 28.8 PG (ref 26.6–33)
MCHC RBC AUTO-ENTMCNC: 33.3 G/DL (ref 31.5–35.7)
MCV RBC AUTO: 86 FL (ref 79–97)
PLATELET # BLD AUTO: 271 X10E3/UL (ref 150–450)
POTASSIUM SERPL-SCNC: 4.6 MMOL/L (ref 3.5–5.2)
PROT SERPL-MCNC: 7.5 G/DL (ref 6–8.5)
RBC # BLD AUTO: 5.6 X10E6/UL (ref 4.14–5.8)
SODIUM SERPL-SCNC: 140 MMOL/L (ref 134–144)
T3 SERPL-MCNC: 170 NG/DL (ref 71–180)
T4 FREE SERPL-MCNC: 1.24 NG/DL (ref 0.82–1.77)
TSH SERPL DL<=0.005 MIU/L-ACNC: 1.63 UIU/ML (ref 0.45–4.5)
VIT B12 SERPL-MCNC: 577 PG/ML (ref 232–1245)
WBC # BLD AUTO: 9.2 X10E3/UL (ref 3.4–10.8)

## 2019-11-19 RX ORDER — METHADONE HYDROCHLORIDE 10 MG/1
TABLET ORAL
COMMUNITY
End: 2020-09-14

## 2019-11-19 RX ORDER — ALBUTEROL SULFATE 90 UG/1
2 AEROSOL, METERED RESPIRATORY (INHALATION)
Qty: 3 INHALER | Refills: 3 | Status: SHIPPED | OUTPATIENT
Start: 2019-11-19 | End: 2020-10-12 | Stop reason: SDUPTHER

## 2019-11-19 RX ORDER — ALBUTEROL SULFATE 0.83 MG/ML
2.5 SOLUTION RESPIRATORY (INHALATION)
Qty: 30 NEBULE | Refills: 5 | OUTPATIENT
Start: 2019-11-19 | End: 2019-12-09

## 2019-11-19 NOTE — PROGRESS NOTES
Chief Complaint   Patient presents with    Hypertension    Referral Request     fluid on knee     1. Have you been to the ER, urgent care clinic since your last visit? Hospitalized since your last visit? No    2. Have you seen or consulted any other health care providers outside of the [address] [place] [address] since your last visit? Include any pap smears or colon screening.  No

## 2019-11-19 NOTE — PROGRESS NOTES
CC:  Hypertension and Referral Request (fluid on knee)     HPI:    Sedrick Lundberg is a 47 y.o. male who presents with a chief complaint of Hypertension and Referral Request (fluid on knee)    and other problems:    HYPERTENSION  - no chest pain, SOB    ANXIETY  - not well controlled  - can't find clonazepam pills sent to pharmacy last OV; doesn't know if lost, thrown out, stolen. - \"I haven't filed a police report\"    MED REFILLS  - Wants cialis, nebulizer meds  - Wants specifically, Ventolin brand inhaler; \"sprays better\"  - understands only one inhaler at a time, not to use brand and generic/insurance covered inhaler together     \"FLUID ON KNEE\"  - not addressed; pt left before end of OV    Allergies   Allergen Reactions    Vicodin [Hydrocodone-Acetaminophen] Other (comments)     Headache     Current Outpatient Medications on File Prior to Visit   Medication Sig Dispense Refill    methadone (DOLOPHINE) 10 mg tablet Take  by mouth.  furosemide (LASIX) 20 mg tablet Take 2 pills PO at HS for 5 days, then one pill each am. 60 Tab 1    amLODIPine (NORVASC) 5 mg tablet Take 1 Tab by mouth daily. Indications: high blood pressure 90 Tab 3    clonazePAM (KLONOPIN) 1 mg tablet Take 1 Tab by mouth two (2) times daily as needed (Anxiety). Max Daily Amount: 2 mg. 60 Tab 1    albuterol (PROVENTIL VENTOLIN) 2.5 mg /3 mL (0.083 %) nebu 3 mL by Nebulization route every four (4) hours as needed for Cough (SOB). 30 Nebule 0    albuterol sulfate 90 mcg/actuation aepb Take 2 Puffs by inhalation every four (4) hours as needed (SOB). 1 Inhaler 0    diclofenac EC (VOLTAREN) 50 mg EC tablet Take 1 Tab by mouth two (2) times a day. 60 Tab 1    cyclobenzaprine (FLEXERIL) 10 mg tablet Take 1 Tab by mouth three (3) times daily as needed for Muscle Spasm(s).  10 Tab 0    albuterol (ACCUNEB) 1.25 mg/3 mL nebu USE 1 VIAL, VIA NEBULIZER TREATMENT EVERY 4 HOURS AS NEEDED FOR COUGH 150 mL 0    tadalafil (CIALIS) 20 mg tablet Take 1 Tab by mouth as needed (erectile dysfunction). 20 Tab 3    lisinopril (PRINIVIL, ZESTRIL) 20 mg tablet Take 1 Tab by mouth daily. 90 Tab 3    polyethylene glycol (MIRALAX) 17 gram packet Take 1 Packet by mouth daily. 30 Packet 11    albuterol (PROVENTIL HFA, VENTOLIN HFA, PROAIR HFA) 90 mcg/actuation inhaler Take 2 Puffs by inhalation every six (6) hours as needed for Wheezing. 1 Inhaler 1    cloNIDine HCl (CATAPRES) 0.2 mg tablet Take 0.2 mg by mouth two (2) times a day.  montelukast (SINGULAIR) 10 mg tablet Take 10 mg by mouth daily.  pantoprazole (PROTONIX) 40 mg tablet Take 40 mg by mouth daily.  aspirin 81 mg tablet Take 1 Tab by mouth daily. 30 Tab 1    lidocaine (LIDODERM) 5 % Apply patch to the affected area for 12 hours a day and remove for 12 hours a day. 5 Each 0    guaiFENesin ER (MUCINEX) 600 mg ER tablet Take 1 Tab by mouth every twelve (12) hours. (Patient not taking: Reported on 11/12/2019) 14 Tab 0     No current facility-administered medications on file prior to visit. ASSESSMENT  TREATMENT  PLAN:    1. Chronic bronchitis, unspecified chronic bronchitis type ([address]  Appears controlled on meds  - albuterol (PROVENTIL VENTOLIN) 2.5 mg /3 mL (0.083 %) nebu; 3 mL by Nebulization route every four (4) hours as needed for Cough (SOB). Dispense: 30 Nebule; Refill: 5  - VENTOLIN HFA 90 mcg/actuation inhaler; Take 2 Puffs by inhalation every four (4) hours as needed for Wheezing or Shortness of Breath. Indications: bronchospasm prevention  Dispense: 3 Inhaler; Refill: 3    2. Essential hypertension  Controlled  - contin meds    3. Anxiety  4.  Patient left before treatment completed  Uncontrolled  - needs connection w/ mental health providers  - no clonazepam to be written henceforth      PHYSICAL EXAM:    CONSTITUTIONAL:     Vitals:    11/19/19 0757   BP: 138/80   Pulse: 86   Resp: 16   Temp: 97.1 °F (36.2 °C)   TempSrc: Oral   SpO2: 97%   Weight: 281 lb 8 oz (127.7 kg) Height: 6' 3\" (1.905 m)   PainSc:   8   PainLoc: Generalized     Weight Metrics 11/19/2019 11/12/2019 11/1/2019 10/13/2019 8/19/2019 7/17/2019 6/12/2019   Weight 281 lb 8 oz 286 lb 276 lb 10.8 oz 271 lb 13.2 oz 260 lb 256 lb 242 lb   BMI 35.19 kg/m2 35.75 kg/m2 34.58 kg/m2 33.98 kg/m2 32.5 kg/m2 32 kg/m2 30.25 kg/m2     General: WD obese black male appropriately groomed in NAD. MUSCULOSKELETAL:  POSITIVE findings:  Frequently up, pacing. Except for Positive findings listed, this system was WNL. My WNL exam:   Gait & station WNL    PSYCHIATRIC:  POSITIVE findings: Moderately anxious, agitated w/o SI/HI/AVH, nor other evidence of psychosis. Focused on loss of clonazepam. Speech mildly pressured, moderately excessive. Judgement for this situation fair, SOT rate mildly/ increased. Attention & concentration fair to good. Except for Positive findings listed, this system was WNL. My WNL exam:  · Speech: rate, volume, articulation, coherence, and spontaneity WNL w/o perseveration, paucity of language or pressured speech  · Thought processes: Rate of thoughts, content of thoughts WNL; logical, not tangential.   · Associations: Intact; not loose, tangential, circumstantial.   · Abnormal or psychotic thoughts not currently present. No homicidal or suicidal ideation. No hallucinations, delusions, preoccupation with violence, obsessions   · Judgment concerning everyday activities & social situations, good. Insight concerning psychiatric condition, good  Mental status:  · Oriented to person, place and time. · Recent and remote memory good for recall of events, times and discussions during visit. · Attention span and concentration good. · Fund of knowledge: vocabulary, good. · Mood and affect: Not depressed or anxious w/o agitation, anger, aggression, hypomania or lability. RHONDA   RHONDA 7 11/12/2019   Over the past 2 weeks how often have you felt nervous, anxious, or on edge?  3   Over the past 2 weeks how often have you not been able to stop or control worrying? 3   In the past 2 weeks how often have you worried too much about different things? 2   Over the past 2 weeks, how often have you had trouble relaxing? 3   Over the last 2 weeks how often have you been so restless that it is hard to sit still? 3   Over the last 2 weeks how often have you been easily annoyed or irritable? 3   Over the last 2 weeks, how often have you felt afraid as if something awful might happen? 3   BSHSI AMB RHONDA-7 SCORE 20     PHQ   3 most recent PHQ Screens 11/12/2019 7/17/2019 4/15/2019   Little interest or pleasure in doing things Several days Not at all Not at all   Feeling down, depressed, irritable, or hopeless Several days Not at all Not at all   Total Score PHQ 2 2 0 0   Trouble falling or staying asleep, or sleeping too much Nearly every day - -   Feeling tired or having little energy More than half the days - -   Poor appetite, weight loss, or overeating Nearly every day - -   Feeling bad about yourself - or that you are a failure or have let yourself or your family down Nearly every day - -   Trouble concentrating on things such as school, work, reading, or watching TV More than half the days - -   Moving or speaking so slowly that other people could have noticed; or the opposite being so fidgety that others notice Nearly every day - -   Thoughts of being better off dead, or hurting yourself in some way Not at all - -   PHQ 9 Score 18 - -         HISTORY- ROS  PAST  FAMILY  SURGICAL  SOCIAL with PROBLEM LIST:  ROS from first OV reviewed & updated as necessary. [facility] reviewed & updated as necessary.     Active Ambulatory Problems     Diagnosis Date Noted    Tobacco abuse 04/15/2019    Essential hypertension 04/15/2019    GERD (gastroesophageal reflux disease) 04/15/2019    Chronic midline low back pain without sciatica 04/15/2019    Acute respiratory failure with hypoxia (HCC) 06/13/2019    Acute bronchitis 06/13/2019 Resolved Ambulatory Problems     Diagnosis Date Noted    No Resolved Ambulatory Problems     Past Medical History:   Diagnosis Date    Arthritis     Asthma     Chronic low back pain     Hypertension      Past Surgical History:   Procedure Laterality Date    HX ORTHOPAEDIC       Social History     Tobacco Use    Smoking status: Current Some Day Smoker     Packs/day: 0.25     Years: 10.00     Pack years: 2.50    Smokeless tobacco: Never Used   Substance Use Topics    Alcohol use: Yes     Comment: occ     Family History   Problem Relation Age of Onset    Cancer Mother     Heart Disease Father         RESULTS of TESTS ORDERED - This Visit Only (some tests not available at time of visit):  No results found for this visit on 11/19/19.

## 2019-11-22 PROBLEM — J42 CHRONIC BRONCHITIS (HCC): Status: ACTIVE | Noted: 2019-11-22

## 2019-11-22 PROBLEM — F41.9 ANXIETY: Status: ACTIVE | Noted: 2019-11-22

## 2019-11-25 ENCOUNTER — HOSPITAL ENCOUNTER (EMERGENCY)
Age: 54
Discharge: HOME | End: 2019-11-25
Attending: EMERGENCY MEDICINE | Admitting: EMERGENCY MEDICINE
Payer: MEDICAID

## 2019-11-25 ENCOUNTER — APPOINTMENT (OUTPATIENT)
Dept: GENERAL RADIOLOGY | Age: 54
End: 2019-11-25
Attending: PHYSICIAN ASSISTANT
Payer: MEDICAID

## 2019-11-25 VITALS
TEMPERATURE: 97.9 F | SYSTOLIC BLOOD PRESSURE: 139 MMHG | RESPIRATION RATE: 18 BRPM | WEIGHT: 282.85 LBS | HEART RATE: 96 BPM | DIASTOLIC BLOOD PRESSURE: 100 MMHG | HEIGHT: 75 IN | BODY MASS INDEX: 35.17 KG/M2 | OXYGEN SATURATION: 96 %

## 2019-11-25 DIAGNOSIS — F17.200 SMOKING ADDICTION: ICD-10-CM

## 2019-11-25 DIAGNOSIS — J20.9 ACUTE BRONCHITIS, UNSPECIFIED ORGANISM: Primary | ICD-10-CM

## 2019-11-25 PROCEDURE — 99282 EMERGENCY DEPT VISIT SF MDM: CPT

## 2019-11-25 RX ORDER — CETIRIZINE HCL 10 MG
10 TABLET ORAL DAILY
Qty: 20 TAB | Refills: 0 | Status: SHIPPED | OUTPATIENT
Start: 2019-11-25 | End: 2020-10-12 | Stop reason: SDUPTHER

## 2019-11-25 RX ORDER — DOXYCYCLINE 100 MG/1
100 CAPSULE ORAL 2 TIMES DAILY
Qty: 20 CAP | Refills: 0 | Status: SHIPPED | OUTPATIENT
Start: 2019-11-25 | End: 2019-12-05

## 2019-11-25 NOTE — ED NOTES
Patient presents with a cough x a few weeks. Patient reports having multiple xrays. Patient reports his back is itching and he is coughing up some yellow.

## 2019-11-25 NOTE — ED PROVIDER NOTES
EMERGENCY DEPARTMENT HISTORY AND PHYSICAL EXAM      Date: 11/25/2019  Patient Name: Mandeep Madison    History of Presenting Illness     Chief Complaint   Patient presents with    Sore Throat     x 2 nights, unsure of fevers but reports feeling warm lately. \"i was taking a prednisone for my breathing problems\"    Cough     gray productive cough x 3 days. reports being on a methadone program     History Provided By: Patient    HPI: Mandeep Madison, 47 y.o. male with a history of asthma, bronchitis and smoking addiction presents ambulatory to the ED with cc of 3 days of moderately severe intermittent productive cough for which she is found no lasting improvement with his albuterol. There has been no chest pain or shortness of breath. Has been no fever. He tells me his throat is a little bit sore from breathing through his mouth and coughing. He does take methadone due to opioid dependence. There are no other complaints, changes, or physical findings at this time. PCP: Sergei Rangel MD    Current Outpatient Medications   Medication Sig Dispense Refill    dextromethorphan-guaiFENesin (ROBITUSSIN COUGH-CHEST DAVID DM) 5-100 mg/5 mL liqd Take 10 mL by mouth every four (4) hours as needed for Cough. 1 Bottle 0    cetirizine (ZYRTEC) 10 mg tablet Take 1 Tab by mouth daily. 20 Tab 0    doxycycline (MONODOX) 100 mg capsule Take 1 Cap by mouth two (2) times a day for 10 days. 20 Cap 0    methadone (DOLOPHINE) 10 mg tablet Take  by mouth.  albuterol (PROVENTIL VENTOLIN) 2.5 mg /3 mL (0.083 %) nebu 3 mL by Nebulization route every four (4) hours as needed for Cough (SOB). 30 Nebule 5    VENTOLIN HFA 90 mcg/actuation inhaler Take 2 Puffs by inhalation every four (4) hours as needed for Wheezing or Shortness of Breath.  Indications: bronchospasm prevention 3 Inhaler 3    furosemide (LASIX) 20 mg tablet Take 2 pills PO at HS for 5 days, then one pill each am. 60 Tab 1    amLODIPine (NORVASC) 5 mg tablet Take 1 Tab by mouth daily. Indications: high blood pressure 90 Tab 3    clonazePAM (KLONOPIN) 1 mg tablet Take 1 Tab by mouth two (2) times daily as needed (Anxiety). Max Daily Amount: 2 mg. 60 Tab 1    albuterol sulfate 90 mcg/actuation aepb Take 2 Puffs by inhalation every four (4) hours as needed (SOB). 1 Inhaler 0    diclofenac EC (VOLTAREN) 50 mg EC tablet Take 1 Tab by mouth two (2) times a day. 60 Tab 1    lidocaine (LIDODERM) 5 % Apply patch to the affected area for 12 hours a day and remove for 12 hours a day. 5 Each 0    cyclobenzaprine (FLEXERIL) 10 mg tablet Take 1 Tab by mouth three (3) times daily as needed for Muscle Spasm(s). 10 Tab 0    tadalafil (CIALIS) 20 mg tablet Take 1 Tab by mouth as needed (erectile dysfunction). 20 Tab 3    lisinopril (PRINIVIL, ZESTRIL) 20 mg tablet Take 1 Tab by mouth daily. 90 Tab 3    polyethylene glycol (MIRALAX) 17 gram packet Take 1 Packet by mouth daily. 30 Packet 11    guaiFENesin ER (MUCINEX) 600 mg ER tablet Take 1 Tab by mouth every twelve (12) hours. 14 Tab 0    cloNIDine HCl (CATAPRES) 0.2 mg tablet Take 0.2 mg by mouth two (2) times a day.  montelukast (SINGULAIR) 10 mg tablet Take 10 mg by mouth daily.  pantoprazole (PROTONIX) 40 mg tablet Take 40 mg by mouth daily.  aspirin 81 mg tablet Take 1 Tab by mouth daily. 30 Tab 1     Past History     Past Medical History:  Past Medical History:   Diagnosis Date    Arthritis     Asthma     Chronic low back pain     Hypertension        Past Surgical History:  Past Surgical History:   Procedure Laterality Date    HX ORTHOPAEDIC         Family History:  Family History   Problem Relation Age of Onset    Cancer Mother     Heart Disease Father        Social History:  Social History     Tobacco Use    Smoking status: Current Some Day Smoker     Packs/day: 0.25     Years: 10.00     Pack years: 2.50    Smokeless tobacco: Never Used   Substance Use Topics    Alcohol use:  Yes Comment: occ    Drug use: No       Allergies: Allergies   Allergen Reactions    Vicodin [Hydrocodone-Acetaminophen] Other (comments)     Headache     Review of Systems   Review of Systems   Constitutional: Negative for fatigue and fever. HENT: Positive for sore throat. Negative for congestion, ear pain and rhinorrhea. Eyes: Negative for pain and redness. Respiratory: Positive for cough. Negative for wheezing. Cardiovascular: Negative for chest pain and palpitations. Gastrointestinal: Negative for abdominal pain, nausea and vomiting. Genitourinary: Negative for dysuria, frequency and urgency. Musculoskeletal: Negative for back pain, neck pain and neck stiffness. Skin: Negative for rash and wound. Neurological: Negative for weakness, light-headedness, numbness and headaches. Physical Exam   Physical Exam  Vitals signs and nursing note reviewed. Constitutional:       General: He is not in acute distress. Appearance: He is well-developed. He is not toxic-appearing. HENT:      Head: Normocephalic and atraumatic. No right periorbital erythema or left periorbital erythema. Jaw: No trismus. Right Ear: External ear normal.      Left Ear: External ear normal.      Nose: Nose normal.      Mouth/Throat:      Pharynx: Uvula midline. Eyes:      General: No scleral icterus. Conjunctiva/sclera: Conjunctivae normal.      Pupils: Pupils are equal, round, and reactive to light. Neck:      Musculoskeletal: Full passive range of motion without pain and normal range of motion. Cardiovascular:      Rate and Rhythm: Normal rate and regular rhythm. Heart sounds: Normal heart sounds. Pulmonary:      Effort: Pulmonary effort is normal. No tachypnea, accessory muscle usage or respiratory distress. Breath sounds: Normal breath sounds. No decreased breath sounds or wheezing. Abdominal:      Palpations: Abdomen is soft. Abdomen is not rigid. Tenderness:  There is no tenderness. There is no guarding. Musculoskeletal: Normal range of motion. Skin:     Findings: No rash. Neurological:      Mental Status: He is alert and oriented to person, place, and time. He is not disoriented. GCS: GCS eye subscore is 4. GCS verbal subscore is 5. GCS motor subscore is 6. Cranial Nerves: No cranial nerve deficit. Sensory: No sensory deficit. Psychiatric:         Speech: Speech normal.       Diagnostic Study Results     Labs -   No results found for this or any previous visit (from the past 12 hour(s)). Radiologic Studies -   No orders to display     CT Results  (Last 48 hours)    None        CXR Results  (Last 48 hours)    None        Medical Decision Making   I am the first provider for this patient. I reviewed the vital signs, available nursing notes, past medical history, past surgical history, family history and social history. Vital Signs-Reviewed the patient's vital signs. Patient Vitals for the past 12 hrs:   Temp Pulse Resp BP SpO2   11/25/19 0826 97.9 °F (36.6 °C) 96 18 (!) 139/100 96 %       Pulse Oximetry Analysis - 96% on RA    Records Reviewed: Nursing Notes, Old Medical Records, Previous Radiology Studies, Previous Laboratory Studies and [facility]    Provider Notes (Medical Decision Making):   DDx: Pneumonia, bronchitis, smoking addiction, asthma exacerbation    Afebrile; well-appearing; breathing is unlabored and lungs are clear; patient tells me he just had a negative chest x-ray and declines current chest x-ray; given productive cough, history of bronchitis and smoking addiction will cover with doxycycline for atypical organisms (azithromycin may react negatively with methadone); referred to primary care; return precautions    TOBACCO COUNSELING:  Spent 1-5 minutes discussing the risks of smoking and the benefits of smoking cessation as well as the long term sequelae of smoking with the pt who verbalized his understanding.  Reviewed strategies for success, including gradually decreasing the number of cigarettes smoked a day. ED Course:   Initial assessment performed. The patients presenting problems have been discussed, and they are in agreement with the care plan formulated and outlined with them. I have encouraged them to ask questions as they arise throughout their visit. Disposition:  Discharge    PLAN:  1. Discharge Medication List as of 11/25/2019  9:03 AM      START taking these medications    Details   dextromethorphan-guaiFENesin (ROBITUSSIN COUGH-CHEST DAVID DM) 5-100 mg/5 mL liqd Take 10 mL by mouth every four (4) hours as needed for Cough., Normal, Disp-1 Bottle, R-0      cetirizine (ZYRTEC) 10 mg tablet Take 1 Tab by mouth daily. , Normal, Disp-20 Tab, R-0      doxycycline (MONODOX) 100 mg capsule Take 1 Cap by mouth two (2) times a day for 10 days. , Normal, Disp-20 Cap, R-0         CONTINUE these medications which have NOT CHANGED    Details   methadone (DOLOPHINE) 10 mg tablet Take  by mouth., Historical Med      albuterol (PROVENTIL VENTOLIN) 2.5 mg /3 mL (0.083 %) nebu 3 mL by Nebulization route every four (4) hours as needed for Cough (SOB). , Normal, Disp-30 Nebule, R-5      VENTOLIN HFA 90 mcg/actuation inhaler Take 2 Puffs by inhalation every four (4) hours as needed for Wheezing or Shortness of Breath. Indications: bronchospasm prevention, Normal, Disp-3 Inhaler, R-3, NIKO      furosemide (LASIX) 20 mg tablet Take 2 pills PO at HS for 5 days, then one pill each am., Normal, Disp-60 Tab, R-1      amLODIPine (NORVASC) 5 mg tablet Take 1 Tab by mouth daily. Indications: high blood pressure, Normal, Disp-90 Tab, R-3      clonazePAM (KLONOPIN) 1 mg tablet Take 1 Tab by mouth two (2) times daily as needed (Anxiety). Max Daily Amount: 2 mg., Normal, Disp-60 Tab, R-1      albuterol sulfate 90 mcg/actuation aepb Take 2 Puffs by inhalation every four (4) hours as needed (SOB). , Print, Disp-1 Inhaler, R-0      diclofenac EC (VOLTAREN) 50 mg EC tablet Take 1 Tab by mouth two (2) times a day., Print, Disp-60 Tab, R-1      lidocaine (LIDODERM) 5 % Apply patch to the affected area for 12 hours a day and remove for 12 hours a day., Print, Disp-5 Each, R-0      cyclobenzaprine (FLEXERIL) 10 mg tablet Take 1 Tab by mouth three (3) times daily as needed for Muscle Spasm(s). , Print, Disp-10 Tab, R-0      tadalafil (CIALIS) 20 mg tablet Take 1 Tab by mouth as needed (erectile dysfunction). , Normal, Disp-20 Tab, R-3      lisinopril (PRINIVIL, ZESTRIL) 20 mg tablet Take 1 Tab by mouth daily. , Normal, Disp-90 Tab, R-3      polyethylene glycol (MIRALAX) 17 gram packet Take 1 Packet by mouth daily. , Normal, Disp-30 Packet, R-11      guaiFENesin ER (MUCINEX) 600 mg ER tablet Take 1 Tab by mouth every twelve (12) hours. , Print, Disp-14 Tab, R-0      cloNIDine HCl (CATAPRES) 0.2 mg tablet Take 0.2 mg by mouth two (2) times a day., Historical Med      montelukast (SINGULAIR) 10 mg tablet Take 10 mg by mouth daily. , Historical Med      pantoprazole (PROTONIX) 40 mg tablet Take 40 mg by mouth daily. , Historical Med      aspirin 81 mg tablet Take 1 Tab by mouth daily. , Print, Disp-30 Tab, R-1           2. Follow-up Information     Follow up With Specialties Details Why Contact Info    Carito Galan MD Internal Medicine Schedule an appointment as soon as possible for a visit As needed [address] [place] [address]  [phone]          Return to ED if worse     Diagnosis     Clinical Impression:   1. Acute bronchitis, unspecified organism    2.  Smoking addiction 56

## 2019-11-25 NOTE — ED NOTES
Discharge instructions given to patient by MARIEL Torres Verbalized understanding of instructions. Patient discharged without difficulty. Patient discharged in stable condition ambulatory.

## 2019-12-09 ENCOUNTER — HOSPITAL ENCOUNTER (EMERGENCY)
Age: 54
Discharge: HOME | End: 2019-12-09
Attending: EMERGENCY MEDICINE
Payer: MEDICAID

## 2019-12-09 ENCOUNTER — APPOINTMENT (OUTPATIENT)
Dept: GENERAL RADIOLOGY | Age: 54
End: 2019-12-09
Attending: EMERGENCY MEDICINE
Payer: MEDICAID

## 2019-12-09 VITALS
SYSTOLIC BLOOD PRESSURE: 161 MMHG | HEART RATE: 92 BPM | DIASTOLIC BLOOD PRESSURE: 111 MMHG | HEIGHT: 75 IN | RESPIRATION RATE: 13 BRPM | OXYGEN SATURATION: 95 % | WEIGHT: 291.01 LBS | BODY MASS INDEX: 36.18 KG/M2 | TEMPERATURE: 97.9 F

## 2019-12-09 DIAGNOSIS — J45.41 MODERATE PERSISTENT ASTHMA WITH ACUTE EXACERBATION: Primary | ICD-10-CM

## 2019-12-09 LAB
ALBUMIN SERPL-MCNC: 3.6 G/DL (ref 3.5–5)
ALBUMIN/GLOB SERPL: 1 {RATIO} (ref 1.1–2.2)
ALP SERPL-CCNC: 82 U/L (ref 45–117)
ALT SERPL-CCNC: 46 U/L (ref 12–78)
ANION GAP SERPL CALC-SCNC: 6 MMOL/L (ref 5–15)
AST SERPL-CCNC: 40 U/L (ref 15–37)
BASOPHILS # BLD: 0 K/UL (ref 0–0.1)
BASOPHILS NFR BLD: 0 % (ref 0–1)
BILIRUB SERPL-MCNC: 1 MG/DL (ref 0.2–1)
BNP SERPL-MCNC: 98 PG/ML
BUN SERPL-MCNC: 9 MG/DL (ref 6–20)
BUN/CREAT SERPL: 10 (ref 12–20)
CALCIUM SERPL-MCNC: 8.9 MG/DL (ref 8.5–10.1)
CHLORIDE SERPL-SCNC: 106 MMOL/L (ref 97–108)
CO2 SERPL-SCNC: 26 MMOL/L (ref 21–32)
CREAT SERPL-MCNC: 0.86 MG/DL (ref 0.7–1.3)
DIFFERENTIAL METHOD BLD: ABNORMAL
EOSINOPHIL # BLD: 0.5 K/UL (ref 0–0.4)
EOSINOPHIL NFR BLD: 5 % (ref 0–7)
ERYTHROCYTE [DISTWIDTH] IN BLOOD BY AUTOMATED COUNT: 12.8 % (ref 11.5–14.5)
GLOBULIN SER CALC-MCNC: 3.5 G/DL (ref 2–4)
GLUCOSE SERPL-MCNC: 76 MG/DL (ref 65–100)
HCT VFR BLD AUTO: 45.1 % (ref 36.6–50.3)
HGB BLD-MCNC: 14.7 G/DL (ref 12.1–17)
IMM GRANULOCYTES # BLD AUTO: 0 K/UL (ref 0–0.04)
IMM GRANULOCYTES NFR BLD AUTO: 0 % (ref 0–0.5)
LYMPHOCYTES # BLD: 1.6 K/UL (ref 0.8–3.5)
LYMPHOCYTES NFR BLD: 19 % (ref 12–49)
MCH RBC QN AUTO: 28.8 PG (ref 26–34)
MCHC RBC AUTO-ENTMCNC: 32.6 G/DL (ref 30–36.5)
MCV RBC AUTO: 88.4 FL (ref 80–99)
MONOCYTES # BLD: 0.7 K/UL (ref 0–1)
MONOCYTES NFR BLD: 8 % (ref 5–13)
NEUTS SEG # BLD: 5.8 K/UL (ref 1.8–8)
NEUTS SEG NFR BLD: 68 % (ref 32–75)
NRBC # BLD: 0 K/UL (ref 0–0.01)
NRBC BLD-RTO: 0 PER 100 WBC
PLATELET # BLD AUTO: 236 K/UL (ref 150–400)
PMV BLD AUTO: 10.7 FL (ref 8.9–12.9)
POTASSIUM SERPL-SCNC: 4.2 MMOL/L (ref 3.5–5.1)
PROT SERPL-MCNC: 7.1 G/DL (ref 6.4–8.2)
RBC # BLD AUTO: 5.1 M/UL (ref 4.1–5.7)
SODIUM SERPL-SCNC: 138 MMOL/L (ref 136–145)
TROPONIN I SERPL-MCNC: <0.05 NG/ML
WBC # BLD AUTO: 8.6 K/UL (ref 4.1–11.1)

## 2019-12-09 PROCEDURE — 83880 ASSAY OF NATRIURETIC PEPTIDE: CPT

## 2019-12-09 PROCEDURE — 74011000250 HC RX REV CODE- 250: Performed by: EMERGENCY MEDICINE

## 2019-12-09 PROCEDURE — 94640 AIRWAY INHALATION TREATMENT: CPT

## 2019-12-09 PROCEDURE — 99285 EMERGENCY DEPT VISIT HI MDM: CPT

## 2019-12-09 PROCEDURE — 74011250636 HC RX REV CODE- 250/636: Performed by: EMERGENCY MEDICINE

## 2019-12-09 PROCEDURE — 85025 COMPLETE CBC W/AUTO DIFF WBC: CPT

## 2019-12-09 PROCEDURE — 96374 THER/PROPH/DIAG INJ IV PUSH: CPT

## 2019-12-09 PROCEDURE — 93005 ELECTROCARDIOGRAM TRACING: CPT

## 2019-12-09 PROCEDURE — 36415 COLL VENOUS BLD VENIPUNCTURE: CPT

## 2019-12-09 PROCEDURE — 80053 COMPREHEN METABOLIC PANEL: CPT

## 2019-12-09 PROCEDURE — 71045 X-RAY EXAM CHEST 1 VIEW: CPT

## 2019-12-09 PROCEDURE — 84484 ASSAY OF TROPONIN QUANT: CPT

## 2019-12-09 RX ORDER — PREDNISONE 20 MG/1
40 TABLET ORAL DAILY
Qty: 10 TAB | Refills: 0 | Status: SHIPPED | OUTPATIENT
Start: 2019-12-09 | End: 2019-12-14

## 2019-12-09 RX ORDER — LORAZEPAM 2 MG/ML
1 INJECTION INTRAMUSCULAR
Status: COMPLETED | OUTPATIENT
Start: 2019-12-09 | End: 2019-12-09

## 2019-12-09 RX ORDER — IPRATROPIUM BROMIDE AND ALBUTEROL SULFATE 2.5; .5 MG/3ML; MG/3ML
3 SOLUTION RESPIRATORY (INHALATION)
Status: COMPLETED | OUTPATIENT
Start: 2019-12-09 | End: 2019-12-09

## 2019-12-09 RX ORDER — ALBUTEROL SULFATE 0.83 MG/ML
2.5 SOLUTION RESPIRATORY (INHALATION)
Qty: 90 NEBULE | Refills: 0 | Status: SHIPPED | OUTPATIENT
Start: 2019-12-09 | End: 2020-10-12 | Stop reason: SDUPTHER

## 2019-12-09 RX ORDER — IPRATROPIUM BROMIDE AND ALBUTEROL SULFATE 2.5; .5 MG/3ML; MG/3ML
3 SOLUTION RESPIRATORY (INHALATION)
Qty: 90 NEBULE | Refills: 0 | Status: SHIPPED | OUTPATIENT
Start: 2019-12-09 | End: 2020-01-27 | Stop reason: SDUPTHER

## 2019-12-09 RX ORDER — ALBUTEROL SULFATE 2.5 MG/.5ML
5 SOLUTION RESPIRATORY (INHALATION)
Status: COMPLETED | OUTPATIENT
Start: 2019-12-09 | End: 2019-12-09

## 2019-12-09 RX ORDER — PROMETHAZINE HYDROCHLORIDE AND DEXTROMETHORPHAN HYDROBROMIDE 6.25; 15 MG/5ML; MG/5ML
5 SYRUP ORAL
Qty: 120 ML | Refills: 0 | Status: SHIPPED | OUTPATIENT
Start: 2019-12-09 | End: 2019-12-16

## 2019-12-09 RX ADMIN — IPRATROPIUM BROMIDE AND ALBUTEROL SULFATE 3 ML: .5; 3 SOLUTION RESPIRATORY (INHALATION) at 18:23

## 2019-12-09 RX ADMIN — LORAZEPAM 1 MG: 2 INJECTION INTRAMUSCULAR; INTRAVENOUS at 18:23

## 2019-12-09 RX ADMIN — ALBUTEROL SULFATE 5 MG: 2.5 SOLUTION RESPIRATORY (INHALATION) at 17:37

## 2019-12-09 NOTE — ED NOTES
States he uses an inhlaer 3x day and has had to use it more today without relief.  Audible wheezing noted

## 2019-12-10 LAB
ATRIAL RATE: 94 BPM
CALCULATED P AXIS, ECG09: 65 DEGREES
CALCULATED R AXIS, ECG10: 39 DEGREES
CALCULATED T AXIS, ECG11: 39 DEGREES
DIAGNOSIS, 93000: NORMAL
P-R INTERVAL, ECG05: 154 MS
Q-T INTERVAL, ECG07: 360 MS
QRS DURATION, ECG06: 80 MS
QTC CALCULATION (BEZET), ECG08: 450 MS
VENTRICULAR RATE, ECG03: 94 BPM

## 2019-12-10 NOTE — DISCHARGE INSTRUCTIONS
Patient Education        Asthma Attack: Care Instructions  Your Care Instructions    During an asthma attack, the airways swell and narrow. This makes it hard to breathe. Severe asthma attacks can be life-threatening, but you can help prevent them by keeping your asthma under control and treating symptoms before they get bad. Symptoms include being short of breath, having chest tightness, coughing, and wheezing. Noting and treating these symptoms can also help you avoid future trips to the emergency room. The doctor has checked you carefully, but problems can develop later. If you notice any problems or new symptoms, get medical treatment right away. Follow-up care is a key part of your treatment and safety. Be sure to make and go to all appointments, and call your doctor if you are having problems. It's also a good idea to know your test results and keep a list of the medicines you take. How can you care for yourself at home? · Follow your asthma action plan to prevent and treat attacks. If you don't have an asthma action plan, work with your doctor to create one. · Take your asthma medicines exactly as prescribed. Talk to your doctor right away if you have any questions about how to take them. ? Use your quick-relief medicine when you have symptoms of an attack. Quick-relief medicine is usually an albuterol inhaler. Some people need to use quick-relief medicine before they exercise. ? Take your controller medicine every day, not just when you have symptoms. Controller medicine is usually an inhaled corticosteroid. The goal is to prevent problems before they occur. Don't use your controller medicine to treat an attack that has already started. It doesn't work fast enough to help. ? If your doctor prescribed corticosteroid pills to use during an attack, take them exactly as prescribed. It may take hours for the pills to work, but they may make the episode shorter and help you breathe better. ?  Keep your quick-relief medicine with you at all times. · Talk to your doctor before using other medicines. Some medicines, such as aspirin, can cause asthma attacks in some people. · If you have a peak flow meter, use it to check how well you are breathing. This can help you predict when an asthma attack is going to occur. Then you can take medicine to prevent the asthma attack or make it less severe. · Do not smoke or allow others to smoke around you. Avoid smoky places. Smoking makes asthma worse. If you need help quitting, talk to your doctor about stop-smoking programs and medicines. These can increase your chances of quitting for good. · Learn what triggers an asthma attack for you, and avoid the triggers when you can. Common triggers include colds, smoke, air pollution, dust, pollen, mold, pets, cockroaches, stress, and cold air. · Avoid colds and the flu. Get a pneumococcal vaccine shot. If you have had one before, ask your doctor if you need a second dose. Get a flu vaccine every fall. If you must be around people with colds or the flu, wash your hands often. When should you call for help? Call 911 anytime you think you may need emergency care. For example, call if:    · You have severe trouble breathing.    Call your doctor now or seek immediate medical care if:    · Your symptoms do not get better after you have followed your asthma action plan.     · You have new or worse trouble breathing.     · Your coughing and wheezing get worse.     · You cough up dark brown or bloody mucus (sputum).     · You have a new or higher fever.    Watch closely for changes in your health, and be sure to contact your doctor if:    · You need to use quick-relief medicine on more than 2 days a week (unless it is just for exercise).     · You cough more deeply or more often, especially if you notice more mucus or a change in the color of your mucus.     · You are not getting better as expected. Where can you learn more?   Go to http://douglas-hans.info/. Enter L912 in the search box to learn more about \"Asthma Attack: Care Instructions. \"  Current as of: June 9, 2019  Content Version: 12.2  © 1002-3844 [employer]. Care instructions adapted under license by [employer] (which disclaims liability or warranty for this information). If you have questions about a medical condition or this instruction, always ask your healthcare professional. [address] any warranty or liability for your use of this information. Patient Education        Using a Metered-Dose Inhaler: Care Instructions  Your Care Instructions    A metered-dose inhaler lets you breathe medicine into your lungs quickly. Inhaled medicine works faster than the same medicine in a pill. An inhaler allows you to take less medicine than you would need if you took it as a pill. \"Metered-dose\" means that the inhaler gives a measured amount of medicine each time you use it. A metered-dose inhaler gives medicine in the form of a liquid mist.  Your doctor may want you to use a spacer with your inhaler. A spacer is a chamber that you attach to the inhaler. The chamber holds the medicine before you inhale it. That way, you can inhale the medicine in as many breaths as you need. Doctors recommend using a spacer with most metered-dose inhalers, especially those with corticosteroid medicines. Follow-up care is a key part of your treatment and safety. Be sure to make and go to all appointments, and call your doctor if you are having problems. It's also a good idea to know your test results and keep a list of the medicines you take. How can you care for yourself at home? To get started using your inhaler  · Talk with your health care provider to be sure you are using your inhaler the right way. It might help if you practice using it in front of a mirror. Use the inhaler exactly as prescribed.   · Check that you have the correct medicine. If you use more than one inhaler, put a label on each one. This will let you know which one to use at the right time. · Keep track of how much medicine is in the inhaler. Check the label to see how many doses are in the container. If you know how many puffs you can take, you can replace the inhaler before you run out. Ask your health care provider how you can keep track of how much medicine is left. · Use a spacer if you have problems pressing the inhaler and breathing in at the same time. You also may need a spacer if you are using corticosteroid medicines. · If you are using a corticosteroid inhaler, gargle and rinse out your mouth with water after use. Do not swallow the water. Swallowing the water will increase the chance that the medicine will get into your bloodstream. This may make it more likely that you will have side effects. To use a spacer with an inhaler  1. Shake the inhaler. Remove the inhaler cap, and place the mouthpiece of the inhaler into the spacer. Check the inhaler instructions to see if you need to prime your inhaler before you use it. If it needs priming, follow the instructions on how to prime your inhaler. 2. Remove the cap from the spacer. 3. Hold the inhaler upright with the mouthpiece at the bottom. 4. Tilt your head back a little, and breathe out slowly and completely. 5. Place the spacer's mouthpiece in your mouth. 6. Press down on the inhaler to spray one puff of medicine into the spacer, and then start breathing in slowly. Wait to inhale until after you have pressed down on the inhaler. Some spacers have a whistle. If you hear it, you should breathe in more slowly. 7. Hold your breath for 10 seconds. This will let the medicine settle in your lungs. 8. If you need to take a second dose, wait 30 to 60 seconds to allow the inhaler valve to refill. To use an inhaler without a spacer  1. Shake the inhaler as directed. Remove the cap.  Check the instructions to see if you need to prime your inhaler before you use it. If it needs priming, follow the instructions on how to prime your inhaler. 2. Hold the inhaler upright with the mouthpiece at the bottom. 3. Tilt your head back a little, and breathe out slowly and completely. 4. Position the inhaler in one of two ways:  ? You can place the inhaler in your mouth. This is easier for most people. And it lowers the risk that any of the medicine will get into your eyes. ? Or you can place the inhaler 1 to 2 inches in front of your open mouth, without closing your lips over it. Try to open your mouth as wide as you can. Placing the inhaler in front of your open mouth may be better for getting the medicine into your lungs. But some people may find this too hard to do. 5. Start taking slow, even breaths through your mouth. Press down on the inhaler once, then inhale fully. 6. Hold your breath for 10 seconds. This will let the medicine settle in your lungs. 7. If you need to take a second dose, wait 30 to 60 seconds to allow the inhaler valve to refill. Where can you learn more? Go to http://douglas-hans.info/. Enter K111 in the search box to learn more about \"Using a Metered-Dose Inhaler: Care Instructions. \"  Current as of: June 9, 2019  Content Version: 12.2  © 5693-0485 [employer]. Care instructions adapted under license by [employer] (which disclaims liability or warranty for this information). If you have questions about a medical condition or this instruction, always ask your healthcare professional. [address] any warranty or liability for your use of this information.

## 2019-12-10 NOTE — ED PROVIDER NOTES
EMERGENCY DEPARTMENT HISTORY AND PHYSICAL EXAM      Date: 12/9/2019  Patient Name: Binh Tijerina    History of Presenting Illness     Chief Complaint   Patient presents with    Shortness of Breath     Patient states he was just diagnosed with adult asthma and he was unable to ambulate from the car to the desk       History Provided By: Patient    HPI: Binh Tijerina, 47 y.o. male presents to the ED with cc of SOA. Pt dx with Asthma in the past year. He had been on steroids for an extended period of time but was taken off due to 40lb weight gain. Today he was walking into the hospital to visit a pt and had sudden onset of shortness of breath. He has had a productive cough and most recently finished course of Ab. Pt denies fever or chills. Pt states SOA worsened with exertion. There is chest tightness rated 10/10 worsened by cough. He denies abd pain n/v/d. There are no other complaints, changes, or physical findings at this time. PCP: eNrissa Berry MD    No current facility-administered medications on file prior to encounter. Current Outpatient Medications on File Prior to Encounter   Medication Sig Dispense Refill    dextromethorphan-guaiFENesin (ROBITUSSIN COUGH-CHEST DAVID DM) 5-100 mg/5 mL liqd Take 10 mL by mouth every four (4) hours as needed for Cough. 1 Bottle 0    cetirizine (ZYRTEC) 10 mg tablet Take 1 Tab by mouth daily. 20 Tab 0    methadone (DOLOPHINE) 10 mg tablet Take  by mouth.  VENTOLIN HFA 90 mcg/actuation inhaler Take 2 Puffs by inhalation every four (4) hours as needed for Wheezing or Shortness of Breath. Indications: bronchospasm prevention 3 Inhaler 3    furosemide (LASIX) 20 mg tablet Take 2 pills PO at HS for 5 days, then one pill each am. 60 Tab 1    amLODIPine (NORVASC) 5 mg tablet Take 1 Tab by mouth daily. Indications: high blood pressure 90 Tab 3    clonazePAM (KLONOPIN) 1 mg tablet Take 1 Tab by mouth two (2) times daily as needed (Anxiety).  Max Daily Amount: 2 mg. 60 Tab 1    diclofenac EC (VOLTAREN) 50 mg EC tablet Take 1 Tab by mouth two (2) times a day. 60 Tab 1    lidocaine (LIDODERM) 5 % Apply patch to the affected area for 12 hours a day and remove for 12 hours a day. 5 Each 0    cyclobenzaprine (FLEXERIL) 10 mg tablet Take 1 Tab by mouth three (3) times daily as needed for Muscle Spasm(s). 10 Tab 0    tadalafil (CIALIS) 20 mg tablet Take 1 Tab by mouth as needed (erectile dysfunction). 20 Tab 3    lisinopril (PRINIVIL, ZESTRIL) 20 mg tablet Take 1 Tab by mouth daily. 90 Tab 3    polyethylene glycol (MIRALAX) 17 gram packet Take 1 Packet by mouth daily. 30 Packet 11    guaiFENesin ER (MUCINEX) 600 mg ER tablet Take 1 Tab by mouth every twelve (12) hours. 14 Tab 0    cloNIDine HCl (CATAPRES) 0.2 mg tablet Take 0.2 mg by mouth two (2) times a day.  montelukast (SINGULAIR) 10 mg tablet Take 10 mg by mouth daily.  pantoprazole (PROTONIX) 40 mg tablet Take 40 mg by mouth daily.  aspirin 81 mg tablet Take 1 Tab by mouth daily. 30 Tab 1       Past History     Past Medical History:  Past Medical History:   Diagnosis Date    Arthritis     Asthma     Chronic low back pain     Hypertension        Past Surgical History:  Past Surgical History:   Procedure Laterality Date    HX ORTHOPAEDIC         Family History:  Family History   Problem Relation Age of Onset    Cancer Mother     Heart Disease Father        Social History:  Social History     Tobacco Use    Smoking status: Current Some Day Smoker     Packs/day: 0.25     Years: 10.00     Pack years: 2.50    Smokeless tobacco: Never Used   Substance Use Topics    Alcohol use: Yes     Comment: occ    Drug use: No       Allergies: Allergies   Allergen Reactions    Vicodin [Hydrocodone-Acetaminophen] Other (comments)     Headache         Review of Systems   Review of Systems   Constitutional: Negative. Negative for appetite change, chills, fatigue and fever. HENT: Negative.   Negative for congestion, rhinorrhea, sinus pressure and sore throat. Eyes: Negative. Respiratory: Positive for cough, shortness of breath and wheezing. Negative for choking and chest tightness. Cardiovascular: Negative. Negative for chest pain, palpitations and leg swelling. Gastrointestinal: Negative for abdominal pain, constipation, diarrhea, nausea and vomiting. Endocrine: Negative. Genitourinary: Negative. Negative for difficulty urinating, dysuria, flank pain and urgency. Musculoskeletal: Negative. Skin: Negative. Neurological: Negative. Negative for dizziness, speech difficulty, weakness, light-headedness, numbness and headaches. Psychiatric/Behavioral: Negative. All other systems reviewed and are negative. Physical Exam   Physical Exam  Vitals signs and nursing note reviewed. Constitutional:       General: He is not in acute distress. Appearance: He is well-developed. He is obese. He is not diaphoretic. HENT:      Head: Normocephalic and atraumatic. Mouth/Throat:      Pharynx: No oropharyngeal exudate. Eyes:      Conjunctiva/sclera: Conjunctivae normal.      Pupils: Pupils are equal, round, and reactive to light. Neck:      Musculoskeletal: Normal range of motion and neck supple. Vascular: No JVD. Trachea: No tracheal deviation. Cardiovascular:      Rate and Rhythm: Normal rate and regular rhythm. Heart sounds: Normal heart sounds. No murmur. Pulmonary:      Effort: Pulmonary effort is normal. No respiratory distress. Breath sounds: No stridor. Examination of the right-upper field reveals wheezing and rhonchi. Examination of the left-upper field reveals wheezing and rhonchi. Examination of the right-middle field reveals wheezing and rhonchi. Examination of the left-middle field reveals wheezing and rhonchi. Examination of the right-lower field reveals decreased breath sounds.  Examination of the left-lower field reveals decreased breath sounds. Decreased breath sounds, wheezing and rhonchi present. No rales. Chest:      Chest wall: No tenderness. Abdominal:      General: There is no distension. Palpations: Abdomen is soft. Tenderness: There is no tenderness. There is no guarding or rebound. Musculoskeletal: Normal range of motion. General: No tenderness. Skin:     General: Skin is warm and dry. Capillary Refill: Capillary refill takes less than 2 seconds. Neurological:      General: No focal deficit present. Mental Status: He is alert and oriented to person, place, and time. Cranial Nerves: No cranial nerve deficit. Comments: No gross motor or sensory deficits    Psychiatric:      Comments: Pt very anxious         Diagnostic Study Results     Labs -     Recent Results (from the past 12 hour(s))   EKG, 12 LEAD, INITIAL    Collection Time: 12/09/19  5:19 PM   Result Value Ref Range    Ventricular Rate 94 BPM    Atrial Rate 94 BPM    P-R Interval 154 ms    QRS Duration 80 ms    Q-T Interval 360 ms    QTC Calculation (Bezet) 450 ms    Calculated P Axis 65 degrees    Calculated R Axis 39 degrees    Calculated T Axis 39 degrees    Diagnosis       Sinus rhythm with occasional premature ventricular complexes   CBC WITH AUTOMATED DIFF    Collection Time: 12/09/19  5:38 PM   Result Value Ref Range    WBC 8.6 4.1 - 11.1 K/uL    RBC 5.10 4. 10 - 5.70 M/uL    HGB 14.7 12.1 - 17.0 g/dL    HCT 45.1 36.6 - 50.3 %    MCV 88.4 80.0 - 99.0 FL    MCH 28.8 26.0 - 34.0 PG    MCHC 32.6 30.0 - 36.5 g/dL    RDW 12.8 11.5 - 14.5 %    PLATELET 618 785 - 948 K/uL    MPV 10.7 8.9 - 12.9 FL    NRBC 0.0 0  WBC    ABSOLUTE NRBC 0.00 0.00 - 0.01 K/uL    NEUTROPHILS 68 32 - 75 %    LYMPHOCYTES 19 12 - 49 %    MONOCYTES 8 5 - 13 %    EOSINOPHILS 5 0 - 7 %    BASOPHILS 0 0 - 1 %    IMMATURE GRANULOCYTES 0 0.0 - 0.5 %    ABS. NEUTROPHILS 5.8 1.8 - 8.0 K/UL    ABS. LYMPHOCYTES 1.6 0.8 - 3.5 K/UL    ABS.  MONOCYTES 0.7 0.0 - 1.0 K/UL ABS. EOSINOPHILS 0.5 (H) 0.0 - 0.4 K/UL    ABS. BASOPHILS 0.0 0.0 - 0.1 K/UL    ABS. IMM. GRANS. 0.0 0.00 - 0.04 K/UL    DF AUTOMATED     METABOLIC PANEL, COMPREHENSIVE    Collection Time: 12/09/19  5:38 PM   Result Value Ref Range    Sodium 138 136 - 145 mmol/L    Potassium 4.2 3.5 - 5.1 mmol/L    Chloride 106 97 - 108 mmol/L    CO2 26 21 - 32 mmol/L    Anion gap 6 5 - 15 mmol/L    Glucose 76 65 - 100 mg/dL    BUN 9 6 - 20 MG/DL    Creatinine 0.86 0.70 - 1.30 MG/DL    BUN/Creatinine ratio 10 (L) 12 - 20      GFR est AA >60 >60 ml/min/1.73m2    GFR est non-AA >60 >60 ml/min/1.73m2    Calcium 8.9 8.5 - 10.1 MG/DL    Bilirubin, total 1.0 0.2 - 1.0 MG/DL    ALT (SGPT) 46 12 - 78 U/L    AST (SGOT) 40 (H) 15 - 37 U/L    Alk. phosphatase 82 45 - 117 U/L    Protein, total 7.1 6.4 - 8.2 g/dL    Albumin 3.6 3.5 - 5.0 g/dL    Globulin 3.5 2.0 - 4.0 g/dL    A-G Ratio 1.0 (L) 1.1 - 2.2     TROPONIN I    Collection Time: 12/09/19  5:38 PM   Result Value Ref Range    Troponin-I, Qt. <0.05 <0.05 ng/mL   NT-PRO BNP    Collection Time: 12/09/19  5:38 PM   Result Value Ref Range    NT pro-BNP 98 <125 PG/ML       Radiologic Studies -   XR CHEST PORT   Final Result   IMPRESSION: No acute findings. CT Results  (Last 48 hours)    None        CXR Results  (Last 48 hours)               12/09/19 1730  XR CHEST PORT Final result    Impression:  IMPRESSION: No acute findings. Narrative:  EXAM: XR CHEST PORT       INDICATION: Short of breath       COMPARISON: 11/1/2019       FINDINGS: A portable AP radiograph of the chest was obtained at 1707 hours. Mild   elevation of left hemidiaphragm is again shown. The lungs are clear. . There is   no pneumothorax or pleural effusion. The cardiac and mediastinal contours and   pulmonary vascularity are normal.  No hilar enlargement is shown. Medical Decision Making   I am the first provider for this patient.     I reviewed the vital signs, available nursing notes, past medical history, past surgical history, family history and social history. Vital Signs-Reviewed the patient's vital signs. Patient Vitals for the past 12 hrs:   Temp Pulse Resp BP SpO2   12/09/19 1900   13 (!) 161/111 95 %   12/09/19 1845   14 (!) 184/106 97 %   12/09/19 1830   17 (!) 180/103 94 %   12/09/19 1815   17 (!) 189/106 93 %   12/09/19 1800   19 (!) 181/102 96 %   12/09/19 1745   14 (!) 160/96 97 %   12/09/19 1737  92      12/09/19 1702 97.9 °F (36.6 °C) 97 20 (!) 171/110 93 %       EKG interpretation: (Preliminary)  Sinus, rate 94, normal axis/pr/qrs, no acute ST changes, Raynald Jovan, DO    Records Reviewed: Nursing Notes, Old Medical Records, Previous Radiology Studies and Previous Laboratory Studies    Provider Notes (Medical Decision Making):   DDx- Acute asthma exacerbation, Bronchitis, pneumonia, panic attack, Raynald Jovan, DO      ED Course:   Initial assessment performed. The patients presenting problems have been discussed, and they are in agreement with the care plan formulated and outlined with them. I have encouraged them to ask questions as they arise throughout their visit. Pt had improved at the time of dc, Rx's written. DC home. Critical Care Time:   None    Disposition:  DC home    PLAN:  1. Discharge Medication List as of 12/9/2019  7:54 PM      START taking these medications    Details   albuterol-ipratropium (DUO-NEB) 2.5 mg-0.5 mg/3 ml nebu 3 mL by Nebulization route every four (4) hours as needed for Wheezing., Normal, Disp-90 Nebule, R-0      promethazine-dextromethorphan (PROMETHAZINE-DM) 6.25-15 mg/5 mL syrup Take 5 mL by mouth every four (4) hours as needed for Cough for up to 7 days. , Normal, Disp-120 mL, R-0      predniSONE (DELTASONE) 20 mg tablet Take 40 mg by mouth daily for 5 days.  With Breakfast, Normal, Disp-10 Tab, R-0         CONTINUE these medications which have NOT CHANGED    Details   dextromethorphan-guaiFENesin (Elena Chavez DAVID DM) 5-100 mg/5 mL liqd Take 10 mL by mouth every four (4) hours as needed for Cough., Normal, Disp-1 Bottle, R-0      cetirizine (ZYRTEC) 10 mg tablet Take 1 Tab by mouth daily. , Normal, Disp-20 Tab, R-0      methadone (DOLOPHINE) 10 mg tablet Take  by mouth., Historical Med      VENTOLIN HFA 90 mcg/actuation inhaler Take 2 Puffs by inhalation every four (4) hours as needed for Wheezing or Shortness of Breath. Indications: bronchospasm prevention, Normal, Disp-3 Inhaler, R-3, NIKO      albuterol (PROVENTIL VENTOLIN) 2.5 mg /3 mL (0.083 %) nebu 3 mL by Nebulization route every four (4) hours as needed for Cough (SOB). , Normal, Disp-30 Nebule, R-5      furosemide (LASIX) 20 mg tablet Take 2 pills PO at HS for 5 days, then one pill each am., Normal, Disp-60 Tab, R-1      amLODIPine (NORVASC) 5 mg tablet Take 1 Tab by mouth daily. Indications: high blood pressure, Normal, Disp-90 Tab, R-3      clonazePAM (KLONOPIN) 1 mg tablet Take 1 Tab by mouth two (2) times daily as needed (Anxiety). Max Daily Amount: 2 mg., Normal, Disp-60 Tab, R-1      albuterol sulfate 90 mcg/actuation aepb Take 2 Puffs by inhalation every four (4) hours as needed (SOB). , Print, Disp-1 Inhaler, R-0      diclofenac EC (VOLTAREN) 50 mg EC tablet Take 1 Tab by mouth two (2) times a day., Print, Disp-60 Tab, R-1      lidocaine (LIDODERM) 5 % Apply patch to the affected area for 12 hours a day and remove for 12 hours a day., Print, Disp-5 Each, R-0      cyclobenzaprine (FLEXERIL) 10 mg tablet Take 1 Tab by mouth three (3) times daily as needed for Muscle Spasm(s). , Print, Disp-10 Tab, R-0      tadalafil (CIALIS) 20 mg tablet Take 1 Tab by mouth as needed (erectile dysfunction). , Normal, Disp-20 Tab, R-3      lisinopril (PRINIVIL, ZESTRIL) 20 mg tablet Take 1 Tab by mouth daily. , Normal, Disp-90 Tab, R-3      polyethylene glycol (MIRALAX) 17 gram packet Take 1 Packet by mouth daily. , Normal, Disp-30 Packet, R-11      guaiFENesin ER (MUCINEX) 600 mg ER tablet Take 1 Tab by mouth every twelve (12) hours. , Print, Disp-14 Tab, R-0      cloNIDine HCl (CATAPRES) 0.2 mg tablet Take 0.2 mg by mouth two (2) times a day., Historical Med      montelukast (SINGULAIR) 10 mg tablet Take 10 mg by mouth daily. , Historical Med      pantoprazole (PROTONIX) 40 mg tablet Take 40 mg by mouth daily. , Historical Med      aspirin 81 mg tablet Take 1 Tab by mouth daily. , Print, Disp-30 Tab, R-1           2. Follow-up Information    None       Return to ED if worse     Diagnosis     Clinical Impression:   1. Moderate persistent asthma with acute exacerbation        Attestations:    Bonifacio Go, DO    Please note that this dictation was completed with [employer], the computer voice recognition software. Quite often unanticipated grammatical, syntax, homophones, and other interpretive errors are inadvertently transcribed by the computer software. Please disregard these errors. Please excuse any errors that have escaped final proofreading. Thank you.

## 2019-12-31 DIAGNOSIS — I10 ESSENTIAL HYPERTENSION: Chronic | ICD-10-CM

## 2019-12-31 DIAGNOSIS — K21.9 GASTROESOPHAGEAL REFLUX DISEASE, ESOPHAGITIS PRESENCE NOT SPECIFIED: Primary | Chronic | ICD-10-CM

## 2019-12-31 DIAGNOSIS — I10 ESSENTIAL HYPERTENSION: Primary | Chronic | ICD-10-CM

## 2019-12-31 RX ORDER — PANTOPRAZOLE SODIUM 40 MG/1
40 TABLET, DELAYED RELEASE ORAL DAILY
Qty: 90 TAB | Refills: 0 | Status: SHIPPED | OUTPATIENT
Start: 2019-12-31 | End: 2020-03-04 | Stop reason: ALTCHOICE

## 2019-12-31 RX ORDER — LISINOPRIL 20 MG/1
20 TABLET ORAL DAILY
Qty: 90 TAB | Refills: 0 | Status: SHIPPED | OUTPATIENT
Start: 2019-12-31 | End: 2020-06-03 | Stop reason: SDUPTHER

## 2019-12-31 RX ORDER — LISINOPRIL 20 MG/1
20 TABLET ORAL DAILY
Qty: 90 TAB | Refills: 3 | Status: SHIPPED | OUTPATIENT
Start: 2019-12-31 | End: 2019-12-31

## 2020-01-06 ENCOUNTER — HOSPITAL ENCOUNTER (EMERGENCY)
Age: 55
Discharge: HOME | End: 2020-01-06
Attending: EMERGENCY MEDICINE
Payer: MEDICAID

## 2020-01-06 ENCOUNTER — APPOINTMENT (OUTPATIENT)
Dept: GENERAL RADIOLOGY | Age: 55
End: 2020-01-06
Attending: EMERGENCY MEDICINE
Payer: MEDICAID

## 2020-01-06 VITALS
HEART RATE: 89 BPM | DIASTOLIC BLOOD PRESSURE: 75 MMHG | HEIGHT: 75 IN | SYSTOLIC BLOOD PRESSURE: 156 MMHG | TEMPERATURE: 97.7 F | RESPIRATION RATE: 16 BRPM | WEIGHT: 304.45 LBS | OXYGEN SATURATION: 98 % | BODY MASS INDEX: 37.86 KG/M2

## 2020-01-06 DIAGNOSIS — J20.9 ACUTE BRONCHITIS, UNSPECIFIED ORGANISM: Primary | ICD-10-CM

## 2020-01-06 DIAGNOSIS — J45.901 MILD ASTHMA WITH ACUTE EXACERBATION, UNSPECIFIED WHETHER PERSISTENT: ICD-10-CM

## 2020-01-06 LAB
ALBUMIN SERPL-MCNC: 3.7 G/DL (ref 3.5–5)
ALBUMIN/GLOB SERPL: 1.2 {RATIO} (ref 1.1–2.2)
ALP SERPL-CCNC: 69 U/L (ref 45–117)
ALT SERPL-CCNC: 36 U/L (ref 12–78)
ANION GAP SERPL CALC-SCNC: 3 MMOL/L (ref 5–15)
AST SERPL-CCNC: 21 U/L (ref 15–37)
ATRIAL RATE: 86 BPM
BASOPHILS # BLD: 0 K/UL (ref 0–0.1)
BASOPHILS NFR BLD: 0 % (ref 0–1)
BILIRUB SERPL-MCNC: 1 MG/DL (ref 0.2–1)
BUN SERPL-MCNC: 9 MG/DL (ref 6–20)
BUN/CREAT SERPL: 10 (ref 12–20)
CALCIUM SERPL-MCNC: 9 MG/DL (ref 8.5–10.1)
CALCULATED P AXIS, ECG09: 61 DEGREES
CALCULATED R AXIS, ECG10: 16 DEGREES
CALCULATED T AXIS, ECG11: 7 DEGREES
CHLORIDE SERPL-SCNC: 107 MMOL/L (ref 97–108)
CO2 SERPL-SCNC: 32 MMOL/L (ref 21–32)
CREAT SERPL-MCNC: 0.93 MG/DL (ref 0.7–1.3)
DIAGNOSIS, 93000: NORMAL
DIFFERENTIAL METHOD BLD: ABNORMAL
EOSINOPHIL # BLD: 0.5 K/UL (ref 0–0.4)
EOSINOPHIL NFR BLD: 6 % (ref 0–7)
ERYTHROCYTE [DISTWIDTH] IN BLOOD BY AUTOMATED COUNT: 12.4 % (ref 11.5–14.5)
GLOBULIN SER CALC-MCNC: 3.2 G/DL (ref 2–4)
GLUCOSE SERPL-MCNC: 81 MG/DL (ref 65–100)
HCT VFR BLD AUTO: 45.6 % (ref 36.6–50.3)
HGB BLD-MCNC: 14.2 G/DL (ref 12.1–17)
IMM GRANULOCYTES # BLD AUTO: 0 K/UL (ref 0–0.04)
IMM GRANULOCYTES NFR BLD AUTO: 0 % (ref 0–0.5)
LYMPHOCYTES # BLD: 1.7 K/UL (ref 0.8–3.5)
LYMPHOCYTES NFR BLD: 21 % (ref 12–49)
MCH RBC QN AUTO: 27.8 PG (ref 26–34)
MCHC RBC AUTO-ENTMCNC: 31.1 G/DL (ref 30–36.5)
MCV RBC AUTO: 89.4 FL (ref 80–99)
MONOCYTES # BLD: 0.9 K/UL (ref 0–1)
MONOCYTES NFR BLD: 11 % (ref 5–13)
NEUTS SEG # BLD: 5.1 K/UL (ref 1.8–8)
NEUTS SEG NFR BLD: 62 % (ref 32–75)
NRBC # BLD: 0 K/UL (ref 0–0.01)
NRBC BLD-RTO: 0 PER 100 WBC
P-R INTERVAL, ECG05: 156 MS
PLATELET # BLD AUTO: 261 K/UL (ref 150–400)
PMV BLD AUTO: 10.6 FL (ref 8.9–12.9)
POTASSIUM SERPL-SCNC: 4.5 MMOL/L (ref 3.5–5.1)
PROT SERPL-MCNC: 6.9 G/DL (ref 6.4–8.2)
Q-T INTERVAL, ECG07: 384 MS
QRS DURATION, ECG06: 82 MS
QTC CALCULATION (BEZET), ECG08: 459 MS
RBC # BLD AUTO: 5.1 M/UL (ref 4.1–5.7)
SODIUM SERPL-SCNC: 142 MMOL/L (ref 136–145)
VENTRICULAR RATE, ECG03: 86 BPM
WBC # BLD AUTO: 8.3 K/UL (ref 4.1–11.1)

## 2020-01-06 PROCEDURE — 36415 COLL VENOUS BLD VENIPUNCTURE: CPT

## 2020-01-06 PROCEDURE — 80053 COMPREHEN METABOLIC PANEL: CPT

## 2020-01-06 PROCEDURE — 85025 COMPLETE CBC W/AUTO DIFF WBC: CPT

## 2020-01-06 PROCEDURE — 71046 X-RAY EXAM CHEST 2 VIEWS: CPT

## 2020-01-06 PROCEDURE — 99283 EMERGENCY DEPT VISIT LOW MDM: CPT

## 2020-01-06 PROCEDURE — 93005 ELECTROCARDIOGRAM TRACING: CPT

## 2020-01-06 RX ORDER — BENZONATATE 100 MG/1
100 CAPSULE ORAL
Qty: 30 CAP | Refills: 0 | Status: SHIPPED | OUTPATIENT
Start: 2020-01-06 | End: 2020-01-13

## 2020-01-06 RX ORDER — PREDNISONE 20 MG/1
40 TABLET ORAL DAILY
Qty: 10 TAB | Refills: 0 | Status: SHIPPED | OUTPATIENT
Start: 2020-01-06 | End: 2020-01-11

## 2020-01-06 NOTE — ED PROVIDER NOTES
EMERGENCY DEPARTMENT HISTORY AND PHYSICAL EXAM      Date: 1/6/2020  Patient Name: Mony Crystal    History of Presenting Illness     Chief Complaint   Patient presents with    Shortness of Breath     Reports wheezing, sob, cough x 2 days. Recently dianosed with asthma. Reports cedrick shoulder pain       History Provided By: Patient    HPI: Mony Crystal, 47 y.o. smoking male with PMHx significant for asthma, HTN, chronic back pain, presents BIB self to the ED with cc of cough and wheezing x 3 days. Cough is productive of clear phlegm but pt feels like he \"can't get the phlegm out. \" Wheezing is improved with his albuterol neb at home, last done ~4 hrs ago. Admits some SOB with coughing episodes only. Denies nasal congestion, ST, fevers, hemoptysis, sputum production, CP, LE swelling, recent surgeries/flights/immobiliations, malignancy, h/o blood clots. There are no other complaints, changes, or physical findings at this time. PCP: Chantel Sow MD    No current facility-administered medications on file prior to encounter. Current Outpatient Medications on File Prior to Encounter   Medication Sig Dispense Refill    lisinopril (PRINIVIL, ZESTRIL) 20 mg tablet Take 1 Tab by mouth daily. 90 Tab 0    pantoprazole (PROTONIX) 40 mg tablet Take 1 Tab by mouth daily. 90 Tab 0    albuterol-ipratropium (DUO-NEB) 2.5 mg-0.5 mg/3 ml nebu 3 mL by Nebulization route every four (4) hours as needed for Wheezing. 90 Nebule 0    albuterol (PROVENTIL VENTOLIN) 2.5 mg /3 mL (0.083 %) nebu 3 mL by Nebulization route every four (4) hours as needed for Wheezing. 90 Nebule 0    dextromethorphan-guaiFENesin (ROBITUSSIN COUGH-CHEST DAVID DM) 5-100 mg/5 mL liqd Take 10 mL by mouth every four (4) hours as needed for Cough. 1 Bottle 0    cetirizine (ZYRTEC) 10 mg tablet Take 1 Tab by mouth daily. 20 Tab 0    methadone (DOLOPHINE) 10 mg tablet Take  by mouth.       VENTOLIN HFA 90 mcg/actuation inhaler Take 2 Puffs by inhalation every four (4) hours as needed for Wheezing or Shortness of Breath. Indications: bronchospasm prevention 3 Inhaler 3    furosemide (LASIX) 20 mg tablet Take 2 pills PO at HS for 5 days, then one pill each am. 60 Tab 1    amLODIPine (NORVASC) 5 mg tablet Take 1 Tab by mouth daily. Indications: high blood pressure 90 Tab 3    clonazePAM (KLONOPIN) 1 mg tablet Take 1 Tab by mouth two (2) times daily as needed (Anxiety). Max Daily Amount: 2 mg. 60 Tab 1    diclofenac EC (VOLTAREN) 50 mg EC tablet Take 1 Tab by mouth two (2) times a day. 60 Tab 1    lidocaine (LIDODERM) 5 % Apply patch to the affected area for 12 hours a day and remove for 12 hours a day. 5 Each 0    cyclobenzaprine (FLEXERIL) 10 mg tablet Take 1 Tab by mouth three (3) times daily as needed for Muscle Spasm(s). 10 Tab 0    tadalafil (CIALIS) 20 mg tablet Take 1 Tab by mouth as needed (erectile dysfunction). 20 Tab 3    polyethylene glycol (MIRALAX) 17 gram packet Take 1 Packet by mouth daily. 30 Packet 11    guaiFENesin ER (MUCINEX) 600 mg ER tablet Take 1 Tab by mouth every twelve (12) hours. 14 Tab 0    cloNIDine HCl (CATAPRES) 0.2 mg tablet Take 0.2 mg by mouth two (2) times a day.  montelukast (SINGULAIR) 10 mg tablet Take 10 mg by mouth daily.  aspirin 81 mg tablet Take 1 Tab by mouth daily.  30 Tab 1       Past History     Past Medical History:  Past Medical History:   Diagnosis Date    Arthritis     Asthma     Chronic low back pain     Hypertension        Past Surgical History:  Past Surgical History:   Procedure Laterality Date    HX ORTHOPAEDIC         Family History:  Family History   Problem Relation Age of Onset    Cancer Mother     Heart Disease Father        Social History:  Social History     Tobacco Use    Smoking status: Current Some Day Smoker     Packs/day: 0.25     Years: 10.00     Pack years: 2.50    Smokeless tobacco: Never Used   Substance Use Topics    Alcohol use: Yes     Comment: occ  Drug use: No       Allergies: Allergies   Allergen Reactions    Vicodin [Hydrocodone-Acetaminophen] Other (comments)     Headache         Review of Systems   Review of Systems   Constitutional: Negative for fever. HENT: Negative for rhinorrhea. Eyes: Negative for redness. Respiratory: Positive for cough and wheezing. Gastrointestinal: Negative for abdominal pain and vomiting. Musculoskeletal: Positive for back pain. Skin: Negative for rash. Allergic/Immunologic: Negative for environmental allergies. Neurological: Negative for dizziness. Hematological: Does not bruise/bleed easily. Psychiatric/Behavioral: Negative for self-injury. Physical Exam   Physical Exam  Vitals signs and nursing note reviewed. Constitutional:       General: He is not in acute distress. Appearance: Normal appearance. He is not toxic-appearing or diaphoretic. Comments: Sitting comfortably on stretcher, NAD   HENT:      Head: Normocephalic and atraumatic. Right Ear: Tympanic membrane normal.      Left Ear: Tympanic membrane normal.      Nose: Nose normal. No congestion. Mouth/Throat:      Mouth: Mucous membranes are moist.      Pharynx: Oropharynx is clear. Eyes:      Extraocular Movements: Extraocular movements intact. Conjunctiva/sclera: Conjunctivae normal.   Neck:      Musculoskeletal: Normal range of motion and neck supple. Trachea: Phonation normal.   Cardiovascular:      Rate and Rhythm: Normal rate and regular rhythm. Pulmonary:      Effort: Pulmonary effort is normal. No respiratory distress. Comments: Expiratory wheeze in all lung fields. No rhonchi or consolidations. Abdominal:      Palpations: Abdomen is soft. Tenderness: There is no tenderness. Musculoskeletal: Normal range of motion. Comments: No calf tenderness b/l    Skin:     General: Skin is warm and dry. Neurological:      Mental Status: He is alert and oriented to person, place, and time. GCS: GCS eye subscore is 4. GCS verbal subscore is 5. GCS motor subscore is 6. Psychiatric:         Mood and Affect: Mood normal.         Behavior: Behavior normal.         Diagnostic Study Results     Labs -     Recent Results (from the past 12 hour(s))   EKG, 12 LEAD, INITIAL    Collection Time: 01/06/20  1:00 PM   Result Value Ref Range    Ventricular Rate 86 BPM    Atrial Rate 86 BPM    P-R Interval 156 ms    QRS Duration 82 ms    Q-T Interval 384 ms    QTC Calculation (Bezet) 459 ms    Calculated P Axis 61 degrees    Calculated R Axis 16 degrees    Calculated T Axis 7 degrees    Diagnosis       Normal sinus rhythm  Normal ECG  When compared with ECG of 09-DEC-2019 17:19,  premature ventricular complexes are no longer present     CBC WITH AUTOMATED DIFF    Collection Time: 01/06/20  1:37 PM   Result Value Ref Range    WBC 8.3 4.1 - 11.1 K/uL    RBC 5.10 4. 10 - 5.70 M/uL    HGB 14.2 12.1 - 17.0 g/dL    HCT 45.6 36.6 - 50.3 %    MCV 89.4 80.0 - 99.0 FL    MCH 27.8 26.0 - 34.0 PG    MCHC 31.1 30.0 - 36.5 g/dL    RDW 12.4 11.5 - 14.5 %    PLATELET 112 476 - 782 K/uL    MPV 10.6 8.9 - 12.9 FL    NRBC 0.0 0  WBC    ABSOLUTE NRBC 0.00 0.00 - 0.01 K/uL    NEUTROPHILS 62 32 - 75 %    LYMPHOCYTES 21 12 - 49 %    MONOCYTES 11 5 - 13 %    EOSINOPHILS 6 0 - 7 %    BASOPHILS 0 0 - 1 %    IMMATURE GRANULOCYTES 0 0.0 - 0.5 %    ABS. NEUTROPHILS 5.1 1.8 - 8.0 K/UL    ABS. LYMPHOCYTES 1.7 0.8 - 3.5 K/UL    ABS. MONOCYTES 0.9 0.0 - 1.0 K/UL    ABS. EOSINOPHILS 0.5 (H) 0.0 - 0.4 K/UL    ABS. BASOPHILS 0.0 0.0 - 0.1 K/UL    ABS. IMM.  GRANS. 0.0 0.00 - 0.04 K/UL    DF AUTOMATED     METABOLIC PANEL, COMPREHENSIVE    Collection Time: 01/06/20  1:37 PM   Result Value Ref Range    Sodium 142 136 - 145 mmol/L    Potassium 4.5 3.5 - 5.1 mmol/L    Chloride 107 97 - 108 mmol/L    CO2 32 21 - 32 mmol/L    Anion gap 3 (L) 5 - 15 mmol/L    Glucose 81 65 - 100 mg/dL    BUN 9 6 - 20 MG/DL    Creatinine 0.93 0.70 - 1.30 MG/DL BUN/Creatinine ratio 10 (L) 12 - 20      GFR est AA >60 >60 ml/min/1.73m2    GFR est non-AA >60 >60 ml/min/1.73m2    Calcium 9.0 8.5 - 10.1 MG/DL    Bilirubin, total 1.0 0.2 - 1.0 MG/DL    ALT (SGPT) 36 12 - 78 U/L    AST (SGOT) 21 15 - 37 U/L    Alk. phosphatase 69 45 - 117 U/L    Protein, total 6.9 6.4 - 8.2 g/dL    Albumin 3.7 3.5 - 5.0 g/dL    Globulin 3.2 2.0 - 4.0 g/dL    A-G Ratio 1.2 1.1 - 2.2         Radiologic Studies -   XR CHEST PA LAT   Final Result   IMPRESSION: No acute cardiopulmonary disease. CT Results  (Last 48 hours)    None        CXR Results  (Last 48 hours)               01/06/20 1333  XR CHEST PA LAT Final result    Impression:  IMPRESSION: No acute cardiopulmonary disease. Narrative: Indication:  sob/ cough        Exam: PA and lateral views of the chest.       Direct comparison is made to prior CXR dated December 9, 2019. Findings: Cardiomediastinal silhouette is within normal limits. Lungs are clear   bilaterally. Pleural spaces are normal. Osseous structures are intact. Medical Decision Making   I am the first provider for this patient. I reviewed the vital signs, available nursing notes, past medical history, past surgical history, family history and social history. Vital Signs-Reviewed the patient's vital signs. Patient Vitals for the past 12 hrs:   Temp Pulse Resp BP SpO2   01/06/20 1258 97.7 °F (36.5 °C) 89 16 156/75 98 %       Records Reviewed: Nursing Notes, Previous electrocardiograms and Previous Radiology Studies    Provider Notes (Medical Decision Making):       Cannot be PERC'd out due to age, however Wells score = 0. Not suspecting PE or ACS as etiology of symptoms. Suspect acute bronchitis in setting of patient's asthma. No evidence of PNA on exam or CXR. Advised on smoking cessation, use of albuterol, prednisone, tessalon, mucinex. F/u with PCP. ED return precautions given. Pt is in agreement with this plan.        ED Course:   Initial assessment performed. The patients presenting problems have been discussed, and they are in agreement with the care plan formulated and outlined with them. I have encouraged them to ask questions as they arise throughout their visit. Critical Care Time: None    Disposition:  D/c home     PLAN:  1. Discharge Medication List as of 1/6/2020  2:32 PM      START taking these medications    Details   predniSONE (DELTASONE) 20 mg tablet Take 40 mg by mouth daily for 5 days. With Breakfast, Normal, Disp-10 Tab, R-0      benzonatate (TESSALON PERLES) 100 mg capsule Take 1 Cap by mouth three (3) times daily as needed for Cough for up to 7 days. , Normal, Disp-30 Cap, R-0      !! guaiFENesin (MUCINEX) 1,200 mg Ta12 ER tablet Take 1 Tab by mouth two (2) times a day for 7 days. , Normal, Disp-14 Tab, R-0       !! - Potential duplicate medications found. Please discuss with provider. CONTINUE these medications which have NOT CHANGED    Details   lisinopril (PRINIVIL, ZESTRIL) 20 mg tablet Take 1 Tab by mouth daily. , Normal, Disp-90 Tab, R-0Please cancel earlier script w/ 3 refills. Thanks. pantoprazole (PROTONIX) 40 mg tablet Take 1 Tab by mouth daily. , Normal, Disp-90 Tab, R-0      albuterol-ipratropium (DUO-NEB) 2.5 mg-0.5 mg/3 ml nebu 3 mL by Nebulization route every four (4) hours as needed for Wheezing., Normal, Disp-90 Nebule, R-0      albuterol (PROVENTIL VENTOLIN) 2.5 mg /3 mL (0.083 %) nebu 3 mL by Nebulization route every four (4) hours as needed for Wheezing., Normal, Disp-90 Nebule, R-0      dextromethorphan-guaiFENesin (ROBITUSSIN COUGH-CHEST DAVID DM) 5-100 mg/5 mL liqd Take 10 mL by mouth every four (4) hours as needed for Cough., Normal, Disp-1 Bottle, R-0      cetirizine (ZYRTEC) 10 mg tablet Take 1 Tab by mouth daily. , Normal, Disp-20 Tab, R-0      methadone (DOLOPHINE) 10 mg tablet Take  by mouth., Historical Med      VENTOLIN HFA 90 mcg/actuation inhaler Take 2 Puffs by inhalation every four (4) hours as needed for Wheezing or Shortness of Breath. Indications: bronchospasm prevention, Normal, Disp-3 Inhaler, R-3, NIKO      furosemide (LASIX) 20 mg tablet Take 2 pills PO at HS for 5 days, then one pill each am., Normal, Disp-60 Tab, R-1      amLODIPine (NORVASC) 5 mg tablet Take 1 Tab by mouth daily. Indications: high blood pressure, Normal, Disp-90 Tab, R-3      clonazePAM (KLONOPIN) 1 mg tablet Take 1 Tab by mouth two (2) times daily as needed (Anxiety). Max Daily Amount: 2 mg., Normal, Disp-60 Tab, R-1      diclofenac EC (VOLTAREN) 50 mg EC tablet Take 1 Tab by mouth two (2) times a day., Print, Disp-60 Tab, R-1      lidocaine (LIDODERM) 5 % Apply patch to the affected area for 12 hours a day and remove for 12 hours a day., Print, Disp-5 Each, R-0      cyclobenzaprine (FLEXERIL) 10 mg tablet Take 1 Tab by mouth three (3) times daily as needed for Muscle Spasm(s). , Print, Disp-10 Tab, R-0      tadalafil (CIALIS) 20 mg tablet Take 1 Tab by mouth as needed (erectile dysfunction). , Normal, Disp-20 Tab, R-3      polyethylene glycol (MIRALAX) 17 gram packet Take 1 Packet by mouth daily. , Normal, Disp-30 Packet, R-11      !! guaiFENesin ER (MUCINEX) 600 mg ER tablet Take 1 Tab by mouth every twelve (12) hours. , Print, Disp-14 Tab, R-0      cloNIDine HCl (CATAPRES) 0.2 mg tablet Take 0.2 mg by mouth two (2) times a day., Historical Med      montelukast (SINGULAIR) 10 mg tablet Take 10 mg by mouth daily. , Historical Med      aspirin 81 mg tablet Take 1 Tab by mouth daily. , Print, Disp-30 Tab, R-1       !! - Potential duplicate medications found. Please discuss with provider.         2.   Follow-up Information     Follow up With Specialties Details Why Contact Info    [facility] EMERGENCY DEPT Emergency Medicine  As needed, If symptoms worsen [address]    Leodan Lee MD Internal Medicine  For follow up [address] [address]  [phone]       Return to ED if worse     Diagnosis     Clinical Impression:   1. Acute bronchitis, unspecified organism    2. Mild asthma with acute exacerbation, unspecified whether persistent          Please note that this dictation was completed with [employer], the computer voice recognition software. Quite often unanticipated grammatical, syntax, homophones, and other interpretive errors are inadvertently transcribed by the computer software. Please disregards these errors. Please excuse any errors that have escaped final proofreading. This note will not be viewable in [address]

## 2020-01-06 NOTE — DISCHARGE INSTRUCTIONS
Patient Education        Bronchitis: Care Instructions  Your Care Instructions    Bronchitis is inflammation of the bronchial tubes, which carry air to the lungs. The tubes swell and produce mucus, or phlegm. The mucus and inflamed bronchial tubes make you cough. You may have trouble breathing. Most cases of bronchitis are caused by viruses like those that cause colds. Antibiotics usually do not help and they may be harmful. Bronchitis usually develops rapidly and lasts about 2 to 3 weeks in otherwise healthy people. Follow-up care is a key part of your treatment and safety. Be sure to make and go to all appointments, and call your doctor if you are having problems. It's also a good idea to know your test results and keep a list of the medicines you take. How can you care for yourself at home? · Take all medicines exactly as prescribed. Call your doctor if you think you are having a problem with your medicine. · Get some extra rest.  · Take an over-the-counter pain medicine, such as acetaminophen (Tylenol), ibuprofen (Advil, Motrin), or naproxen (Aleve) to reduce fever and relieve body aches. Read and follow all instructions on the label. · Do not take two or more pain medicines at the same time unless the doctor told you to. Many pain medicines have acetaminophen, which is Tylenol. Too much acetaminophen (Tylenol) can be harmful. · Take an over-the-counter cough medicine that contains dextromethorphan to help quiet a dry, hacking cough so that you can sleep. Avoid cough medicines that have more than one active ingredient. Read and follow all instructions on the label. · Breathe moist air from a humidifier, hot shower, or sink filled with hot water. The heat and moisture will thin mucus so you can cough it out. · Do not smoke. Smoking can make bronchitis worse. If you need help quitting, talk to your doctor about stop-smoking programs and medicines.  These can increase your chances of quitting for good.  When should you call for help? Call 911 anytime you think you may need emergency care. For example, call if:    · You have severe trouble breathing.    Call your doctor now or seek immediate medical care if:    · You have new or worse trouble breathing.     · You cough up dark brown or bloody mucus (sputum).     · You have a new or higher fever.     · You have a new rash.    Watch closely for changes in your health, and be sure to contact your doctor if:    · You cough more deeply or more often, especially if you notice more mucus or a change in the color of your mucus.     · You are not getting better as expected. Where can you learn more? Go to http://douglas-hans.info/. Enter H333 in the search box to learn more about \"Bronchitis: Care Instructions. \"  Current as of: June 9, 2019  Content Version: 12.2  © 8472-6909 [employer]. Care instructions adapted under license by [employer] (which disclaims liability or warranty for this information). If you have questions about a medical condition or this instruction, always ask your healthcare professional. [address] any warranty or liability for your use of this information.

## 2020-01-15 ENCOUNTER — OFFICE VISIT (OUTPATIENT)
Dept: FAMILY MEDICINE CLINIC | Age: 55
End: 2020-01-15

## 2020-01-15 VITALS
SYSTOLIC BLOOD PRESSURE: 120 MMHG | TEMPERATURE: 97.4 F | BODY MASS INDEX: 37.3 KG/M2 | WEIGHT: 300 LBS | RESPIRATION RATE: 18 BRPM | DIASTOLIC BLOOD PRESSURE: 72 MMHG | OXYGEN SATURATION: 95 % | HEART RATE: 72 BPM | HEIGHT: 75 IN

## 2020-01-15 DIAGNOSIS — I10 ESSENTIAL HYPERTENSION: Chronic | ICD-10-CM

## 2020-01-15 DIAGNOSIS — N52.9 ERECTILE DYSFUNCTION, UNSPECIFIED ERECTILE DYSFUNCTION TYPE: Primary | ICD-10-CM

## 2020-01-15 DIAGNOSIS — K21.9 GASTROESOPHAGEAL REFLUX DISEASE, ESOPHAGITIS PRESENCE NOT SPECIFIED: Chronic | ICD-10-CM

## 2020-01-15 DIAGNOSIS — E66.01 SEVERE OBESITY (HCC): ICD-10-CM

## 2020-01-15 DIAGNOSIS — F41.9 ANXIETY: ICD-10-CM

## 2020-01-15 DIAGNOSIS — N52.8 OTHER MALE ERECTILE DYSFUNCTION: ICD-10-CM

## 2020-01-15 DIAGNOSIS — M54.50 CHRONIC LEFT-SIDED LOW BACK PAIN WITHOUT SCIATICA: ICD-10-CM

## 2020-01-15 DIAGNOSIS — G89.29 CHRONIC LEFT-SIDED LOW BACK PAIN WITHOUT SCIATICA: ICD-10-CM

## 2020-01-15 DIAGNOSIS — Z79.891 CHRONIC PRESCRIPTION OPIATE USE: ICD-10-CM

## 2020-01-15 DIAGNOSIS — F19.10 SUBSTANCE ABUSE (HCC): ICD-10-CM

## 2020-01-15 PROBLEM — J42 CHRONIC BRONCHITIS (HCC): Status: RESOLVED | Noted: 2019-11-22 | Resolved: 2020-01-15

## 2020-01-15 RX ORDER — LIDOCAINE 50 MG/G
PATCH TOPICAL
Qty: 30 EACH | Refills: 0 | Status: SHIPPED | OUTPATIENT
Start: 2020-01-15 | End: 2020-03-04 | Stop reason: SDUPTHER

## 2020-01-15 RX ORDER — FLUTICASONE PROPIONATE 50 MCG
2 SPRAY, SUSPENSION (ML) NASAL DAILY
COMMUNITY
End: 2020-03-04 | Stop reason: SDUPTHER

## 2020-01-15 RX ORDER — PANTOPRAZOLE SODIUM 40 MG/1
40 TABLET, DELAYED RELEASE ORAL DAILY
Qty: 90 TAB | Refills: 0 | Status: CANCELLED | OUTPATIENT
Start: 2020-01-15

## 2020-01-15 RX ORDER — TIOTROPIUM BROMIDE 18 UG/1
CAPSULE ORAL; RESPIRATORY (INHALATION)
COMMUNITY
Start: 2020-01-15 | End: 2020-09-14

## 2020-01-15 RX ORDER — AMLODIPINE BESYLATE 5 MG/1
5 TABLET ORAL DAILY
Qty: 90 TAB | Refills: 3 | Status: CANCELLED | OUTPATIENT
Start: 2020-01-15

## 2020-01-15 RX ORDER — CYCLOBENZAPRINE HCL 10 MG
10 TABLET ORAL
Qty: 30 TAB | Refills: 0 | Status: SHIPPED | OUTPATIENT
Start: 2020-01-15 | End: 2020-02-11 | Stop reason: SDUPTHER

## 2020-01-15 RX ORDER — TADALAFIL 20 MG/1
20 TABLET ORAL AS NEEDED
Qty: 20 TAB | Refills: 3 | Status: CANCELLED | OUTPATIENT
Start: 2020-01-15

## 2020-01-15 RX ORDER — CLONAZEPAM 1 MG/1
1 TABLET ORAL
Qty: 60 TAB | Refills: 2 | Status: SHIPPED | OUTPATIENT
Start: 2020-01-15 | End: 2020-04-06

## 2020-01-15 RX ORDER — LISINOPRIL 20 MG/1
20 TABLET ORAL DAILY
Qty: 90 TAB | Refills: 0 | Status: CANCELLED | OUTPATIENT
Start: 2020-01-15

## 2020-01-15 RX ORDER — TADALAFIL 5 MG/1
5 TABLET ORAL DAILY
Qty: 30 TAB | Refills: 0 | Status: SHIPPED | OUTPATIENT
Start: 2020-01-15 | End: 2020-02-11 | Stop reason: SDUPTHER

## 2020-01-15 RX ORDER — NALOXONE HYDROCHLORIDE 4 MG/.1ML
SPRAY NASAL
Qty: 2 EACH | Refills: 0 | Status: SHIPPED | OUTPATIENT
Start: 2020-01-15 | End: 2020-07-27

## 2020-01-15 RX ORDER — CLONIDINE HYDROCHLORIDE 0.2 MG/1
0.2 TABLET ORAL 2 TIMES DAILY
Qty: 180 TAB | Refills: 0 | Status: SHIPPED | OUTPATIENT
Start: 2020-01-15 | End: 2020-04-07 | Stop reason: SDUPTHER

## 2020-01-15 NOTE — PROGRESS NOTES
Found office from insurance book. Here with son. Recently went to [facility] and got last refill done. Takes Klonipin at night to relax and sleep. Needs to take BID for nerves. Tried once daily and not enough. Gets daily methadone from [facility]. Here to get meds refilled.  reviewed. New control agreement completed. Urine obtained. Visit Vitals  /72 (BP 1 Location: Left arm, BP Patient Position: Sitting)   Pulse 72   Temp 97.4 °F (36.3 °C) (Oral)   Resp 18   Ht 6' 3\" (1.905 m)   Wt 300 lb (136.1 kg)   SpO2 95%   BMI 37.50 kg/m²     Patient alert and cooperative. Reviewed above. Assessment:  1. HTN. 2. Anxiety, on chronic controlled med. 3. Chronic back pain, on muscle relaxer and Lidoderm patch. Plan:  1. Refilled meds. 2. Controlled agreement completed, urine obtained. 3. Return in three months for controlled med refill. Get rest of refills from pharmacy. 4. Follow otherwise here prn. TOTAL FACE TO FACE TIME OF 30 MINUTES SPENT WITH PATIENT. GREATER THAN 50% OF TIME WAS SPENT IN COUNSELING AND/OR COORDINATION OF CARE. SEE ASSESSMENT AND PLAN FOR DETAILS.

## 2020-01-15 NOTE — LETTER
Name:Giuseppe Grimm 
:1965 MR #:[id number] Office:[facility] Page 1 of 5 CONTROLLED SUBSTANCE AGREEMENT I may be prescribed medications that are controlled substances as part  of my treatment plan for management of my medical condition(s). The goal of my treatment plan is to maintain and/or improve my health and wellbeing. Because controlled substances have an increased risk of abuse or harm, continual re-evaluation is needed determine if the goals of my treatment plan are being met for my safety and the safety of others. Garrett Porter  am entering into this Controlled Substance Agreement with my provider, __________________________________ at [facility] . I understand that successful treatment requires mutual trust and honesty between me and my provider. I understand that there are state and federal laws and regulations which apply to the medications that my provider may prescribe that must be followed. I understand there are risks and benefits ts of taking the medicines that my provider may prescribe. I understand and agree that following this Agreement is necessary in continuing my provider-patient relationship and success of my treatment plan. As a part of my treatment plan, I agree to the following: COMMUNICATION: 
 
1. I will communicate fully with my provider about my medical condition(s), including the effect on my daily life and how well my medications are helping. I will tell my provider all of the medications that I take for any reason, including medications I receive from another health care provider, and will notify my provider about all issues, problems or concerns, including any side effects, which may be related to my medications. I understand that this information allows my provider to adjust my treatment plan to help manage my medical condition.  I understand that this information will become part of my permanent medical record. 2. I will notify my provider if I have a history of alcohol/drug misuse/addiction or if I have had treatment for alcohol/drug addiction in the past, or if I have a new problem with or concern about alcohol/drug use/addiction, because this increases the likelihood of high risk behaviors and may lead to serious medical conditions. 3. Females Only: I will notify my provider if I am or become pregnant, or if I intend to become pregnant, or if I intend to breastfeed. I understand that communication of these issues with my provider is important, due to possible effects my medication could have on an unborn fetus or breastfeeding child. Initials_____ Name:.Isauro Bowers Dl  
:1965 MR #:[id number] Office:[facility] Page 2 of 5 MISUSE OF MEDICATIONS / DRUGS: 
 
1. I agree to take all controlled substances as prescribed, and will not misuse or abuse any controlled substances prescribed by my provider. For my safety, I will not increase the amount of medicine I take without first talking with and getting permission from my provider. 2. If I have a medical emergency, another health care provider may prescribe me medication. If I seek emergency treatment, I will notify my provider within seventy-two (72) hours. 3. I understand that my provider may discuss my use and/or possible misuse/abuse of controlled substances and alcohol, as appropriate, with any health care provider involved in my care, pharmacist or legal authority. ILLEGAL DRUGS: 
 
1. I will not use illegal drugs of any kind, including but not limited to marijuana, heroin, cocaine, or any prescription drug which is not prescribed to me. DRUG DIVERSION / PRESCRIPTION FRAUD: 
 
1. I will not share, sell, trade, give away, or otherwise misuse my prescriptions or medications. 2. I will not alter any prescriptions provided to me by my provider. SINGLE PROVIDER: 
 
1. I agree that all controlled substances that I take will be prescribed only by my provider (or his/her covering provider) under this Agreement. This agreement does not prevent me from seeking emergency medical treatment or receiving pain management related to a surgery. PROTECTING MEDICATIONS: 
 
1. I am responsible for keeping my prescriptions and medications in a safe and secure place including safeguarding them from loss or theft. I understand that lost, stolen or damaged/destroyed prescriptions or medications will not be replaced. Initials____ Name:.Isauro Webb Oxford  
:1965 MR #:[id number] Office:[facility] Page 3 of 5 PRESCRIPTION RENEWALS/REFILLS: 
 
1. I will follow my controlled substance medication schedule as prescribed by my provider. 2. I understand and agree that I will make any requests for renewals or refills of my prescriptions only at the time of an office visit or during my providers regular office hours subject to the prescription refill requirements of the individual practice. 3. I understand that my provider may not call in prescriptions for controlled substances to my pharmacy. 4. I understand that my provider may adjust or discontinue these medications as deemed appropriate for my medical treatment plan. This Agreement does not guarantee the prescription of controlled medications. 5. I agree that if my medications are adjusted or discontinued, I will properly dispose of any remaining medications. I understand that I will be required to dispose of any remaining controlled medications prior to being provided with any prescriptions for other controlled medications. 6. I understand that the renewal of my prescription depends on my medical condition, my consistent participation, and my adherence with my treatment plan and this Agreement. 7. I understand that if I do not keep an appointment with my provider, I may not receive a renewal or refill for my controlled substance medication. PRESCRIPTION MONITORING / DRUG TESTIN. I understand that my provider may require me to provide urine, saliva or blood for testing at any time. I understand that this testing will be used to monitor for safety and adherence with my treatment plan and this Agreement. 2. I understand that my provider may ask me to provide an observed urine specimen, which means that a nurse or other health care provider may watch me provide urine, and I agree to cooperate if I am asked to provide an observed specimen. 3. I understand that if I do not provide urine, saliva or blood samples within two (2) hours of my providers request, or other timeframe decided by my provider, my treatment plan could be changed, or my prescriptions and medications may be changed or ended. 4. I understand that urine, saliva and blood test results will be a part of my permanent medical record. Initials_____ Name:.Isauro Rodriguez  
:1965 MR #:[id number] Office:[facility] Page 4 of 5 
 
5. I understand that my provider is required to obtain a copy of my State Prescription Monitoring Program () Report at any time in order to safely prescribe medications. 6. I will bring all of my prescribed controlled substance medications in their original bottles to all of my scheduled appointments. 7. I understand that my provider may ask me to come to the practice with all of my prescribed medications for a random pill count at any time. I agree to cooperate if I am asked to come in for a random pill count. I understand that if I do not arrive in the timeframe decided by my provider, my treatment plan could be changed, or my prescriptions and medications may be changed or ended.  
 
COOPERATION WITH INVESTIGATIONS: 
 
 1. I authorize my provider and my pharmacy to cooperate fully with any local, state, or federal law enforcement agency in the investigation of any possible misuse, sale, or other diversion of my controlled substance prescriptions or medications. RISKS: 
 
1. I understand that my level of consciousness may be affected from the use of controlled substances, and I understand that there are risks, benefits, effects and potential alternatives (including no treatment) to the medications that my provider has prescribed. 2. I understand that I may become drowsy, tired, dizzy, constipated, and sick to my stomach, or have changes in my mood or in my sleep while taking my medications. I have talked with my provider about these possible side effects, risks, benefits, and alternative treatments, and my provider has answered all of my questions. 3. I understand that I should not suddenly stop taking my medications without first speaking with my provider. I understand that if I suddenly stop taking my medications, I may experience nausea, vomiting, sweating,anxiety, sleeplessness, itching or other uncomfortable feelings. 4. I will not take my medications with alcohol of any kind, including beer, wine or liquor. I understand that drinking alcohol with my medications increases the chances of side effects, including breathing problems or even death. 5. I understand that if I have a history of alcoholism or other drug addiction I may be at increased risk of addiction to my medications. Signs of addiction might include craving, compulsive use, and continued use despite harm. Since addiction is a disease, I understand my provider may decide to change my medications and refer me to appropriate treatment services. I understand that this information would become part of my permanent medical record. Initials_____ Name:Giuseppe Sultana  
:1965 MR #:[id number] Office:[facility] Page 5 of 5  
 
 
6. Females only: Children born to women who regularly take controlled substances are likely to have physical problems and suffer withdrawal symptoms at birth. If I am of child-bearing age, I understand that I should use safe and effective birth control while taking any controlled substances to avoid the impact of medications on an unborn fetus or  child. I agree to notify my provider immediately if I should become pregnant so that my treatment plan can be adjusted. 7. Males only: I understand that chronic use of controlled substances has been associated with low testosterone levels in males which may affect my mood, stamina, sexual desire, and general health. I understand that my provider may order the appropriate laboratory test to determine my testosterone level,and I agree to this testing. ADHERENCE: 
 
1. I understand that if I do not adhere to this Agreement in any way, my provider may change my prescriptions, stop prescribing controlled substances or end our provider-patient relationship. 2. If my provider decides to stop prescribing medication, or decides to end our provider-patient relationship,my provider may require that I taper my medications slowly. If necessary, my provider may also provide a prescription for other medications to treat my withdrawal symptoms. UNDERSTANDING THIS AGREEMENT: 
 
I understand that my provider may adjust or stop my prescriptions for controlled substances based on my medical condition and my treatment plan. I understand that this Agreement does not guarantee that I will be prescribed medications or controlled substances. I understand that controlled substances may be just one part 
of my treatment plan. My initial on each page and my signature below shows that I have read each page of this Agreement, I have had an opportunity to ask questions, and all of my questions have been answered to my satisfaction by my provider. By signing below, I agree to comply with this Agreement, and I understand that if I do not follow the Agreements listed above, my provider may stop 
 
_________________________________________  Date/Time 1/15/2020 3:27 PM   
             (Patient Signature) 
 
________________________________________    Date/Time 1/15/2020 3:27 PM 
 (Parent or Guardian Signature if <18 yrs) 
 
_________________________________________ Date/Time 1/15/2020 3:27 PM 
 (Provider Signature)

## 2020-01-15 NOTE — PROGRESS NOTES
Ramiro Real is a 47 y.o. male  HIPAA verified by two patient identifiers. Health Maintenance Due   Topic    Pneumococcal 0-64 years (1 of 1 - PPSV23)    DTaP/Tdap/Td series (1 - Tdap)    Shingrix Vaccine Age 50> (1 of 2)    FOBT Q 1 YEAR AGE 54-65     Influenza Age 5 to Adult      Chief Complaint   Patient presents with   [facility] Patient     Visit Vitals  /72 (BP 1 Location: Left arm, BP Patient Position: Sitting)   Pulse 72   Temp 97.4 °F (36.3 °C) (Oral)   Resp 18   Ht 6' 3\" (1.905 m)   Wt 300 lb (136.1 kg)   SpO2 95%   BMI 37.50 kg/m²       Pain Scale: 10 - Worst pain ever/10  Pain Location: Knee (left)  1. Have you been to the ER, urgent care clinic since your last visit? Hospitalized since your last visit? No    2. Have you seen or consulted any other health care providers outside of the [address] [place] [address] since your last visit? Include any pap smears or colon screening.  No

## 2020-01-20 DIAGNOSIS — J42 CHRONIC BRONCHITIS, UNSPECIFIED CHRONIC BRONCHITIS TYPE (HCC): ICD-10-CM

## 2020-01-20 DIAGNOSIS — R06.2 WHEEZING: Primary | ICD-10-CM

## 2020-01-21 LAB — DRUGS UR: NORMAL

## 2020-01-27 ENCOUNTER — APPOINTMENT (OUTPATIENT)
Dept: GENERAL RADIOLOGY | Age: 55
End: 2020-01-27
Attending: EMERGENCY MEDICINE
Payer: MEDICAID

## 2020-01-27 ENCOUNTER — HOSPITAL ENCOUNTER (EMERGENCY)
Age: 55
Discharge: HOME | End: 2020-01-27
Attending: EMERGENCY MEDICINE
Payer: MEDICAID

## 2020-01-27 VITALS
SYSTOLIC BLOOD PRESSURE: 151 MMHG | BODY MASS INDEX: 37.44 KG/M2 | HEART RATE: 85 BPM | TEMPERATURE: 97.5 F | RESPIRATION RATE: 13 BRPM | WEIGHT: 301.15 LBS | DIASTOLIC BLOOD PRESSURE: 92 MMHG | HEIGHT: 75 IN | OXYGEN SATURATION: 93 %

## 2020-01-27 DIAGNOSIS — R06.02 SOB (SHORTNESS OF BREATH): Primary | ICD-10-CM

## 2020-01-27 DIAGNOSIS — R06.2 WHEEZING: ICD-10-CM

## 2020-01-27 DIAGNOSIS — R60.0 LOWER EXTREMITY EDEMA: ICD-10-CM

## 2020-01-27 LAB
ALBUMIN SERPL-MCNC: 3.5 G/DL (ref 3.5–5)
ALBUMIN/GLOB SERPL: 1.1 {RATIO} (ref 1.1–2.2)
ALP SERPL-CCNC: 70 U/L (ref 45–117)
ALT SERPL-CCNC: 26 U/L (ref 12–78)
ANION GAP SERPL CALC-SCNC: 3 MMOL/L (ref 5–15)
AST SERPL-CCNC: 20 U/L (ref 15–37)
BASOPHILS # BLD: 0 K/UL (ref 0–0.1)
BASOPHILS NFR BLD: 0 % (ref 0–1)
BILIRUB SERPL-MCNC: 0.4 MG/DL (ref 0.2–1)
BNP SERPL-MCNC: 117 PG/ML
BUN SERPL-MCNC: 7 MG/DL (ref 6–20)
BUN/CREAT SERPL: 8 (ref 12–20)
CALCIUM SERPL-MCNC: 9.4 MG/DL (ref 8.5–10.1)
CHLORIDE SERPL-SCNC: 106 MMOL/L (ref 97–108)
CO2 SERPL-SCNC: 33 MMOL/L (ref 21–32)
CREAT SERPL-MCNC: 0.9 MG/DL (ref 0.7–1.3)
DIFFERENTIAL METHOD BLD: NORMAL
EOSINOPHIL # BLD: 0.3 K/UL (ref 0–0.4)
EOSINOPHIL NFR BLD: 5 % (ref 0–7)
ERYTHROCYTE [DISTWIDTH] IN BLOOD BY AUTOMATED COUNT: 13.2 % (ref 11.5–14.5)
GLOBULIN SER CALC-MCNC: 3.1 G/DL (ref 2–4)
GLUCOSE SERPL-MCNC: 88 MG/DL (ref 65–100)
HCT VFR BLD AUTO: 42.7 % (ref 36.6–50.3)
HGB BLD-MCNC: 14 G/DL (ref 12.1–17)
IMM GRANULOCYTES # BLD AUTO: 0 K/UL (ref 0–0.04)
IMM GRANULOCYTES NFR BLD AUTO: 0 % (ref 0–0.5)
LYMPHOCYTES # BLD: 1.7 K/UL (ref 0.8–3.5)
LYMPHOCYTES NFR BLD: 25 % (ref 12–49)
MCH RBC QN AUTO: 28.2 PG (ref 26–34)
MCHC RBC AUTO-ENTMCNC: 32.8 G/DL (ref 30–36.5)
MCV RBC AUTO: 86.1 FL (ref 80–99)
MONOCYTES # BLD: 0.6 K/UL (ref 0–1)
MONOCYTES NFR BLD: 8 % (ref 5–13)
NEUTS SEG # BLD: 4.2 K/UL (ref 1.8–8)
NEUTS SEG NFR BLD: 62 % (ref 32–75)
NRBC # BLD: 0 K/UL (ref 0–0.01)
NRBC BLD-RTO: 0 PER 100 WBC
PLATELET # BLD AUTO: 201 K/UL (ref 150–400)
PMV BLD AUTO: 10.8 FL (ref 8.9–12.9)
POTASSIUM SERPL-SCNC: 4.3 MMOL/L (ref 3.5–5.1)
PROT SERPL-MCNC: 6.6 G/DL (ref 6.4–8.2)
RBC # BLD AUTO: 4.96 M/UL (ref 4.1–5.7)
SODIUM SERPL-SCNC: 142 MMOL/L (ref 136–145)
TROPONIN I SERPL-MCNC: <0.05 NG/ML
WBC # BLD AUTO: 6.8 K/UL (ref 4.1–11.1)

## 2020-01-27 PROCEDURE — 74011250636 HC RX REV CODE- 250/636: Performed by: EMERGENCY MEDICINE

## 2020-01-27 PROCEDURE — 94640 AIRWAY INHALATION TREATMENT: CPT

## 2020-01-27 PROCEDURE — 84484 ASSAY OF TROPONIN QUANT: CPT

## 2020-01-27 PROCEDURE — 36415 COLL VENOUS BLD VENIPUNCTURE: CPT

## 2020-01-27 PROCEDURE — 99284 EMERGENCY DEPT VISIT MOD MDM: CPT

## 2020-01-27 PROCEDURE — 74011000250 HC RX REV CODE- 250: Performed by: EMERGENCY MEDICINE

## 2020-01-27 PROCEDURE — 71046 X-RAY EXAM CHEST 2 VIEWS: CPT

## 2020-01-27 PROCEDURE — 96375 TX/PRO/DX INJ NEW DRUG ADDON: CPT

## 2020-01-27 PROCEDURE — 80053 COMPREHEN METABOLIC PANEL: CPT

## 2020-01-27 PROCEDURE — 85025 COMPLETE CBC W/AUTO DIFF WBC: CPT

## 2020-01-27 PROCEDURE — 96374 THER/PROPH/DIAG INJ IV PUSH: CPT

## 2020-01-27 PROCEDURE — 83880 ASSAY OF NATRIURETIC PEPTIDE: CPT

## 2020-01-27 PROCEDURE — 93005 ELECTROCARDIOGRAM TRACING: CPT

## 2020-01-27 RX ORDER — IPRATROPIUM BROMIDE AND ALBUTEROL SULFATE 2.5; .5 MG/3ML; MG/3ML
3 SOLUTION RESPIRATORY (INHALATION)
Qty: 90 NEBULE | Refills: 0 | Status: SHIPPED | OUTPATIENT
Start: 2020-01-27 | End: 2020-04-15 | Stop reason: SDUPTHER

## 2020-01-27 RX ORDER — ALBUTEROL SULFATE 2.5 MG/.5ML
10 SOLUTION RESPIRATORY (INHALATION) CONTINUOUS
Status: DISCONTINUED | OUTPATIENT
Start: 2020-01-27 | End: 2020-01-27 | Stop reason: HOSPADM

## 2020-01-27 RX ORDER — IPRATROPIUM BROMIDE 0.5 MG/2.5ML
0.5 SOLUTION RESPIRATORY (INHALATION)
Status: COMPLETED | OUTPATIENT
Start: 2020-01-27 | End: 2020-01-27

## 2020-01-27 RX ORDER — FUROSEMIDE 10 MG/ML
20 INJECTION INTRAMUSCULAR; INTRAVENOUS ONCE
Status: COMPLETED | OUTPATIENT
Start: 2020-01-27 | End: 2020-01-27

## 2020-01-27 RX ADMIN — METHYLPREDNISOLONE SODIUM SUCCINATE 125 MG: 125 INJECTION, POWDER, FOR SOLUTION INTRAMUSCULAR; INTRAVENOUS at 19:07

## 2020-01-27 RX ADMIN — IPRATROPIUM BROMIDE 0.5 MG: 0.5 SOLUTION RESPIRATORY (INHALATION) at 19:07

## 2020-01-27 RX ADMIN — ALBUTEROL SULFATE 10 MG: 2.5 SOLUTION RESPIRATORY (INHALATION) at 19:26

## 2020-01-27 RX ADMIN — FUROSEMIDE 20 MG: 10 INJECTION, SOLUTION INTRAMUSCULAR; INTRAVENOUS at 19:06

## 2020-01-27 NOTE — ED PROVIDER NOTES
EMERGENCY DEPARTMENT HISTORY AND PHYSICAL EXAM      Date: 1/27/2020  Patient Name: Kristie Guardado    History of Presenting Illness     Chief Complaint   Patient presents with    Shortness of Breath     reports wheezing, cough, SOB on exertion for several days    Leg Swelling     BLE swelling x3-4 days. taking diuretic but reports it is not causing him to urinate as much as usual       History Provided By: Patient    HPI: Kristie Guardado, 47 y.o. male  With h/o arthritis, chronic back pain, HTN, anxiety, asthma presents to the ED with cc of bilateral lower extremity swelling as well as shortness of breath. Symptoms have been present for the past 1 to 2 weeks. He is on Lasix 20 mg daily however he states that he has not had any increased urination and feels that he continues to have bilateral lower leg swelling. He reports weight gain from 240 to 300 pounds since he started taking prednisone a few months ago. He does endorse mild orthopnea, intermittent dyspnea on exertion. He was experiencing some shortness of breath and wheezing earlier today but states that his symptoms are very intermittent. Denies any chest pain, chest tightness. Denies any nausea, vomiting, abdominal pain, fevers, chills, cough. Former smoker however his wife still smokes. There are no other complaints, changes, or physical findings at this time. PCP: Geena Hinton MD    No current facility-administered medications on file prior to encounter. Current Outpatient Medications on File Prior to Encounter   Medication Sig Dispense Refill    Nebulizer Accessories kit Nebulizer cup w/ tubing; use every 4 hrs prn wheezing 1 Kit 5    cyclobenzaprine (FLEXERIL) 10 mg tablet Take 1 Tab by mouth nightly. 30 Tab 0    clonazePAM (KLONOPIN) 1 mg tablet Take 1 Tab by mouth two (2) times daily as needed (Anxiety).  Max Daily Amount: 2 mg. 60 Tab 2    lidocaine (LIDODERM) 5 % Apply patch to the affected area for 12 hours a day and remove for 12 hours a day. 30 Each 0    cloNIDine HCL (CATAPRES) 0.2 mg tablet Take 1 Tab by mouth two (2) times a day. 180 Tab 0    fluticasone propionate (FLONASE ALLERGY RELIEF) 50 mcg/actuation nasal spray 2 Sprays by Both Nostrils route daily.  SPIRIVA WITH HANDIHALER 18 mcg inhalation capsule       tadalafil (CIALIS) 5 mg tablet Take 1 Tab by mouth daily. 30 Tab 0    naloxone (NARCAN) 4 mg/actuation nasal spray Use 1 spray intranasally, then discard. Repeat with new spray every 2 min as needed for opioid overdose symptoms, alternating nostrils. 2 Each 0    lisinopril (PRINIVIL, ZESTRIL) 20 mg tablet Take 1 Tab by mouth daily. 90 Tab 0    pantoprazole (PROTONIX) 40 mg tablet Take 1 Tab by mouth daily. 90 Tab 0    albuterol (PROVENTIL VENTOLIN) 2.5 mg /3 mL (0.083 %) nebu 3 mL by Nebulization route every four (4) hours as needed for Wheezing. 90 Nebule 0    dextromethorphan-guaiFENesin (ROBITUSSIN COUGH-CHEST DAVID DM) 5-100 mg/5 mL liqd Take 10 mL by mouth every four (4) hours as needed for Cough. 1 Bottle 0    cetirizine (ZYRTEC) 10 mg tablet Take 1 Tab by mouth daily. 20 Tab 0    methadone (DOLOPHINE) 10 mg tablet Take  by mouth.  VENTOLIN HFA 90 mcg/actuation inhaler Take 2 Puffs by inhalation every four (4) hours as needed for Wheezing or Shortness of Breath. Indications: bronchospasm prevention 3 Inhaler 3    furosemide (LASIX) 20 mg tablet Take 2 pills PO at HS for 5 days, then one pill each am. 60 Tab 1    amLODIPine (NORVASC) 5 mg tablet Take 1 Tab by mouth daily. Indications: high blood pressure 90 Tab 3    diclofenac EC (VOLTAREN) 50 mg EC tablet Take 1 Tab by mouth two (2) times a day. 60 Tab 1    tadalafil (CIALIS) 20 mg tablet Take 1 Tab by mouth as needed (erectile dysfunction). 20 Tab 3    polyethylene glycol (MIRALAX) 17 gram packet Take 1 Packet by mouth daily. 30 Packet 11    guaiFENesin ER (MUCINEX) 600 mg ER tablet Take 1 Tab by mouth every twelve (12) hours.  15 Tab 0    montelukast (SINGULAIR) 10 mg tablet Take 10 mg by mouth daily.  aspirin 81 mg tablet Take 1 Tab by mouth daily. 30 Tab 1       Past History     Past Medical History:  Past Medical History:   Diagnosis Date    Arthritis     Asthma     Chronic low back pain     Hypertension        Past Surgical History:  Past Surgical History:   Procedure Laterality Date    HX ORTHOPAEDIC         Family History:  Family History   Problem Relation Age of Onset    Cancer Mother     Heart Disease Father        Social History:  Social History     Tobacco Use    Smoking status: Current Some Day Smoker     Packs/day: 0.25     Years: 10.00     Pack years: 2.50    Smokeless tobacco: Never Used   Substance Use Topics    Alcohol use: Yes     Comment: occ    Drug use: No       Allergies: Allergies   Allergen Reactions    Vicodin [Hydrocodone-Acetaminophen] Other (comments)     Headache         Review of Systems   Review of Systems   Constitutional: Negative for chills and fever. HENT: Negative. Eyes: Negative for visual disturbance. Respiratory: Positive for shortness of breath and wheezing. Negative for cough. Cardiovascular: Positive for leg swelling. Negative for chest pain. Gastrointestinal: Negative for abdominal pain, nausea and vomiting. Genitourinary: Negative. Musculoskeletal: Negative for back pain and gait problem. Skin: Negative for color change and rash. Neurological: Negative for dizziness, weakness, light-headedness and headaches. Hematological: Does not bruise/bleed easily. All other systems reviewed and are negative. Physical Exam   Physical Exam  Vitals signs and nursing note reviewed. Constitutional:       General: He is not in acute distress. Appearance: Normal appearance. He is obese. He is not ill-appearing, toxic-appearing or diaphoretic. HENT:      Head: Normocephalic and atraumatic. Neck:      Musculoskeletal: Normal range of motion.       Vascular: No JVD.   Cardiovascular:      Rate and Rhythm: Normal rate and regular rhythm. Heart sounds: Normal heart sounds. No murmur. Pulmonary:      Effort: Pulmonary effort is normal. No respiratory distress. Breath sounds: Wheezing present. Comments: No acute respiratory distress but does have prolonged expiratory phase with diffuse expiratory wheezes throughout all lung fields  Abdominal:      Palpations: Abdomen is soft. Tenderness: There is no abdominal tenderness. There is no guarding or rebound. Musculoskeletal: Normal range of motion. Right lower leg: Edema present. Left lower leg: Edema present. Comments: 1+ bilateral pitting edema   Skin:     General: Skin is warm and dry. Findings: No erythema or rash. Neurological:      General: No focal deficit present. Mental Status: He is alert and oriented to person, place, and time.          Diagnostic Study Results     Labs -     Recent Results (from the past 12 hour(s))   EKG, 12 LEAD, INITIAL    Collection Time: 01/27/20  4:43 PM   Result Value Ref Range    Ventricular Rate 82 BPM    Atrial Rate 82 BPM    P-R Interval 148 ms    QRS Duration 82 ms    Q-T Interval 398 ms    QTC Calculation (Bezet) 464 ms    Calculated P Axis 55 degrees    Calculated R Axis 19 degrees    Calculated T Axis 7 degrees    Diagnosis       Normal sinus rhythm  Normal ECG  When compared with ECG of 06-JAN-2020 13:00,  No significant change was found     CBC WITH AUTOMATED DIFF    Collection Time: 01/27/20  6:09 PM   Result Value Ref Range    WBC 6.8 4.1 - 11.1 K/uL    RBC 4.96 4.10 - 5.70 M/uL    HGB 14.0 12.1 - 17.0 g/dL    HCT 42.7 36.6 - 50.3 %    MCV 86.1 80.0 - 99.0 FL    MCH 28.2 26.0 - 34.0 PG    MCHC 32.8 30.0 - 36.5 g/dL    RDW 13.2 11.5 - 14.5 %    PLATELET 113 050 - 234 K/uL    MPV 10.8 8.9 - 12.9 FL    NRBC 0.0 0  WBC    ABSOLUTE NRBC 0.00 0.00 - 0.01 K/uL    NEUTROPHILS 62 32 - 75 %    LYMPHOCYTES 25 12 - 49 %    MONOCYTES 8 5 - 13 %    EOSINOPHILS 5 0 - 7 %    BASOPHILS 0 0 - 1 %    IMMATURE GRANULOCYTES 0 0.0 - 0.5 %    ABS. NEUTROPHILS 4.2 1.8 - 8.0 K/UL    ABS. LYMPHOCYTES 1.7 0.8 - 3.5 K/UL    ABS. MONOCYTES 0.6 0.0 - 1.0 K/UL    ABS. EOSINOPHILS 0.3 0.0 - 0.4 K/UL    ABS. BASOPHILS 0.0 0.0 - 0.1 K/UL    ABS. IMM. GRANS. 0.0 0.00 - 0.04 K/UL    DF AUTOMATED     METABOLIC PANEL, COMPREHENSIVE    Collection Time: 01/27/20  6:09 PM   Result Value Ref Range    Sodium 142 136 - 145 mmol/L    Potassium 4.3 3.5 - 5.1 mmol/L    Chloride 106 97 - 108 mmol/L    CO2 33 (H) 21 - 32 mmol/L    Anion gap 3 (L) 5 - 15 mmol/L    Glucose 88 65 - 100 mg/dL    BUN 7 6 - 20 MG/DL    Creatinine 0.90 0.70 - 1.30 MG/DL    BUN/Creatinine ratio 8 (L) 12 - 20      GFR est AA >60 >60 ml/min/1.73m2    GFR est non-AA >60 >60 ml/min/1.73m2    Calcium 9.4 8.5 - 10.1 MG/DL    Bilirubin, total 0.4 0.2 - 1.0 MG/DL    ALT (SGPT) 26 12 - 78 U/L    AST (SGOT) 20 15 - 37 U/L    Alk. phosphatase 70 45 - 117 U/L    Protein, total 6.6 6.4 - 8.2 g/dL    Albumin 3.5 3.5 - 5.0 g/dL    Globulin 3.1 2.0 - 4.0 g/dL    A-G Ratio 1.1 1.1 - 2.2     NT-PRO BNP    Collection Time: 01/27/20  6:09 PM   Result Value Ref Range    NT pro- <125 PG/ML   TROPONIN I    Collection Time: 01/27/20  6:09 PM   Result Value Ref Range    Troponin-I, Qt. <0.05 <0.05 ng/mL       Radiologic Studies -   XR CHEST PA LAT   Final Result   IMPRESSION: No acute process seen        CT Results  (Last 48 hours)    None        CXR Results  (Last 48 hours)               01/27/20 1734  XR CHEST PA LAT Final result    Impression:  IMPRESSION: No acute process seen       Narrative:  EXAM: XR CHEST PA LAT       INDICATION: SOB, peripheral edema       COMPARISON: 1/6/2020. FINDINGS: PA and lateral radiographs of the chest demonstrate clear lungs. The   cardiac and mediastinal contours and pulmonary vascularity are normal. DISH is   noted.                   Medical Decision Making   I am the first provider for this patient. I reviewed the vital signs, available nursing notes, past medical history, past surgical history, family history and social history. Vital Signs-Reviewed the patient's vital signs. Patient Vitals for the past 12 hrs:   Temp Pulse Resp BP SpO2   01/27/20 2002 97.5 °F (36.4 °C) 85 13 (!) 151/92 93 %   01/27/20 1926  81  (!) 159/97    01/27/20 1915  77 14  99 %   01/27/20 1906  (!) 101      01/27/20 1900  80 11 110/79 95 %   01/27/20 1845  78 16 176/87 94 %   01/27/20 1830  80 15 141/88 94 %   01/27/20 1815     96 %   01/27/20 1815  80 11 147/90 95 %   01/27/20 1813   11  95 %   01/27/20 1812    (!) 146/92    01/27/20 1636 98.1 °F (36.7 °C) 85 22 (!) 180/95 99 %     Records Reviewed: Nursing Notes, Old Medical Records, Previous Radiology Studies and Previous Laboratory Studies    Provider Notes (Medical Decision Making): This is a 51-year-old male here with shortness of breath and bilateral lower leg swelling. Patient treated with 20 mg IV Lasix push, 10 mg albuterol, ipratropium, and Solu-Medrol. On reexamination he no longer has wheezing. Feels much improved. He does not have any hypoxia or increased work of breathing in the ED. He does have 1+ bilateral pain edema. However proBNP is within normal limits, troponin negative. Chest x-ray does not show any sign of pulmonary edema. Patient does not have any increased work of breathing or hypoxia with ambulation. Feels improved on my reexamination and he is encouraged to follow-up closely with his PCP. Agrees plan as above and given strict return precautions. Discharged home stable condition. ED Course:   Initial assessment performed. The patients presenting problems have been discussed, and they are in agreement with the care plan formulated and outlined with them. I have encouraged them to ask questions as they arise throughout their visit.     ED Course as of Jan 28 0013   Mon Jan 27, 2020   1805 EKG per my interpretation normal sinus rhythm, rate 82 bpm, normal axis, no acute ischemic changes. [AK]      ED Course User Index  [AK] Armani Melgoza MD     Discharge Note:  The patient has been re-evaluated and is ready for discharge. Reviewed available results with patient. Counseled patient on diagnosis and care plan. Patient has expressed understanding, and all questions have been answered. Patient agrees with plan and agrees to follow up as recommended, or to return to the ED if their symptoms worsen. Discharge instructions have been provided and explained to the patient, along with reasons to return to the ED. Critical Care Time:   0    Disposition:  Home    PLAN:  1. Discharge Medication List as of 1/27/2020  8:13 PM      CONTINUE these medications which have CHANGED    Details   albuterol-ipratropium (DUO-NEB) 2.5 mg-0.5 mg/3 ml nebu 3 mL by Nebulization route every four (4) hours as needed for Wheezing., Print, Disp-90 Nebule, R-0         CONTINUE these medications which have NOT CHANGED    Details   Nebulizer Accessories kit Nebulizer cup w/ tubing; use every 4 hrs prn wheezing, Normal, Disp-1 Kit, R-5      cyclobenzaprine (FLEXERIL) 10 mg tablet Take 1 Tab by mouth nightly., Normal, Disp-30 Tab, R-0      clonazePAM (KLONOPIN) 1 mg tablet Take 1 Tab by mouth two (2) times daily as needed (Anxiety). Max Daily Amount: 2 mg., Normal, Disp-60 Tab, R-2      lidocaine (LIDODERM) 5 % Apply patch to the affected area for 12 hours a day and remove for 12 hours a day., Normal, Disp-30 Each, R-0      cloNIDine HCL (CATAPRES) 0.2 mg tablet Take 1 Tab by mouth two (2) times a day., Normal, Disp-180 Tab, R-0      fluticasone propionate (FLONASE ALLERGY RELIEF) 50 mcg/actuation nasal spray 2 Sprays by Both Nostrils route daily. , Historical Med      SPIRIVA WITH HANDIHALER 18 mcg inhalation capsule Historical Med, NIKO      !! tadalafil (CIALIS) 5 mg tablet Take 1 Tab by mouth daily. , Normal, Disp-30 Tab, R-0 naloxone (NARCAN) 4 mg/actuation nasal spray Use 1 spray intranasally, then discard. Repeat with new spray every 2 min as needed for opioid overdose symptoms, alternating nostrils. , Normal, Disp-2 Each, R-0      lisinopril (PRINIVIL, ZESTRIL) 20 mg tablet Take 1 Tab by mouth daily. , Normal, Disp-90 Tab, R-0Please cancel earlier script w/ 3 refills. Thanks. pantoprazole (PROTONIX) 40 mg tablet Take 1 Tab by mouth daily. , Normal, Disp-90 Tab, R-0      albuterol (PROVENTIL VENTOLIN) 2.5 mg /3 mL (0.083 %) nebu 3 mL by Nebulization route every four (4) hours as needed for Wheezing., Normal, Disp-90 Nebule, R-0      dextromethorphan-guaiFENesin (ROBITUSSIN COUGH-CHEST DAVID DM) 5-100 mg/5 mL liqd Take 10 mL by mouth every four (4) hours as needed for Cough., Normal, Disp-1 Bottle, R-0      cetirizine (ZYRTEC) 10 mg tablet Take 1 Tab by mouth daily. , Normal, Disp-20 Tab, R-0      methadone (DOLOPHINE) 10 mg tablet Take  by mouth., Historical Med      VENTOLIN HFA 90 mcg/actuation inhaler Take 2 Puffs by inhalation every four (4) hours as needed for Wheezing or Shortness of Breath. Indications: bronchospasm prevention, Normal, Disp-3 Inhaler, R-3, NIKO      furosemide (LASIX) 20 mg tablet Take 2 pills PO at HS for 5 days, then one pill each am., Normal, Disp-60 Tab, R-1      amLODIPine (NORVASC) 5 mg tablet Take 1 Tab by mouth daily. Indications: high blood pressure, Normal, Disp-90 Tab, R-3      diclofenac EC (VOLTAREN) 50 mg EC tablet Take 1 Tab by mouth two (2) times a day., Print, Disp-60 Tab, R-1      !! tadalafil (CIALIS) 20 mg tablet Take 1 Tab by mouth as needed (erectile dysfunction). , Normal, Disp-20 Tab, R-3      polyethylene glycol (MIRALAX) 17 gram packet Take 1 Packet by mouth daily. , Normal, Disp-30 Packet, R-11      guaiFENesin ER (MUCINEX) 600 mg ER tablet Take 1 Tab by mouth every twelve (12) hours. , Print, Disp-14 Tab, R-0      montelukast (SINGULAIR) 10 mg tablet Take 10 mg by mouth daily. , Historical Med      aspirin 81 mg tablet Take 1 Tab by mouth daily. , Print, Disp-30 Tab, R-1       !! - Potential duplicate medications found. Please discuss with provider. 2.   Follow-up Information     Follow up With Specialties Details Why Contact Info    ReadLaney MD [facility] Schedule an appointment as soon as possible for a visit  for evaluation of your breathing [address]  [phone]          Return to ED if worse     Diagnosis     Clinical Impression:   1. SOB (shortness of breath)    2. Wheezing    3. Lower extremity edema        Attestations:    Sandip Grigsby MD    Please note that this dictation was completed with [employer], the computer voice recognition software. Quite often unanticipated grammatical, syntax, homophones, and other interpretive errors are inadvertently transcribed by the computer software. Please disregard these errors. Please excuse any errors that have escaped final proofreading. Thank you.

## 2020-01-28 ENCOUNTER — TELEPHONE (OUTPATIENT)
Dept: FAMILY MEDICINE CLINIC | Age: 55
End: 2020-01-28

## 2020-01-28 LAB
ATRIAL RATE: 82 BPM
CALCULATED P AXIS, ECG09: 55 DEGREES
CALCULATED R AXIS, ECG10: 19 DEGREES
CALCULATED T AXIS, ECG11: 7 DEGREES
DIAGNOSIS, 93000: NORMAL
P-R INTERVAL, ECG05: 148 MS
Q-T INTERVAL, ECG07: 398 MS
QRS DURATION, ECG06: 82 MS
QTC CALCULATION (BEZET), ECG08: 464 MS
VENTRICULAR RATE, ECG03: 82 BPM

## 2020-01-28 NOTE — TELEPHONE ENCOUNTER
----- Message from Caroline Levy sent at 1/28/2020 12:17 PM EST -----  Regarding: Dr. Yarelis Ann first and last name: Mike Jeffry. Reason for call:  needs referral for specialist to take fluid out of the knee  Callback required yes/no and why: yes  Best contact number(s): [phone]  Details to clarify the request: needs referral for someone to take excessive fluid out of his left knee. Pt wants a call back.

## 2020-01-28 NOTE — TELEPHONE ENCOUNTER
----- Message from Anuel Young sent at 1/28/2020 12:17 PM EST -----  Regarding: Dr. Danielle Gregorio first and last name: Kaylah May. Reason for call:  needs referral for specialist to take fluid out of the knee  Callback required yes/no and why: yes  Best contact number(s): [phone]  Details to clarify the request: needs referral for someone to take excessive fluid out of his left knee. Pt wants a call back.

## 2020-01-28 NOTE — TELEPHONE ENCOUNTER
Follow up visit scheduled:       Date/time Monday, February 03, 2020 01:15 PM  Patient Joao Mendez [id number] ([id number] M) #[id number]  Department [facility]MAIN OFFICE-PCP  Appointment type Acute Care  Provider CAMMIE WU

## 2020-01-28 NOTE — DISCHARGE INSTRUCTIONS
You were evaluated in the emergency department for shortness of breath and leg swelling. Your examination was reassuring as was your work-up including blood work, ekg, and chest xray. It will be important for you to follow-up with your primary care physician in 2-5 days. If you develop worsening symptoms such as chest pain or shortness of breath, please return to the emergency department immediately.

## 2020-01-28 NOTE — ED NOTES
Bedside and Verbal shift change report given to 1161 Lance Stewart (oncoming nurse) by Jacey Giraldo RN (offgoing nurse). Report included the following information SBAR, Kardex, ED Summary, Procedure Summary, MAR and Recent Results.

## 2020-01-28 NOTE — TELEPHONE ENCOUNTER
----- Message from Johnnyjonathan Mitchell sent at 1/28/2020 12:17 PM EST -----  Regarding: Dr. Terrence Gloria (if not patient):  Relationship of caller (if not patient):  Best contact number(s):[phone]  Name of medication and dosage if known:  cilalis, 20 mg  Is patient out of this medication (yes/no): yes  Pharmacy name: [id number] listed in chart? (yes/no): yes  Pharmacy phone number: on file  Date of last visit: 1/15/2020  Details to clarify the request: needs prior authorization for the meds. W/o it, he will only get three pills at a time from the pharmacy. Call him back.

## 2020-01-28 NOTE — TELEPHONE ENCOUNTER
[facility] last night. The patient said they gave him breathing treatments and he is feeling better now. The patient had some questions about some medications that he is taking also because he was concerned that his insurance would not cover them. The patient will like a phone call back about his medications.  [phone]

## 2020-02-07 ENCOUNTER — TELEPHONE (OUTPATIENT)
Dept: FAMILY MEDICINE CLINIC | Age: 55
End: 2020-02-07

## 2020-02-07 NOTE — TELEPHONE ENCOUNTER
Patient came into the office stating that he needed a referral for ortho, need fluid drawn from his L knee.   P: [phone]

## 2020-02-11 ENCOUNTER — OFFICE VISIT (OUTPATIENT)
Dept: FAMILY MEDICINE CLINIC | Age: 55
End: 2020-02-11

## 2020-02-11 VITALS
HEART RATE: 97 BPM | SYSTOLIC BLOOD PRESSURE: 140 MMHG | WEIGHT: 308 LBS | TEMPERATURE: 97.6 F | RESPIRATION RATE: 18 BRPM | DIASTOLIC BLOOD PRESSURE: 90 MMHG | HEIGHT: 75 IN | OXYGEN SATURATION: 95 % | BODY MASS INDEX: 38.3 KG/M2

## 2020-02-11 DIAGNOSIS — R60.9 EDEMA, UNSPECIFIED TYPE: ICD-10-CM

## 2020-02-11 DIAGNOSIS — J45.51 SEVERE PERSISTENT ASTHMA WITH ACUTE EXACERBATION: Primary | ICD-10-CM

## 2020-02-11 DIAGNOSIS — M25.462 FLUID IN LEFT KNEE JOINT: ICD-10-CM

## 2020-02-11 DIAGNOSIS — G89.29 CHRONIC LEFT-SIDED LOW BACK PAIN WITHOUT SCIATICA: ICD-10-CM

## 2020-02-11 DIAGNOSIS — N52.9 ERECTILE DYSFUNCTION, UNSPECIFIED ERECTILE DYSFUNCTION TYPE: ICD-10-CM

## 2020-02-11 DIAGNOSIS — M54.50 CHRONIC LEFT-SIDED LOW BACK PAIN WITHOUT SCIATICA: ICD-10-CM

## 2020-02-11 RX ORDER — CYCLOBENZAPRINE HCL 10 MG
10 TABLET ORAL
Qty: 30 TAB | Refills: 0 | Status: SHIPPED | OUTPATIENT
Start: 2020-02-11 | End: 2020-04-21 | Stop reason: SDUPTHER

## 2020-02-11 RX ORDER — FLUTICASONE PROPIONATE 220 UG/1
1-2 AEROSOL, METERED RESPIRATORY (INHALATION) 2 TIMES DAILY
Qty: 1 INHALER | Refills: 0 | Status: SHIPPED | OUTPATIENT
Start: 2020-02-11 | End: 2020-04-06 | Stop reason: SDUPTHER

## 2020-02-11 RX ORDER — FUROSEMIDE 20 MG/1
60 TABLET ORAL DAILY
Qty: 90 TAB | Refills: 1 | Status: SHIPPED | OUTPATIENT
Start: 2020-02-11 | End: 2020-03-04 | Stop reason: DRUGHIGH

## 2020-02-11 RX ORDER — TADALAFIL 5 MG/1
5 TABLET ORAL DAILY
Qty: 30 TAB | Refills: 0 | Status: SHIPPED | OUTPATIENT
Start: 2020-02-11 | End: 2020-03-02

## 2020-02-11 NOTE — PROGRESS NOTES
Tracey Cobb is a 47 y.o. male  HIPAA verified by two patient identifiers. Health Maintenance Due   Topic    Pneumococcal 0-64 years (1 of 1 - PPSV23)    DTaP/Tdap/Td series (1 - Tdap)    Shingrix Vaccine Age 50> (1 of 2)    FOBT Q1Y Age 54-65     Influenza Age 5 to Adult      Chief Complaint   Patient presents with    Follow-up     ER follow up SOB and fluid on     . Visit Vitals  /90 (BP 1 Location: Right arm, BP Patient Position: Sitting)   Pulse 97   Temp 97.6 °F (36.4 °C) (Oral)   Resp 18   Ht 6' 3\" (1.905 m)   Wt 308 lb (139.7 kg)   SpO2 95%   BMI 38.50 kg/m²       Pain Scale: 10 - Worst pain ever/10  Pain Location: Knee (shoulder,back)    1. Have you been to the ER, urgent care clinic since your last visit? Hospitalized since your last visit? No    2. Have you seen or consulted any other health care providers outside of the [address] [place] [address] since your last visit? Include any pap smears or colon screening.  No

## 2020-02-11 NOTE — PROGRESS NOTES
Here for ER f/u. Last breathing tx last night and 5 :30 this AM.  No h/o asthma. Family has asthma as children. Finished oral steroid 2 mos ago. Gained 60-70 pounds. Saw pulm 3 mos ago. Can't exercise b/o knee and back pbs. Fluid on left knee. Not using albuterol inh enough. Patient denies chest pain, unexpected pain, mood or memory changes. States pbs breathing at night. Advised to f/u with pulm. Visit Vitals  /90 (BP 1 Location: Right arm, BP Patient Position: Sitting)   Pulse 97   Temp 97.6 °F (36.4 °C) (Oral)   Resp 18   Ht 6' 3\" (1.905 m)   Wt 308 lb (139.7 kg)   SpO2 95%   BMI 38.50 kg/m²     Patient alert and cooperative. Audible exp wheezing, has not used his Ventolin today. Assessment:  1. Asthma exacerbation. Plan:  1. Script for Flovent, to use two puffs twice a day. 2. Refilled Lasix, to take up to three tabs for fluid. 3. Referred back to pulmonary. 4. Referred to orthopedics for fluid on the left knee. 5. Set up urologist referral for ED. 6. Refilled Cialis 5 mg daily. 7. Refilled his Flexeril for back symptoms. 8. Advised to use his Ventolin q 1-2 hrs as needed for wheezing.

## 2020-02-20 PROBLEM — G47.00 INSOMNIA: Status: ACTIVE | Noted: 2020-02-20

## 2020-02-20 PROBLEM — F10.10 ALCOHOL ABUSE, DAILY USE: Status: ACTIVE | Noted: 2020-02-20

## 2020-02-20 PROBLEM — F32.A DEPRESSION: Status: ACTIVE | Noted: 2020-02-20

## 2020-02-20 PROBLEM — M25.50 ARTHRALGIA: Status: ACTIVE | Noted: 2020-02-20

## 2020-02-20 PROBLEM — R60.0 EDEMA OF BOTH LEGS: Status: ACTIVE | Noted: 2020-02-20

## 2020-02-23 ENCOUNTER — HOSPITAL ENCOUNTER (EMERGENCY)
Age: 55
Discharge: HOME | End: 2020-02-24
Attending: EMERGENCY MEDICINE
Payer: MEDICAID

## 2020-02-23 DIAGNOSIS — R60.0 LOWER EXTREMITY EDEMA: Primary | ICD-10-CM

## 2020-02-23 PROCEDURE — 99283 EMERGENCY DEPT VISIT LOW MDM: CPT

## 2020-02-24 ENCOUNTER — APPOINTMENT (OUTPATIENT)
Dept: GENERAL RADIOLOGY | Age: 55
End: 2020-02-24
Attending: EMERGENCY MEDICINE
Payer: MEDICAID

## 2020-02-24 VITALS
TEMPERATURE: 97.2 F | WEIGHT: 301.81 LBS | DIASTOLIC BLOOD PRESSURE: 109 MMHG | BODY MASS INDEX: 37.53 KG/M2 | OXYGEN SATURATION: 99 % | SYSTOLIC BLOOD PRESSURE: 168 MMHG | HEIGHT: 75 IN | RESPIRATION RATE: 20 BRPM | HEART RATE: 97 BPM

## 2020-02-24 LAB
ANION GAP SERPL CALC-SCNC: 5 MMOL/L (ref 5–15)
ATRIAL RATE: 97 BPM
BASOPHILS # BLD: 0 K/UL (ref 0–0.1)
BASOPHILS NFR BLD: 0 % (ref 0–1)
BNP SERPL-MCNC: 41 PG/ML
BUN SERPL-MCNC: 8 MG/DL (ref 6–20)
BUN/CREAT SERPL: 10 (ref 12–20)
CALCIUM SERPL-MCNC: 9.1 MG/DL (ref 8.5–10.1)
CALCULATED P AXIS, ECG09: 67 DEGREES
CALCULATED R AXIS, ECG10: 40 DEGREES
CALCULATED T AXIS, ECG11: 51 DEGREES
CHLORIDE SERPL-SCNC: 108 MMOL/L (ref 97–108)
CO2 SERPL-SCNC: 29 MMOL/L (ref 21–32)
CREAT SERPL-MCNC: 0.82 MG/DL (ref 0.7–1.3)
DIAGNOSIS, 93000: NORMAL
DIFFERENTIAL METHOD BLD: NORMAL
EOSINOPHIL # BLD: 0.3 K/UL (ref 0–0.4)
EOSINOPHIL NFR BLD: 4 % (ref 0–7)
ERYTHROCYTE [DISTWIDTH] IN BLOOD BY AUTOMATED COUNT: 13.1 % (ref 11.5–14.5)
GLUCOSE SERPL-MCNC: 114 MG/DL (ref 65–100)
HCT VFR BLD AUTO: 42.3 % (ref 36.6–50.3)
HGB BLD-MCNC: 14.1 G/DL (ref 12.1–17)
IMM GRANULOCYTES # BLD AUTO: 0 K/UL (ref 0–0.04)
IMM GRANULOCYTES NFR BLD AUTO: 0 % (ref 0–0.5)
LYMPHOCYTES # BLD: 2 K/UL (ref 0.8–3.5)
LYMPHOCYTES NFR BLD: 26 % (ref 12–49)
MCH RBC QN AUTO: 28.7 PG (ref 26–34)
MCHC RBC AUTO-ENTMCNC: 33.3 G/DL (ref 30–36.5)
MCV RBC AUTO: 86 FL (ref 80–99)
MONOCYTES # BLD: 0.8 K/UL (ref 0–1)
MONOCYTES NFR BLD: 10 % (ref 5–13)
NEUTS SEG # BLD: 4.4 K/UL (ref 1.8–8)
NEUTS SEG NFR BLD: 60 % (ref 32–75)
NRBC # BLD: 0 K/UL (ref 0–0.01)
NRBC BLD-RTO: 0 PER 100 WBC
P-R INTERVAL, ECG05: 150 MS
PLATELET # BLD AUTO: 264 K/UL (ref 150–400)
PMV BLD AUTO: 10.8 FL (ref 8.9–12.9)
POTASSIUM SERPL-SCNC: 3.8 MMOL/L (ref 3.5–5.1)
Q-T INTERVAL, ECG07: 368 MS
QRS DURATION, ECG06: 82 MS
QTC CALCULATION (BEZET), ECG08: 467 MS
RBC # BLD AUTO: 4.92 M/UL (ref 4.1–5.7)
SODIUM SERPL-SCNC: 142 MMOL/L (ref 136–145)
TROPONIN I SERPL-MCNC: <0.05 NG/ML
VENTRICULAR RATE, ECG03: 97 BPM
WBC # BLD AUTO: 7.6 K/UL (ref 4.1–11.1)

## 2020-02-24 PROCEDURE — 83880 ASSAY OF NATRIURETIC PEPTIDE: CPT

## 2020-02-24 PROCEDURE — 71045 X-RAY EXAM CHEST 1 VIEW: CPT

## 2020-02-24 PROCEDURE — 84484 ASSAY OF TROPONIN QUANT: CPT

## 2020-02-24 PROCEDURE — 85025 COMPLETE CBC W/AUTO DIFF WBC: CPT

## 2020-02-24 PROCEDURE — 93005 ELECTROCARDIOGRAM TRACING: CPT

## 2020-02-24 PROCEDURE — 36415 COLL VENOUS BLD VENIPUNCTURE: CPT

## 2020-02-24 PROCEDURE — 80048 BASIC METABOLIC PNL TOTAL CA: CPT

## 2020-02-24 NOTE — ED PROVIDER NOTES
EMERGENCY DEPARTMENT HISTORY AND PHYSICAL EXAM      Date: 2/23/2020  Patient Name: Harmeet Szymanski  Patient Age and Sex: 47 y.o. male    History of Presenting Illness     Chief Complaint   Patient presents with    Leg Swelling     Patient reports that he has been seen here multiple times with EKG's done. Told to take more fluid pills but it is not working and now his knees hurt. History Provided By: Patient    HPI: Harmeet Szymanski, is a 47 y.o. male whose medical history is noted below presents to the ED with persistent lower extremity edema for which he has been seen in the emergency room here few weeks ago and then on 2/11, started on higher dose of Lasix, but states that he has not improved. The edema is equal bilaterally, comes up to than patient's knees. Mild to moderate lower extremity pain that is chronic. He has a known left knee effusion for which [facility] and which is also chronic. History of asthma, scheduled to see pulmonary for better management of it given chronic shortness of breath. His primary concern here today is no lower extremity swelling. No chest pain, no orthopnea, chronic shortness of breath that has not worsened over the past month or so    Pt denies any other alleviating or exacerbating factors. There are no other complaints, changes or physical findings at this time.      Past Medical History:   Diagnosis Date    Arthritis     Asthma     Chronic low back pain     Hypertension      Past Surgical History:   Procedure Laterality Date    HX ORTHOPAEDIC         PCP: Stephany Hinton MD    Past History   Past Medical History:  Past Medical History:   Diagnosis Date    Arthritis     Asthma     Chronic low back pain     Hypertension        Past Surgical History:  Past Surgical History:   Procedure Laterality Date    HX ORTHOPAEDIC         Family History:  Family History   Problem Relation Age of Onset    Cancer Mother     Heart Disease Father Social History:  Social History     Tobacco Use    Smoking status: Current Some Day Smoker     Packs/day: 0.25     Years: 10.00     Pack years: 2.50    Smokeless tobacco: Never Used   Substance Use Topics    Alcohol use: Yes     Comment: occ    Drug use: No       Allergies: Allergies   Allergen Reactions    Vicodin [Hydrocodone-Acetaminophen] Other (comments)     Headache       Current Medications:  No current facility-administered medications on file prior to encounter. Current Outpatient Medications on File Prior to Encounter   Medication Sig Dispense Refill    cyclobenzaprine (FLEXERIL) 10 mg tablet Take 1 Tab by mouth nightly. 30 Tab 0    dextromethorphan-guaiFENesin (ROBITUSSIN COUGH-CHEST DAVID DM) 5-100 mg/5 mL liqd Take 10 mL by mouth every four (4) hours as needed for Cough. 1 Bottle 0    fluticasone propionate (FLOVENT HFA) 220 mcg/actuation inhaler Take 1-2 Puffs by inhalation two (2) times a day. 1 Inhaler 0    furosemide (LASIX) 20 mg tablet Take 3 Tabs by mouth daily. Take 2 pills PO at HS for 5 days, then one pill each am. 90 Tab 1    tadalafil (CIALIS) 5 mg tablet Take 1 Tab by mouth daily. 30 Tab 0    albuterol-ipratropium (DUO-NEB) 2.5 mg-0.5 mg/3 ml nebu 3 mL by Nebulization route every four (4) hours as needed for Wheezing. 90 Nebule 0    Nebulizer Accessories kit Nebulizer cup w/ tubing; use every 4 hrs prn wheezing 1 Kit 5    clonazePAM (KLONOPIN) 1 mg tablet Take 1 Tab by mouth two (2) times daily as needed (Anxiety). Max Daily Amount: 2 mg. 60 Tab 2    lidocaine (LIDODERM) 5 % Apply patch to the affected area for 12 hours a day and remove for 12 hours a day. 30 Each 0    cloNIDine HCL (CATAPRES) 0.2 mg tablet Take 1 Tab by mouth two (2) times a day. 180 Tab 0    fluticasone propionate (FLONASE ALLERGY RELIEF) 50 mcg/actuation nasal spray 2 Sprays by Both Nostrils route daily.       SPIRIVA WITH HANDIHALER 18 mcg inhalation capsule       naloxone (NARCAN) 4 mg/actuation nasal spray Use 1 spray intranasally, then discard. Repeat with new spray every 2 min as needed for opioid overdose symptoms, alternating nostrils. 2 Each 0    lisinopril (PRINIVIL, ZESTRIL) 20 mg tablet Take 1 Tab by mouth daily. 90 Tab 0    pantoprazole (PROTONIX) 40 mg tablet Take 1 Tab by mouth daily. 90 Tab 0    albuterol (PROVENTIL VENTOLIN) 2.5 mg /3 mL (0.083 %) nebu 3 mL by Nebulization route every four (4) hours as needed for Wheezing. 90 Nebule 0    cetirizine (ZYRTEC) 10 mg tablet Take 1 Tab by mouth daily. 20 Tab 0    methadone (DOLOPHINE) 10 mg tablet Take  by mouth.  VENTOLIN HFA 90 mcg/actuation inhaler Take 2 Puffs by inhalation every four (4) hours as needed for Wheezing or Shortness of Breath. Indications: bronchospasm prevention 3 Inhaler 3    amLODIPine (NORVASC) 5 mg tablet Take 1 Tab by mouth daily. Indications: high blood pressure 90 Tab 3    diclofenac EC (VOLTAREN) 50 mg EC tablet Take 1 Tab by mouth two (2) times a day. 60 Tab 1    tadalafil (CIALIS) 20 mg tablet Take 1 Tab by mouth as needed (erectile dysfunction). 20 Tab 3    polyethylene glycol (MIRALAX) 17 gram packet Take 1 Packet by mouth daily. 30 Packet 11    guaiFENesin ER (MUCINEX) 600 mg ER tablet Take 1 Tab by mouth every twelve (12) hours. 14 Tab 0    montelukast (SINGULAIR) 10 mg tablet Take 10 mg by mouth daily.  aspirin 81 mg tablet Take 1 Tab by mouth daily. 30 Tab 1       Review of Systems   Review of Systems   Constitutional: Negative for appetite change, chills and fever. Respiratory: Positive for shortness of breath. Negative for cough, chest tightness and wheezing. Cardiovascular: Positive for leg swelling. Negative for chest pain and palpitations. Gastrointestinal: Negative for abdominal distention, abdominal pain, blood in stool, constipation, diarrhea, nausea and vomiting. Genitourinary: Negative for difficulty urinating, dysuria, flank pain, frequency and hematuria. Musculoskeletal: Negative for arthralgias, joint swelling, myalgias, neck pain and neck stiffness. Skin: Negative for color change. Neurological: Negative for dizziness, weakness, light-headedness, numbness and headaches. Hematological: Negative for adenopathy. All other systems reviewed and are negative. Physical Exam   Physical Exam  Vitals signs and nursing note reviewed. Constitutional:       Appearance: He is not toxic-appearing. HENT:      Mouth/Throat:      Mouth: Mucous membranes are moist.   Eyes:      General: No scleral icterus. Extraocular Movements: Extraocular movements intact. Conjunctiva/sclera: Conjunctivae normal.      Pupils: Pupils are equal, round, and reactive to light. Neck:      Musculoskeletal: Normal range of motion and neck supple. Vascular: No JVD. Cardiovascular:      Rate and Rhythm: Normal rate and regular rhythm. Pulses: Normal pulses. Heart sounds: Normal heart sounds. Pulmonary:      Effort: Pulmonary effort is normal.      Breath sounds: Normal breath sounds. Chest:      Chest wall: No tenderness. Abdominal:      General: Bowel sounds are normal. There is no distension. Palpations: Abdomen is soft. Tenderness: There is no abdominal tenderness. Musculoskeletal: Normal range of motion. General: No swelling or tenderness. Right lower leg: Edema present. Left lower leg: Edema present. Skin:     General: Skin is warm and dry. Capillary Refill: Capillary refill takes less than 2 seconds. Findings: No erythema or rash. Neurological:      General: No focal deficit present. Mental Status: Mental status is at baseline. Cranial Nerves: Cranial nerves are intact. Motor: Motor function is intact.    Psychiatric:         Mood and Affect: Mood normal.         Behavior: Behavior normal.         Diagnostic Study Results     Labs -  Recent Results (from the past 24 hour(s))   EKG, 12 LEAD, INITIAL    Collection Time: 02/24/20 12:47 AM   Result Value Ref Range    Ventricular Rate 97 BPM    Atrial Rate 97 BPM    P-R Interval 150 ms    QRS Duration 82 ms    Q-T Interval 368 ms    QTC Calculation (Bezet) 467 ms    Calculated P Axis 67 degrees    Calculated R Axis 40 degrees    Calculated T Axis 51 degrees    Diagnosis       Normal sinus rhythm  Nonspecific T wave abnormality  Prolonged QT  When compared with ECG of 27-JAN-2020 16:43,  No significant change was found     METABOLIC PANEL, BASIC    Collection Time: 02/24/20 12:58 AM   Result Value Ref Range    Sodium 142 136 - 145 mmol/L    Potassium 3.8 3.5 - 5.1 mmol/L    Chloride 108 97 - 108 mmol/L    CO2 29 21 - 32 mmol/L    Anion gap 5 5 - 15 mmol/L    Glucose 114 (H) 65 - 100 mg/dL    BUN 8 6 - 20 MG/DL    Creatinine 0.82 0.70 - 1.30 MG/DL    BUN/Creatinine ratio 10 (L) 12 - 20      GFR est AA >60 >60 ml/min/1.73m2    GFR est non-AA >60 >60 ml/min/1.73m2    Calcium 9.1 8.5 - 10.1 MG/DL   CBC WITH AUTOMATED DIFF    Collection Time: 02/24/20 12:58 AM   Result Value Ref Range    WBC 7.6 4.1 - 11.1 K/uL    RBC 4.92 4.10 - 5.70 M/uL    HGB 14.1 12.1 - 17.0 g/dL    HCT 42.3 36.6 - 50.3 %    MCV 86.0 80.0 - 99.0 FL    MCH 28.7 26.0 - 34.0 PG    MCHC 33.3 30.0 - 36.5 g/dL    RDW 13.1 11.5 - 14.5 %    PLATELET 574 049 - 976 K/uL    MPV 10.8 8.9 - 12.9 FL    NRBC 0.0 0  WBC    ABSOLUTE NRBC 0.00 0.00 - 0.01 K/uL    NEUTROPHILS 60 32 - 75 %    LYMPHOCYTES 26 12 - 49 %    MONOCYTES 10 5 - 13 %    EOSINOPHILS 4 0 - 7 %    BASOPHILS 0 0 - 1 %    IMMATURE GRANULOCYTES 0 0.0 - 0.5 %    ABS. NEUTROPHILS 4.4 1.8 - 8.0 K/UL    ABS. LYMPHOCYTES 2.0 0.8 - 3.5 K/UL    ABS. MONOCYTES 0.8 0.0 - 1.0 K/UL    ABS. EOSINOPHILS 0.3 0.0 - 0.4 K/UL    ABS. BASOPHILS 0.0 0.0 - 0.1 K/UL    ABS. IMM.  GRANS. 0.0 0.00 - 0.04 K/UL    DF AUTOMATED     TROPONIN I    Collection Time: 02/24/20 12:58 AM   Result Value Ref Range    Troponin-I, Qt. <0.05 <0.05 ng/mL   NT-PRO BNP Collection Time: 02/24/20 12:58 AM   Result Value Ref Range    NT pro-BNP 41 <125 PG/ML       Radiologic Studies -   XR CHEST PORT   Final Result   IMPRESSION:   No acute process. Medical Decision Making   I am the first provider for this patient. Records Reviewed: I reviewed our electronic medical record system for any past medical records that were available that may contribute to the patient's current condition, including their PMH, surgical history, social and family history. Reviewed the nursing notes and vital signs from today's visit. Nursing notes will be reviewed as they become available in realtime while the pt has been in the ED. Marleni Vincent MD Msc    Vital Signs-Reviewed the patient's vital signs. Patient Vitals for the past 24 hrs:   Temp Pulse Resp BP SpO2   02/24/20 0104     98 %   02/24/20 0102    (!) 142/98    02/23/20 2347 97.2 °F (36.2 °C) 97 20 (!) 175/94 99 %       ED ECG interpretation:  ECG shows normal sinus rhythm, with HR 97, prolonged QT and no ST changes concerning for ischemia. This ECG was interpreted by Marleni Vincent M.D. Provider Notes (Medical Decision Making):   Patient is a 68-year-old gentleman who presents emergency room with persistent lower extremity edema and chronic shortness of breath. He has had multiple evaluations for this in the emergency room as well as outpatient by primary care. Recently had his Lasix dose increased and reports good urinary output to this. Today, there is no evidence of renal dysfunction or congestive heart failure. This would be the most emergent reasons as to why made developed lower extremity edema. I really doubt thrombosis given the bilateral nature of the edema. There is no evidence of cellulitis. Most likely due to fluid retention as well as venous insufficiency.   Advised the patient to take an extra dose of Lasix for the next 3 to 4 days, then continue with the prescribed dose and follow-up with his primary care doctor. For left knee pain which is chronic he should see orthopedics as he was referred to by his doctor. No other emergent interventions are indicated here today. HYPERTENSION COUNSELING   Education was provided to the patient today regarding their hypertension. Patient is made aware of their elevated blood pressure and is instructed to follow up this week with their Primary Care for a recheck. Patient is counseled regarding consequences of chronic, uncontrolled hypertension including kidney disease, heart disease, stroke or even death. Patient states their understanding and agrees to follow up this week. Additionally, during their visit, I discussed sodium restriction, maintaining ideal body weight and regular exercise program as physiologic means to achieve blood pressure control. The patient will strive towards this. ED Course:   Initial assessment performed. The patients presenting problems have been discussed, and they are in agreement with the care plan formulated and outlined with them. I have encouraged them to ask questions as they arise throughout their visit. Progress note:  Patient has been reassessed and reports feeling considerably better, has normal vital signs and feels comfortable going home. I think this is reasonable as no findings today suggest a life-threatening condition. DISPOSITION: DISCHARGE  The patient's results have been reviewed with patient and available family and/or caregiver. They verbally convey their understanding and agreement of the patient's signs, symptoms, diagnosis, treatment and prognosis and additionally agree to follow up as recommended in the discharge instructions or to return to the Emergency Department should the patient's condition change prior to their follow-up appointment. The patient and available family and/or caregiver verbally agree with the care plan and all of their questions have been answered.  The discharge instructions have also been provided to the them with educational information regarding the patient's diagnosis as well a list of reasons why the patient would want to return to the ER prior to their follow-up appointment should any concerns arise, the patient's condition change or symptoms worsen. Hannah Muñoz MD, MSc      Diagnosis     Clinical Impression:   1. Lower extremity edema        Attestation:  I personally performed the services described in this documentation on this date 2/23/2020 for patient Mandy Villanueva. Hannah Muñoz MD    Please note that this dictation was completed with [employer], the computer voice recognition software. Quite often unanticipated grammatical, syntax, homophones, and other interpretive errors are inadvertently transcribed by the computer software. Please disregard these errors. Please excuse any errors that have escaped final proofreading.

## 2020-02-24 NOTE — DISCHARGE INSTRUCTIONS
Continue taking the increased dose of lasix as prescribed by your doctor. You can add an additional daily dose for the next 3 days (break them up to 40mg twice daily)  It may take some time for the swelling in your legs to decrease. Your blood work, including heart and kidney functions are both normal. Your chest xray also looks normal.  Please follow up with orthopedics as prescribed. Leg and Ankle Edema: Care Instructions  Your Care Instructions  Swelling in the legs, ankles, and feet is called edema. It is common after you sit or stand for a while. Long plane flights or car rides often cause swelling in the legs and feet. You may also have swelling if you have to stand for long periods of time at your job. Problems with the veins in the legs (varicose veins) and changes in hormones can also cause swelling. Sometimes the swelling in the ankles and feet is caused by a more serious problem, such as heart failure, infection, blood clots, or liver or kidney disease. Follow-up care is a key part of your treatment and safety. Be sure to make and go to all appointments, and call your doctor if you are having problems. It's also a good idea to know your test results and keep a list of the medicines you take. How can you care for yourself at home? · If your doctor gave you medicine, take it as prescribed. Call your doctor if you think you are having a problem with your medicine. · Whenever you are resting, raise your legs up. Try to keep the swollen area higher than the level of your heart. · Take breaks from standing or sitting in one position. ? Walk around to increase the blood flow in your lower legs. ? Move your feet and ankles often while you stand, or tighten and relax your leg muscles. · Wear support stockings. Put them on in the morning, before swelling gets worse. · Eat a balanced diet. Lose weight if you need to. · Limit the amount of salt (sodium) in your diet.  Salt holds fluid in the body and may increase swelling. When should you call for help? Call 911 anytime you think you may need emergency care. For example, call if:    · You have symptoms of a blood clot in your lung (called a pulmonary embolism). These may include:  ? Sudden chest pain. ? Trouble breathing. ? Coughing up blood.    Call your doctor now or seek immediate medical care if:    · You have signs of a blood clot, such as:  ? Pain in your calf, back of the knee, thigh, or groin. ? Redness and swelling in your leg or groin.     · You have symptoms of infection, such as:  ? Increased pain, swelling, warmth, or redness. ? Red streaks or pus. ? A fever.    Watch closely for changes in your health, and be sure to contact your doctor if:    · Your swelling is getting worse.     · You have new or worsening pain in your legs.     · You do not get better as expected. Where can you learn more? Go to http://douglas-hans.info/. Enter S795 in the search box to learn more about \"Leg and Ankle Edema: Care Instructions. \"  Current as of: June 26, 2019  Content Version: 12.2  © 1237-9137 [employer]. Care instructions adapted under license by [employer] (which disclaims liability or warranty for this information). If you have questions about a medical condition or this instruction, always ask your healthcare professional. [address] any warranty or liability for your use of this information.

## 2020-03-01 DIAGNOSIS — N52.9 ERECTILE DYSFUNCTION, UNSPECIFIED ERECTILE DYSFUNCTION TYPE: ICD-10-CM

## 2020-03-02 DIAGNOSIS — F41.9 ANXIETY: ICD-10-CM

## 2020-03-02 DIAGNOSIS — R60.9 EDEMA, UNSPECIFIED TYPE: ICD-10-CM

## 2020-03-02 RX ORDER — CLONAZEPAM 1 MG/1
1 TABLET ORAL
Qty: 60 TAB | Refills: 2 | OUTPATIENT
Start: 2020-03-02

## 2020-03-02 RX ORDER — TADALAFIL 5 MG/1
TABLET ORAL
Qty: 30 TAB | Refills: 0 | Status: SHIPPED | OUTPATIENT
Start: 2020-03-02 | End: 2020-04-21 | Stop reason: SDUPTHER

## 2020-03-02 RX ORDER — FUROSEMIDE 20 MG/1
60 TABLET ORAL DAILY
Qty: 90 TAB | Refills: 1 | OUTPATIENT
Start: 2020-03-02

## 2020-03-02 NOTE — TELEPHONE ENCOUNTER
PCP: Tiffany Fritz MD    Last appt: 2/11/2020  Future Appointments   Date Time Provider Zenobia Benjamin   3/3/2020 10:00 AM Yessenia Hinton MD [facility] SCHED   4/21/2020  8:15 AM Yessenia Hinton MD [facility] SCHED       Requested Prescriptions     Pending Prescriptions Disp Refills    clonazePAM (KLONOPIN) 1 mg tablet 60 Tab 2     Sig: Take 1 Tab by mouth two (2) times daily as needed (Anxiety). Max Daily Amount: 2 mg.  furosemide (LASIX) 20 mg tablet 90 Tab 1     Sig: Take 3 Tabs by mouth daily. Take 2 pills PO at HS for 5 days, then one pill each am.       Pharmacy [facility]    Patient has ? days' supply of medication available.     Prior labs and Blood pressures:  BP Readings from Last 3 Encounters:   02/24/20 (!) 168/109   02/11/20 140/90   01/27/20 (!) 151/92     Lab Results   Component Value Date/Time    Sodium 142 02/24/2020 12:58 AM    Potassium 3.8 02/24/2020 12:58 AM    Chloride 108 02/24/2020 12:58 AM    CO2 29 02/24/2020 12:58 AM    Anion gap 5 02/24/2020 12:58 AM    Glucose 114 (H) 02/24/2020 12:58 AM    BUN 8 02/24/2020 12:58 AM    Creatinine 0.82 02/24/2020 12:58 AM    BUN/Creatinine ratio 10 (L) 02/24/2020 12:58 AM    GFR est AA >60 02/24/2020 12:58 AM    GFR est non-AA >60 02/24/2020 12:58 AM    Calcium 9.1 02/24/2020 12:58 AM     Lab Results   Component Value Date/Time    Hemoglobin A1c 5.8 (H) 04/15/2019 10:40 AM    Hemoglobin A1c (POC) 5.4 11/12/2019 03:03 PM     Lab Results   Component Value Date/Time    Cholesterol, total 201 (H) 04/15/2019 10:40 AM    Cholesterol (POC) 154 11/12/2019 03:04 PM    HDL Cholesterol 55 04/15/2019 10:40 AM    HDL Cholesterol (POC) 82 11/12/2019 03:04 PM    LDL Cholesterol (POC) 49 11/12/2019 03:04 PM    LDL, calculated 124 (H) 04/15/2019 10:40 AM    VLDL, calculated 22 04/15/2019 10:40 AM    Triglyceride 108 04/15/2019 10:40 AM    Triglycerides (POC) 116 11/12/2019 03:04 PM     Lab Results   Component Value Date/Time    VITAMIN D, 25-HYDROXY 10.8 (L) 11/18/2019 09:15 AM       Lab Results   Component Value Date/Time    TSH 1.630 11/18/2019 09:15 AM

## 2020-03-04 ENCOUNTER — OFFICE VISIT (OUTPATIENT)
Dept: FAMILY MEDICINE CLINIC | Age: 55
End: 2020-03-04

## 2020-03-04 VITALS
OXYGEN SATURATION: 94 % | HEIGHT: 75 IN | RESPIRATION RATE: 18 BRPM | DIASTOLIC BLOOD PRESSURE: 94 MMHG | WEIGHT: 304.2 LBS | SYSTOLIC BLOOD PRESSURE: 145 MMHG | HEART RATE: 85 BPM | BODY MASS INDEX: 37.82 KG/M2

## 2020-03-04 DIAGNOSIS — R60.0 EDEMA OF BOTH LEGS: ICD-10-CM

## 2020-03-04 DIAGNOSIS — F41.9 ANXIETY: ICD-10-CM

## 2020-03-04 DIAGNOSIS — M25.511 ACUTE PAIN OF BOTH SHOULDERS: ICD-10-CM

## 2020-03-04 DIAGNOSIS — M25.512 ACUTE PAIN OF BOTH SHOULDERS: ICD-10-CM

## 2020-03-04 DIAGNOSIS — K21.9 GASTROESOPHAGEAL REFLUX DISEASE, ESOPHAGITIS PRESENCE NOT SPECIFIED: Chronic | ICD-10-CM

## 2020-03-04 DIAGNOSIS — K59.09 CHRONIC CONSTIPATION: ICD-10-CM

## 2020-03-04 DIAGNOSIS — J30.9 ALLERGIC RHINITIS, UNSPECIFIED SEASONALITY, UNSPECIFIED TRIGGER: Primary | ICD-10-CM

## 2020-03-04 DIAGNOSIS — I10 ESSENTIAL HYPERTENSION: ICD-10-CM

## 2020-03-04 DIAGNOSIS — N52.9 ERECTILE DYSFUNCTION, UNSPECIFIED ERECTILE DYSFUNCTION TYPE: ICD-10-CM

## 2020-03-04 RX ORDER — FLUTICASONE PROPIONATE 50 MCG
2 SPRAY, SUSPENSION (ML) NASAL DAILY
Qty: 1 BOTTLE | Refills: 5 | Status: SHIPPED | OUTPATIENT
Start: 2020-03-04 | End: 2020-07-13 | Stop reason: SDUPTHER

## 2020-03-04 RX ORDER — LINACLOTIDE 290 UG/1
CAPSULE, GELATIN COATED ORAL
COMMUNITY
Start: 2020-02-15 | End: 2020-10-12 | Stop reason: SDUPTHER

## 2020-03-04 RX ORDER — POLYETHYLENE GLYCOL 3350 17 G/17G
17 POWDER, FOR SOLUTION ORAL DAILY
Qty: 1 EACH | Refills: 5 | Status: SHIPPED | OUTPATIENT
Start: 2020-03-04

## 2020-03-04 RX ORDER — FUROSEMIDE 20 MG/1
40 TABLET ORAL 2 TIMES DAILY
Qty: 120 TAB | Refills: 1 | Status: SHIPPED | OUTPATIENT
Start: 2020-03-04 | End: 2020-07-13 | Stop reason: SDUPTHER

## 2020-03-04 RX ORDER — LIDOCAINE 50 MG/G
PATCH TOPICAL
Qty: 60 EACH | Refills: 1 | Status: SHIPPED | OUTPATIENT
Start: 2020-03-04 | End: 2020-06-03 | Stop reason: SDUPTHER

## 2020-03-04 RX ORDER — ESOMEPRAZOLE MAGNESIUM 40 MG/1
40 CAPSULE, DELAYED RELEASE ORAL DAILY
Qty: 90 CAP | Refills: 0 | Status: SHIPPED | COMMUNITY
Start: 2020-03-04 | End: 2020-04-21 | Stop reason: SDUPTHER

## 2020-03-04 RX ORDER — TADALAFIL 5 MG/1
TABLET ORAL
Qty: 30 TAB | Refills: 0 | Status: CANCELLED | OUTPATIENT
Start: 2020-03-04

## 2020-03-04 NOTE — PROGRESS NOTES
Benigno Osgood is a 47 y.o. male  Chief Complaint   Patient presents with    Medication Refill     Health Maintenance Due   Topic Date Due    Pneumococcal 0-64 years (1 of 1 - PPSV23) 05/17/1971    DTaP/Tdap/Td series (1 - Tdap) 05/17/1976    Shingrix Vaccine Age 50> (1 of 2) 05/17/2015    FOBT Q1Y Age 50-75  05/17/2015     Visit Vitals  BP (!) 145/94   Pulse 85   Resp 18   Ht 6' 3\" (1.905 m)   Wt 304 lb 3.2 oz (138 kg)   SpO2 94%   BMI 38.02 kg/m²     1. Have you been to the ER, urgent care clinic since your last visit? Hospitalized since your last visit? Yes, the day before yesterday. 2. Have you seen or consulted any other health care providers outside of the [address] [place] [address] since your last visit? Include any pap smears or colon screening. Yes, the eye doctor.  94

## 2020-03-04 NOTE — PROGRESS NOTES
Here for med refills. 9 y/o son. Partner with bipolar who won't take her meds. Wants to go back on Nexium which worked better than current Protonix. Needs Flonase refilled. Needs Miralax refilled for chronic constipation. Taking Lasix 2 BID per ER doc to get fluid off. Need lidoderm patches refilled for cedrick shoulder pain. Triny Arellano awakens with anxiety and has to take another Klonipin. Patient denies chest pain, dyspnea, unexpected weight change, unexpected pain, mood or memory changes. Visit Vitals  BP (!) 145/94   Pulse 85   Resp 18   Ht 6' 3\" (1.905 m)   Wt 304 lb 3.2 oz (138 kg)   SpO2 94%   BMI 38.02 kg/m²     Patient alert and cooperative. Reviewed above. Assessment:  1. GERD, on PPI. 2. Constipation, on prn Miralax. 3. Allergic rhinitis, on daily Flonase. 4. GERD, switch from Protonix to Nexium. 5. Peripheral edema, on high dose Lasix. Plan:  1. Refills done, return when needed. 2. Follow otherwise here prn.

## 2020-03-05 ENCOUNTER — TELEPHONE (OUTPATIENT)
Dept: FAMILY MEDICINE CLINIC | Age: 55
End: 2020-03-05

## 2020-03-05 NOTE — TELEPHONE ENCOUNTER
----- Message from Melissa Clancy sent at 3/5/2020 12:36 PM EST -----  Regarding: Dr. Sanjay Potter Refill  Contact: [phone]  HWUZEA (if not patient): N/A  Relationship of caller (if not patient): N/A  Best contact number(s): [phone]  Name of medication and dosage if known: \" Nexium \"  Is patient out of this medication (yes/no): Yes   Pharmacy name:  [facility] listed in chart? (yes/no): Yes   Pharmacy phone number: N/A  Date of last visit: Wednesday, March 04, 2020 01:15 PM  Details to clarify the request: Pt stated that he is out of his medication and need it filled right away. Pt doesn't know the milligrams of the medication.

## 2020-03-06 NOTE — TELEPHONE ENCOUNTER
Akira Cardoza called to follow up on his medication Nexium.  The patient said he didn't get it as his pharmacy please call back [phone]

## 2020-04-06 DIAGNOSIS — F41.9 ANXIETY: ICD-10-CM

## 2020-04-06 DIAGNOSIS — J45.51 SEVERE PERSISTENT ASTHMA WITH ACUTE EXACERBATION: ICD-10-CM

## 2020-04-06 RX ORDER — CLONAZEPAM 1 MG/1
TABLET ORAL
Qty: 60 TAB | Refills: 2 | Status: SHIPPED | OUTPATIENT
Start: 2020-04-06 | End: 2020-04-23 | Stop reason: SDUPTHER

## 2020-04-06 RX ORDER — FLUTICASONE PROPIONATE 220 UG/1
AEROSOL, METERED RESPIRATORY (INHALATION)
Qty: 1 INHALER | Refills: 0 | Status: SHIPPED | OUTPATIENT
Start: 2020-04-06 | End: 2020-06-03 | Stop reason: SDUPTHER

## 2020-04-07 DIAGNOSIS — I10 ESSENTIAL HYPERTENSION: Chronic | ICD-10-CM

## 2020-04-07 RX ORDER — CLONIDINE HYDROCHLORIDE 0.2 MG/1
0.2 TABLET ORAL 2 TIMES DAILY
Qty: 180 TAB | Refills: 0 | Status: SHIPPED | OUTPATIENT
Start: 2020-04-07 | End: 2020-06-03 | Stop reason: SDUPTHER

## 2020-04-07 NOTE — TELEPHONE ENCOUNTER
PCP: Catheryn Oppenheim, MD    Last appt: 3/4/2020  Future Appointments   Date Time Provider Zenobia Benjamin   4/21/2020  8:15 AM Neal Hinton MD [facility]       Requested Prescriptions     Pending Prescriptions Disp Refills    cloNIDine HCL (CATAPRES) 0.2 mg tablet 180 Tab 0     Sig: Take 1 Tab by mouth two (2) times a day. Pharmacy [facility] pharmacy    Patient has ? days' supply of medication available.     Prior labs and Blood pressures:  BP Readings from Last 3 Encounters:   03/04/20 (!) 145/94   02/24/20 (!) 168/109   02/11/20 140/90     Lab Results   Component Value Date/Time    Sodium 142 02/24/2020 12:58 AM    Potassium 3.8 02/24/2020 12:58 AM    Chloride 108 02/24/2020 12:58 AM    CO2 29 02/24/2020 12:58 AM    Anion gap 5 02/24/2020 12:58 AM    Glucose 114 (H) 02/24/2020 12:58 AM    BUN 8 02/24/2020 12:58 AM    Creatinine 0.82 02/24/2020 12:58 AM    BUN/Creatinine ratio 10 (L) 02/24/2020 12:58 AM    GFR est AA >60 02/24/2020 12:58 AM    GFR est non-AA >60 02/24/2020 12:58 AM    Calcium 9.1 02/24/2020 12:58 AM     Lab Results   Component Value Date/Time    Hemoglobin A1c 5.8 (H) 04/15/2019 10:40 AM    Hemoglobin A1c (POC) 5.4 11/12/2019 03:03 PM     Lab Results   Component Value Date/Time    Cholesterol, total 201 (H) 04/15/2019 10:40 AM    Cholesterol (POC) 154 11/12/2019 03:04 PM    HDL Cholesterol 55 04/15/2019 10:40 AM    HDL Cholesterol (POC) 82 11/12/2019 03:04 PM    LDL Cholesterol (POC) 49 11/12/2019 03:04 PM    LDL, calculated 124 (H) 04/15/2019 10:40 AM    VLDL, calculated 22 04/15/2019 10:40 AM    Triglyceride 108 04/15/2019 10:40 AM    Triglycerides (POC) 116 11/12/2019 03:04 PM     Lab Results   Component Value Date/Time    VITAMIN D, 25-HYDROXY 10.8 (L) 11/18/2019 09:15 AM       Lab Results   Component Value Date/Time    TSH 1.630 11/18/2019 09:15 AM

## 2020-04-13 RX ORDER — ALBUTEROL SULFATE 0.83 MG/ML
SOLUTION RESPIRATORY (INHALATION)
Qty: 75 ML | Refills: 0 | OUTPATIENT
Start: 2020-04-13

## 2020-04-15 RX ORDER — IPRATROPIUM BROMIDE AND ALBUTEROL SULFATE 2.5; .5 MG/3ML; MG/3ML
3 SOLUTION RESPIRATORY (INHALATION)
Qty: 90 NEBULE | Refills: 0 | Status: SHIPPED | OUTPATIENT
Start: 2020-04-15 | End: 2020-05-08

## 2020-04-15 NOTE — TELEPHONE ENCOUNTER
Requested Prescriptions     Pending Prescriptions Disp Refills    albuterol-ipratropium (DUO-NEB) 2.5 mg-0.5 mg/3 ml nebu 90 Nebule 0     Sig: 3 mL by Nebulization route every four (4) hours as needed for Wheezing. Pharmacy is requesting tramadol HCl 50 mg tablet  90 tab  Take one tablet by mouth every 8 hours as needed for pain .  Even though it was prescribed by Dr Anjali Hartman

## 2020-04-21 ENCOUNTER — VIRTUAL VISIT (OUTPATIENT)
Dept: FAMILY MEDICINE CLINIC | Age: 55
End: 2020-04-21

## 2020-04-21 ENCOUNTER — TELEPHONE (OUTPATIENT)
Dept: FAMILY MEDICINE CLINIC | Age: 55
End: 2020-04-21

## 2020-04-21 VITALS — TEMPERATURE: 98.1 F

## 2020-04-21 DIAGNOSIS — M54.50 CHRONIC LEFT-SIDED LOW BACK PAIN WITHOUT SCIATICA: ICD-10-CM

## 2020-04-21 DIAGNOSIS — N52.8 OTHER MALE ERECTILE DYSFUNCTION: ICD-10-CM

## 2020-04-21 DIAGNOSIS — G89.29 CHRONIC MIDLINE LOW BACK PAIN WITHOUT SCIATICA: Primary | Chronic | ICD-10-CM

## 2020-04-21 DIAGNOSIS — I10 ESSENTIAL HYPERTENSION: Chronic | ICD-10-CM

## 2020-04-21 DIAGNOSIS — E66.01 SEVERE OBESITY (HCC): ICD-10-CM

## 2020-04-21 DIAGNOSIS — M54.50 CHRONIC MIDLINE LOW BACK PAIN WITHOUT SCIATICA: Primary | Chronic | ICD-10-CM

## 2020-04-21 DIAGNOSIS — K21.9 GASTROESOPHAGEAL REFLUX DISEASE, ESOPHAGITIS PRESENCE NOT SPECIFIED: Chronic | ICD-10-CM

## 2020-04-21 DIAGNOSIS — G89.29 CHRONIC LEFT-SIDED LOW BACK PAIN WITHOUT SCIATICA: ICD-10-CM

## 2020-04-21 PROBLEM — J20.9 ACUTE BRONCHITIS: Status: RESOLVED | Noted: 2019-06-13 | Resolved: 2020-04-21

## 2020-04-21 RX ORDER — DICLOFENAC SODIUM 50 MG/1
50 TABLET, DELAYED RELEASE ORAL 2 TIMES DAILY
Qty: 60 TAB | Refills: 2 | Status: SHIPPED | OUTPATIENT
Start: 2020-04-21 | End: 2020-06-03 | Stop reason: SDUPTHER

## 2020-04-21 RX ORDER — CLONIDINE HYDROCHLORIDE 0.2 MG/1
0.2 TABLET ORAL 2 TIMES DAILY
Qty: 180 TAB | Refills: 0 | Status: CANCELLED | OUTPATIENT
Start: 2020-04-21

## 2020-04-21 RX ORDER — TADALAFIL 20 MG/1
20 TABLET ORAL AS NEEDED
Qty: 20 TAB | Refills: 0 | Status: SHIPPED | OUTPATIENT
Start: 2020-04-21 | End: 2020-06-03 | Stop reason: SDUPTHER

## 2020-04-21 RX ORDER — ESOMEPRAZOLE MAGNESIUM 40 MG/1
40 CAPSULE, DELAYED RELEASE ORAL DAILY
Qty: 30 CAP | Refills: 2 | Status: SHIPPED | OUTPATIENT
Start: 2020-04-21 | End: 2020-05-06 | Stop reason: CLARIF

## 2020-04-21 RX ORDER — CYCLOBENZAPRINE HCL 10 MG
10 TABLET ORAL
Qty: 30 TAB | Refills: 0 | Status: SHIPPED | OUTPATIENT
Start: 2020-04-21 | End: 2020-07-13 | Stop reason: SDUPTHER

## 2020-04-21 RX ORDER — ASPIRIN 81 MG/1
81 TABLET ORAL DAILY
Qty: 100 TAB | Refills: 3 | Status: SHIPPED | OUTPATIENT
Start: 2020-04-21 | End: 2020-10-12 | Stop reason: SDUPTHER

## 2020-04-21 NOTE — PROGRESS NOTES
**THIS IS A VIRTUAL VISIT VIA A VIDEO SYNCHRONOUS DISCUSSION OVER DOXY. ME PATIENT AGREED TO HAVE THEIR CARE DELIVERED OVER A [employer] VIDEO VISIT IN PLACE OF THEIR REGULARLY SCHEDULED OFFICE VISIT**       Consent: Cristiane Jackson, who was seen by synchronous (real-time) audio-video technology, and/or his healthcare decision maker, is aware that this patient-initiated, Telehealth encounter on 4/21/2020 is a billable service, with coverage as determined by his insurance carrier. He is aware that he may receive a bill and has provided verbal consent to proceed: Yes. Assessment & Plan:   Diagnoses and all orders for this visit:    1. Chronic midline low back pain without sciatica  -     diclofenac EC (VOLTAREN) 50 mg EC tablet; Take 1 Tab by mouth two (2) times a day. 2. Gastroesophageal reflux disease, esophagitis presence not specified  -     esomeprazole (NEXIUM) 40 mg capsule; Take 1 Cap by mouth daily. 3. Other male erectile dysfunction  -     tadalafiL (Cialis) 20 mg tablet; Take 1 Tab by mouth as needed (erectile dysfunction). Every 3 days. 4. Essential hypertension  -     aspirin delayed-release 81 mg tablet; Take 1 Tab by mouth daily. 5. Chronic left-sided low back pain without sciatica  -     cyclobenzaprine (FLEXERIL) 10 mg tablet; Take 1 Tab by mouth nightly. 6. Severe obesity ([address]                I spent at least 10 minutes with this established patient, and >50% of the time was spent counseling and/or coordinating care regarding med refills. 712  Subjective:   Cristiane Jackson is a 47 y.o. male who was seen for Follow-up and Medication Refill ( requesting pain medication)    Wants to wean off the methadone. Wants to go back to 20 mg Cialis. Needs script for the LS ASA. Needs Voltaren refill. Needs flexeril refill. Would like prescription for a cane b/o knees. Advised to check with insurance. Same for knee brace. Needs Nexium resent. Using neb 3 x daily.   Prefers monthly scripts with refills. Prior to Admission medications    Medication Sig Start Date End Date Taking? Authorizing Provider   albuterol-ipratropium (DUO-NEB) 2.5 mg-0.5 mg/3 ml nebu 3 mL by Nebulization route every four (4) hours as needed for Wheezing. 4/15/20  Yes Read, Caden Humphreys MD   cloNIDine HCL (CATAPRES) 0.2 mg tablet Take 1 Tab by mouth two (2) times a day. 4/7/20  Yes Read, Caden Humphreys MD   Flovent  mcg/actuation inhaler INHALE 1 OR 2 PUFFS 2 TIMES A DAY. 4/6/20  Yes Read, Caden Humphreys MD   clonazePAM (KlonoPIN) 1 mg tablet TAKE ONE TABLET BY MOUTH 2 TIMES A DAY AS NEEDED. 4/6/20  Yes Read, Caden Humphreys MD   LINZESS 290 mcg cap capsule  2/15/20  Yes Provider, Historical   fluticasone propionate (FLONASE ALLERGY RELIEF) 50 mcg/actuation nasal spray 2 Sprays by Both Nostrils route daily. 3/4/20  Yes Read, Caden Humphreys MD   polyethylene glycol (MIRALAX) 17 gram packet Take 1 Packet by mouth daily. 3/4/20  Yes Read, Caden Humphreys MD   furosemide (LASIX) 20 mg tablet Take 2 Tabs by mouth two (2) times a day. 3/4/20  Yes Read, Caden Humphreys MD   lidocaine (LIDODERM) 5 % Apply patch to the affected area for 12 hours a day and remove for 12 hours a day. 3/4/20  Yes Read, Caden Humphreys MD   tadalafil (CIALIS) 5 mg tablet TAKE ONE TABLET BY MOUTH EVERY DAY 3/2/20  Yes Read, Caden Humphreys MD   cyclobenzaprine (FLEXERIL) 10 mg tablet Take 1 Tab by mouth nightly. 2/11/20  Yes Read, Caden Humphreys MD   Nebulizer Accessories kit Nebulizer cup w/ tubing; use every 4 hrs prn wheezing 1/20/20  Yes Gurpreet Reeder MD   [address] [place] [address] Drive 18 mcg inhalation capsule  1/15/20  Yes Provider, Historical   naloxone (NARCAN) 4 mg/actuation nasal spray Use 1 spray intranasally, then discard. Repeat with new spray every 2 min as needed for opioid overdose symptoms, alternating nostrils. 1/15/20  Yes Read, Caden Humphreys MD   lisinopril (PRINIVIL, ZESTRIL) 20 mg tablet Take 1 Tab by mouth daily.  12/31/19  Yes Gurpreet Reeder MD   albuterol (PROVENTIL VENTOLIN) 2.5 mg /3 mL (0.083 %) nebu 3 mL by Nebulization route every four (4) hours as needed for Wheezing. 12/9/19  Yes Joanie Gandhi DO   cetirizine (ZYRTEC) 10 mg tablet Take 1 Tab by mouth daily. 11/25/19  Yes MARIEL Junior   VENTOLIN HFA 90 mcg/actuation inhaler Take 2 Puffs by inhalation every four (4) hours as needed for Wheezing or Shortness of Breath. Indications: bronchospasm prevention 11/19/19  Yes Alisson Calvin MD   amLODIPine (NORVASC) 5 mg tablet Take 1 Tab by mouth daily. Indications: high blood pressure  Patient taking differently: Take 10 mg by mouth daily. Indications: high blood pressure 11/12/19  Yes Alisson Calvin MD   montelukast (SINGULAIR) 10 mg tablet Take 10 mg by mouth daily. Yes Provider, Historical   aspirin 81 mg tablet Take 1 Tab by mouth daily. 4/29/13  Yes Oren Tracy MD   esomeprazole (NEXIUM) 40 mg capsule Take 1 Cap by mouth daily. 3/4/20   Heidi Hinton MD   dextromethorphan-guaiFENesin (ROBITUSSIN COUGH-CHEST DAVID DM) 5-100 mg/5 mL liqd Take 10 mL by mouth every four (4) hours as needed for Cough. 2/11/20   Heidi Hinton MD   methadone (DOLOPHINE) 10 mg tablet Take  by mouth.     Provider, Historical     Allergies   Allergen Reactions    Vicodin [Hydrocodone-Acetaminophen] Other (comments)     Headache       Patient Active Problem List   Diagnosis Code    Tobacco abuse Z72.0    Essential hypertension I10    GERD (gastroesophageal reflux disease) K21.9    Chronic midline low back pain without sciatica M54.5, G89.29    Acute respiratory failure with hypoxia ([facility]) J96.01    Acute bronchitis J20.9    Anxiety F41.9    Severe obesity ([facility]) E66.01    Depression F32.9    Insomnia G47.00    Alcohol abuse, daily use F10.10    Arthralgia M25.50    Edema of both legs R60.0    Acute pain of both shoulders M25.511, M25.512    Chronic constipation K59.09    Allergic rhinitis J30.9    Erectile dysfunction N52.9     Current Outpatient Medications   Medication Sig Dispense Refill    albuterol-ipratropium (DUO-NEB) 2.5 mg-0.5 mg/3 ml nebu 3 mL by Nebulization route every four (4) hours as needed for Wheezing. 90 Nebule 0    cloNIDine HCL (CATAPRES) 0.2 mg tablet Take 1 Tab by mouth two (2) times a day. 180 Tab 0    Flovent  mcg/actuation inhaler INHALE 1 OR 2 PUFFS 2 TIMES A DAY. 1 Inhaler 0    clonazePAM (KlonoPIN) 1 mg tablet TAKE ONE TABLET BY MOUTH 2 TIMES A DAY AS NEEDED. 60 Tab 2    LINZESS 290 mcg cap capsule       fluticasone propionate (FLONASE ALLERGY RELIEF) 50 mcg/actuation nasal spray 2 Sprays by Both Nostrils route daily. 1 Bottle 5    polyethylene glycol (MIRALAX) 17 gram packet Take 1 Packet by mouth daily. 1 Each 5    furosemide (LASIX) 20 mg tablet Take 2 Tabs by mouth two (2) times a day. 120 Tab 1    lidocaine (LIDODERM) 5 % Apply patch to the affected area for 12 hours a day and remove for 12 hours a day. 60 Each 1    tadalafil (CIALIS) 5 mg tablet TAKE ONE TABLET BY MOUTH EVERY DAY 30 Tab 0    cyclobenzaprine (FLEXERIL) 10 mg tablet Take 1 Tab by mouth nightly. 30 Tab 0    Nebulizer Accessories kit Nebulizer cup w/ tubing; use every 4 hrs prn wheezing 1 Kit 5    SPIRIVA WITH HANDIHALER 18 mcg inhalation capsule       naloxone (NARCAN) 4 mg/actuation nasal spray Use 1 spray intranasally, then discard. Repeat with new spray every 2 min as needed for opioid overdose symptoms, alternating nostrils. 2 Each 0    lisinopril (PRINIVIL, ZESTRIL) 20 mg tablet Take 1 Tab by mouth daily. 90 Tab 0    albuterol (PROVENTIL VENTOLIN) 2.5 mg /3 mL (0.083 %) nebu 3 mL by Nebulization route every four (4) hours as needed for Wheezing. 90 Nebule 0    cetirizine (ZYRTEC) 10 mg tablet Take 1 Tab by mouth daily. 20 Tab 0    VENTOLIN HFA 90 mcg/actuation inhaler Take 2 Puffs by inhalation every four (4) hours as needed for Wheezing or Shortness of Breath.  Indications: bronchospasm prevention 3 Inhaler 3    amLODIPine (NORVASC) 5 mg tablet Take 1 Tab by mouth daily. Indications: high blood pressure (Patient taking differently: Take 10 mg by mouth daily. Indications: high blood pressure) 90 Tab 3    montelukast (SINGULAIR) 10 mg tablet Take 10 mg by mouth daily.  aspirin 81 mg tablet Take 1 Tab by mouth daily. 30 Tab 1    esomeprazole (NEXIUM) 40 mg capsule Take 1 Cap by mouth daily. 90 Cap 0    dextromethorphan-guaiFENesin (ROBITUSSIN COUGH-CHEST DAVID DM) 5-100 mg/5 mL liqd Take 10 mL by mouth every four (4) hours as needed for Cough. 1 Bottle 0    methadone (DOLOPHINE) 10 mg tablet Take  by mouth. Allergies   Allergen Reactions    Vicodin [Hydrocodone-Acetaminophen] Other (comments)     Headache     Past Medical History:   Diagnosis Date    Arthritis     Asthma     Chronic low back pain     Hypertension      Past Surgical History:   Procedure Laterality Date    HX ORTHOPAEDIC       Family History   Problem Relation Age of Onset    Cancer Mother     Heart Disease Father      Social History     Tobacco Use    Smoking status: Current Some Day Smoker     Packs/day: 0.25     Years: 10.00     Pack years: 2.50    Smokeless tobacco: Never Used   Substance Use Topics    Alcohol use: Yes     Comment: occ       ROS        Objective:   Vital Signs: (As obtained by patient/caregiver at home)  Visit Vitals  Temp 98.1 °F (36.7 °C) (Oral)        [INSTRUCTIONS:  \"[x]\" Indicates a positive item  \"[]\" Indicates a negative item  -- DELETE ALL ITEMS NOT EXAMINED]    Constitutional: [x] Appears well-developed and well-nourished [x] No apparent distress      [] Abnormal -     Mental status: [x] Alert and awake  [x] Oriented to person/place/time [x] Able to follow commands    [] Abnormal -     Psychiatric:       [x] Normal Affect [] Abnormal -        [x] No Hallucinations    Other pertinent observable physical exam findings:-        We discussed the expected course, resolution and complications of the diagnosis(es) in detail. Medication risks, benefits, costs, interactions, and alternatives were discussed as indicated. I advised him to contact the office if his condition worsens, changes or fails to improve as anticipated. He expressed understanding with the diagnosis(es) and plan. Cathy Julian is a 47 y.o. male being evaluated by a video visit encounter for concerns as above. A caregiver was present when appropriate. Due to this being a TeleHealth encounter (During [facility] public health emergency), evaluation of the following organ systems was limited: Vitals/Constitutional/EENT/Resp/CV/GI//MS/Neuro/Skin/Heme-Lymph-Imm. Pursuant to the emergency declaration under the [facility], 1135 waiver authority and the [employer] Act, this Virtual  Visit was conducted, with patient's (and/or legal guardian's) consent, to reduce the patient's risk of exposure to COVID-19 and provide necessary medical care. Services were provided through a video synchronous discussion virtually to substitute for in-person clinic visit. Patient and provider were located at their individual homes.         Areli Roblero MD

## 2020-04-21 NOTE — TELEPHONE ENCOUNTER
Humble  called back after his VV saying that his Clonazepam 1 mg order was supposed to be filled. He is claiming that there was supposed to be an increase in dosage and a 90 day supply versus 60 days.

## 2020-04-21 NOTE — TELEPHONE ENCOUNTER
Called patient and advised. Patient states that he has not been taking prescription as prescribed. Patient states that he has been taking medication 3 times a day versus 2 times a day and is already out. Advised patient that he should not have been taking medication outside of prescribed orders unless he has spoken to you about it first. Patient states he has had \"increased anxiety\" d/t COVID-19 and would like increase. Informed patient that I would ask, although I did not believe that request would be satisfied d/t non compliance of prescribed orders.

## 2020-04-21 NOTE — PROGRESS NOTES
Ce Rasmussen is a 47 y.o. male     No chief complaint on file. 1. Have you been to the ER, urgent care clinic since your last visit? Hospitalized since your last visit? No    2. Have you seen or consulted any other health care providers outside of the [address] [place] [address] since your last visit? Include any pap smears or colon screening.  No

## 2020-04-23 ENCOUNTER — VIRTUAL VISIT (OUTPATIENT)
Dept: FAMILY MEDICINE CLINIC | Age: 55
End: 2020-04-23

## 2020-04-23 ENCOUNTER — TELEPHONE (OUTPATIENT)
Dept: FAMILY MEDICINE CLINIC | Age: 55
End: 2020-04-23

## 2020-04-23 DIAGNOSIS — F41.9 ANXIETY: ICD-10-CM

## 2020-04-23 RX ORDER — CLONAZEPAM 1 MG/1
TABLET ORAL
Qty: 90 TAB | Refills: 0 | Status: SHIPPED | OUTPATIENT
Start: 2020-04-23 | End: 2020-06-03 | Stop reason: SDUPTHER

## 2020-04-23 RX ORDER — CITALOPRAM 10 MG/1
10 TABLET ORAL DAILY
Qty: 30 TAB | Refills: 0 | Status: SHIPPED | OUTPATIENT
Start: 2020-04-23 | End: 2020-06-02

## 2020-04-23 NOTE — TELEPHONE ENCOUNTER
Contacted Pt in regards to Prior Auth needed for Nexium. Informed Pt that he needs to contact insurance company so that they can Fax us over the Form to be completed. Pt verbalized understanding.

## 2020-04-23 NOTE — PROGRESS NOTES
**THIS IS A VIRTUAL VISIT VIA A VIDEO SYNCHRONOUS DISCUSSION OVER DOXY. ME PATIENT AGREED TO HAVE THEIR CARE DELIVERED OVER A [employer] VIDEO VISIT IN PLACE OF THEIR REGULARLY SCHEDULED OFFICE VISIT**       Consent: Cristiane Jackson, who was seen by synchronous (real-time) audio-video technology, and/or his healthcare decision maker, is aware that this patient-initiated, Telehealth encounter on 4/23/2020 is a billable service, with coverage as determined by his insurance carrier. He is aware that he may receive a bill and has provided verbal consent to proceed: Yes. Assessment & Plan:   Diagnoses and all orders for this visit:    1. Anxiety  -     clonazePAM (KlonoPIN) 1 mg tablet; TAKE ONE TABLET BY MOUTH 2-3 TIMES A DAY AS NEEDED. -     citalopram (CELEXA) 10 mg tablet; Take 1 Tab by mouth daily. I spent at least 5 minutes with this established patient, and >50% of the time was spent counseling and/or coordinating care regarding dose increase  712  Subjective:   Cristiane Jackson is a 47 y.o. male who was seen for Medication Refill (states that he needs an increase in Clonazepam)    Past 2 weeks increased anxiety. Taking 3 pills daily. States needs the 3 pills daily for now. Discussed adding once daily med to decrease overall anxiety. Pt willing to try for a month. Prior to Admission medications    Medication Sig Start Date End Date Taking? Authorizing Provider   tadalafiL (Cialis) 20 mg tablet Take 1 Tab by mouth as needed (erectile dysfunction). Every 3 days. 4/21/20   Juana Hinton MD   aspirin delayed-release 81 mg tablet Take 1 Tab by mouth daily. 4/21/20   Juana Hinton MD   diclofenac EC (VOLTAREN) 50 mg EC tablet Take 1 Tab by mouth two (2) times a day. 4/21/20   Juana Hinton MD   cyclobenzaprine (FLEXERIL) 10 mg tablet Take 1 Tab by mouth nightly. 4/21/20   Juana Hinton MD   esomeprazole (NEXIUM) 40 mg capsule Take 1 Cap by mouth daily.  4/21/20   Kevin Thomas MD MYAH   albuterol-ipratropium (DUO-NEB) 2.5 mg-0.5 mg/3 ml nebu 3 mL by Nebulization route every four (4) hours as needed for Wheezing. 4/15/20   Fazal Hinton MD   cloNIDine HCL (CATAPRES) 0.2 mg tablet Take 1 Tab by mouth two (2) times a day. 4/7/20   Fazal Hinton MD   Flovent  mcg/actuation inhaler INHALE 1 OR 2 PUFFS 2 TIMES A DAY. 4/6/20   Fazal Hinton MD   clonazePAM (KlonoPIN) 1 mg tablet TAKE ONE TABLET BY MOUTH 2 TIMES A DAY AS NEEDED. 4/6/20   Fazal Hinton MD   LINZESS 290 mcg cap capsule  2/15/20   Provider, Historical   fluticasone propionate (FLONASE ALLERGY RELIEF) 50 mcg/actuation nasal spray 2 Sprays by Both Nostrils route daily. 3/4/20   Fazal Hinton MD   polyethylene glycol (MIRALAX) 17 gram packet Take 1 Packet by mouth daily. 3/4/20   Fazal Hinton MD   furosemide (LASIX) 20 mg tablet Take 2 Tabs by mouth two (2) times a day. 3/4/20   Fazal Hinton MD   lidocaine (LIDODERM) 5 % Apply patch to the affected area for 12 hours a day and remove for 12 hours a day. 3/4/20   Fazal Hinton MD   dextromethorphan-guaiFENesin (ROBITUSSIN COUGH-CHEST DAVID DM) 5-100 mg/5 mL liqd Take 10 mL by mouth every four (4) hours as needed for Cough. 2/11/20   Fazal Hinton MD   Nebulizer Accessories kit Nebulizer cup w/ tubing; use every 4 hrs prn wheezing 1/20/20   Sheri Jones MD   [address] [place] [address] Drive 18 mcg inhalation capsule  1/15/20   Provider, Historical   naloxone [facility]) 4 mg/actuation nasal spray Use 1 spray intranasally, then discard. Repeat with new spray every 2 min as needed for opioid overdose symptoms, alternating nostrils. 1/15/20   Fazal Hinton MD   lisinopril (PRINIVIL, ZESTRIL) 20 mg tablet Take 1 Tab by mouth daily. 12/31/19   Sheri Jones MD   albuterol (PROVENTIL VENTOLIN) 2.5 mg /3 mL (0.083 %) nebu 3 mL by Nebulization route every four (4) hours as needed for Wheezing.  12/9/19   Leon Stephens DO   cetirizine (ZYRTEC) 10 mg tablet Take 1 Tab by mouth daily. 11/25/19   MARIEL Moralez   methadone (DOLOPHINE) 10 mg tablet Take  by mouth. Provider, Historical   VENTOLIN HFA 90 mcg/actuation inhaler Take 2 Puffs by inhalation every four (4) hours as needed for Wheezing or Shortness of Breath. Indications: bronchospasm prevention 11/19/19   Gurpreet Reeder MD   amLODIPine (NORVASC) 5 mg tablet Take 1 Tab by mouth daily. Indications: high blood pressure  Patient taking differently: Take 10 mg by mouth daily. Indications: high blood pressure 11/12/19   Gurpreet Reeder MD   montelukast (SINGULAIR) 10 mg tablet Take 10 mg by mouth daily. Provider, Historical     Allergies   Allergen Reactions    Vicodin [Hydrocodone-Acetaminophen] Other (comments)     Headache       Patient Active Problem List   Diagnosis Code    Tobacco abuse Z72.0    Essential hypertension I10    GERD (gastroesophageal reflux disease) K21.9    Chronic midline low back pain without sciatica M54.5, G89.29    Acute respiratory failure with hypoxia ([facility]) J96.01    Anxiety F41.9    Severe obesity ([facility]) E66.01    Depression F32.9    Insomnia G47.00    Alcohol abuse, daily use F10.10    Arthralgia M25.50    Edema of both legs R60.0    Acute pain of both shoulders M25.511, M25.512    Chronic constipation K59.09    Allergic rhinitis J30.9    Erectile dysfunction N52.9     Current Outpatient Medications   Medication Sig Dispense Refill    tadalafiL (Cialis) 20 mg tablet Take 1 Tab by mouth as needed (erectile dysfunction). Every 3 days. 20 Tab 0    aspirin delayed-release 81 mg tablet Take 1 Tab by mouth daily. 100 Tab 3    diclofenac EC (VOLTAREN) 50 mg EC tablet Take 1 Tab by mouth two (2) times a day. 60 Tab 2    cyclobenzaprine (FLEXERIL) 10 mg tablet Take 1 Tab by mouth nightly. 30 Tab 0    esomeprazole (NEXIUM) 40 mg capsule Take 1 Cap by mouth daily.  30 Cap 2    albuterol-ipratropium (DUO-NEB) 2.5 mg-0.5 mg/3 ml nebu 3 mL by Nebulization route every four (4) hours as needed for Wheezing. 90 Nebule 0    cloNIDine HCL (CATAPRES) 0.2 mg tablet Take 1 Tab by mouth two (2) times a day. 180 Tab 0    Flovent  mcg/actuation inhaler INHALE 1 OR 2 PUFFS 2 TIMES A DAY. 1 Inhaler 0    clonazePAM (KlonoPIN) 1 mg tablet TAKE ONE TABLET BY MOUTH 2 TIMES A DAY AS NEEDED. 60 Tab 2    LINZESS 290 mcg cap capsule       fluticasone propionate (FLONASE ALLERGY RELIEF) 50 mcg/actuation nasal spray 2 Sprays by Both Nostrils route daily. 1 Bottle 5    polyethylene glycol (MIRALAX) 17 gram packet Take 1 Packet by mouth daily. 1 Each 5    furosemide (LASIX) 20 mg tablet Take 2 Tabs by mouth two (2) times a day. 120 Tab 1    lidocaine (LIDODERM) 5 % Apply patch to the affected area for 12 hours a day and remove for 12 hours a day. 60 Each 1    dextromethorphan-guaiFENesin (ROBITUSSIN COUGH-CHEST DAVID DM) 5-100 mg/5 mL liqd Take 10 mL by mouth every four (4) hours as needed for Cough. 1 Bottle 0    Nebulizer Accessories kit Nebulizer cup w/ tubing; use every 4 hrs prn wheezing 1 Kit 5    SPIRIVA WITH HANDIHALER 18 mcg inhalation capsule       naloxone (NARCAN) 4 mg/actuation nasal spray Use 1 spray intranasally, then discard. Repeat with new spray every 2 min as needed for opioid overdose symptoms, alternating nostrils. 2 Each 0    lisinopril (PRINIVIL, ZESTRIL) 20 mg tablet Take 1 Tab by mouth daily. 90 Tab 0    albuterol (PROVENTIL VENTOLIN) 2.5 mg /3 mL (0.083 %) nebu 3 mL by Nebulization route every four (4) hours as needed for Wheezing. 90 Nebule 0    cetirizine (ZYRTEC) 10 mg tablet Take 1 Tab by mouth daily. 20 Tab 0    methadone (DOLOPHINE) 10 mg tablet Take  by mouth.  VENTOLIN HFA 90 mcg/actuation inhaler Take 2 Puffs by inhalation every four (4) hours as needed for Wheezing or Shortness of Breath. Indications: bronchospasm prevention 3 Inhaler 3    amLODIPine (NORVASC) 5 mg tablet Take 1 Tab by mouth daily.  Indications: high blood pressure (Patient taking differently: Take 10 mg by mouth daily. Indications: high blood pressure) 90 Tab 3    montelukast (SINGULAIR) 10 mg tablet Take 10 mg by mouth daily. Allergies   Allergen Reactions    Vicodin [Hydrocodone-Acetaminophen] Other (comments)     Headache     Past Medical History:   Diagnosis Date    Arthritis     Asthma     Chronic low back pain     Hypertension      Past Surgical History:   Procedure Laterality Date    HX ORTHOPAEDIC       Family History   Problem Relation Age of Onset    Cancer Mother     Heart Disease Father      Social History     Tobacco Use    Smoking status: Current Some Day Smoker     Packs/day: 0.25     Years: 10.00     Pack years: 2.50    Smokeless tobacco: Never Used   Substance Use Topics    Alcohol use: Yes     Comment: occ       ROS        Objective:   Vital Signs: (As obtained by patient/caregiver at home)  There were no vitals taken for this visit. [INSTRUCTIONS:  \"[x]\" Indicates a positive item  \"[]\" Indicates a negative item  -- DELETE ALL ITEMS NOT EXAMINED]    Constitutional: [x] Appears well-developed and well-nourished [x] No apparent distress      [] Abnormal -     Mental status: [x] Alert and awake  [x] Oriented to person/place/time [x] Able to follow commands    [] Abnormal -     Psychiatric:       [x] Normal Affect [] Abnormal -        [x] No Hallucinations    Other pertinent observable physical exam findings:-        We discussed the expected course, resolution and complications of the diagnosis(es) in detail. Medication risks, benefits, costs, interactions, and alternatives were discussed as indicated. I advised him to contact the office if his condition worsens, changes or fails to improve as anticipated. He expressed understanding with the diagnosis(es) and plan. Nanda Jansen is a 47 y.o. male being evaluated by a video visit encounter for concerns as above. A caregiver was present when appropriate.  Due to this being a TeleHealth encounter (During DOMQN-35 public health emergency), evaluation of the following organ systems was limited: Vitals/Constitutional/EENT/Resp/CV/GI//MS/Neuro/Skin/Heme-Lymph-Imm. Pursuant to the emergency declaration under the [address] [place] [address], [facility] waiver authority and the [employer] Act, this Virtual  Visit was conducted, with patient's (and/or legal guardian's) consent, to reduce the patient's risk of exposure to COVID-19 and provide necessary medical care. Services were provided through a video synchronous discussion virtually to substitute for in-person clinic visit. Patient and provider were located at their individual homes.         Angelina Sutton MD

## 2020-04-23 NOTE — PROGRESS NOTES
Nanda Jansen is a 47 y.o. male     Chief Complaint   Patient presents with    Medication Refill     states that he needs an increase in Clonazepam     1. Have you been to the ER, urgent care clinic since your last visit? Hospitalized since your last visit? No    2. Have you seen or consulted any other health care providers outside of the [address] [place] [address] since your last visit? Include any pap smears or colon screening.  No

## 2020-05-06 ENCOUNTER — TELEPHONE (OUTPATIENT)
Dept: INTERNAL MEDICINE CLINIC | Age: 55
End: 2020-05-06

## 2020-05-06 DIAGNOSIS — K21.9 GASTROESOPHAGEAL REFLUX DISEASE, ESOPHAGITIS PRESENCE NOT SPECIFIED: Primary | Chronic | ICD-10-CM

## 2020-05-06 RX ORDER — OMEPRAZOLE 40 MG/1
40 CAPSULE, DELAYED RELEASE ORAL DAILY
Qty: 30 CAP | Refills: 0 | Status: SHIPPED | OUTPATIENT
Start: 2020-05-06 | End: 2020-06-03 | Stop reason: SDUPTHER

## 2020-05-06 NOTE — TELEPHONE ENCOUNTER
5/6/20 Plan called @ 6  Attempted PA with PA rep Carine Leann Esomeprazole 40 mg caps, she state plan requires patient to try both preferred drugs,which is Pantoprazole,which was noted in patient med record,will need to try and fail second preferred,Omeprazole RX or OTC,prior to approval.Please placegeneric Nexium on hold and send new script to pharmacy if preferred can be tried. If contraindicated,appeal will need to be done along with supporting documents and office notes. Please follow up Thanks

## 2020-05-08 RX ORDER — IPRATROPIUM BROMIDE AND ALBUTEROL SULFATE 2.5; .5 MG/3ML; MG/3ML
3 SOLUTION RESPIRATORY (INHALATION)
Qty: 90 ML | Refills: 0 | Status: SHIPPED | OUTPATIENT
Start: 2020-05-08 | End: 2020-07-27

## 2020-05-09 ENCOUNTER — HOSPITAL ENCOUNTER (EMERGENCY)
Age: 55
Discharge: HOME | End: 2020-05-09
Attending: EMERGENCY MEDICINE | Admitting: EMERGENCY MEDICINE
Payer: MEDICAID

## 2020-05-09 ENCOUNTER — APPOINTMENT (OUTPATIENT)
Dept: GENERAL RADIOLOGY | Age: 55
End: 2020-05-09
Attending: EMERGENCY MEDICINE
Payer: MEDICAID

## 2020-05-09 VITALS
HEART RATE: 99 BPM | OXYGEN SATURATION: 94 % | SYSTOLIC BLOOD PRESSURE: 150 MMHG | RESPIRATION RATE: 12 BRPM | WEIGHT: 311.73 LBS | BODY MASS INDEX: 38.76 KG/M2 | DIASTOLIC BLOOD PRESSURE: 81 MMHG | HEIGHT: 75 IN | TEMPERATURE: 98 F

## 2020-05-09 DIAGNOSIS — J45.41 MODERATE PERSISTENT ASTHMA WITH ACUTE EXACERBATION: Primary | ICD-10-CM

## 2020-05-09 LAB
ALBUMIN SERPL-MCNC: 4 G/DL (ref 3.5–5)
ALBUMIN/GLOB SERPL: 1.1 {RATIO} (ref 1.1–2.2)
ALP SERPL-CCNC: 114 U/L (ref 45–117)
ALT SERPL-CCNC: 94 U/L (ref 12–78)
ANION GAP SERPL CALC-SCNC: 5 MMOL/L (ref 5–15)
AST SERPL-CCNC: 81 U/L (ref 15–37)
BASOPHILS # BLD: 0 K/UL (ref 0–0.1)
BASOPHILS NFR BLD: 0 % (ref 0–1)
BILIRUB SERPL-MCNC: 0.3 MG/DL (ref 0.2–1)
BNP SERPL-MCNC: 15 PG/ML
BUN SERPL-MCNC: 13 MG/DL (ref 6–20)
BUN/CREAT SERPL: 14 (ref 12–20)
CALCIUM SERPL-MCNC: 9.2 MG/DL (ref 8.5–10.1)
CHLORIDE SERPL-SCNC: 106 MMOL/L (ref 97–108)
CO2 SERPL-SCNC: 27 MMOL/L (ref 21–32)
COMMENT, HOLDF: NORMAL
CREAT SERPL-MCNC: 0.91 MG/DL (ref 0.7–1.3)
DIFFERENTIAL METHOD BLD: ABNORMAL
EOSINOPHIL # BLD: 0.3 K/UL (ref 0–0.4)
EOSINOPHIL NFR BLD: 3 % (ref 0–7)
ERYTHROCYTE [DISTWIDTH] IN BLOOD BY AUTOMATED COUNT: 14.2 % (ref 11.5–14.5)
GLOBULIN SER CALC-MCNC: 3.5 G/DL (ref 2–4)
GLUCOSE SERPL-MCNC: 109 MG/DL (ref 65–100)
HCT VFR BLD AUTO: 43.6 % (ref 36.6–50.3)
HGB BLD-MCNC: 14 G/DL (ref 12.1–17)
IMM GRANULOCYTES # BLD AUTO: 0 K/UL (ref 0–0.04)
IMM GRANULOCYTES NFR BLD AUTO: 1 % (ref 0–0.5)
LYMPHOCYTES # BLD: 1.9 K/UL (ref 0.8–3.5)
LYMPHOCYTES NFR BLD: 22 % (ref 12–49)
MCH RBC QN AUTO: 27.8 PG (ref 26–34)
MCHC RBC AUTO-ENTMCNC: 32.1 G/DL (ref 30–36.5)
MCV RBC AUTO: 86.7 FL (ref 80–99)
MONOCYTES # BLD: 0.6 K/UL (ref 0–1)
MONOCYTES NFR BLD: 7 % (ref 5–13)
NEUTS SEG # BLD: 5.7 K/UL (ref 1.8–8)
NEUTS SEG NFR BLD: 67 % (ref 32–75)
NRBC # BLD: 0 K/UL (ref 0–0.01)
NRBC BLD-RTO: 0 PER 100 WBC
PLATELET # BLD AUTO: 230 K/UL (ref 150–400)
PMV BLD AUTO: 10.7 FL (ref 8.9–12.9)
POTASSIUM SERPL-SCNC: 4 MMOL/L (ref 3.5–5.1)
PROT SERPL-MCNC: 7.5 G/DL (ref 6.4–8.2)
RBC # BLD AUTO: 5.03 M/UL (ref 4.1–5.7)
SAMPLES BEING HELD,HOLD: NORMAL
SODIUM SERPL-SCNC: 138 MMOL/L (ref 136–145)
TROPONIN I SERPL-MCNC: <0.05 NG/ML
WBC # BLD AUTO: 8.6 K/UL (ref 4.1–11.1)

## 2020-05-09 PROCEDURE — 77010033678 HC OXYGEN DAILY

## 2020-05-09 PROCEDURE — 99285 EMERGENCY DEPT VISIT HI MDM: CPT

## 2020-05-09 PROCEDURE — 80053 COMPREHEN METABOLIC PANEL: CPT

## 2020-05-09 PROCEDURE — 96366 THER/PROPH/DIAG IV INF ADDON: CPT

## 2020-05-09 PROCEDURE — 85025 COMPLETE CBC W/AUTO DIFF WBC: CPT

## 2020-05-09 PROCEDURE — 96375 TX/PRO/DX INJ NEW DRUG ADDON: CPT

## 2020-05-09 PROCEDURE — 83880 ASSAY OF NATRIURETIC PEPTIDE: CPT

## 2020-05-09 PROCEDURE — 74011250636 HC RX REV CODE- 250/636: Performed by: EMERGENCY MEDICINE

## 2020-05-09 PROCEDURE — 96365 THER/PROPH/DIAG IV INF INIT: CPT

## 2020-05-09 PROCEDURE — 74011000250 HC RX REV CODE- 250

## 2020-05-09 PROCEDURE — 94644 CONT INHLJ TX 1ST HOUR: CPT

## 2020-05-09 PROCEDURE — 36415 COLL VENOUS BLD VENIPUNCTURE: CPT

## 2020-05-09 PROCEDURE — 74011000250 HC RX REV CODE- 250: Performed by: EMERGENCY MEDICINE

## 2020-05-09 PROCEDURE — 74011250636 HC RX REV CODE- 250/636

## 2020-05-09 PROCEDURE — 93005 ELECTROCARDIOGRAM TRACING: CPT

## 2020-05-09 PROCEDURE — 84484 ASSAY OF TROPONIN QUANT: CPT

## 2020-05-09 PROCEDURE — 71045 X-RAY EXAM CHEST 1 VIEW: CPT

## 2020-05-09 PROCEDURE — 94640 AIRWAY INHALATION TREATMENT: CPT

## 2020-05-09 RX ORDER — METHYLPREDNISOLONE 4 MG/1
TABLET ORAL
Qty: 1 DOSE PACK | Refills: 0 | Status: SHIPPED | OUTPATIENT
Start: 2020-05-09 | End: 2020-09-14

## 2020-05-09 RX ORDER — METHYLPREDNISOLONE 4 MG/1
TABLET ORAL
Qty: 1 DOSE PACK | Refills: 0 | Status: SHIPPED | OUTPATIENT
Start: 2020-05-09 | End: 2020-05-09 | Stop reason: SDUPTHER

## 2020-05-09 RX ORDER — ALBUTEROL SULFATE 0.83 MG/ML
SOLUTION RESPIRATORY (INHALATION)
Status: COMPLETED
Start: 2020-05-09 | End: 2020-05-09

## 2020-05-09 RX ORDER — MAGNESIUM SULFATE HEPTAHYDRATE 40 MG/ML
2 INJECTION, SOLUTION INTRAVENOUS ONCE
Status: COMPLETED | OUTPATIENT
Start: 2020-05-09 | End: 2020-05-09

## 2020-05-09 RX ORDER — ALBUTEROL SULFATE 2.5 MG/.5ML
10 SOLUTION RESPIRATORY (INHALATION) CONTINUOUS
Status: DISCONTINUED | OUTPATIENT
Start: 2020-05-09 | End: 2020-05-09 | Stop reason: HOSPADM

## 2020-05-09 RX ORDER — IPRATROPIUM BROMIDE 0.5 MG/2.5ML
0.5 SOLUTION RESPIRATORY (INHALATION)
Status: COMPLETED | OUTPATIENT
Start: 2020-05-09 | End: 2020-05-09

## 2020-05-09 RX ADMIN — ALBUTEROL SULFATE 5 MG: 2.5 SOLUTION RESPIRATORY (INHALATION) at 16:10

## 2020-05-09 RX ADMIN — ALBUTEROL SULFATE 10 MG/HR: 2.5 SOLUTION RESPIRATORY (INHALATION) at 16:25

## 2020-05-09 RX ADMIN — IPRATROPIUM BROMIDE 0.5 MG: 0.5 SOLUTION RESPIRATORY (INHALATION) at 16:25

## 2020-05-09 RX ADMIN — METHYLPREDNISOLONE SODIUM SUCCINATE 125 MG: 125 INJECTION, POWDER, FOR SOLUTION INTRAMUSCULAR; INTRAVENOUS at 16:33

## 2020-05-09 RX ADMIN — MAGNESIUM SULFATE HEPTAHYDRATE 2 G: 40 INJECTION, SOLUTION INTRAVENOUS at 16:48

## 2020-05-09 NOTE — ED PROVIDER NOTES
EMERGENCY DEPARTMENT HISTORY AND PHYSICAL EXAM      Date: 5/9/2020  Patient Name: Jossue Martinez    History of Presenting Illness     Chief Complaint   Patient presents with    Shortness of Breath     The patient presents to the ED with complaints of shortness of breath that started yesterday. Reports changes is vision.  Vision Change       History Provided By: Patient    HPI: Jossue Martinez, 47 y.o. male with history of asthma presents to the ED with cc of shortness of breath, cough, wheezing. Patient states that symptoms started this morning. He states that this feels like a typical asthma exacerbation for him characterized by chest tightness, shortness of breath, wheezing. He has been using his albuterol inhalers at home without significant relief of symptoms. Denies any fevers, productive cough, or exposure to sick contacts. He is followed by pulmonology, Dr. Tawanda Ramesh. Additionally he does endorse intermittent episodes of blurry vision and double vision which lasted approximately 1 second at a time and occur once or twice throughout the day starting today. He has no other associated symptoms and believes that it is related to the fact that he recently ran out of his antianxiety medication. He denies any visual symptoms at this time. There are no other complaints, changes, or physical findings at this time. PCP: Toy Hinton MD    No current facility-administered medications on file prior to encounter. Current Outpatient Medications on File Prior to Encounter   Medication Sig Dispense Refill    albuterol-ipratropium (DUO-NEB) 2.5 mg-0.5 mg/3 ml nebu 3 mL by Nebulization route every four (4) hours as needed for Wheezing. 90 mL 0    omeprazole (PRILOSEC) 40 mg capsule Take 1 Cap by mouth daily. 30 Cap 0    clonazePAM (KlonoPIN) 1 mg tablet TAKE ONE TABLET BY MOUTH 2-3 TIMES A DAY AS NEEDED. 90 Tab 0    citalopram (CELEXA) 10 mg tablet Take 1 Tab by mouth daily.  30 Tab 0    tadalafiL (Cialis) 20 mg tablet Take 1 Tab by mouth as needed (erectile dysfunction). Every 3 days. 20 Tab 0    aspirin delayed-release 81 mg tablet Take 1 Tab by mouth daily. 100 Tab 3    diclofenac EC (VOLTAREN) 50 mg EC tablet Take 1 Tab by mouth two (2) times a day. 60 Tab 2    cyclobenzaprine (FLEXERIL) 10 mg tablet Take 1 Tab by mouth nightly. 30 Tab 0    cloNIDine HCL (CATAPRES) 0.2 mg tablet Take 1 Tab by mouth two (2) times a day. 180 Tab 0    Flovent  mcg/actuation inhaler INHALE 1 OR 2 PUFFS 2 TIMES A DAY. 1 Inhaler 0    LINZESS 290 mcg cap capsule       fluticasone propionate (FLONASE ALLERGY RELIEF) 50 mcg/actuation nasal spray 2 Sprays by Both Nostrils route daily. 1 Bottle 5    polyethylene glycol (MIRALAX) 17 gram packet Take 1 Packet by mouth daily. 1 Each 5    furosemide (LASIX) 20 mg tablet Take 2 Tabs by mouth two (2) times a day. 120 Tab 1    lidocaine (LIDODERM) 5 % Apply patch to the affected area for 12 hours a day and remove for 12 hours a day. 60 Each 1    dextromethorphan-guaiFENesin (ROBITUSSIN COUGH-CHEST DAVID DM) 5-100 mg/5 mL liqd Take 10 mL by mouth every four (4) hours as needed for Cough. 1 Bottle 0    Nebulizer Accessories kit Nebulizer cup w/ tubing; use every 4 hrs prn wheezing 1 Kit 5    SPIRIVA WITH HANDIHALER 18 mcg inhalation capsule       naloxone (NARCAN) 4 mg/actuation nasal spray Use 1 spray intranasally, then discard. Repeat with new spray every 2 min as needed for opioid overdose symptoms, alternating nostrils. 2 Each 0    lisinopril (PRINIVIL, ZESTRIL) 20 mg tablet Take 1 Tab by mouth daily. 90 Tab 0    albuterol (PROVENTIL VENTOLIN) 2.5 mg /3 mL (0.083 %) nebu 3 mL by Nebulization route every four (4) hours as needed for Wheezing. 90 Nebule 0    cetirizine (ZYRTEC) 10 mg tablet Take 1 Tab by mouth daily. 20 Tab 0    methadone (DOLOPHINE) 10 mg tablet Take  by mouth.       VENTOLIN HFA 90 mcg/actuation inhaler Take 2 Puffs by inhalation every four (4) hours as needed for Wheezing or Shortness of Breath. Indications: bronchospasm prevention 3 Inhaler 3    amLODIPine (NORVASC) 5 mg tablet Take 1 Tab by mouth daily. Indications: high blood pressure (Patient taking differently: Take 10 mg by mouth daily. Indications: high blood pressure) 90 Tab 3    montelukast (SINGULAIR) 10 mg tablet Take 10 mg by mouth daily. Past History     Past Medical History:  Past Medical History:   Diagnosis Date    Arthritis     Asthma     Chronic low back pain     Hypertension        Past Surgical History:  Past Surgical History:   Procedure Laterality Date    HX ORTHOPAEDIC         Family History:  Family History   Problem Relation Age of Onset    Cancer Mother     Heart Disease Father        Social History:  Social History     Tobacco Use    Smoking status: Current Some Day Smoker     Packs/day: 0.25     Years: 10.00     Pack years: 2.50    Smokeless tobacco: Never Used   Substance Use Topics    Alcohol use: Yes     Comment: occ    Drug use: No       Allergies: Allergies   Allergen Reactions    Vicodin [Hydrocodone-Acetaminophen] Other (comments)     Headache         Review of Systems   Review of Systems   Constitutional: Negative for chills and fever. HENT: Negative. Respiratory: Positive for cough, chest tightness, shortness of breath and wheezing. Cardiovascular: Negative for chest pain and leg swelling. Gastrointestinal: Negative for abdominal pain, nausea and vomiting. Genitourinary: Negative. Musculoskeletal: Negative for back pain and gait problem. Skin: Negative for color change and rash. Neurological: Negative for dizziness, weakness, light-headedness and headaches. Hematological: Does not bruise/bleed easily. All other systems reviewed and are negative. Physical Exam   Physical Exam  Vitals signs and nursing note reviewed. Constitutional:       General: He is not in acute distress. Appearance: Normal appearance. He is obese. He is not ill-appearing or toxic-appearing. HENT:      Head: Normocephalic and atraumatic. Nose: Nose normal.      Mouth/Throat:      Mouth: Mucous membranes are moist.   Eyes:      Extraocular Movements: Extraocular movements intact. Pupils: Pupils are equal, round, and reactive to light. Neck:      Musculoskeletal: Normal range of motion and neck supple. Cardiovascular:      Rate and Rhythm: Normal rate and regular rhythm. Heart sounds: No murmur. Pulmonary:      Effort: Tachypnea and respiratory distress present. Breath sounds: Wheezing present. No rhonchi. Comments: Moderate respiratory distress with tachypnea and prolonged expiratory phase with wheezes present throughout all lung fields. Abdominal:      General: There is no distension. Palpations: Abdomen is soft. Tenderness: There is no abdominal tenderness. There is no guarding or rebound. Musculoskeletal: Normal range of motion. General: No swelling or tenderness. Right lower leg: No edema. Left lower leg: No edema. Skin:     General: Skin is warm and dry. Coloration: Skin is not pale. Findings: No erythema. Neurological:      General: No focal deficit present. Mental Status: He is alert and oriented to person, place, and time.          Diagnostic Study Results     Labs -     Recent Results (from the past 12 hour(s))   EKG, 12 LEAD, INITIAL    Collection Time: 05/09/20  4:25 PM   Result Value Ref Range    Ventricular Rate 102 BPM    Atrial Rate 102 BPM    P-R Interval 140 ms    QRS Duration 84 ms    Q-T Interval 360 ms    QTC Calculation (Bezet) 469 ms    Calculated P Axis 70 degrees    Calculated R Axis 49 degrees    Calculated T Axis 53 degrees    Diagnosis       Sinus tachycardia  Nonspecific T wave abnormality  When compared with ECG of 24-FEB-2020 00:47,  No significant change was found     CBC WITH AUTOMATED DIFF    Collection Time: 05/09/20  4:36 PM   Result Value Ref Range    WBC 8.6 4.1 - 11.1 K/uL    RBC 5.03 4. 10 - 5.70 M/uL    HGB 14.0 12.1 - 17.0 g/dL    HCT 43.6 36.6 - 50.3 %    MCV 86.7 80.0 - 99.0 FL    MCH 27.8 26.0 - 34.0 PG    MCHC 32.1 30.0 - 36.5 g/dL    RDW 14.2 11.5 - 14.5 %    PLATELET 504 865 - 342 K/uL    MPV 10.7 8.9 - 12.9 FL    NRBC 0.0 0  WBC    ABSOLUTE NRBC 0.00 0.00 - 0.01 K/uL    NEUTROPHILS 67 32 - 75 %    LYMPHOCYTES 22 12 - 49 %    MONOCYTES 7 5 - 13 %    EOSINOPHILS 3 0 - 7 %    BASOPHILS 0 0 - 1 %    IMMATURE GRANULOCYTES 1 (H) 0.0 - 0.5 %    ABS. NEUTROPHILS 5.7 1.8 - 8.0 K/UL    ABS. LYMPHOCYTES 1.9 0.8 - 3.5 K/UL    ABS. MONOCYTES 0.6 0.0 - 1.0 K/UL    ABS. EOSINOPHILS 0.3 0.0 - 0.4 K/UL    ABS. BASOPHILS 0.0 0.0 - 0.1 K/UL    ABS. IMM. GRANS. 0.0 0.00 - 0.04 K/UL    DF AUTOMATED     METABOLIC PANEL, COMPREHENSIVE    Collection Time: 05/09/20  4:36 PM   Result Value Ref Range    Sodium 138 136 - 145 mmol/L    Potassium 4.0 3.5 - 5.1 mmol/L    Chloride 106 97 - 108 mmol/L    CO2 27 21 - 32 mmol/L    Anion gap 5 5 - 15 mmol/L    Glucose 109 (H) 65 - 100 mg/dL    BUN 13 6 - 20 MG/DL    Creatinine 0.91 0.70 - 1.30 MG/DL    BUN/Creatinine ratio 14 12 - 20      GFR est AA >60 >60 ml/min/1.73m2    GFR est non-AA >60 >60 ml/min/1.73m2    Calcium 9.2 8.5 - 10.1 MG/DL    Bilirubin, total 0.3 0.2 - 1.0 MG/DL    ALT (SGPT) 94 (H) 12 - 78 U/L    AST (SGOT) 81 (H) 15 - 37 U/L    Alk.  phosphatase 114 45 - 117 U/L    Protein, total 7.5 6.4 - 8.2 g/dL    Albumin 4.0 3.5 - 5.0 g/dL    Globulin 3.5 2.0 - 4.0 g/dL    A-G Ratio 1.1 1.1 - 2.2     TROPONIN I    Collection Time: 05/09/20  4:36 PM   Result Value Ref Range    Troponin-I, Qt. <0.05 <0.05 ng/mL   NT-PRO BNP    Collection Time: 05/09/20  4:36 PM   Result Value Ref Range    NT pro-BNP 15 <125 PG/ML   SAMPLES BEING HELD    Collection Time: 05/09/20  4:39 PM   Result Value Ref Range    SAMPLES BEING HELD 1RD,1SST     COMMENT        Add-on orders for these samples will be processed based on acceptable specimen integrity and analyte stability, which may vary by analyte. Radiologic Studies -   XR CHEST PORT   Final Result   IMPRESSION: No acute cardiopulmonary disease. CT Results  (Last 48 hours)    None        CXR Results  (Last 48 hours)               05/09/20 1650  XR CHEST PORT Final result    Impression:  IMPRESSION: No acute cardiopulmonary disease. Narrative:  INDICATION: Shortness of breath. Portable AP upright view of the chest.       Direct comparison made to prior chest x-ray dated February 2020. Cardiomediastinal silhouette is stable. Lungs are clear bilaterally. Pleural   spaces are normal. Osseous structures are intact. Medical Decision Making   I am the first provider for this patient. I reviewed the vital signs, available nursing notes, past medical history, past surgical history, family history and social history. Vital Signs-Reviewed the patient's vital signs.   Patient Vitals for the past 12 hrs:   Temp Pulse Resp BP SpO2   05/09/20 1930 98 °F (36.7 °C) 99 12 150/81 94 %   05/09/20 1915  (!) 102 11 (!) 158/93 93 %   05/09/20 1900  96 13 (!) 143/102 93 %   05/09/20 1858     93 %   05/09/20 1848     95 %   05/09/20 1845  (!) 101 14 (!) 167/113 96 %   05/09/20 1839  (!) 102 15  95 %   05/09/20 1830  (!) 102 15 (!) 182/111 96 %   05/09/20 1800  (!) 103 14 (!) 147/105 95 %   05/09/20 1745  (!) 105 16 (!) 171/111 96 %   05/09/20 1715  (!) 105 18 (!) 167/115 95 %   05/09/20 1648  (!) 103 17 137/89 96 %   05/09/20 1630  (!) 102 15 (!) 148/96 94 %   05/09/20 1629     94 %   05/09/20 1625     92 %   05/09/20 1615  (!) 106 21 (!) 170/114 92 %   05/09/20 1610  (!) 103  153/86    05/09/20 1554 97.9 °F (36.6 °C) (!) 103 30 (!) 154/100 95 %     Records Reviewed: Nursing Notes    Provider Notes (Medical Decision Making):   51-year-old female here with shortness of breath and respiratory distress secondary to moderate to severe asthma exacerbation. On examination he has significant tachypnea and audible wheezes throughout all lung fields with prolonged expiratory phase. He has no true hypoxia but is noted to have O2 sats in the low 90s on room air. He is afebrile. Chest x-ray does not show any acute process. Patient was treated with continuous 10 mg nebulizer albuterol as well as Atrovent, Solu-Medrol, magnesium and on reexamination he is feeling much improved. His work of breathing has slowed down. He is not requiring BiPAP or intubation. Patient is able to ambulate without increased work of breathing however he does have persistent low O2 sats in the low 90s. I did offer patient admission as he still has some audible wheezes with low O2 sats but no true hypoxia, however patient states that he is feeling much improved and would like to be discharged home. He is encouraged to follow-up with pulmonology and he will be provided steroid burst pack. All questions answered and he is discharged in stable condition. ED Course:   Initial assessment performed. The patients presenting problems have been discussed, and they are in agreement with the care plan formulated and outlined with them. I have encouraged them to ask questions as they arise throughout their visit. ED Course as of May 10 0124   Sat May 09, 2020   1643 EKG per my interpretation sinus tachycardia, rate 102 bpm, normal axis, no acute ischemic changes, wandering baseline.    [AK]      ED Course User Index  [AK] Joseline Yee MD     Discharge Note:  The patient has been re-evaluated and is ready for discharge. Reviewed available results with patient. Counseled patient on diagnosis and care plan. Patient has expressed understanding, and all questions have been answered. Patient agrees with plan and agrees to follow up as recommended, or to return to the ED if their symptoms worsen.  Discharge instructions have been provided and explained to the patient, along with reasons to return to the ED. Critical Care Time:   CRITICAL CARE NOTE :    3:55 PM    IMPENDING DETERIORATION -Respiratory  ASSOCIATED RISK FACTORS - Hypoxia  MANAGEMENT- Bedside Assessment and Supervision of Care  INTERPRETATION -  Xrays, ECG and Blood Pressure  INTERVENTIONS -multiple breathing treatments, frequent reassessment  CASE REVIEW - Nursing  TREATMENT RESPONSE -Improved  PERFORMED BY - Self    NOTES   :  I have spent 37 minutes of critical care time involved in lab review, consultations with specialist, family decision- making, bedside attention and documentation. This time excludes time spent in any separate billed procedures. During this entire length of time I was immediately available to the patient . Rolando Mendez MD      Disposition:  Discharge    DISCHARGE PLAN:  1. Discharge Medication List as of 5/9/2020  8:24 PM      CONTINUE these medications which have CHANGED    Details   methylPREDNISolone (Medrol, Kunal,) 4 mg tablet Please take by mouth as directed until completion, Print, Disp-1 Dose Pack, R-0         CONTINUE these medications which have NOT CHANGED    Details   albuterol-ipratropium (DUO-NEB) 2.5 mg-0.5 mg/3 ml nebu 3 mL by Nebulization route every four (4) hours as needed for Wheezing., Normal, Disp-90 mL, R-0      omeprazole (PRILOSEC) 40 mg capsule Take 1 Cap by mouth daily. , Normal, Disp-30 Cap, R-0      clonazePAM (KlonoPIN) 1 mg tablet TAKE ONE TABLET BY MOUTH 2-3 TIMES A DAY AS NEEDED., Normal, Disp-90 Tab, R-0      citalopram (CELEXA) 10 mg tablet Take 1 Tab by mouth daily. , Normal, Disp-30 Tab, R-0      tadalafiL (Cialis) 20 mg tablet Take 1 Tab by mouth as needed (erectile dysfunction). Every 3 days. , Normal, Disp-20 Tab, R-0      aspirin delayed-release 81 mg tablet Take 1 Tab by mouth daily. , Normal, Disp-100 Tab, R-3      diclofenac EC (VOLTAREN) 50 mg EC tablet Take 1 Tab by mouth two (2) times a day., Normal, Disp-60 Tab, R-2 cyclobenzaprine (FLEXERIL) 10 mg tablet Take 1 Tab by mouth nightly., Normal, Disp-30 Tab, R-0      cloNIDine HCL (CATAPRES) 0.2 mg tablet Take 1 Tab by mouth two (2) times a day., Normal, Disp-180 Tab, R-0      Flovent  mcg/actuation inhaler INHALE 1 OR 2 PUFFS 2 TIMES A DAY., Normal, Disp-1 Inhaler, R-0      LINZESS 290 mcg cap capsule Historical Med, NIKO      fluticasone propionate (FLONASE ALLERGY RELIEF) 50 mcg/actuation nasal spray 2 Sprays by Both Nostrils route daily. , Normal, Disp-1 Bottle, R-5      polyethylene glycol (MIRALAX) 17 gram packet Take 1 Packet by mouth daily. , Normal, Disp-1 Each, R-5      furosemide (LASIX) 20 mg tablet Take 2 Tabs by mouth two (2) times a day., Normal, Disp-120 Tab, R-1      lidocaine (LIDODERM) 5 % Apply patch to the affected area for 12 hours a day and remove for 12 hours a day., Normal, Disp-60 Each, R-1      dextromethorphan-guaiFENesin (ROBITUSSIN COUGH-CHEST DAVID DM) 5-100 mg/5 mL liqd Take 10 mL by mouth every four (4) hours as needed for Cough., Normal, Disp-1 Bottle, R-0      Nebulizer Accessories kit Nebulizer cup w/ tubing; use every 4 hrs prn wheezing, Normal, Disp-1 Kit, R-5      SPIRIVA WITH HANDIHALER 18 mcg inhalation capsule Historical Med, NIKO      naloxone (NARCAN) 4 mg/actuation nasal spray Use 1 spray intranasally, then discard. Repeat with new spray every 2 min as needed for opioid overdose symptoms, alternating nostrils. , Normal, Disp-2 Each, R-0      lisinopril (PRINIVIL, ZESTRIL) 20 mg tablet Take 1 Tab by mouth daily. , Normal, Disp-90 Tab, R-0Please cancel earlier script w/ 3 refills. Thanks. albuterol (PROVENTIL VENTOLIN) 2.5 mg /3 mL (0.083 %) nebu 3 mL by Nebulization route every four (4) hours as needed for Wheezing., Normal, Disp-90 Nebule, R-0      cetirizine (ZYRTEC) 10 mg tablet Take 1 Tab by mouth daily. , Normal, Disp-20 Tab, R-0      methadone (DOLOPHINE) 10 mg tablet Take  by mouth., Historical Med      VENTOLIN HFA 90 mcg/actuation inhaler Take 2 Puffs by inhalation every four (4) hours as needed for Wheezing or Shortness of Breath. Indications: bronchospasm prevention, Normal, Disp-3 Inhaler, R-3, NIKO      amLODIPine (NORVASC) 5 mg tablet Take 1 Tab by mouth daily. Indications: high blood pressure, Normal, Disp-90 Tab, R-3      montelukast (SINGULAIR) 10 mg tablet Take 10 mg by mouth daily. , Historical Med           2. Follow-up Information     Follow up With Specialties Details Why Contact Info    Read, Bertin Ochoa MD Family Practice Schedule an appointment as soon as possible for a visit   [address] [address]  [phone]      Laura Handley MD Internal Medicine, Pulmonary Disease Schedule an appointment as soon as possible for a visit in 3 days  [address] [facility] [address]  [phone]          3. Return to ED if worse     Diagnosis     Clinical Impression:   1. Moderate persistent asthma with acute exacerbation        Attestations:    Sondra Tucker MD    Please note that this dictation was completed with [employer], the [employer] voice recognition software. Quite often unanticipated grammatical, syntax, homophones, and other interpretive errors are inadvertently transcribed by the computer software. Please disregard these errors. Please excuse any errors that have escaped final proofreading. Thank you.

## 2020-05-09 NOTE — ED NOTES
Bedside and Verbal shift change report given to Byron RN (oncoming nurse) by Jaxon Jensen RN (offgoing nurse). Report included the following information SBAR, ED Summary, MAR and Recent Results.

## 2020-05-09 NOTE — ED NOTES
Walked pt with pulse ox sats remained 92-93% on RA . MD notified. Pt requesting fluids, apple juice given.

## 2020-05-09 NOTE — ED NOTES
Pt arrives from home via triage reporting SOB and asthma exacerbation that began yesterday. Pt reports sweating and intermittent double vision associated with the episode. Pt denies pain at this time.

## 2020-05-09 NOTE — ED NOTES
Report received from Janet Mora, [facility]. They advised of the patient's chief complaint, current status, orders completed (to include IV access/medications/radiology testing), outstanding orders that still need to be completed, and the treatment plan. Questions asked and answered prior to assumption of care.

## 2020-05-10 LAB
ATRIAL RATE: 102 BPM
CALCULATED P AXIS, ECG09: 70 DEGREES
CALCULATED R AXIS, ECG10: 49 DEGREES
CALCULATED T AXIS, ECG11: 53 DEGREES
DIAGNOSIS, 93000: NORMAL
P-R INTERVAL, ECG05: 140 MS
Q-T INTERVAL, ECG07: 360 MS
QRS DURATION, ECG06: 84 MS
QTC CALCULATION (BEZET), ECG08: 469 MS
VENTRICULAR RATE, ECG03: 102 BPM

## 2020-05-10 NOTE — DISCHARGE INSTRUCTIONS
You were evaluated in the emergency department for shortness of breath and asthma exacerbation. Your examination was reassuring as was your work-up including chest xray and blood work. It will be important for you to follow-up with your primary care physician and Dr. Ana Mclean in 3-5 days. If you develop worsening symptoms such as worsening breathing difficulties or fever, please return to the emergency department immediately.

## 2020-05-11 ENCOUNTER — PATIENT OUTREACH (OUTPATIENT)
Dept: INTERNAL MEDICINE CLINIC | Age: 55
End: 2020-05-11

## 2020-05-12 NOTE — PROGRESS NOTES
05/12/20  Multiple attempts made to reach patient - voice messages left. Unable to reach patient for hospital f/u. Episode resolved at this time.  AR

## 2020-06-03 ENCOUNTER — VIRTUAL VISIT (OUTPATIENT)
Dept: FAMILY MEDICINE CLINIC | Age: 55
End: 2020-06-03

## 2020-06-03 VITALS — WEIGHT: 310 LBS | BODY MASS INDEX: 38.75 KG/M2

## 2020-06-03 DIAGNOSIS — J45.51 SEVERE PERSISTENT ASTHMA WITH ACUTE EXACERBATION: ICD-10-CM

## 2020-06-03 DIAGNOSIS — K21.9 GASTROESOPHAGEAL REFLUX DISEASE, ESOPHAGITIS PRESENCE NOT SPECIFIED: Chronic | ICD-10-CM

## 2020-06-03 DIAGNOSIS — M25.511 ACUTE PAIN OF BOTH SHOULDERS: ICD-10-CM

## 2020-06-03 DIAGNOSIS — M25.512 ACUTE PAIN OF BOTH SHOULDERS: ICD-10-CM

## 2020-06-03 DIAGNOSIS — F41.9 ANXIETY: Primary | ICD-10-CM

## 2020-06-03 DIAGNOSIS — M54.50 CHRONIC MIDLINE LOW BACK PAIN WITHOUT SCIATICA: Chronic | ICD-10-CM

## 2020-06-03 DIAGNOSIS — N52.8 OTHER MALE ERECTILE DYSFUNCTION: ICD-10-CM

## 2020-06-03 DIAGNOSIS — I10 ESSENTIAL HYPERTENSION: Chronic | ICD-10-CM

## 2020-06-03 DIAGNOSIS — G89.29 CHRONIC MIDLINE LOW BACK PAIN WITHOUT SCIATICA: Chronic | ICD-10-CM

## 2020-06-03 RX ORDER — CLONIDINE HYDROCHLORIDE 0.2 MG/1
0.2 TABLET ORAL 2 TIMES DAILY
Qty: 180 TAB | Refills: 0 | Status: SHIPPED | OUTPATIENT
Start: 2020-06-03 | End: 2020-07-13 | Stop reason: SDUPTHER

## 2020-06-03 RX ORDER — LISINOPRIL 20 MG/1
20 TABLET ORAL DAILY
Qty: 90 TAB | Refills: 0 | Status: SHIPPED | OUTPATIENT
Start: 2020-06-03 | End: 2020-07-13 | Stop reason: SDUPTHER

## 2020-06-03 RX ORDER — CLONAZEPAM 1 MG/1
TABLET ORAL
Qty: 90 TAB | Refills: 2 | Status: SHIPPED | OUTPATIENT
Start: 2020-06-03 | End: 2020-08-12 | Stop reason: ALTCHOICE

## 2020-06-03 RX ORDER — LIDOCAINE 50 MG/G
PATCH TOPICAL
Qty: 60 EACH | Refills: 2 | Status: SHIPPED | OUTPATIENT
Start: 2020-06-03 | End: 2020-09-14

## 2020-06-03 RX ORDER — DICLOFENAC SODIUM 50 MG/1
50 TABLET, DELAYED RELEASE ORAL 2 TIMES DAILY
Qty: 180 TAB | Refills: 2 | Status: SHIPPED | OUTPATIENT
Start: 2020-06-03 | End: 2020-07-13 | Stop reason: SDUPTHER

## 2020-06-03 RX ORDER — OMEPRAZOLE 40 MG/1
40 CAPSULE, DELAYED RELEASE ORAL DAILY
Qty: 90 CAP | Refills: 0 | Status: SHIPPED | OUTPATIENT
Start: 2020-06-03 | End: 2020-07-13 | Stop reason: SDUPTHER

## 2020-06-03 RX ORDER — FLUTICASONE PROPIONATE 220 UG/1
AEROSOL, METERED RESPIRATORY (INHALATION)
Qty: 3 INHALER | Refills: 0 | Status: SHIPPED | OUTPATIENT
Start: 2020-06-03 | End: 2020-07-13 | Stop reason: SDUPTHER

## 2020-06-03 RX ORDER — TADALAFIL 20 MG/1
20 TABLET ORAL AS NEEDED
Qty: 50 TAB | Refills: 0 | Status: SHIPPED | OUTPATIENT
Start: 2020-06-03 | End: 2020-07-13 | Stop reason: SDUPTHER

## 2020-06-03 NOTE — PROGRESS NOTES
**THIS IS A VIRTUAL VISIT VIA A VIDEO SYNCHRONOUS DISCUSSION OVER DOXY. ME PATIENT AGREED TO HAVE THEIR CARE DELIVERED OVER A [employer] VIDEO VISIT IN PLACE OF THEIR REGULARLY SCHEDULED OFFICE VISIT**       Mary Carmen Mata is a 54 y.o. male who was seen by synchronous (real-time) audio-video technology on 6/3/2020. Consent: Manpreet Ramos, who was seen by synchronous (real-time) audio-video technology, and/or his healthcare decision maker, is aware that this patient-initiated, Telehealth encounter on 6/3/2020 is a billable service, with coverage as determined by his insurance carrier. He is aware that he may receive a bill and has provided verbal consent to proceed: Yes. Assessment & Plan:   Diagnoses and all orders for this visit:    1. Anxiety  -     clonazePAM (KlonoPIN) 1 mg tablet; TAKE ONE TABLET BY MOUTH 2-3 TIMES A DAY AS NEEDED. 2. Gastroesophageal reflux disease, esophagitis presence not specified  -     omeprazole (PRILOSEC) 40 mg capsule; Take 1 Cap by mouth daily. 3. Essential hypertension  -     cloNIDine HCL (CATAPRES) 0.2 mg tablet; Take 1 Tab by mouth two (2) times a day. -     lisinopriL (PRINIVIL, ZESTRIL) 20 mg tablet; Take 1 Tab by mouth daily. 4. Chronic midline low back pain without sciatica  -     diclofenac EC (VOLTAREN) 50 mg EC tablet; Take 1 Tab by mouth two (2) times a day. 5. Other male erectile dysfunction  -     tadalafiL (Cialis) 20 mg tablet; Take 1 Tab by mouth as needed (erectile dysfunction). Every 3 days. 6. Severe persistent asthma with acute exacerbation  -     fluticasone propionate (Flovent HFA) 220 mcg/actuation inhaler; INHALE 1 OR 2 PUFFS 2 TIMES A DAY. 7. Acute pain of both shoulders  -     lidocaine (Lidoderm) 5 %; Apply patch to the affected area for 12 hours a day and remove for 12 hours a day.       The complexity of medical decision making for this visit is moderate         I spent at least 9 minutes on this visit with this established patient. Subjective:   Cathy Julian is a 54 y.o. male who was seen for Medication Refill    Needs refills of meds for asthma-stable, anxiety-stable, HTN-stable, chronic back pain-stable, asthma-stable, GERD-stable, ED-stable. Pt driving with phone. Prior to Admission medications    Medication Sig Start Date End Date Taking? Authorizing Provider   citalopram (CELEXA) 10 mg tablet TAKE ONE TABLET BY MOUTH EVERY DAY 6/2/20  Yes Erich Hinton MD   methylPREDNISolone (Medrol, Kunal,) 4 mg tablet Please take by mouth as directed until completion 5/9/20  Yes Mulu Benitez MD   albuterol-ipratropium (DUO-NEB) 2.5 mg-0.5 mg/3 ml nebu 3 mL by Nebulization route every four (4) hours as needed for Wheezing. 5/8/20  Yes Erich Hinton MD   omeprazole (PRILOSEC) 40 mg capsule Take 1 Cap by mouth daily. 5/6/20  Yes Erich Hinton MD   clonazePAM (KlonoPIN) 1 mg tablet TAKE ONE TABLET BY MOUTH 2-3 TIMES A DAY AS NEEDED. 4/23/20  Yes Erich Hinton MD   tadalafiL (Cialis) 20 mg tablet Take 1 Tab by mouth as needed (erectile dysfunction). Every 3 days. 4/21/20  Yes Erich Hinton MD   aspirin delayed-release 81 mg tablet Take 1 Tab by mouth daily. 4/21/20  Yes Erich Hinton MD   cyclobenzaprine (FLEXERIL) 10 mg tablet Take 1 Tab by mouth nightly. 4/21/20  Yes Erich Hinton MD   cloNIDine HCL (CATAPRES) 0.2 mg tablet Take 1 Tab by mouth two (2) times a day. 4/7/20  Yes Erich Hinton MD   Flovent  mcg/actuation inhaler INHALE 1 OR 2 PUFFS 2 TIMES A DAY. 4/6/20  Yes Erich Hinton MD   LINZESS 290 mcg cap capsule  2/15/20  Yes Provider, Historical   fluticasone propionate (FLONASE ALLERGY RELIEF) 50 mcg/actuation nasal spray 2 Sprays by Both Nostrils route daily. 3/4/20  Yes Erich Hinton MD   polyethylene glycol (MIRALAX) 17 gram packet Take 1 Packet by mouth daily. 3/4/20  Yes Read, Erich Miller MD   furosemide (LASIX) 20 mg tablet Take 2 Tabs by mouth two (2) times a day.  3/4/20 Yes Neal Hinton MD   lidocaine (LIDODERM) 5 % Apply patch to the affected area for 12 hours a day and remove for 12 hours a day. 3/4/20  Yes Neal Hinton MD   Nebulizer Accessories kit Nebulizer cup w/ tubing; use every 4 hrs prn wheezing 1/20/20  Yes Harshad Patton MD   [address] 18 mcg inhalation capsule  1/15/20  Yes Provider, Historical   naloxone (NARCAN) 4 mg/actuation nasal spray Use 1 spray intranasally, then discard. Repeat with new spray every 2 min as needed for opioid overdose symptoms, alternating nostrils. 1/15/20  Yes Neal Hinton MD   lisinopril (PRINIVIL, ZESTRIL) 20 mg tablet Take 1 Tab by mouth daily. 12/31/19  Yes Harshad Patton MD   albuterol (PROVENTIL VENTOLIN) 2.5 mg /3 mL (0.083 %) nebu 3 mL by Nebulization route every four (4) hours as needed for Wheezing. 12/9/19  Yes Alex Butcher DO   cetirizine (ZYRTEC) 10 mg tablet Take 1 Tab by mouth daily. 11/25/19  Yes MARIEL Armendariz   methadone (DOLOPHINE) 10 mg tablet Take  by mouth. Yes Provider, Historical   VENTOLIN HFA 90 mcg/actuation inhaler Take 2 Puffs by inhalation every four (4) hours as needed for Wheezing or Shortness of Breath. Indications: bronchospasm prevention 11/19/19  Yes Harshad Patton MD   amLODIPine (NORVASC) 5 mg tablet Take 1 Tab by mouth daily. Indications: high blood pressure  Patient taking differently: Take 10 mg by mouth daily. Indications: high blood pressure 11/12/19  Yes Harshad Patton MD   montelukast (SINGULAIR) 10 mg tablet Take 10 mg by mouth daily. Yes Provider, Historical   diclofenac EC (VOLTAREN) 50 mg EC tablet Take 1 Tab by mouth two (2) times a day. 4/21/20   Neal Hinton MD   dextromethorphan-guaiFENesin (ROBITUSSIN COUGH-CHEST DAVID DM) 5-100 mg/5 mL liqd Take 10 mL by mouth every four (4) hours as needed for Cough.  2/11/20   Neal Hinton, MD     Allergies   Allergen Reactions    Vicodin [Hydrocodone-Acetaminophen] Other (comments)     Headache Patient Active Problem List   Diagnosis Code    Tobacco abuse Z72.0    Essential hypertension I10    GERD (gastroesophageal reflux disease) K21.9    Chronic midline low back pain without sciatica M54.5, G89.29    Acute respiratory failure with hypoxia ([facility]) J96.01    Anxiety F41.9    Severe obesity ([facility]) E66.01    Depression F32.9    Insomnia G47.00    Alcohol abuse, daily use F10.10    Arthralgia M25.50    Edema of both legs R60.0    Acute pain of both shoulders M25.511, M25.512    Chronic constipation K59.09    Allergic rhinitis J30.9    Erectile dysfunction N52.9     Current Outpatient Medications   Medication Sig Dispense Refill    citalopram (CELEXA) 10 mg tablet TAKE ONE TABLET BY MOUTH EVERY DAY 30 Tab 2    methylPREDNISolone (Medrol, Kunal,) 4 mg tablet Please take by mouth as directed until completion 1 Dose Pack 0    albuterol-ipratropium (DUO-NEB) 2.5 mg-0.5 mg/3 ml nebu 3 mL by Nebulization route every four (4) hours as needed for Wheezing. 90 mL 0    omeprazole (PRILOSEC) 40 mg capsule Take 1 Cap by mouth daily. 30 Cap 0    clonazePAM (KlonoPIN) 1 mg tablet TAKE ONE TABLET BY MOUTH 2-3 TIMES A DAY AS NEEDED. 90 Tab 0    tadalafiL (Cialis) 20 mg tablet Take 1 Tab by mouth as needed (erectile dysfunction). Every 3 days. 20 Tab 0    aspirin delayed-release 81 mg tablet Take 1 Tab by mouth daily. 100 Tab 3    cyclobenzaprine (FLEXERIL) 10 mg tablet Take 1 Tab by mouth nightly. 30 Tab 0    cloNIDine HCL (CATAPRES) 0.2 mg tablet Take 1 Tab by mouth two (2) times a day. 180 Tab 0    Flovent  mcg/actuation inhaler INHALE 1 OR 2 PUFFS 2 TIMES A DAY. 1 Inhaler 0    LINZESS 290 mcg cap capsule       fluticasone propionate (FLONASE ALLERGY RELIEF) 50 mcg/actuation nasal spray 2 Sprays by Both Nostrils route daily. 1 Bottle 5    polyethylene glycol (MIRALAX) 17 gram packet Take 1 Packet by mouth daily.  1 Each 5    furosemide (LASIX) 20 mg tablet Take 2 Tabs by mouth two (2) times a day. 120 Tab 1    lidocaine (LIDODERM) 5 % Apply patch to the affected area for 12 hours a day and remove for 12 hours a day. 60 Each 1    Nebulizer Accessories kit Nebulizer cup w/ tubing; use every 4 hrs prn wheezing 1 Kit 5    SPIRIVA WITH HANDIHALER 18 mcg inhalation capsule       naloxone (NARCAN) 4 mg/actuation nasal spray Use 1 spray intranasally, then discard. Repeat with new spray every 2 min as needed for opioid overdose symptoms, alternating nostrils. 2 Each 0    lisinopril (PRINIVIL, ZESTRIL) 20 mg tablet Take 1 Tab by mouth daily. 90 Tab 0    albuterol (PROVENTIL VENTOLIN) 2.5 mg /3 mL (0.083 %) nebu 3 mL by Nebulization route every four (4) hours as needed for Wheezing. 90 Nebule 0    cetirizine (ZYRTEC) 10 mg tablet Take 1 Tab by mouth daily. 20 Tab 0    methadone (DOLOPHINE) 10 mg tablet Take  by mouth.  VENTOLIN HFA 90 mcg/actuation inhaler Take 2 Puffs by inhalation every four (4) hours as needed for Wheezing or Shortness of Breath. Indications: bronchospasm prevention 3 Inhaler 3    amLODIPine (NORVASC) 5 mg tablet Take 1 Tab by mouth daily. Indications: high blood pressure (Patient taking differently: Take 10 mg by mouth daily. Indications: high blood pressure) 90 Tab 3    montelukast (SINGULAIR) 10 mg tablet Take 10 mg by mouth daily.  diclofenac EC (VOLTAREN) 50 mg EC tablet Take 1 Tab by mouth two (2) times a day. 60 Tab 2    dextromethorphan-guaiFENesin (ROBITUSSIN COUGH-CHEST DAVID DM) 5-100 mg/5 mL liqd Take 10 mL by mouth every four (4) hours as needed for Cough.  1 Bottle 0     Allergies   Allergen Reactions    Vicodin [Hydrocodone-Acetaminophen] Other (comments)     Headache     Past Medical History:   Diagnosis Date    Arthritis     Asthma     Chronic low back pain     Hypertension      Past Surgical History:   Procedure Laterality Date    HX ORTHOPAEDIC       Family History   Problem Relation Age of Onset    Cancer Mother     Heart Disease Father Social History     Tobacco Use    Smoking status: Current Some Day Smoker     Packs/day: 0.25     Years: 10.00     Pack years: 2.50    Smokeless tobacco: Never Used   Substance Use Topics    Alcohol use: Yes     Comment: occ       ROS    Objective:   Vital Signs: (As obtained by patient/caregiver at home)  Visit Vitals  Wt 310 lb (140.6 kg)   BMI 38.75 kg/m²        [INSTRUCTIONS:  \"[x]\" Indicates a positive item  \"[]\" Indicates a negative item  -- DELETE ALL ITEMS NOT EXAMINED]    Constitutional: [x] Appears well-developed and well-nourished [x] No apparent distress      [] Abnormal -     Mental status: [x] Alert and awake  [x] Oriented to person/place/time [x] Able to follow commands    [] Abnormal -     Eyes:   EOM    [x]  Normal    [] Abnormal -   Sclera  [x]  Normal    [] Abnormal -          Discharge [x]  None visible   [] Abnormal -     Psychiatric:       [x] Normal Affect [] Abnormal -        [x] No Hallucinations    Other pertinent observable physical exam findings:-        We discussed the expected course, resolution and complications of the diagnosis(es) in detail. Medication risks, benefits, costs, interactions, and alternatives were discussed as indicated. I advised him to contact the office if his condition worsens, changes or fails to improve as anticipated. He expressed understanding with the diagnosis(es) and plan. Diya Mcbride is a 54 y.o. male who was evaluated by a video visit encounter for concerns as above. Patient identification was verified prior to start of the visit. A caregiver was present when appropriate. Due to this being a TeleHealth encounter (During [place] [address] public health emergency), evaluation of the following organ systems was limited: Vitals/Constitutional/EENT/Resp/CV/GI//MS/Neuro/Skin/Heme-Lymph-Imm.   Pursuant to the emergency declaration under the [address] and Response Supplemental Appropriations Act, this Virtual  Visit was conducted, with patient's (and/or legal guardian's) consent, to reduce the patient's risk of exposure to COVID-19 and provide necessary medical care. Services were provided through a video synchronous discussion virtually to substitute for in-person clinic visit. Patient and provider were located at their individual homes.       Melissa Naylor MD

## 2020-06-03 NOTE — PROGRESS NOTES
Jake Reyes is a 54 y.o. male  Chief Complaint   Patient presents with    Medication Refill     Health Maintenance Due   Topic Date Due    Pneumococcal 0-64 years (1 of 1 - PPSV23) [birth year]1971    DTaP/Tdap/Td series (1 - Tdap) 05/17/1986    Shingrix Vaccine Age 50> (1 of 2) 05/17/2015    FOBT Q1Y Age 50-75  05/17/2015     Visit Vitals  Wt 310 lb (140.6 kg)   BMI 38.75 kg/m²     1. Have you been to the ER, urgent care clinic since your last visit? Hospitalized since your last visit? Yes for sob     2. Have you seen or consulted any other health care providers outside of the [address] [place] [address] since your last visit? Include any pap smears or colon screening. No     No bp cuff    Needs most meds refilled    Pt phone number: [phone] (dox. me visit)

## 2020-06-11 ENCOUNTER — TELEPHONE (OUTPATIENT)
Dept: FAMILY MEDICINE CLINIC | Age: 55
End: 2020-06-11

## 2020-06-11 ENCOUNTER — HOSPITAL ENCOUNTER (EMERGENCY)
Age: 55
Discharge: LEFT BEFORE DISCHARGE | End: 2020-06-11
Admitting: EMERGENCY MEDICINE

## 2020-06-11 VITALS
WEIGHT: 315 LBS | SYSTOLIC BLOOD PRESSURE: 163 MMHG | RESPIRATION RATE: 18 BRPM | DIASTOLIC BLOOD PRESSURE: 100 MMHG | TEMPERATURE: 97.5 F | BODY MASS INDEX: 39.17 KG/M2 | HEIGHT: 75 IN | OXYGEN SATURATION: 96 % | HEART RATE: 83 BPM

## 2020-06-11 RX ORDER — MONTELUKAST SODIUM 10 MG/1
10 TABLET ORAL DAILY
Qty: 90 TAB | Refills: 0 | Status: SHIPPED | OUTPATIENT
Start: 2020-06-11 | End: 2020-07-13 | Stop reason: SDUPTHER

## 2020-06-11 NOTE — TELEPHONE ENCOUNTER
----- Message from Jackeline Garcia sent at 6/11/2020  1:28 PM EDT -----  Regarding: Dr. Kraig Melara General Message/Vendor Calls    Caller's first and last name:      Reason for call: Regarding he has been waiting in  ER for 2 hrs for a knot R side stomach would like a referral to a GI Dr. He said the knot is getting larger over a wks time.       Call back required yes/no and why: Yes       Best contact number(s): [phone]      Details to clarify the request:      Jackeline Garcia

## 2020-06-11 NOTE — TELEPHONE ENCOUNTER
----- Message from Quynh Schulenburg sent at 6/10/2020  7:03 PM EDT -----  Regarding: Adriel Rucker / telephone  Patient's first and last name:Leroy Arnulfo Blizzard   :   ID numbers: #[id number]  Caller (if not patient):n/a  Relationship of caller (if not patient):n/a  Best contact number(s):[phone]  Name of medication and dosage if known:\" Singulair\" and \" Teetee Boseer  Is patient out of this medication (yes/no):yes  Pharmacy name:[facility]  Pharmacy listed in chart? (yes/no):yes  Pharmacy phone number:n/a  Date of last visit:6/3/20  Details to clarify the request:n/a

## 2020-06-15 ENCOUNTER — TELEPHONE (OUTPATIENT)
Dept: FAMILY MEDICINE CLINIC | Age: 55
End: 2020-06-15

## 2020-06-15 NOTE — TELEPHONE ENCOUNTER
Patient called and would like a script to purchase a blood pressure cuff sent to [facility].

## 2020-06-15 NOTE — TELEPHONE ENCOUNTER
Spoke to patient who states he tried to buy a blood pressure cuff, but was told he needs a prescription sent to [facility]. Informed pt will call back once prescription has been sent, pt voiced understanding.

## 2020-06-15 NOTE — TELEPHONE ENCOUNTER
Spoke to the pharmacist at [facility] who states pts insurance is not going to cover a BP cuff, and he would discuss with pt. Called pt to let him know and he states he will call his insurance.

## 2020-06-24 DIAGNOSIS — I10 ESSENTIAL HYPERTENSION: Primary | Chronic | ICD-10-CM

## 2020-06-24 RX ORDER — AMLODIPINE BESYLATE 10 MG/1
10 TABLET ORAL DAILY
Qty: 90 TAB | Refills: 0 | Status: SHIPPED | OUTPATIENT
Start: 2020-06-24 | End: 2020-09-14 | Stop reason: SDUPTHER

## 2020-07-06 ENCOUNTER — TELEPHONE (OUTPATIENT)
Dept: FAMILY MEDICINE CLINIC | Age: 55
End: 2020-07-06

## 2020-07-06 NOTE — TELEPHONE ENCOUNTER
The patient is concerned about his recent weight gain. He said he gained 85 lbs and he would like a phone call back as soon as possible. The patient said he has been eating right and have never been the weight he is now. The patient would like a phone call back as soon as possible.  Please call [phone]

## 2020-07-06 NOTE — TELEPHONE ENCOUNTER
Called pt back at [phone] in regards to his concerns of weight gain. The phone kept breaking up, unable to call back. The pt has a appointment with his PCP on 7/13/20 so that he can discuss.

## 2020-07-13 ENCOUNTER — VIRTUAL VISIT (OUTPATIENT)
Dept: FAMILY MEDICINE CLINIC | Age: 55
End: 2020-07-13

## 2020-07-13 DIAGNOSIS — G89.29 CHRONIC MIDLINE LOW BACK PAIN WITHOUT SCIATICA: Chronic | ICD-10-CM

## 2020-07-13 DIAGNOSIS — M25.511 ACUTE PAIN OF BOTH SHOULDERS: ICD-10-CM

## 2020-07-13 DIAGNOSIS — R63.5 ABNORMAL WEIGHT GAIN: ICD-10-CM

## 2020-07-13 DIAGNOSIS — M54.50 CHRONIC LEFT-SIDED LOW BACK PAIN WITHOUT SCIATICA: ICD-10-CM

## 2020-07-13 DIAGNOSIS — R60.0 EDEMA OF BOTH LEGS: ICD-10-CM

## 2020-07-13 DIAGNOSIS — E66.01 SEVERE OBESITY (HCC): ICD-10-CM

## 2020-07-13 DIAGNOSIS — N52.8 OTHER MALE ERECTILE DYSFUNCTION: ICD-10-CM

## 2020-07-13 DIAGNOSIS — K21.9 GASTROESOPHAGEAL REFLUX DISEASE, ESOPHAGITIS PRESENCE NOT SPECIFIED: Chronic | ICD-10-CM

## 2020-07-13 DIAGNOSIS — J30.9 ALLERGIC RHINITIS, UNSPECIFIED SEASONALITY, UNSPECIFIED TRIGGER: ICD-10-CM

## 2020-07-13 DIAGNOSIS — F41.9 ANXIETY: ICD-10-CM

## 2020-07-13 DIAGNOSIS — G89.29 CHRONIC LEFT-SIDED LOW BACK PAIN WITHOUT SCIATICA: ICD-10-CM

## 2020-07-13 DIAGNOSIS — M54.50 CHRONIC MIDLINE LOW BACK PAIN WITHOUT SCIATICA: Chronic | ICD-10-CM

## 2020-07-13 DIAGNOSIS — I10 ESSENTIAL HYPERTENSION: Primary | Chronic | ICD-10-CM

## 2020-07-13 DIAGNOSIS — M25.512 ACUTE PAIN OF BOTH SHOULDERS: ICD-10-CM

## 2020-07-13 DIAGNOSIS — J45.51 SEVERE PERSISTENT ASTHMA WITH ACUTE EXACERBATION: ICD-10-CM

## 2020-07-13 RX ORDER — LIDOCAINE 50 MG/G
PATCH TOPICAL
Qty: 60 EACH | Refills: 2 | Status: CANCELLED | OUTPATIENT
Start: 2020-07-13

## 2020-07-13 RX ORDER — LISINOPRIL 20 MG/1
20 TABLET ORAL DAILY
Qty: 90 TAB | Refills: 0 | Status: SHIPPED | OUTPATIENT
Start: 2020-07-13 | End: 2020-09-14 | Stop reason: SDUPTHER

## 2020-07-13 RX ORDER — ALBUTEROL SULFATE 0.83 MG/ML
2.5 SOLUTION RESPIRATORY (INHALATION)
Qty: 90 NEBULE | Refills: 0 | Status: CANCELLED | OUTPATIENT
Start: 2020-07-13

## 2020-07-13 RX ORDER — OMEPRAZOLE 40 MG/1
40 CAPSULE, DELAYED RELEASE ORAL DAILY
Qty: 90 CAP | Refills: 0 | Status: SHIPPED | OUTPATIENT
Start: 2020-07-13 | End: 2020-07-27

## 2020-07-13 RX ORDER — PANTOPRAZOLE SODIUM 40 MG/1
TABLET, DELAYED RELEASE ORAL
COMMUNITY
Start: 2020-04-06 | End: 2020-07-13 | Stop reason: ALTCHOICE

## 2020-07-13 RX ORDER — FUROSEMIDE 20 MG/1
20 TABLET ORAL 2 TIMES DAILY
Qty: 180 TAB | Refills: 0 | Status: SHIPPED | OUTPATIENT
Start: 2020-07-13 | End: 2020-09-14 | Stop reason: SDUPTHER

## 2020-07-13 RX ORDER — TIOTROPIUM BROMIDE 18 UG/1
CAPSULE ORAL; RESPIRATORY (INHALATION)
Qty: 30 CAP | Status: CANCELLED | OUTPATIENT
Start: 2020-07-13

## 2020-07-13 RX ORDER — FLUTICASONE PROPIONATE 220 UG/1
AEROSOL, METERED RESPIRATORY (INHALATION)
Qty: 3 INHALER | Refills: 0 | Status: SHIPPED | OUTPATIENT
Start: 2020-07-13 | End: 2020-10-12 | Stop reason: SDUPTHER

## 2020-07-13 RX ORDER — TADALAFIL 20 MG/1
20 TABLET ORAL AS NEEDED
Qty: 50 TAB | Refills: 0 | Status: SHIPPED | OUTPATIENT
Start: 2020-07-13 | End: 2020-08-12 | Stop reason: SDUPTHER

## 2020-07-13 RX ORDER — CETIRIZINE HCL 10 MG
10 TABLET ORAL DAILY
Qty: 20 TAB | Refills: 0 | Status: CANCELLED | OUTPATIENT
Start: 2020-07-13

## 2020-07-13 RX ORDER — CLONIDINE HYDROCHLORIDE 0.2 MG/1
0.2 TABLET ORAL 2 TIMES DAILY
Qty: 180 TAB | Refills: 0 | Status: SHIPPED | OUTPATIENT
Start: 2020-07-13 | End: 2020-09-14

## 2020-07-13 RX ORDER — CYCLOBENZAPRINE HCL 10 MG
10 TABLET ORAL
Qty: 30 TAB | Refills: 0 | Status: SHIPPED | OUTPATIENT
Start: 2020-07-13 | End: 2020-10-12 | Stop reason: SDUPTHER

## 2020-07-13 RX ORDER — DICLOFENAC SODIUM 50 MG/1
50 TABLET, DELAYED RELEASE ORAL 2 TIMES DAILY
Qty: 180 TAB | Refills: 0 | Status: SHIPPED | OUTPATIENT
Start: 2020-07-13 | End: 2020-10-12 | Stop reason: SDUPTHER

## 2020-07-13 RX ORDER — CLONAZEPAM 1 MG/1
TABLET ORAL
Qty: 90 TAB | Refills: 2 | Status: CANCELLED | OUTPATIENT
Start: 2020-07-13

## 2020-07-13 RX ORDER — FLUTICASONE PROPIONATE 50 MCG
2 SPRAY, SUSPENSION (ML) NASAL DAILY
Qty: 1 BOTTLE | Refills: 5 | Status: SHIPPED | OUTPATIENT
Start: 2020-07-13 | End: 2020-10-12 | Stop reason: SDUPTHER

## 2020-07-13 RX ORDER — BUDESONIDE AND FORMOTEROL FUMARATE DIHYDRATE 160; 4.5 UG/1; UG/1
AEROSOL RESPIRATORY (INHALATION)
COMMUNITY
Start: 2020-05-15 | End: 2020-07-13 | Stop reason: ALTCHOICE

## 2020-07-13 RX ORDER — MONTELUKAST SODIUM 10 MG/1
10 TABLET ORAL DAILY
Qty: 90 TAB | Refills: 0 | Status: SHIPPED | OUTPATIENT
Start: 2020-07-13 | End: 2020-10-12 | Stop reason: SDUPTHER

## 2020-07-13 RX ORDER — LINACLOTIDE 290 UG/1
CAPSULE, GELATIN COATED ORAL
Status: CANCELLED | OUTPATIENT
Start: 2020-07-13

## 2020-07-13 NOTE — PROGRESS NOTES
Kinza Ford is a 54 y.o. male  HIPAA verified by two patient identifiers. Health Maintenance Due   Topic    Pneumococcal 0-64 years (1 of 1 - PPSV23)    DTaP/Tdap/Td series (1 - Tdap)    Shingrix Vaccine Age 49> (1 of 2)    FOBT Q1Y Age 54-65      Chief Complaint   Patient presents with    Weight Gain     85lbs in 3 -4 months     Patient-Reported Vitals 7/13/2020   Patient-Reported Weight 316lb   Patient-Reported Height 6'3   Patient-Reported Pulse 88   Patient-Reported Systolic  700   Patient-Reported Diastolic 412       Pain Scale: 8/10  Pain Location: back and knees            1. Have you been to the ER, urgent care clinic since your last visit? Hospitalized since your last visit? No    2. Have you seen or consulted any other health care providers outside of the [address] [place] [address] since your last visit? Include any pap smears or colon screening.  No     # [phone]

## 2020-07-13 NOTE — PROGRESS NOTES
**THIS IS A VIRTUAL VISIT VIA A VIDEO SYNCHRONOUS DISCUSSION OVER DOXY. ME PATIENT AGREED TO HAVE THEIR CARE DELIVERED OVER A [employer] VIDEO VISIT IN PLACE OF THEIR REGULARLY SCHEDULED OFFICE VISIT**       Lisbeth Rasheed is a 54 y.o. male who was seen by synchronous (real-time) audio-video technology on 7/13/2020 for Weight Gain (85lbs in 3 -4 months)        Assessment & Plan:   Diagnoses and all orders for this visit:    1. Essential hypertension  -     cloNIDine HCL (CATAPRES) 0.2 mg tablet; Take 1 Tab by mouth two (2) times a day. -     lisinopriL (PRINIVIL, ZESTRIL) 20 mg tablet; Take 1 Tab by mouth daily. 2. Anxiety    3. Chronic left-sided low back pain without sciatica  -     cyclobenzaprine (FLEXERIL) 10 mg tablet; Take 1 Tab by mouth nightly. 4. Gastroesophageal reflux disease, esophagitis presence not specified  -     omeprazole (PRILOSEC) 40 mg capsule; Take 1 Cap by mouth daily. 5. Chronic midline low back pain without sciatica  -     diclofenac EC (VOLTAREN) 50 mg EC tablet; Take 1 Tab by mouth two (2) times a day. 6. Other male erectile dysfunction  -     tadalafiL (Cialis) 20 mg tablet; Take 1 Tab by mouth as needed (erectile dysfunction). Every 3 days. 7. Severe persistent asthma with acute exacerbation  -     montelukast (Singulair) 10 mg tablet; Take 1 Tab by mouth daily. -     fluticasone propionate (Flovent HFA) 220 mcg/actuation inhaler; INHALE 1 OR 2 PUFFS 2 TIMES A DAY. 8. Allergic rhinitis, unspecified seasonality, unspecified trigger  -     montelukast (Singulair) 10 mg tablet; Take 1 Tab by mouth daily. -     fluticasone propionate (Flonase Allergy Relief) 50 mcg/actuation nasal spray; 2 Sprays by Both Nostrils route daily. 9. Acute pain of both shoulders    10. Edema of both legs  -     furosemide (LASIX) 20 mg tablet; Take 1 Tab by mouth two (2) times a day. 11. Severe obesity ([address]  -     HEMOGLOBIN A1C WITH EAG; Future    12.  Abnormal weight gain  - HEMOGLOBIN A1C WITH EAG; Future      The complexity of medical decision making for this visit is moderate         I spent at least 11 minutes on this visit with this established patient. Subjective:     Concerned about weight gain. Unable to exercise with knees and back pb. Pt to try stationary bike. Quit smoking. Need refills on meds for asthma, HTN, cedrick knee pain, allergies, edema, GERD, ED. Will check A1c as pt concerned about weight gain. Prior to Admission medications    Medication Sig Start Date End Date Taking? Authorizing Provider   amLODIPine (NORVASC) 10 mg tablet Take 1 Tab by mouth daily. Indications: high blood pressure 6/24/20  Yes Umesh Hinton MD   montelukast (Singulair) 10 mg tablet Take 1 Tab by mouth daily. 6/11/20  Yes Umesh Hinton MD   omeprazole (PRILOSEC) 40 mg capsule Take 1 Cap by mouth daily. 6/3/20  Yes Umesh Hinton MD   clonazePAM (KlonoPIN) 1 mg tablet TAKE ONE TABLET BY MOUTH 2-3 TIMES A DAY AS NEEDED. 6/3/20  Yes Umesh Hinton MD   cloNIDine HCL (CATAPRES) 0.2 mg tablet Take 1 Tab by mouth two (2) times a day. 6/3/20  Yes Umesh Hinton MD   lisinopriL (PRINIVIL, ZESTRIL) 20 mg tablet Take 1 Tab by mouth daily. 6/3/20  Yes Umesh Hinton MD   diclofenac EC (VOLTAREN) 50 mg EC tablet Take 1 Tab by mouth two (2) times a day. 6/3/20  Yes Umesh Hinton MD   tadalafiL (Cialis) 20 mg tablet Take 1 Tab by mouth as needed (erectile dysfunction). Every 3 days. 6/3/20  Yes Umesh Hinton MD   fluticasone propionate (Flovent HFA) 220 mcg/actuation inhaler INHALE 1 OR 2 PUFFS 2 TIMES A DAY. 6/3/20  Yes Umesh Hinton MD   lidocaine (Lidoderm) 5 % Apply patch to the affected area for 12 hours a day and remove for 12 hours a day.  6/3/20  Yes Umesh Hinton MD   citalopram (CELEXA) 10 mg tablet TAKE ONE TABLET BY MOUTH EVERY DAY 6/2/20  Yes Umesh Hinton MD   albuterol-ipratropium (DUO-NEB) 2.5 mg-0.5 mg/3 ml nebu 3 mL by Nebulization route every four (4) hours as needed for Wheezing. 5/8/20  Yes Suzette Hinton MD   aspirin delayed-release 81 mg tablet Take 1 Tab by mouth daily. 4/21/20  Yes Suzette Hinton MD   cyclobenzaprine (FLEXERIL) 10 mg tablet Take 1 Tab by mouth nightly. 4/21/20  Yes Suzette Hinton MD   LINZESS 290 mcg cap capsule  2/15/20  Yes Provider, Historical   fluticasone propionate (FLONASE ALLERGY RELIEF) 50 mcg/actuation nasal spray 2 Sprays by Both Nostrils route daily. 3/4/20  Yes Suzette Hinton MD   polyethylene glycol (MIRALAX) 17 gram packet Take 1 Packet by mouth daily. 3/4/20  Yes Suzette Hinton MD   furosemide (LASIX) 20 mg tablet Take 2 Tabs by mouth two (2) times a day. 3/4/20  Yes Suzette Hinton MD   Nebulizer Accessories kit Nebulizer cup w/ tubing; use every 4 hrs prn wheezing 1/20/20  Yes Snow Garcia MD   [facility] [address] 18 mcg inhalation capsule  1/15/20  Yes Provider, Historical   naloxone (NARCAN) 4 mg/actuation nasal spray Use 1 spray intranasally, then discard. Repeat with new spray every 2 min as needed for opioid overdose symptoms, alternating nostrils. 1/15/20  Yes Suzette Hinton MD   albuterol (PROVENTIL VENTOLIN) 2.5 mg /3 mL (0.083 %) nebu 3 mL by Nebulization route every four (4) hours as needed for Wheezing. 12/9/19  Yes Madhu Porter DO   cetirizine (ZYRTEC) 10 mg tablet Take 1 Tab by mouth daily. 11/25/19  Yes MARIEL Auguste   VENTOLIN HFA 90 mcg/actuation inhaler Take 2 Puffs by inhalation every four (4) hours as needed for Wheezing or Shortness of Breath.  Indications: bronchospasm prevention 11/19/19  Yes Snow Garcia MD   budesonide-formoteroL [facility]) 160-4.5 mcg/actuation HFAA  5/15/20   Provider, Historical   pantoprazole (PROTONIX) 40 mg tablet  4/6/20   Provider, Historical   methylPREDNISolone (Medrol, Kunal,) 4 mg tablet Please take by mouth as directed until completion 5/9/20   Bernadette Vergara MD   dextromethorphan-guaiFENesin (ROBITUSSIN COUGH-CHEST DAVID DM) 5-100 mg/5 mL liqd Take 10 mL by mouth every four (4) hours as needed for Cough. 2/11/20   Joe Hinton MD   methadone (DOLOPHINE) 10 mg tablet Take  by mouth. Provider, Historical     Patient Active Problem List   Diagnosis Code    Tobacco abuse Z72.0    Essential hypertension I10    GERD (gastroesophageal reflux disease) K21.9    Chronic midline low back pain without sciatica M54.5, G89.29    Acute respiratory failure with hypoxia ([facility]) J96.01    Anxiety F41.9    Severe obesity ([facility]) E66.01    Depression F32.9    Insomnia G47.00    Alcohol abuse, daily use F10.10    Arthralgia M25.50    Edema of both legs R60.0    Acute pain of both shoulders M25.511, M25.512    Chronic constipation K59.09    Allergic rhinitis J30.9    Erectile dysfunction N52.9    Severe persistent asthma with acute exacerbation J45.51     Current Outpatient Medications   Medication Sig Dispense Refill    amLODIPine (NORVASC) 10 mg tablet Take 1 Tab by mouth daily. Indications: high blood pressure 90 Tab 0    montelukast (Singulair) 10 mg tablet Take 1 Tab by mouth daily. 90 Tab 0    omeprazole (PRILOSEC) 40 mg capsule Take 1 Cap by mouth daily. 90 Cap 0    clonazePAM (KlonoPIN) 1 mg tablet TAKE ONE TABLET BY MOUTH 2-3 TIMES A DAY AS NEEDED. 90 Tab 2    cloNIDine HCL (CATAPRES) 0.2 mg tablet Take 1 Tab by mouth two (2) times a day. 180 Tab 0    lisinopriL (PRINIVIL, ZESTRIL) 20 mg tablet Take 1 Tab by mouth daily. 90 Tab 0    diclofenac EC (VOLTAREN) 50 mg EC tablet Take 1 Tab by mouth two (2) times a day. 180 Tab 2    tadalafiL (Cialis) 20 mg tablet Take 1 Tab by mouth as needed (erectile dysfunction). Every 3 days. 50 Tab 0    fluticasone propionate (Flovent HFA) 220 mcg/actuation inhaler INHALE 1 OR 2 PUFFS 2 TIMES A DAY. 3 Inhaler 0    lidocaine (Lidoderm) 5 % Apply patch to the affected area for 12 hours a day and remove for 12 hours a day.  60 Each 2    citalopram (CELEXA) 10 mg tablet TAKE ONE TABLET BY MOUTH EVERY DAY 30 Tab 2    albuterol-ipratropium (DUO-NEB) 2.5 mg-0.5 mg/3 ml nebu 3 mL by Nebulization route every four (4) hours as needed for Wheezing. 90 mL 0    aspirin delayed-release 81 mg tablet Take 1 Tab by mouth daily. 100 Tab 3    cyclobenzaprine (FLEXERIL) 10 mg tablet Take 1 Tab by mouth nightly. 30 Tab 0    LINZESS 290 mcg cap capsule       fluticasone propionate (FLONASE ALLERGY RELIEF) 50 mcg/actuation nasal spray 2 Sprays by Both Nostrils route daily. 1 Bottle 5    polyethylene glycol (MIRALAX) 17 gram packet Take 1 Packet by mouth daily. 1 Each 5    furosemide (LASIX) 20 mg tablet Take 2 Tabs by mouth two (2) times a day. 120 Tab 1    Nebulizer Accessories kit Nebulizer cup w/ tubing; use every 4 hrs prn wheezing 1 Kit 5    SPIRIVA WITH HANDIHALER 18 mcg inhalation capsule       naloxone (NARCAN) 4 mg/actuation nasal spray Use 1 spray intranasally, then discard. Repeat with new spray every 2 min as needed for opioid overdose symptoms, alternating nostrils. 2 Each 0    albuterol (PROVENTIL VENTOLIN) 2.5 mg /3 mL (0.083 %) nebu 3 mL by Nebulization route every four (4) hours as needed for Wheezing. 90 Nebule 0    cetirizine (ZYRTEC) 10 mg tablet Take 1 Tab by mouth daily. 20 Tab 0    VENTOLIN HFA 90 mcg/actuation inhaler Take 2 Puffs by inhalation every four (4) hours as needed for Wheezing or Shortness of Breath. Indications: bronchospasm prevention 3 Inhaler 3    budesonide-formoteroL (SYMBICORT) 160-4.5 mcg/actuation HFAA       pantoprazole (PROTONIX) 40 mg tablet       methylPREDNISolone (Medrol, Kunal,) 4 mg tablet Please take by mouth as directed until completion 1 Dose Pack 0    dextromethorphan-guaiFENesin (ROBITUSSIN COUGH-CHEST DAVID DM) 5-100 mg/5 mL liqd Take 10 mL by mouth every four (4) hours as needed for Cough. 1 Bottle 0    methadone (DOLOPHINE) 10 mg tablet Take  by mouth.        Allergies   Allergen Reactions    Vicodin [Hydrocodone-Acetaminophen] Other (comments)     Headache Past Medical History:   Diagnosis Date    Arthritis     Asthma     Chronic low back pain     Hypertension      Past Surgical History:   Procedure Laterality Date    HX ORTHOPAEDIC       Family History   Problem Relation Age of Onset    Cancer Mother     Heart Disease Father      Social History     Tobacco Use    Smoking status: Current Some Day Smoker     Packs/day: 0.25     Years: 10.00     Pack years: 2.50    Smokeless tobacco: Never Used   Substance Use Topics    Alcohol use: Yes     Comment: occ       ROS    Objective:     Patient-Reported Vitals 7/13/2020   Patient-Reported Weight 316lb   Patient-Reported Height 6'3   Patient-Reported Pulse 88   Patient-Reported Systolic  953   Patient-Reported Diastolic 283        [INSTRUCTIONS:  \"[x]\" Indicates a positive item  \"[]\" Indicates a negative item  -- DELETE ALL ITEMS NOT EXAMINED]    Constitutional: [x] Appears well-developed and well-nourished [x] No apparent distress      [] Abnormal -     Mental status: [x] Alert and awake  [x] Oriented to person/place/time [x] Able to follow commands    [] Abnormal -     Eyes:   EOM    [x]  Normal    [] Abnormal -   Sclera  [x]  Normal    [] Abnormal -          Discharge [x]  None visible   [] Abnormal -     Psychiatric:       [x] Normal Affect [] Abnormal -        [x] No Hallucinations    Other pertinent observable physical exam findings:-        We discussed the expected course, resolution and complications of the diagnosis(es) in detail. Medication risks, benefits, costs, interactions, and alternatives were discussed as indicated. I advised him to contact the office if his condition worsens, changes or fails to improve as anticipated. He expressed understanding with the diagnosis(es) and plan.        Maycol Huff, who was evaluated through a patient-initiated, synchronous (real-time) audio-video encounter, and/or his healthcare decision maker, is aware that it is a billable service, with coverage as determined by his insurance carrier. He provided verbal consent to proceed: Yes, and patient identification was verified. It was conducted pursuant to the emergency declaration under the [address] [place] [address] and the [place] Act. A caregiver was present when appropriate. Ability to conduct physical exam was limited. I was at home. The patient was at home.       Luis Angel Landeros MD

## 2020-07-18 ENCOUNTER — HOSPITAL ENCOUNTER (EMERGENCY)
Age: 55
Discharge: LEFT BEFORE DISCHARGE | End: 2020-07-18
Attending: EMERGENCY MEDICINE | Admitting: EMERGENCY MEDICINE
Payer: MEDICAID

## 2020-07-18 VITALS
WEIGHT: 315 LBS | TEMPERATURE: 98.3 F | OXYGEN SATURATION: 97 % | HEART RATE: 89 BPM | RESPIRATION RATE: 18 BRPM | DIASTOLIC BLOOD PRESSURE: 80 MMHG | HEIGHT: 75 IN | BODY MASS INDEX: 39.17 KG/M2 | SYSTOLIC BLOOD PRESSURE: 146 MMHG

## 2020-07-18 PROCEDURE — 75810000275 HC EMERGENCY DEPT VISIT NO LEVEL OF CARE

## 2020-07-18 NOTE — ED NOTES
Pt present ambulatory from waiting room. Pt w/ c/o umbilical bolge getting larger. Pt reports taking steroids and methadone causing weight gain. Pt reports resent anxiety and states he miss placed his RX and would like a refill.

## 2020-07-21 ENCOUNTER — APPOINTMENT (OUTPATIENT)
Dept: GENERAL RADIOLOGY | Age: 55
End: 2020-07-21
Attending: EMERGENCY MEDICINE
Payer: MEDICARE

## 2020-07-21 ENCOUNTER — HOSPITAL ENCOUNTER (EMERGENCY)
Age: 55
Discharge: HOME | End: 2020-07-21
Attending: EMERGENCY MEDICINE
Payer: MEDICARE

## 2020-07-21 VITALS
DIASTOLIC BLOOD PRESSURE: 108 MMHG | BODY MASS INDEX: 39.17 KG/M2 | HEIGHT: 75 IN | RESPIRATION RATE: 18 BRPM | SYSTOLIC BLOOD PRESSURE: 173 MMHG | WEIGHT: 315 LBS | OXYGEN SATURATION: 97 % | TEMPERATURE: 98 F | HEART RATE: 80 BPM

## 2020-07-21 DIAGNOSIS — K42.9 UMBILICAL HERNIA WITHOUT OBSTRUCTION AND WITHOUT GANGRENE: Primary | ICD-10-CM

## 2020-07-21 DIAGNOSIS — M25.512 ACUTE PAIN OF LEFT SHOULDER: ICD-10-CM

## 2020-07-21 LAB
ALBUMIN SERPL-MCNC: 3.7 G/DL (ref 3.5–5)
ALBUMIN/GLOB SERPL: 1 {RATIO} (ref 1.1–2.2)
ALP SERPL-CCNC: 106 U/L (ref 45–117)
ALT SERPL-CCNC: 73 U/L (ref 12–78)
ANION GAP SERPL CALC-SCNC: 3 MMOL/L (ref 5–15)
AST SERPL-CCNC: 47 U/L (ref 15–37)
ATRIAL RATE: 81 BPM
BASOPHILS # BLD: 0 K/UL (ref 0–0.1)
BASOPHILS NFR BLD: 0 % (ref 0–1)
BILIRUB SERPL-MCNC: 0.3 MG/DL (ref 0.2–1)
BUN SERPL-MCNC: 8 MG/DL (ref 6–20)
BUN/CREAT SERPL: 10 (ref 12–20)
CALCIUM SERPL-MCNC: 9.7 MG/DL (ref 8.5–10.1)
CALCULATED P AXIS, ECG09: 75 DEGREES
CALCULATED R AXIS, ECG10: 13 DEGREES
CALCULATED T AXIS, ECG11: -5 DEGREES
CHLORIDE SERPL-SCNC: 105 MMOL/L (ref 97–108)
CO2 SERPL-SCNC: 31 MMOL/L (ref 21–32)
CREAT SERPL-MCNC: 0.81 MG/DL (ref 0.7–1.3)
DIAGNOSIS, 93000: NORMAL
DIFFERENTIAL METHOD BLD: NORMAL
EOSINOPHIL # BLD: 0.2 K/UL (ref 0–0.4)
EOSINOPHIL NFR BLD: 3 % (ref 0–7)
ERYTHROCYTE [DISTWIDTH] IN BLOOD BY AUTOMATED COUNT: 13.5 % (ref 11.5–14.5)
GLOBULIN SER CALC-MCNC: 3.6 G/DL (ref 2–4)
GLUCOSE SERPL-MCNC: 104 MG/DL (ref 65–100)
HCT VFR BLD AUTO: 43.7 % (ref 36.6–50.3)
HGB BLD-MCNC: 13.7 G/DL (ref 12.1–17)
IMM GRANULOCYTES # BLD AUTO: 0 K/UL (ref 0–0.04)
IMM GRANULOCYTES NFR BLD AUTO: 0 % (ref 0–0.5)
LACTATE BLD-SCNC: 0.74 MMOL/L (ref 0.4–2)
LIPASE SERPL-CCNC: 182 U/L (ref 73–393)
LYMPHOCYTES # BLD: 1.6 K/UL (ref 0.8–3.5)
LYMPHOCYTES NFR BLD: 22 % (ref 12–49)
MCH RBC QN AUTO: 28.5 PG (ref 26–34)
MCHC RBC AUTO-ENTMCNC: 31.4 G/DL (ref 30–36.5)
MCV RBC AUTO: 91 FL (ref 80–99)
MONOCYTES # BLD: 0.6 K/UL (ref 0–1)
MONOCYTES NFR BLD: 9 % (ref 5–13)
NEUTS SEG # BLD: 4.7 K/UL (ref 1.8–8)
NEUTS SEG NFR BLD: 66 % (ref 32–75)
NRBC # BLD: 0 K/UL (ref 0–0.01)
NRBC BLD-RTO: 0 PER 100 WBC
P-R INTERVAL, ECG05: 172 MS
PLATELET # BLD AUTO: 216 K/UL (ref 150–400)
PMV BLD AUTO: 10.8 FL (ref 8.9–12.9)
POTASSIUM SERPL-SCNC: 4.2 MMOL/L (ref 3.5–5.1)
PROT SERPL-MCNC: 7.3 G/DL (ref 6.4–8.2)
Q-T INTERVAL, ECG07: 414 MS
QRS DURATION, ECG06: 78 MS
QTC CALCULATION (BEZET), ECG08: 480 MS
RBC # BLD AUTO: 4.8 M/UL (ref 4.1–5.7)
SODIUM SERPL-SCNC: 139 MMOL/L (ref 136–145)
TROPONIN I SERPL-MCNC: <0.05 NG/ML
VENTRICULAR RATE, ECG03: 81 BPM
WBC # BLD AUTO: 7.1 K/UL (ref 4.1–11.1)

## 2020-07-21 PROCEDURE — 73030 X-RAY EXAM OF SHOULDER: CPT

## 2020-07-21 PROCEDURE — 93005 ELECTROCARDIOGRAM TRACING: CPT

## 2020-07-21 PROCEDURE — 36415 COLL VENOUS BLD VENIPUNCTURE: CPT

## 2020-07-21 PROCEDURE — 85025 COMPLETE CBC W/AUTO DIFF WBC: CPT

## 2020-07-21 PROCEDURE — 80053 COMPREHEN METABOLIC PANEL: CPT

## 2020-07-21 PROCEDURE — 84484 ASSAY OF TROPONIN QUANT: CPT

## 2020-07-21 PROCEDURE — 83690 ASSAY OF LIPASE: CPT

## 2020-07-21 PROCEDURE — 99284 EMERGENCY DEPT VISIT MOD MDM: CPT

## 2020-07-21 PROCEDURE — 83605 ASSAY OF LACTIC ACID: CPT

## 2020-07-21 NOTE — DISCHARGE INSTRUCTIONS
You were evaluated in the emergency department for evaluation of hernia. Your examination was reassuring as was your work-up including blood work. Your shoulder xray was negative. It will be important for you to follow-up with General Surgery for evaluation of your hernia in 1-2 weeks. You can also make an appointment with [facility] as needed for evaluation of your shoulder. If you develop worsening symptoms such as nausea, vomiting, abdominal pain, pain at hernia site, inability to have a bowel movement or pass gas, shortness of breath, or chest pain, please return to the emergency department immediately.

## 2020-07-21 NOTE — ED PROVIDER NOTES
EMERGENCY DEPARTMENT HISTORY AND PHYSICAL EXAM      Date: 7/21/2020  Patient Name: Sedrick Lundberg    History of Presenting Illness     Chief Complaint   Patient presents with    Abdominal Pain     c/o umbilical mass x 3 weeks; denies n/v/d/fever    Shoulder Pain     c/o bilateral shoulder pain x three weeks; reports chonic back pain and on methadone        History Provided By: Patient    HPI: Sedrick Lundberg, 54 y.o. male with history of hypertension, asthma, arthritis presents to the ED with cc of umbilical hernia. Symptoms have been present over the past 3 weeks. Patient has never had a hernia before. He denies any nausea, vomiting, diarrhea, change in p.o. intake, or difficulty having a bowel movement. Denies any constipation. He is able to pass flatus. He became concerned when he noticed that his hernia was becoming larger. He has not sought medical care for this in the past.  His second complaint is left shoulder pain which has been present over the past 3 to 4 weeks. He states that he works as an  and is frequently raising his arms above his head. No recent injury or trauma. Denies any chest pain or shortness of breath. Symptoms are worse when he is at rest and sleeping. His third complaint is worsening anxiety. He states that he recently lost his Klonopin prescription prescribed by his PCP and he is asking for a refill. He has not reached out to his PCP regarding this medication refill. There are no other complaints, changes, or physical findings at this time. PCP: Natalee Hinton MD    No current facility-administered medications on file prior to encounter. Current Outpatient Medications on File Prior to Encounter   Medication Sig Dispense Refill    cloNIDine HCL (CATAPRES) 0.2 mg tablet Take 1 Tab by mouth two (2) times a day. 180 Tab 0    cyclobenzaprine (FLEXERIL) 10 mg tablet Take 1 Tab by mouth nightly.  30 Tab 0    montelukast (Singulair) 10 mg tablet Take 1 Tab by mouth daily. 90 Tab 0    omeprazole (PRILOSEC) 40 mg capsule Take 1 Cap by mouth daily. 90 Cap 0    lisinopriL (PRINIVIL, ZESTRIL) 20 mg tablet Take 1 Tab by mouth daily. 90 Tab 0    diclofenac EC (VOLTAREN) 50 mg EC tablet Take 1 Tab by mouth two (2) times a day. 180 Tab 0    tadalafiL (Cialis) 20 mg tablet Take 1 Tab by mouth as needed (erectile dysfunction). Every 3 days. 50 Tab 0    fluticasone propionate (Flovent HFA) 220 mcg/actuation inhaler INHALE 1 OR 2 PUFFS 2 TIMES A DAY. 3 Inhaler 0    fluticasone propionate (Flonase Allergy Relief) 50 mcg/actuation nasal spray 2 Sprays by Both Nostrils route daily. 1 Bottle 5    furosemide (LASIX) 20 mg tablet Take 1 Tab by mouth two (2) times a day. 180 Tab 0    amLODIPine (NORVASC) 10 mg tablet Take 1 Tab by mouth daily. Indications: high blood pressure 90 Tab 0    clonazePAM (KlonoPIN) 1 mg tablet TAKE ONE TABLET BY MOUTH 2-3 TIMES A DAY AS NEEDED. 90 Tab 2    lidocaine (Lidoderm) 5 % Apply patch to the affected area for 12 hours a day and remove for 12 hours a day. 60 Each 2    citalopram (CELEXA) 10 mg tablet TAKE ONE TABLET BY MOUTH EVERY DAY 30 Tab 2    methylPREDNISolone (Medrol, Kunal,) 4 mg tablet Please take by mouth as directed until completion 1 Dose Pack 0    albuterol-ipratropium (DUO-NEB) 2.5 mg-0.5 mg/3 ml nebu 3 mL by Nebulization route every four (4) hours as needed for Wheezing. 90 mL 0    aspirin delayed-release 81 mg tablet Take 1 Tab by mouth daily. 100 Tab 3    LINZESS 290 mcg cap capsule       polyethylene glycol (MIRALAX) 17 gram packet Take 1 Packet by mouth daily. 1 Each 5    dextromethorphan-guaiFENesin (ROBITUSSIN COUGH-CHEST DAVID DM) 5-100 mg/5 mL liqd Take 10 mL by mouth every four (4) hours as needed for Cough.  1 Bottle 0    Nebulizer Accessories kit Nebulizer cup w/ tubing; use every 4 hrs prn wheezing 1 Kit 5    SPIRIVA WITH HANDIHALER 18 mcg inhalation capsule       naloxone (NARCAN) 4 mg/actuation nasal spray Use 1 spray intranasally, then discard. Repeat with new spray every 2 min as needed for opioid overdose symptoms, alternating nostrils. 2 Each 0    albuterol (PROVENTIL VENTOLIN) 2.5 mg /3 mL (0.083 %) nebu 3 mL by Nebulization route every four (4) hours as needed for Wheezing. 90 Nebule 0    cetirizine (ZYRTEC) 10 mg tablet Take 1 Tab by mouth daily. 20 Tab 0    methadone (DOLOPHINE) 10 mg tablet Take  by mouth.  VENTOLIN HFA 90 mcg/actuation inhaler Take 2 Puffs by inhalation every four (4) hours as needed for Wheezing or Shortness of Breath. Indications: bronchospasm prevention 3 Inhaler 3       Past History     Past Medical History:  Past Medical History:   Diagnosis Date    Arthritis     Asthma     Chronic low back pain     Hypertension        Past Surgical History:  Past Surgical History:   Procedure Laterality Date    HX ORTHOPAEDIC         Family History:  Family History   Problem Relation Age of Onset    Cancer Mother     Heart Disease Father        Social History:  Social History     Tobacco Use    Smoking status: Current Some Day Smoker     Packs/day: 0.25     Years: 10.00     Pack years: 2.50    Smokeless tobacco: Never Used   Substance Use Topics    Alcohol use: Yes     Comment: occ    Drug use: No       Allergies: Allergies   Allergen Reactions    Vicodin [Hydrocodone-Acetaminophen] Other (comments)     Headache         Review of Systems   Review of Systems   Constitutional: Negative for chills and fever. HENT: Negative. Eyes: Negative for visual disturbance. Respiratory: Negative for cough and shortness of breath. Cardiovascular: Negative for chest pain and leg swelling. Gastrointestinal: Negative for abdominal pain, nausea and vomiting.        +Hernia   Genitourinary: Negative. Musculoskeletal: Positive for arthralgias. Negative for back pain and gait problem. Skin: Negative for color change and rash.    Neurological: Negative for dizziness, weakness, light-headedness and headaches. Hematological: Does not bruise/bleed easily. All other systems reviewed and are negative. Physical Exam   Physical Exam  Vitals signs and nursing note reviewed. Constitutional:       General: He is not in acute distress. Appearance: Normal appearance. He is not ill-appearing or toxic-appearing. HENT:      Head: Normocephalic and atraumatic. Nose: Nose normal.      Mouth/Throat:      Mouth: Mucous membranes are moist.   Eyes:      Extraocular Movements: Extraocular movements intact. Pupils: Pupils are equal, round, and reactive to light. Neck:      Musculoskeletal: Normal range of motion and neck supple. Cardiovascular:      Rate and Rhythm: Normal rate and regular rhythm. Heart sounds: No murmur. Pulmonary:      Effort: Pulmonary effort is normal. No respiratory distress. Breath sounds: Normal breath sounds. No wheezing. Abdominal:      General: There is no distension. Palpations: Abdomen is soft. Tenderness: There is no abdominal tenderness. There is no guarding or rebound. Hernia: A hernia is present. Hernia is present in the umbilical area ( Small umbilical hernia present easily reducible without surrounding erythema or tenderness palpation). Musculoskeletal: Normal range of motion. General: No swelling or tenderness. Right lower leg: No edema. Left lower leg: No edema. Skin:     General: Skin is warm and dry. Coloration: Skin is not pale. Findings: No erythema. Neurological:      General: No focal deficit present. Mental Status: He is alert and oriented to person, place, and time. Diagnostic Study Results     Labs -     Recent Results (from the past 12 hour(s))   CBC WITH AUTOMATED DIFF    Collection Time: 07/21/20  9:49 AM   Result Value Ref Range    WBC 7.1 4.1 - 11.1 K/uL    RBC 4.80 4. 10 - 5.70 M/uL    HGB 13.7 12.1 - 17.0 g/dL    HCT 43.7 36.6 - 50.3 %    MCV 91.0 80.0 - 99.0 FL    MCH 28.5 26.0 - 34.0 PG    MCHC 31.4 30.0 - 36.5 g/dL    RDW 13.5 11.5 - 14.5 %    PLATELET 690 483 - 541 K/uL    MPV 10.8 8.9 - 12.9 FL    NRBC 0.0 0  WBC    ABSOLUTE NRBC 0.00 0.00 - 0.01 K/uL    NEUTROPHILS 66 32 - 75 %    LYMPHOCYTES 22 12 - 49 %    MONOCYTES 9 5 - 13 %    EOSINOPHILS 3 0 - 7 %    BASOPHILS 0 0 - 1 %    IMMATURE GRANULOCYTES 0 0.0 - 0.5 %    ABS. NEUTROPHILS 4.7 1.8 - 8.0 K/UL    ABS. LYMPHOCYTES 1.6 0.8 - 3.5 K/UL    ABS. MONOCYTES 0.6 0.0 - 1.0 K/UL    ABS. EOSINOPHILS 0.2 0.0 - 0.4 K/UL    ABS. BASOPHILS 0.0 0.0 - 0.1 K/UL    ABS. IMM. GRANS. 0.0 0.00 - 0.04 K/UL    DF AUTOMATED     METABOLIC PANEL, COMPREHENSIVE    Collection Time: 07/21/20  9:49 AM   Result Value Ref Range    Sodium 139 136 - 145 mmol/L    Potassium 4.2 3.5 - 5.1 mmol/L    Chloride 105 97 - 108 mmol/L    CO2 31 21 - 32 mmol/L    Anion gap 3 (L) 5 - 15 mmol/L    Glucose 104 (H) 65 - 100 mg/dL    BUN 8 6 - 20 MG/DL    Creatinine 0.81 0.70 - 1.30 MG/DL    BUN/Creatinine ratio 10 (L) 12 - 20      GFR est AA >60 >60 ml/min/1.73m2    GFR est non-AA >60 >60 ml/min/1.73m2    Calcium 9.7 8.5 - 10.1 MG/DL    Bilirubin, total 0.3 0.2 - 1.0 MG/DL    ALT (SGPT) 73 12 - 78 U/L    AST (SGOT) 47 (H) 15 - 37 U/L    Alk.  phosphatase 106 45 - 117 U/L    Protein, total 7.3 6.4 - 8.2 g/dL    Albumin 3.7 3.5 - 5.0 g/dL    Globulin 3.6 2.0 - 4.0 g/dL    A-G Ratio 1.0 (L) 1.1 - 2.2     LIPASE    Collection Time: 07/21/20  9:49 AM   Result Value Ref Range    Lipase 182 73 - 393 U/L   TROPONIN I    Collection Time: 07/21/20  9:49 AM   Result Value Ref Range    Troponin-I, Qt. <0.05 <0.05 ng/mL   POC LACTIC ACID    Collection Time: 07/21/20 10:52 AM   Result Value Ref Range    Lactic Acid (POC) 0.74 0.40 - 2.00 mmol/L   EKG, 12 LEAD, INITIAL    Collection Time: 07/21/20 10:57 AM   Result Value Ref Range    Ventricular Rate 81 BPM    Atrial Rate 81 BPM    P-R Interval 172 ms    QRS Duration 78 ms    Q-T Interval 414 ms    QTC Calculation (Bezet) 480 ms    Calculated P Axis 75 degrees    Calculated R Axis 13 degrees    Calculated T Axis -5 degrees    Diagnosis       Normal sinus rhythm  Prolonged QT  When compared with ECG of 09-MAY-2020 16:25,  ST now depressed in Inferior leads  ST elevation now present in Lateral leads         Radiologic Studies -   XR SHOULDER LT AP/LAT MIN 2 V   Final Result   IMPRESSION: No acute fracture or dislocation        CT Results  (Last 48 hours)    None        CXR Results  (Last 48 hours)    None          Medical Decision Making   I am the first provider for this patient. I reviewed the vital signs, available nursing notes, past medical history, past surgical history, family history and social history. Vital Signs-Reviewed the patient's vital signs. Patient Vitals for the past 12 hrs:   Temp Pulse Resp BP SpO2   07/21/20 1000    (!) 173/108 97 %   07/21/20 0953     97 %   07/21/20 0952    (!) 170/108    07/21/20 0938 98 °F (36.7 °C) 80 18 (!) 179/108 100 %       Records Reviewed: Nursing Notes    Provider Notes (Medical Decision Making): This is a 54 y.o. male here for evaluation of an umbilical hernia as well as evaluation of left shoulder pain over the past few weeks. On examination patient appears clinically well and nontoxic. Vital signs stable throughout entire ED stay and patient is afebrile. Abdomen is soft, benign, nontender non-peritoneal.  I will pursue work-up consisting of blood work including lactic and reassess the patient. His hernia is easily reducible and there is no tenderness palpation, or surrounding erythema. He is able to tolerate all p.o. intake without complaints of abdominal pain, nausea, vomiting, or change in bowel output. Therefore I do not feel he requires CT imaging at this time. We will also obtain plain film of left shoulder, EKG, troponin.   Patient was informed that he needs to follow-up with his PCP to request refill for his controlled substance. I feel patient can be discharged to follow-up as an outpatient. Patient is strongly encouraged to follow-up with general surgery within 1-2 weeks. The patient and any family members present at bedside are given strict return to ED precautions immediately for persistent or worsening symptoms including fevers, abdominal pain, inability to eat or drink, difficulty passing stool, or hernia tenderness. Patient and available family express their understanding and agreement with plan as above and all of their questions have been answered. ED Course:   Initial assessment performed. The patients presenting problems have been discussed, and they are in agreement with the care plan formulated and outlined with them. I have encouraged them to ask questions as they arise throughout their visit. ED Course as of Jul 21 1533   Tue Jul 21, 2020   1112 EKG per my interpretation normal sinus rhythm, rate 81 bpm, normal axis, no acute ischemic changes, prolonged  ms. [AK]      ED Course User Index  [AK] Carmen Chavarria MD     Discharge Note:  The patient has been re-evaluated and is ready for discharge. Reviewed available results with patient. Counseled patient on diagnosis and care plan. Patient has expressed understanding, and all questions have been answered. Patient agrees with plan and agrees to follow up as recommended, or to return to the ED if their symptoms worsen. Discharge instructions have been provided and explained to the patient, along with reasons to return to the ED. Disposition:  Discharge home      DISCHARGE PLAN:  1. Discharge Medication List as of 7/21/2020 12:27 PM        2.    Follow-up Information     Follow up With Specialties Details Why Contact Info    Aldo Lind MD General Surgery Call  for evaluation of umbilical hernia [address] [facility] [address]  [address] [facility]      Sharyn Loyd MD Orthopedic Surgery Call  As needed, If symptoms worsen for left shoulder [address]  [phone]          3. Return to ED if worse     Diagnosis     Clinical Impression:   1. Umbilical hernia without obstruction and without gangrene    2. Acute pain of left shoulder        Attestations:    Nat Garland MD    Please note that this dictation was completed with [employer], the computer voice recognition software. Quite often unanticipated grammatical, syntax, homophones, and other interpretive errors are inadvertently transcribed by the computer software. Please disregard these errors. Please excuse any errors that have escaped final proofreading. Thank you.

## 2020-07-21 NOTE — ED NOTES
Dr. Judit Adams and Jennifer Anna RN reviewed discharge instructions with the patient. The patient verbalized understanding. All questions and concerns were addressed. The patient declined a wheelchair and is discharged ambulatory in the care of family members with instructions and prescriptions in hand. Pt is alert and oriented x 4. Respirations are clear and unlabored.

## 2020-07-22 ENCOUNTER — PATIENT OUTREACH (OUTPATIENT)
Dept: CASE MANAGEMENT | Age: 55
End: 2020-07-22

## 2020-07-22 NOTE — PROGRESS NOTES
Pt.reports not pleased with service received ED, pt.reports he do not wish to receive further calls.  -[facility]

## 2020-07-23 ENCOUNTER — TELEPHONE (OUTPATIENT)
Dept: FAMILY MEDICINE CLINIC | Age: 55
End: 2020-07-23

## 2020-07-23 DIAGNOSIS — Z51.81 MEDICATION MONITORING ENCOUNTER: ICD-10-CM

## 2020-07-23 DIAGNOSIS — Z51.81 MEDICATION MONITORING ENCOUNTER: Primary | ICD-10-CM

## 2020-07-27 ENCOUNTER — HOSPITAL ENCOUNTER (EMERGENCY)
Age: 55
Discharge: HOME | End: 2020-07-27
Attending: EMERGENCY MEDICINE | Admitting: EMERGENCY MEDICINE
Payer: MEDICARE

## 2020-07-27 VITALS
HEIGHT: 75 IN | SYSTOLIC BLOOD PRESSURE: 165 MMHG | RESPIRATION RATE: 13 BRPM | HEART RATE: 84 BPM | BODY MASS INDEX: 39.17 KG/M2 | TEMPERATURE: 97.9 F | OXYGEN SATURATION: 96 % | DIASTOLIC BLOOD PRESSURE: 93 MMHG | WEIGHT: 315 LBS

## 2020-07-27 DIAGNOSIS — F17.200 SMOKING ADDICTION: ICD-10-CM

## 2020-07-27 DIAGNOSIS — R60.0 BILATERAL LOWER EXTREMITY EDEMA: Primary | ICD-10-CM

## 2020-07-27 DIAGNOSIS — R60.0 EDEMA OF BOTH LEGS: ICD-10-CM

## 2020-07-27 LAB
ALBUMIN SERPL-MCNC: 3.7 G/DL (ref 3.5–5)
ALBUMIN/GLOB SERPL: 1 {RATIO} (ref 1.1–2.2)
ALP SERPL-CCNC: 95 U/L (ref 45–117)
ALT SERPL-CCNC: 66 U/L (ref 12–78)
ANION GAP SERPL CALC-SCNC: 4 MMOL/L (ref 5–15)
AST SERPL-CCNC: 44 U/L (ref 15–37)
BASOPHILS # BLD: 0 K/UL (ref 0–0.1)
BASOPHILS NFR BLD: 0 % (ref 0–1)
BILIRUB SERPL-MCNC: 0.3 MG/DL (ref 0.2–1)
BNP SERPL-MCNC: 26 PG/ML
BUN SERPL-MCNC: 15 MG/DL (ref 6–20)
BUN/CREAT SERPL: 15 (ref 12–20)
CALCIUM SERPL-MCNC: 9.1 MG/DL (ref 8.5–10.1)
CHLORIDE SERPL-SCNC: 105 MMOL/L (ref 97–108)
CO2 SERPL-SCNC: 30 MMOL/L (ref 21–32)
CREAT SERPL-MCNC: 0.97 MG/DL (ref 0.7–1.3)
DIFFERENTIAL METHOD BLD: NORMAL
DRUGS UR: NORMAL
EOSINOPHIL # BLD: 0.3 K/UL (ref 0–0.4)
EOSINOPHIL NFR BLD: 4 % (ref 0–7)
ERYTHROCYTE [DISTWIDTH] IN BLOOD BY AUTOMATED COUNT: 13.2 % (ref 11.5–14.5)
GLOBULIN SER CALC-MCNC: 3.7 G/DL (ref 2–4)
GLUCOSE SERPL-MCNC: 106 MG/DL (ref 65–100)
HCT VFR BLD AUTO: 42.6 % (ref 36.6–50.3)
HGB BLD-MCNC: 13.5 G/DL (ref 12.1–17)
IMM GRANULOCYTES # BLD AUTO: 0 K/UL (ref 0–0.04)
IMM GRANULOCYTES NFR BLD AUTO: 0 % (ref 0–0.5)
LYMPHOCYTES # BLD: 1.4 K/UL (ref 0.8–3.5)
LYMPHOCYTES NFR BLD: 24 % (ref 12–49)
MCH RBC QN AUTO: 28.6 PG (ref 26–34)
MCHC RBC AUTO-ENTMCNC: 31.7 G/DL (ref 30–36.5)
MCV RBC AUTO: 90.3 FL (ref 80–99)
MONOCYTES # BLD: 0.5 K/UL (ref 0–1)
MONOCYTES NFR BLD: 8 % (ref 5–13)
NEUTS SEG # BLD: 3.8 K/UL (ref 1.8–8)
NEUTS SEG NFR BLD: 64 % (ref 32–75)
NRBC # BLD: 0 K/UL (ref 0–0.01)
NRBC BLD-RTO: 0 PER 100 WBC
PLATELET # BLD AUTO: 189 K/UL (ref 150–400)
PMV BLD AUTO: 10.9 FL (ref 8.9–12.9)
POTASSIUM SERPL-SCNC: 4.5 MMOL/L (ref 3.5–5.1)
PROT SERPL-MCNC: 7.4 G/DL (ref 6.4–8.2)
RBC # BLD AUTO: 4.72 M/UL (ref 4.1–5.7)
SODIUM SERPL-SCNC: 139 MMOL/L (ref 136–145)
WBC # BLD AUTO: 6 K/UL (ref 4.1–11.1)

## 2020-07-27 PROCEDURE — 99284 EMERGENCY DEPT VISIT MOD MDM: CPT

## 2020-07-27 PROCEDURE — 80053 COMPREHEN METABOLIC PANEL: CPT

## 2020-07-27 PROCEDURE — 93005 ELECTROCARDIOGRAM TRACING: CPT

## 2020-07-27 PROCEDURE — 83880 ASSAY OF NATRIURETIC PEPTIDE: CPT

## 2020-07-27 PROCEDURE — 85025 COMPLETE CBC W/AUTO DIFF WBC: CPT

## 2020-07-27 PROCEDURE — 36415 COLL VENOUS BLD VENIPUNCTURE: CPT

## 2020-07-27 RX ORDER — FUROSEMIDE 40 MG/1
TABLET ORAL
Qty: 5 TAB | Refills: 0 | Status: SHIPPED | OUTPATIENT
Start: 2020-07-27 | End: 2020-10-12 | Stop reason: SDUPTHER

## 2020-07-27 NOTE — ED NOTES
Assumed care of pt. Plan of care discussed. Call bell in reach. Will continue to monitor. Rhythm strip placed chart. 11:38  Pt reports bilateral lower extremity swelling and L sided CP that comes and goes. PA at bedside.

## 2020-07-27 NOTE — ED PROVIDER NOTES
EMERGENCY DEPARTMENT HISTORY AND PHYSICAL EXAM      Date: 7/27/2020  Patient Name: Marley Brown    History of Presenting Illness     Chief Complaint   Patient presents with    Leg Swelling     BLLE edema and tightness x 2 days. no hx of chf       History Provided By: Patient    HPI: Marley Brown, 54 y.o. male with a history of asthma, arthritis, chronic low back pain and opioid addiction on methadone maintenance presents ambulatory to the ED with cc of 2 days of 5 out of 10 constant, aching bilateral lower extremity swelling that is not associated with shortness of breath or chest pain. He tells me this has been a problem for him in the past and his primary care provider had started him on a \"water pill\". He tells me sometimes he forgets to take his pill but he did take his prescribed dose at 9:00 this morning after returning from the methadone clinic. He does endorse an unintended weight gain following recent prescriptions for oral steroids due to his asthma. There are no other complaints, changes, or physical findings at this time. PCP: Alphonse Hinton MD    Current Outpatient Medications   Medication Sig Dispense Refill    furosemide (LASIX) 40 mg tablet Increase your morning dose to 40 mg for 5 days 5 Tab 0    cloNIDine HCL (CATAPRES) 0.2 mg tablet Take 1 Tab by mouth two (2) times a day. 180 Tab 0    cyclobenzaprine (FLEXERIL) 10 mg tablet Take 1 Tab by mouth nightly. 30 Tab 0    montelukast (Singulair) 10 mg tablet Take 1 Tab by mouth daily. 90 Tab 0    lisinopriL (PRINIVIL, ZESTRIL) 20 mg tablet Take 1 Tab by mouth daily. 90 Tab 0    fluticasone propionate (Flovent HFA) 220 mcg/actuation inhaler INHALE 1 OR 2 PUFFS 2 TIMES A DAY. 3 Inhaler 0    fluticasone propionate (Flonase Allergy Relief) 50 mcg/actuation nasal spray 2 Sprays by Both Nostrils route daily. 1 Bottle 5    furosemide (LASIX) 20 mg tablet Take 1 Tab by mouth two (2) times a day.  180 Tab 0    amLODIPine (NORVASC) 10 mg tablet Take 1 Tab by mouth daily. Indications: high blood pressure 90 Tab 0    clonazePAM (KlonoPIN) 1 mg tablet TAKE ONE TABLET BY MOUTH 2-3 TIMES A DAY AS NEEDED. 90 Tab 2    lidocaine (Lidoderm) 5 % Apply patch to the affected area for 12 hours a day and remove for 12 hours a day. 60 Each 2    citalopram (CELEXA) 10 mg tablet TAKE ONE TABLET BY MOUTH EVERY DAY 30 Tab 2    aspirin delayed-release 81 mg tablet Take 1 Tab by mouth daily. 100 Tab 3    polyethylene glycol (MIRALAX) 17 gram packet Take 1 Packet by mouth daily. 1 Each 5    albuterol (PROVENTIL VENTOLIN) 2.5 mg /3 mL (0.083 %) nebu 3 mL by Nebulization route every four (4) hours as needed for Wheezing. 90 Nebule 0    cetirizine (ZYRTEC) 10 mg tablet Take 1 Tab by mouth daily. 20 Tab 0    methadone (DOLOPHINE) 10 mg tablet Take  by mouth.  diclofenac EC (VOLTAREN) 50 mg EC tablet Take 1 Tab by mouth two (2) times a day. 180 Tab 0    tadalafiL (Cialis) 20 mg tablet Take 1 Tab by mouth as needed (erectile dysfunction). Every 3 days. 50 Tab 0    methylPREDNISolone (Medrol, Kunal,) 4 mg tablet Please take by mouth as directed until completion 1 Dose Pack 0    LINZESS 290 mcg cap capsule       dextromethorphan-guaiFENesin (ROBITUSSIN COUGH-CHEST DAVID DM) 5-100 mg/5 mL liqd Take 10 mL by mouth every four (4) hours as needed for Cough. 1 Bottle 0    Nebulizer Accessories kit Nebulizer cup w/ tubing; use every 4 hrs prn wheezing 1 Kit 5    SPIRIVA WITH HANDIHALER 18 mcg inhalation capsule       VENTOLIN HFA 90 mcg/actuation inhaler Take 2 Puffs by inhalation every four (4) hours as needed for Wheezing or Shortness of Breath.  Indications: bronchospasm prevention 3 Inhaler 3     Past History     Past Medical History:  Past Medical History:   Diagnosis Date    Arthritis     Asthma     Chronic low back pain     Hypertension        Past Surgical History:  Past Surgical History:   Procedure Laterality Date    HX ORTHOPAEDIC         Family History:  Family History   Problem Relation Age of Onset    Cancer Mother     Heart Disease Father        Social History:  Social History     Tobacco Use    Smoking status: Current Some Day Smoker     Packs/day: 0.25     Years: 10.00     Pack years: 2.50    Smokeless tobacco: Never Used   Substance Use Topics    Alcohol use: Yes     Comment: occ    Drug use: No       Allergies: Allergies   Allergen Reactions    Vicodin [Hydrocodone-Acetaminophen] Other (comments)     Headache     Review of Systems   Review of Systems   Constitutional: Negative for fatigue and fever. HENT: Negative for congestion, ear pain and rhinorrhea. Eyes: Negative for pain and redness. Respiratory: Negative for cough and wheezing. Cardiovascular: Positive for leg swelling. Negative for chest pain and palpitations. Gastrointestinal: Negative for abdominal pain, nausea and vomiting. Genitourinary: Negative for dysuria, frequency and urgency. Musculoskeletal: Negative for back pain, neck pain and neck stiffness. Skin: Negative for rash and wound. Neurological: Negative for weakness, light-headedness, numbness and headaches. Physical Exam   Physical Exam  Vitals signs and nursing note reviewed. Constitutional:       General: He is not in acute distress. Appearance: He is well-developed. He is obese. He is not toxic-appearing. HENT:      Head: Normocephalic and atraumatic. No right periorbital erythema or left periorbital erythema. Jaw: No trismus. Right Ear: External ear normal.      Left Ear: External ear normal.      Nose: Nose normal.      Mouth/Throat:      Pharynx: Uvula midline. Eyes:      General: No scleral icterus. Conjunctiva/sclera: Conjunctivae normal.      Pupils: Pupils are equal, round, and reactive to light. Neck:      Musculoskeletal: Full passive range of motion without pain and normal range of motion. Cardiovascular:      Rate and Rhythm: Normal rate and regular rhythm. Heart sounds: Normal heart sounds. Comments: Bilateral Velcro knee braces are worn. There is bilateral, symmetric 1+ pitting edema of both lower extremities to the knees  Pulmonary:      Effort: Pulmonary effort is normal. No tachypnea, accessory muscle usage or respiratory distress. Breath sounds: Normal breath sounds. No decreased breath sounds or wheezing. Abdominal:      Palpations: Abdomen is soft. Abdomen is not rigid. Tenderness: There is no abdominal tenderness. There is no guarding. Musculoskeletal: Normal range of motion. Right lower le+ Pitting Edema present. Left lower le+ Pitting Edema present. Skin:     Findings: No rash. Neurological:      Mental Status: He is alert and oriented to person, place, and time. He is not disoriented. GCS: GCS eye subscore is 4. GCS verbal subscore is 5. GCS motor subscore is 6. Cranial Nerves: No cranial nerve deficit. Sensory: No sensory deficit. Psychiatric:         Speech: Speech normal.       Diagnostic Study Results     Labs -     Recent Results (from the past 12 hour(s))   NT-PRO BNP    Collection Time: 20 11:44 AM   Result Value Ref Range    NT pro-BNP 26 <702 PG/ML   METABOLIC PANEL, COMPREHENSIVE    Collection Time: 20 11:44 AM   Result Value Ref Range    Sodium 139 136 - 145 mmol/L    Potassium 4.5 3.5 - 5.1 mmol/L    Chloride 105 97 - 108 mmol/L    CO2 30 21 - 32 mmol/L    Anion gap 4 (L) 5 - 15 mmol/L    Glucose 106 (H) 65 - 100 mg/dL    BUN 15 6 - 20 MG/DL    Creatinine 0.97 0.70 - 1.30 MG/DL    BUN/Creatinine ratio 15 12 - 20      GFR est AA >60 >60 ml/min/1.73m2    GFR est non-AA >60 >60 ml/min/1.73m2    Calcium 9.1 8.5 - 10.1 MG/DL    Bilirubin, total 0.3 0.2 - 1.0 MG/DL    ALT (SGPT) 66 12 - 78 U/L    AST (SGOT) 44 (H) 15 - 37 U/L    Alk.  phosphatase 95 45 - 117 U/L    Protein, total 7.4 6.4 - 8.2 g/dL    Albumin 3.7 3.5 - 5.0 g/dL    Globulin 3.7 2.0 - 4.0 g/dL    A-G Ratio 1.0 (L) 1.1 - 2.2     CBC WITH AUTOMATED DIFF    Collection Time: 07/27/20 11:44 AM   Result Value Ref Range    WBC 6.0 4.1 - 11.1 K/uL    RBC 4.72 4.10 - 5.70 M/uL    HGB 13.5 12.1 - 17.0 g/dL    HCT 42.6 36.6 - 50.3 %    MCV 90.3 80.0 - 99.0 FL    MCH 28.6 26.0 - 34.0 PG    MCHC 31.7 30.0 - 36.5 g/dL    RDW 13.2 11.5 - 14.5 %    PLATELET 085 643 - 930 K/uL    MPV 10.9 8.9 - 12.9 FL    NRBC 0.0 0  WBC    ABSOLUTE NRBC 0.00 0.00 - 0.01 K/uL    NEUTROPHILS 64 32 - 75 %    LYMPHOCYTES 24 12 - 49 %    MONOCYTES 8 5 - 13 %    EOSINOPHILS 4 0 - 7 %    BASOPHILS 0 0 - 1 %    IMMATURE GRANULOCYTES 0 0.0 - 0.5 %    ABS. NEUTROPHILS 3.8 1.8 - 8.0 K/UL    ABS. LYMPHOCYTES 1.4 0.8 - 3.5 K/UL    ABS. MONOCYTES 0.5 0.0 - 1.0 K/UL    ABS. EOSINOPHILS 0.3 0.0 - 0.4 K/UL    ABS. BASOPHILS 0.0 0.0 - 0.1 K/UL    ABS. IMM. GRANS. 0.0 0.00 - 0.04 K/UL    DF AUTOMATED         Radiologic Studies -   No orders to display     CT Results  (Last 48 hours)    None        CXR Results  (Last 48 hours)    None        Medical Decision Making   I am the first provider for this patient. I reviewed the vital signs, available nursing notes, past medical history, past surgical history, family history and social history. Vital Signs-Reviewed the patient's vital signs.   Patient Vitals for the past 12 hrs:   Temp Pulse Resp BP SpO2   07/27/20 1145  84 13 (!) 165/93 96 %   07/27/20 1057 97.9 °F (36.6 °C) (!) 102 16 (!) 157/102 98 %       Pulse Oximetry Analysis - 98% on RA    Records Reviewed: Nursing Notes, Old Medical Records, Previous Radiology Studies, Previous Laboratory Studies and [facility]    Provider Notes (Medical Decision Making):   DDx: Lower extremity edema, dependent edema, metabolic dyscrasia, CHF    Afebrile; no distress; labs to include proBNP are essentially normal and non diagnostic; per the patient he is not always compliant with his prescriptions; discussed case with ; believe reasonable to increase the morning dose of furosemide to 40 mg the next 5 days; discussed elevation and possible compression stockings; refer to primary care    TOBACCO COUNSELING:  Spent 1-5 minutes discussing the risks of smoking and the benefits of smoking cessation as well as the long term sequelae of smoking with the pt who verbalized his understanding. Reviewed strategies for success, including gradually decreasing the number of cigarettes smoked a day. ED Course:   Initial assessment performed. The patients presenting problems have been discussed, and they are in agreement with the care plan formulated and outlined with them. I have encouraged them to ask questions as they arise throughout their visit. Disposition:  Discharge    PLAN:  1. Discharge Medication List as of 7/27/2020 12:47 PM      START taking these medications    Details   !! furosemide (LASIX) 40 mg tablet Increase your morning dose to 40 mg for 5 days, Normal, Disp-5 Tab,R-0       !! - Potential duplicate medications found. Please discuss with provider. CONTINUE these medications which have NOT CHANGED    Details   cloNIDine HCL (CATAPRES) 0.2 mg tablet Take 1 Tab by mouth two (2) times a day., Normal, Disp-180 Tab,R-0      cyclobenzaprine (FLEXERIL) 10 mg tablet Take 1 Tab by mouth nightly., Normal, Disp-30 Tab,R-0      montelukast (Singulair) 10 mg tablet Take 1 Tab by mouth daily. , Normal, Disp-90 Tab,R-0      lisinopriL (PRINIVIL, ZESTRIL) 20 mg tablet Take 1 Tab by mouth daily. , Normal, Disp-90 Tab,R-0Please cancel earlier script w/ 3 refills. Thanks. fluticasone propionate (Flovent HFA) 220 mcg/actuation inhaler INHALE 1 OR 2 PUFFS 2 TIMES A DAY., Normal, Disp-3 Inhaler,R-0      fluticasone propionate (Flonase Allergy Relief) 50 mcg/actuation nasal spray 2 Sprays by Both Nostrils route daily. , Normal, Disp-1 Bottle,R-5      !! furosemide (LASIX) 20 mg tablet Take 1 Tab by mouth two (2) times a day., Normal, Disp-180 Tab,R-0      amLODIPine (NORVASC) 10 mg tablet Take 1 Tab by mouth daily. Indications: high blood pressure, Normal, Disp-90 Tab,R-0      clonazePAM (KlonoPIN) 1 mg tablet TAKE ONE TABLET BY MOUTH 2-3 TIMES A DAY AS NEEDED., Normal, Disp-90 Tab, R-2      lidocaine (Lidoderm) 5 % Apply patch to the affected area for 12 hours a day and remove for 12 hours a day., Normal, Disp-60 Each, R-2      citalopram (CELEXA) 10 mg tablet TAKE ONE TABLET BY MOUTH EVERY DAY, Normal, Disp-30 Tab, R-2      aspirin delayed-release 81 mg tablet Take 1 Tab by mouth daily. , Normal, Disp-100 Tab, R-3      polyethylene glycol (MIRALAX) 17 gram packet Take 1 Packet by mouth daily. , Normal, Disp-1 Each, R-5      albuterol (PROVENTIL VENTOLIN) 2.5 mg /3 mL (0.083 %) nebu 3 mL by Nebulization route every four (4) hours as needed for Wheezing., Normal, Disp-90 Nebule, R-0      cetirizine (ZYRTEC) 10 mg tablet Take 1 Tab by mouth daily. , Normal, Disp-20 Tab, R-0      methadone (DOLOPHINE) 10 mg tablet Take  by mouth., Historical Med      diclofenac EC (VOLTAREN) 50 mg EC tablet Take 1 Tab by mouth two (2) times a day., Normal, Disp-180 Tab,R-0      tadalafiL (Cialis) 20 mg tablet Take 1 Tab by mouth as needed (erectile dysfunction). Every 3 days. , Normal, Disp-50 Tab,R-0      methylPREDNISolone (Medrol, Kunal,) 4 mg tablet Please take by mouth as directed until completion, Print, Disp-1 Dose Pack, R-0      LINZESS 290 mcg cap capsule Historical Med, NIKO      dextromethorphan-guaiFENesin (ROBITUSSIN COUGH-CHEST DAVID DM) 5-100 mg/5 mL liqd Take 10 mL by mouth every four (4) hours as needed for Cough., Normal, Disp-1 Bottle, R-0      Nebulizer Accessories kit Nebulizer cup w/ tubing; use every 4 hrs prn wheezing, Normal, Disp-1 Kit, R-5      SPIRIVA WITH HANDIHALER 18 mcg inhalation capsule Historical Med NIKO      VENTOLIN HFA 90 mcg/actuation inhaler Take 2 Puffs by inhalation every four (4) hours as needed for Wheezing or Shortness of Breath.  Indications: bronchospasm prevention, Normal, Disp-3 Inhaler, R-3, NIKO       !! - Potential duplicate medications found. Please discuss with provider. 2.   Follow-up Information     Follow up With Specialties Details Why Contact Info    Read, Gracie Polo MD Family Medicine Schedule an appointment as soon as possible for a visit PRIMARY CARE: call to schedule follow up [address] [facility]  [phone]          Return to ED if worse     Diagnosis     Clinical Impression:   1. Bilateral lower extremity edema    2. Smoking addiction    3.  Edema of both legs

## 2020-07-28 ENCOUNTER — PATIENT OUTREACH (OUTPATIENT)
Dept: CASE MANAGEMENT | Age: 55
End: 2020-07-28

## 2020-07-28 LAB
ATRIAL RATE: 91 BPM
CALCULATED P AXIS, ECG09: 51 DEGREES
CALCULATED R AXIS, ECG10: 10 DEGREES
CALCULATED T AXIS, ECG11: 16 DEGREES
DIAGNOSIS, 93000: NORMAL
P-R INTERVAL, ECG05: 152 MS
Q-T INTERVAL, ECG07: 380 MS
QRS DURATION, ECG06: 84 MS
QTC CALCULATION (BEZET), ECG08: 467 MS
VENTRICULAR RATE, ECG03: 91 BPM

## 2020-07-28 NOTE — PROGRESS NOTES
Patient contacted regarding recent discharge and COVID-19 risk. Discussed COVID-19 related testing which was not done at this time. Test results were not done. Patient informed of results, if available? no    Care Transition Nurse/ Ambulatory Care Manager contacted the patient by telephone to perform post discharge assessment. Verified name and  with patient as identifiers. Patient has following risk factors of: asthma. CTN/ACM reviewed discharge instructions, medical action plan and red flags related to discharge diagnosis. Reviewed and educated them on any new and changed medications related to discharge diagnosis. Advised obtaining a 90-day supply of all daily and as-needed medications. Advance Care Planning:   Does patient have an Advance Directive: not on file     Education provided regarding infection prevention, and signs and symptoms of COVID-19 and when to seek medical attention with patient who verbalized understanding. Discussed exposure protocols and quarantine from [address] you at higher risk for severe illness  and given an opportunity for questions and concerns. The patient agrees to contact the COVID-19 hotline [phone] or PCP office for questions related to their healthcare. CTN/ACM provided contact information for future reference. From CDC: Are you at higher risk for severe illness?  Wash your hands often.  Avoid close contact (6 feet, which is about two arm lengths) with people who are sick.  Put distance between yourself and other people if COVID-19 is spreading in your community.  Clean and disinfect frequently touched surfaces.  Avoid all cruise travel and non-essential air travel.  Call your healthcare professional if you have concerns about COVID-19 and your underlying condition or if you are sick.     For more information on steps you can take to protect yourself, see CDC's How to Protect Yourself      Patient/family/caregiver given information for GetWell Loop and agrees to enroll yes  Patient's preferred e-mail:  [email or web address]  Patient's preferred phone number: [phone]   Based on Loop alert triggers, patient will be contacted by nurse care manager for worsening symptoms. Pt will be further monitored by COVID Loop Team based on severity of symptoms and risk factors.

## 2020-08-10 ENCOUNTER — APPOINTMENT (OUTPATIENT)
Dept: GENERAL RADIOLOGY | Age: 55
End: 2020-08-10
Payer: MEDICARE

## 2020-08-10 ENCOUNTER — APPOINTMENT (OUTPATIENT)
Dept: GENERAL RADIOLOGY | Age: 55
End: 2020-08-10
Attending: EMERGENCY MEDICINE
Payer: MEDICARE

## 2020-08-10 ENCOUNTER — HOSPITAL ENCOUNTER (EMERGENCY)
Age: 55
Discharge: HOME | End: 2020-08-10
Attending: EMERGENCY MEDICINE
Payer: MEDICARE

## 2020-08-10 VITALS
RESPIRATION RATE: 18 BRPM | HEART RATE: 95 BPM | DIASTOLIC BLOOD PRESSURE: 99 MMHG | TEMPERATURE: 98.1 F | HEIGHT: 75 IN | WEIGHT: 315 LBS | SYSTOLIC BLOOD PRESSURE: 145 MMHG | OXYGEN SATURATION: 94 % | BODY MASS INDEX: 39.17 KG/M2

## 2020-08-10 DIAGNOSIS — M25.571 ACUTE RIGHT ANKLE PAIN: Primary | ICD-10-CM

## 2020-08-10 LAB
ALBUMIN SERPL-MCNC: 4.1 G/DL (ref 3.5–5)
ALBUMIN/GLOB SERPL: 1.1 {RATIO} (ref 1.1–2.2)
ALP SERPL-CCNC: 110 U/L (ref 45–117)
ALT SERPL-CCNC: 80 U/L (ref 12–78)
ANION GAP SERPL CALC-SCNC: 3 MMOL/L (ref 5–15)
AST SERPL-CCNC: 47 U/L (ref 15–37)
BASOPHILS # BLD: 0 K/UL (ref 0–0.1)
BASOPHILS NFR BLD: 0 % (ref 0–1)
BILIRUB SERPL-MCNC: 0.3 MG/DL (ref 0.2–1)
BNP SERPL-MCNC: 20 PG/ML
BUN SERPL-MCNC: 11 MG/DL (ref 6–20)
BUN/CREAT SERPL: 9 (ref 12–20)
CALCIUM SERPL-MCNC: 9.2 MG/DL (ref 8.5–10.1)
CHLORIDE SERPL-SCNC: 103 MMOL/L (ref 97–108)
CK MB CFR SERPL CALC: 0.9 % (ref 0–2.5)
CK MB SERPL-MCNC: 4.2 NG/ML (ref 5–25)
CK SERPL-CCNC: 491 U/L (ref 39–308)
CO2 SERPL-SCNC: 32 MMOL/L (ref 21–32)
CREAT SERPL-MCNC: 1.26 MG/DL (ref 0.7–1.3)
DIFFERENTIAL METHOD BLD: ABNORMAL
EOSINOPHIL # BLD: 0.2 K/UL (ref 0–0.4)
EOSINOPHIL NFR BLD: 2 % (ref 0–7)
ERYTHROCYTE [DISTWIDTH] IN BLOOD BY AUTOMATED COUNT: 12.9 % (ref 11.5–14.5)
GLOBULIN SER CALC-MCNC: 3.8 G/DL (ref 2–4)
GLUCOSE SERPL-MCNC: 98 MG/DL (ref 65–100)
HCT VFR BLD AUTO: 46.6 % (ref 36.6–50.3)
HGB BLD-MCNC: 14.6 G/DL (ref 12.1–17)
IMM GRANULOCYTES # BLD AUTO: 0.1 K/UL (ref 0–0.04)
IMM GRANULOCYTES NFR BLD AUTO: 1 % (ref 0–0.5)
LYMPHOCYTES # BLD: 1.4 K/UL (ref 0.8–3.5)
LYMPHOCYTES NFR BLD: 16 % (ref 12–49)
MCH RBC QN AUTO: 28.3 PG (ref 26–34)
MCHC RBC AUTO-ENTMCNC: 31.3 G/DL (ref 30–36.5)
MCV RBC AUTO: 90.3 FL (ref 80–99)
MONOCYTES # BLD: 0.7 K/UL (ref 0–1)
MONOCYTES NFR BLD: 7 % (ref 5–13)
NEUTS SEG # BLD: 6.8 K/UL (ref 1.8–8)
NEUTS SEG NFR BLD: 74 % (ref 32–75)
NRBC # BLD: 0 K/UL (ref 0–0.01)
NRBC BLD-RTO: 0 PER 100 WBC
PLATELET # BLD AUTO: 220 K/UL (ref 150–400)
PMV BLD AUTO: 10.7 FL (ref 8.9–12.9)
POTASSIUM SERPL-SCNC: 4.2 MMOL/L (ref 3.5–5.1)
PROT SERPL-MCNC: 7.9 G/DL (ref 6.4–8.2)
RBC # BLD AUTO: 5.16 M/UL (ref 4.1–5.7)
SODIUM SERPL-SCNC: 138 MMOL/L (ref 136–145)
TROPONIN I SERPL-MCNC: <0.05 NG/ML
WBC # BLD AUTO: 9.1 K/UL (ref 4.1–11.1)

## 2020-08-10 PROCEDURE — 80053 COMPREHEN METABOLIC PANEL: CPT

## 2020-08-10 PROCEDURE — 84484 ASSAY OF TROPONIN QUANT: CPT

## 2020-08-10 PROCEDURE — 73610 X-RAY EXAM OF ANKLE: CPT

## 2020-08-10 PROCEDURE — 94640 AIRWAY INHALATION TREATMENT: CPT

## 2020-08-10 PROCEDURE — 82550 ASSAY OF CK (CPK): CPT

## 2020-08-10 PROCEDURE — 85025 COMPLETE CBC W/AUTO DIFF WBC: CPT

## 2020-08-10 PROCEDURE — 36415 COLL VENOUS BLD VENIPUNCTURE: CPT

## 2020-08-10 PROCEDURE — 93005 ELECTROCARDIOGRAM TRACING: CPT

## 2020-08-10 PROCEDURE — 71045 X-RAY EXAM CHEST 1 VIEW: CPT

## 2020-08-10 PROCEDURE — 82553 CREATINE MB FRACTION: CPT

## 2020-08-10 PROCEDURE — 99284 EMERGENCY DEPT VISIT MOD MDM: CPT

## 2020-08-10 PROCEDURE — 74011000250 HC RX REV CODE- 250: Performed by: EMERGENCY MEDICINE

## 2020-08-10 PROCEDURE — 83880 ASSAY OF NATRIURETIC PEPTIDE: CPT

## 2020-08-10 RX ORDER — IPRATROPIUM BROMIDE AND ALBUTEROL SULFATE 2.5; .5 MG/3ML; MG/3ML
3 SOLUTION RESPIRATORY (INHALATION)
Status: COMPLETED | OUTPATIENT
Start: 2020-08-10 | End: 2020-08-10

## 2020-08-10 RX ADMIN — IPRATROPIUM BROMIDE AND ALBUTEROL SULFATE 3 ML: .5; 3 SOLUTION RESPIRATORY (INHALATION) at 17:16

## 2020-08-10 NOTE — ED PROVIDER NOTES
EMERGENCY DEPARTMENT HISTORY AND PHYSICAL EXAM      Date: 8/10/2020  Patient Name: Erik Hoyt    History of Presenting Illness     Chief Complaint   Patient presents with    Leg Swelling    Ankle Injury    Back Pain         HPI: Erik Hoyt, 54 y.o. male presents to the ED with cc of right ankle pain, shortness of breath. He states that twice over the last few weeks he has felt like he twisted his ankle. Has had persistent pain to the right lateral malleolus ever since then. He has been wearing a brace but that has not helped. Pain is worse with ambulation and palpation, better with rest.    Patient also notes that he has had increased wheezing recently. He states he was diagnosed with asthma, takes the inhaler at home but that has not helped. He states he has been to this ED before, received a DuoNeb and felt much better. Denies significant shortness of breath, chest pain, fevers, cough, or other symptoms. There are no other complaints, changes, or physical findings at this time. PCP: Noé Hinton MD    No current facility-administered medications on file prior to encounter. Current Outpatient Medications on File Prior to Encounter   Medication Sig Dispense Refill    furosemide (LASIX) 40 mg tablet Increase your morning dose to 40 mg for 5 days 5 Tab 0    cloNIDine HCL (CATAPRES) 0.2 mg tablet Take 1 Tab by mouth two (2) times a day. 180 Tab 0    cyclobenzaprine (FLEXERIL) 10 mg tablet Take 1 Tab by mouth nightly. 30 Tab 0    montelukast (Singulair) 10 mg tablet Take 1 Tab by mouth daily. 90 Tab 0    lisinopriL (PRINIVIL, ZESTRIL) 20 mg tablet Take 1 Tab by mouth daily. 90 Tab 0    diclofenac EC (VOLTAREN) 50 mg EC tablet Take 1 Tab by mouth two (2) times a day. 180 Tab 0    tadalafiL (Cialis) 20 mg tablet Take 1 Tab by mouth as needed (erectile dysfunction). Every 3 days.  50 Tab 0    fluticasone propionate (Flovent HFA) 220 mcg/actuation inhaler INHALE 1 OR 2 PUFFS 2 TIMES A DAY. 3 Inhaler 0    fluticasone propionate (Flonase Allergy Relief) 50 mcg/actuation nasal spray 2 Sprays by Both Nostrils route daily. 1 Bottle 5    furosemide (LASIX) 20 mg tablet Take 1 Tab by mouth two (2) times a day. 180 Tab 0    amLODIPine (NORVASC) 10 mg tablet Take 1 Tab by mouth daily. Indications: high blood pressure 90 Tab 0    clonazePAM (KlonoPIN) 1 mg tablet TAKE ONE TABLET BY MOUTH 2-3 TIMES A DAY AS NEEDED. 90 Tab 2    lidocaine (Lidoderm) 5 % Apply patch to the affected area for 12 hours a day and remove for 12 hours a day. 60 Each 2    citalopram (CELEXA) 10 mg tablet TAKE ONE TABLET BY MOUTH EVERY DAY 30 Tab 2    methylPREDNISolone (Medrol, Kunal,) 4 mg tablet Please take by mouth as directed until completion 1 Dose Pack 0    aspirin delayed-release 81 mg tablet Take 1 Tab by mouth daily. 100 Tab 3    LINZESS 290 mcg cap capsule       polyethylene glycol (MIRALAX) 17 gram packet Take 1 Packet by mouth daily. 1 Each 5    dextromethorphan-guaiFENesin (ROBITUSSIN COUGH-CHEST DAVID DM) 5-100 mg/5 mL liqd Take 10 mL by mouth every four (4) hours as needed for Cough. 1 Bottle 0    Nebulizer Accessories kit Nebulizer cup w/ tubing; use every 4 hrs prn wheezing 1 Kit 5    SPIRIVA WITH HANDIHALER 18 mcg inhalation capsule       albuterol (PROVENTIL VENTOLIN) 2.5 mg /3 mL (0.083 %) nebu 3 mL by Nebulization route every four (4) hours as needed for Wheezing. 90 Nebule 0    cetirizine (ZYRTEC) 10 mg tablet Take 1 Tab by mouth daily. 20 Tab 0    methadone (DOLOPHINE) 10 mg tablet Take  by mouth.  VENTOLIN HFA 90 mcg/actuation inhaler Take 2 Puffs by inhalation every four (4) hours as needed for Wheezing or Shortness of Breath.  Indications: bronchospasm prevention 3 Inhaler 3       Past History     Past Medical History:  Past Medical History:   Diagnosis Date    Arthritis     Asthma     Chronic low back pain     Hypertension        Past Surgical History:  Past Surgical History: Procedure Laterality Date    HX ORTHOPAEDIC         Family History:  Family History   Problem Relation Age of Onset    Cancer Mother     Heart Disease Father        Social History:  Social History     Tobacco Use    Smoking status: Current Some Day Smoker     Packs/day: 0.25     Years: 10.00     Pack years: 2.50    Smokeless tobacco: Never Used   Substance Use Topics    Alcohol use: Yes     Comment: occ    Drug use: No       Allergies: Allergies   Allergen Reactions    Vicodin [Hydrocodone-Acetaminophen] Other (comments)     Headache         Review of Systems   Review of Systems   Constitutional: Negative for chills and fever. HENT: Negative for sore throat. Eyes: Negative for redness. Respiratory: Positive for wheezing. Negative for shortness of breath. Cardiovascular: Negative for chest pain. Gastrointestinal: Negative for abdominal pain. Genitourinary: Negative for dysuria. Musculoskeletal: Positive for arthralgias. Negative for back pain. Neurological: Negative for syncope. Psychiatric/Behavioral: The patient is not nervous/anxious. All other systems reviewed and are negative. Physical Exam   Physical Exam  Vitals signs and nursing note reviewed. Constitutional:       Appearance: Normal appearance. HENT:      Head: Normocephalic and atraumatic. Mouth/Throat:      Mouth: Mucous membranes are moist.   Neck:      Musculoskeletal: Neck supple. Cardiovascular:      Rate and Rhythm: Normal rate and regular rhythm. Pulmonary:      Effort: Pulmonary effort is normal.      Breath sounds: Wheezing (bilat mild) present. Abdominal:      Palpations: Abdomen is soft. Tenderness: There is no abdominal tenderness. Musculoskeletal:         General: No deformity. Comments: TTP R ankle inf to med malleolus w/o sig swelling/discoloration, 2+ pulses to BLE, toes up/down, SILT   Skin:     General: Skin is warm and dry.    Neurological:      General: No focal deficit present. Mental Status: He is alert. Psychiatric:         Mood and Affect: Mood normal.         Behavior: Behavior normal.         Diagnostic Study Results     Labs -     Recent Results (from the past 24 hour(s))   EKG, 12 LEAD, INITIAL    Collection Time: 08/10/20  2:25 PM   Result Value Ref Range    Ventricular Rate 82 BPM    Atrial Rate 82 BPM    P-R Interval 144 ms    QRS Duration 88 ms    Q-T Interval 390 ms    QTC Calculation (Bezet) 455 ms    Calculated P Axis 51 degrees    Calculated R Axis 12 degrees    Calculated T Axis 21 degrees    Diagnosis       Normal sinus rhythm  Nonspecific T wave abnormality  When compared with ECG of 27-JUL-2020 11:02,  No significant change was found     CBC WITH AUTOMATED DIFF    Collection Time: 08/10/20  3:17 PM   Result Value Ref Range    WBC 9.1 4.1 - 11.1 K/uL    RBC 5.16 4.10 - 5.70 M/uL    HGB 14.6 12.1 - 17.0 g/dL    HCT 46.6 36.6 - 50.3 %    MCV 90.3 80.0 - 99.0 FL    MCH 28.3 26.0 - 34.0 PG    MCHC 31.3 30.0 - 36.5 g/dL    RDW 12.9 11.5 - 14.5 %    PLATELET 713 483 - 924 K/uL    MPV 10.7 8.9 - 12.9 FL    NRBC 0.0 0  WBC    ABSOLUTE NRBC 0.00 0.00 - 0.01 K/uL    NEUTROPHILS 74 32 - 75 %    LYMPHOCYTES 16 12 - 49 %    MONOCYTES 7 5 - 13 %    EOSINOPHILS 2 0 - 7 %    BASOPHILS 0 0 - 1 %    IMMATURE GRANULOCYTES 1 (H) 0.0 - 0.5 %    ABS. NEUTROPHILS 6.8 1.8 - 8.0 K/UL    ABS. LYMPHOCYTES 1.4 0.8 - 3.5 K/UL    ABS. MONOCYTES 0.7 0.0 - 1.0 K/UL    ABS. EOSINOPHILS 0.2 0.0 - 0.4 K/UL    ABS. BASOPHILS 0.0 0.0 - 0.1 K/UL    ABS. IMM.  GRANS. 0.1 (H) 0.00 - 0.04 K/UL    DF AUTOMATED     METABOLIC PANEL, COMPREHENSIVE    Collection Time: 08/10/20  3:17 PM   Result Value Ref Range    Sodium 138 136 - 145 mmol/L    Potassium 4.2 3.5 - 5.1 mmol/L    Chloride 103 97 - 108 mmol/L    CO2 32 21 - 32 mmol/L    Anion gap 3 (L) 5 - 15 mmol/L    Glucose 98 65 - 100 mg/dL    BUN 11 6 - 20 MG/DL    Creatinine 1.26 0.70 - 1.30 MG/DL    BUN/Creatinine ratio 9 (L) 12 - 20      GFR est AA >60 >60 ml/min/1.73m2    GFR est non-AA 59 (L) >60 ml/min/1.73m2    Calcium 9.2 8.5 - 10.1 MG/DL    Bilirubin, total 0.3 0.2 - 1.0 MG/DL    ALT (SGPT) 80 (H) 12 - 78 U/L    AST (SGOT) 47 (H) 15 - 37 U/L    Alk. phosphatase 110 45 - 117 U/L    Protein, total 7.9 6.4 - 8.2 g/dL    Albumin 4.1 3.5 - 5.0 g/dL    Globulin 3.8 2.0 - 4.0 g/dL    A-G Ratio 1.1 1.1 - 2.2     NT-PRO BNP    Collection Time: 08/10/20  3:17 PM   Result Value Ref Range    NT pro-BNP 20 <125 PG/ML   CK W/ REFLX CKMB    Collection Time: 08/10/20  3:17 PM   Result Value Ref Range     (H) 39 - 308 U/L   TROPONIN I    Collection Time: 08/10/20  3:17 PM   Result Value Ref Range    Troponin-I, Qt. <0.05 <0.05 ng/mL   CK-MB,QUANT. Collection Time: 08/10/20  3:17 PM   Result Value Ref Range    CK - MB 4.2 (H) <3.6 NG/ML    CK-MB Index 0.9 0.0 - 2.5         Radiologic Studies -   XR ANKLE RT MIN 3 V   Final Result   IMPRESSION: No acute bone abnormality. XR CHEST SNGL V   Final Result   IMPRESSION:   No acute cardiopulmonary abnormality. CT Results  (Last 48 hours)    None        CXR Results  (Last 48 hours)               08/10/20 1627  XR CHEST SNGL V Final result    Impression:  IMPRESSION:   No acute cardiopulmonary abnormality. Narrative:  EXAM:  XR CHEST SNGL V       INDICATION:  Shortness of breath, lower lobectomy swelling       COMPARISON: May 9, 2020       TECHNIQUE: AP portable upright chest view       FINDINGS: The lungs are clear. Heart size and mediastinal contours are normal.   No pleural effusion or pneumothorax. Osseous structures are within normal   limits. Medical Decision Making   I am the first provider for this patient. I reviewed the vital signs, available nursing notes, past medical history, past surgical history, family history and social history. Vital Signs-Reviewed the patient's vital signs.   Patient Vitals for the past 24 hrs:   Temp Pulse Resp BP SpO2   08/10/20 1712     94 %   08/10/20 1715 98.1 °F (36.7 °C) 95 18 (!) 145/99 96 %   08/10/20 1700    (!) 147/102 93 %   08/10/20 1645    (!) 176/126 93 %   08/10/20 1630    (!) 163/107 93 %   08/10/20 1615    (!) 161/114    08/10/20 1422 97.8 °F (36.6 °C) 93 22 (!) 149/92 96 %         Provider Notes (Medical Decision Making):   51-year-old presenting to ED with chief complaint of ankle pain. Several traumatic issues over the last couple weeks and pain at that site. X-rays are negative for fracture. Sounds like ankle sprain. Will refer to orthopedics for further management. He also notes mild shortness of breath that he describes as wheezing. States he gets this periodically and feels better with duo nebs. He was given a DuoNeb here and felt much better. Does not sound like COVID-19, patient looks great and has normal vitals. Other work-up is reassuring. Patient feeling much better and wants to go home in the sounds reasonable. We will plan for referral to primary care physician and orthopedics. Return precautions given. ED Course:     Initial assessment performed. The patients presenting problems have been discussed, and they are in agreement with the care plan formulated and outlined with them. I have encouraged them to ask questions as they arise throughout their visit. Disposition:  dc    PLAN:  1. Discharge Medication List as of 8/10/2020  5:28 PM        2.    Follow-up Information     Follow up With Specialties Details Why [address], [address] [place] [address], DO Orthopedic Surgery   [address] [place] [address] [address]  [address] [id number] Michael Pagan MD General Surgery   [address]  [address] [address] [phone]          Return to ED if worse     Diagnosis     Clinical Impression: ankle pain

## 2020-08-10 NOTE — ED NOTES
Discharge instructions provided. Pt verbalized understanding and has no further questions at this time. Wheelchair offered, pt refused. Pt leaving ED in company of family in stable condition.

## 2020-08-10 NOTE — ED NOTES
Pt arrives to the ED AAOX4 with a c/c of right ankle pain onset one week ago after twisting his foot into a hole in the ground. Pt additionally endorses BLE swelling for a few weeks. Pt states he is wheezing and is requesting a breathing treatment. Pt is noted in stable condition, now in ED room with side rail up, bed to lowest position and call light within reach. Will continue to monitor and wait for EDP evaluation.

## 2020-08-10 NOTE — ED TRIAGE NOTES
Twisted right ankle 1 week ago and pain not getting better. Also extra fluid on both legs with swelling. On fluid pill but swelling not going down. Also has been nebulizers x 3 today for shortness of breath.

## 2020-08-11 ENCOUNTER — PATIENT OUTREACH (OUTPATIENT)
Dept: CASE MANAGEMENT | Age: 55
End: 2020-08-11

## 2020-08-11 DIAGNOSIS — N52.8 OTHER MALE ERECTILE DYSFUNCTION: ICD-10-CM

## 2020-08-11 LAB
ATRIAL RATE: 82 BPM
CALCULATED P AXIS, ECG09: 51 DEGREES
CALCULATED R AXIS, ECG10: 12 DEGREES
CALCULATED T AXIS, ECG11: 21 DEGREES
DIAGNOSIS, 93000: NORMAL
P-R INTERVAL, ECG05: 144 MS
Q-T INTERVAL, ECG07: 390 MS
QRS DURATION, ECG06: 88 MS
QTC CALCULATION (BEZET), ECG08: 455 MS
VENTRICULAR RATE, ECG03: 82 BPM

## 2020-08-11 RX ORDER — TADALAFIL 20 MG/1
20 TABLET ORAL AS NEEDED
Qty: 50 TAB | Refills: 0 | Status: CANCELLED | OUTPATIENT
Start: 2020-08-11

## 2020-08-11 NOTE — PROGRESS NOTES
Patient contacted regarding recent discharge and COVID-19 risk. [facility] ED 8/10/20 dx-acute Rt ankle pain    Discussed COVID-19 related testing which was not done at this time. Test results were not done. Patient informed of results, if available? N/a    Spoke to pt briefly as he was taking his girlfriend to the doctor and he hung up the phone. Pt stated he is not feeling any better that when he went to the ED and they did not give him any pain meds. Pt stated he would call both doctors he was referred to and he has a PCP appt tomorrow. Care Transition Nurse/ Ambulatory Care Manager/ LPN Care Coordinator contacted the patient by telephone to perform post discharge assessment. Verified name and  with patient as identifiers. Patient has following risk factors of: asthma and severe obesity, HTN, tobacco abuse. CTN/ACM/LPN reviewed discharge instructions, medical action plan and red flags related to discharge diagnosis. Reviewed and educated them on any new and changed medications related to discharge diagnosis. Advised obtaining a 90-day supply of all daily and as-needed medications. Advance Care Planning:   Does patient have an Advance Directive: not on file     Education provided regarding infection prevention, and signs and symptoms of COVID-19 and when to seek medical attention with unable to complete this assessment as pt hung up the phone who verbalized understanding. Discussed exposure protocols and quarantine from [address] you at higher risk for severe illness  and given an opportunity for questions and concerns. The unable to complete this assessment as pt hung up the phone agrees to contact the [phone] or PCP office for questions related to their healthcare. CTN/ACM/LPN provided contact information for future reference. From CDC: Are you at higher risk for severe illness?  Wash your hands often.    Avoid close contact (6 feet, which is about two arm lengths) with people who are sick.  Put distance between yourself and other people if COVID-19 is spreading in your community.  Clean and disinfect frequently touched surfaces.  Avoid all cruise travel and non-essential air travel.  Call your healthcare professional if you have concerns about COVID-19 and your underlying condition or if you are sick. For more information on steps you can take to protect yourself, see CDC's How to Protect Yourself      Patient/family/caregiver given information for [employer] [facility] and agrees to enroll unable to complete this assessment as pt hung up the phone  Patient's preferred e-mail:  n/a  Patient's preferred phone number: n/a  Based on Loop alert triggers, patient will be contacted by nurse care manager for worsening symptoms. Plan for follow-up call in 7-14 days based on severity of symptoms and risk factors.

## 2020-08-11 NOTE — TELEPHONE ENCOUNTER
PCP: Irina Ling MD    Last appt: Visit date not found  Future Appointments   Date Time Provider Zenobia Benjamin   8/12/2020 11:40 AM José Luis Hinton MD [facility] BS AMB   8/18/2020  2:00 PM Jl Talley MD [facility] BS AMB       Requested Prescriptions     Pending Prescriptions Disp Refills    tadalafiL (Cialis) 20 mg tablet 50 Tab 0     Sig: Take 1 Tab by mouth as needed (erectile dysfunction). Every 3 days.        Pharmacy verified: Y    Prior labs and Blood pressures:  BP Readings from Last 3 Encounters:   08/10/20 (!) 145/99   07/27/20 (!) 165/93   07/21/20 (!) 173/108     Lab Results   Component Value Date/Time    Sodium 138 08/10/2020 03:17 PM    Potassium 4.2 08/10/2020 03:17 PM    Chloride 103 08/10/2020 03:17 PM    CO2 32 08/10/2020 03:17 PM    Anion gap 3 (L) 08/10/2020 03:17 PM    Glucose 98 08/10/2020 03:17 PM    BUN 11 08/10/2020 03:17 PM    Creatinine 1.26 08/10/2020 03:17 PM    BUN/Creatinine ratio 9 (L) 08/10/2020 03:17 PM    GFR est AA >60 08/10/2020 03:17 PM    GFR est non-AA 59 (L) 08/10/2020 03:17 PM    Calcium 9.2 08/10/2020 03:17 PM     Lab Results   Component Value Date/Time    Hemoglobin A1c 5.8 (H) 04/15/2019 10:40 AM    Hemoglobin A1c (POC) 5.4 11/12/2019 03:03 PM     Lab Results   Component Value Date/Time    Cholesterol, total 201 (H) 04/15/2019 10:40 AM    Cholesterol (POC) 154 11/12/2019 03:04 PM    HDL Cholesterol 55 04/15/2019 10:40 AM    HDL Cholesterol (POC) 82 11/12/2019 03:04 PM    LDL Cholesterol (POC) 49 11/12/2019 03:04 PM    LDL, calculated 124 (H) 04/15/2019 10:40 AM    VLDL, calculated 22 04/15/2019 10:40 AM    Triglyceride 108 04/15/2019 10:40 AM    Triglycerides (POC) 116 11/12/2019 03:04 PM     Lab Results   Component Value Date/Time    VITAMIN D, 25-HYDROXY 10.8 (L) 11/18/2019 09:15 AM       Lab Results   Component Value Date/Time    TSH 1.630 11/18/2019 09:15 AM

## 2020-08-12 ENCOUNTER — VIRTUAL VISIT (OUTPATIENT)
Dept: FAMILY MEDICINE CLINIC | Age: 55
End: 2020-08-12
Payer: MEDICARE

## 2020-08-12 DIAGNOSIS — E66.01 SEVERE OBESITY (HCC): ICD-10-CM

## 2020-08-12 DIAGNOSIS — R60.0 EDEMA OF BOTH LEGS: ICD-10-CM

## 2020-08-12 DIAGNOSIS — I10 ESSENTIAL HYPERTENSION: ICD-10-CM

## 2020-08-12 DIAGNOSIS — S93.401A SPRAIN OF RIGHT ANKLE, UNSPECIFIED LIGAMENT, INITIAL ENCOUNTER: ICD-10-CM

## 2020-08-12 DIAGNOSIS — K42.9 UMBILICAL HERNIA WITHOUT OBSTRUCTION AND WITHOUT GANGRENE: ICD-10-CM

## 2020-08-12 DIAGNOSIS — N52.8 OTHER MALE ERECTILE DYSFUNCTION: ICD-10-CM

## 2020-08-12 DIAGNOSIS — F41.9 ANXIETY: Primary | ICD-10-CM

## 2020-08-12 PROCEDURE — G8756 NO BP MEASURE DOC: HCPCS | Performed by: FAMILY MEDICINE

## 2020-08-12 PROCEDURE — G9717 DOC PT DX DEP/BP F/U NT REQ: HCPCS | Performed by: FAMILY MEDICINE

## 2020-08-12 PROCEDURE — 3017F COLORECTAL CA SCREEN DOC REV: CPT | Performed by: FAMILY MEDICINE

## 2020-08-12 PROCEDURE — 99214 OFFICE O/P EST MOD 30 MIN: CPT | Performed by: FAMILY MEDICINE

## 2020-08-12 PROCEDURE — G8427 DOCREV CUR MEDS BY ELIG CLIN: HCPCS | Performed by: FAMILY MEDICINE

## 2020-08-12 RX ORDER — PANTOPRAZOLE SODIUM 40 MG/1
TABLET, DELAYED RELEASE ORAL
COMMUNITY
Start: 2020-08-07 | End: 2020-10-12 | Stop reason: SDUPTHER

## 2020-08-12 RX ORDER — BUSPIRONE HYDROCHLORIDE 10 MG/1
10-20 TABLET ORAL 3 TIMES DAILY
Qty: 20 TAB | Refills: 0 | Status: SHIPPED | OUTPATIENT
Start: 2020-08-12 | End: 2021-05-03 | Stop reason: SDUPTHER

## 2020-08-12 RX ORDER — TADALAFIL 20 MG/1
20 TABLET ORAL AS NEEDED
Qty: 50 TAB | Refills: 0 | Status: SHIPPED | OUTPATIENT
Start: 2020-08-12 | End: 2020-08-24 | Stop reason: CLARIF

## 2020-08-12 NOTE — PROGRESS NOTES
Chief Complaint   Patient presents with    Medication Refill       Patient-Reported Vitals 7/13/2020   Patient-Reported Weight 316lb   Patient-Reported Height 6'3   Patient-Reported Pulse 88   Patient-Reported Systolic  685   Patient-Reported Diastolic 084       Pain Scale: /10  Pain Location:

## 2020-08-12 NOTE — PROGRESS NOTES
Diane Beach is a 54 y.o. male who was seen by synchronous (real-time) audio-video technology on 8/12/2020 for No chief complaint on file. Assessment & Plan:   Diagnoses and all orders for this visit:    1. Anxiety  -     busPIRone (BUSPAR) 10 mg tablet; Take 1-2 Tabs by mouth three (3) times daily. 2. Other male erectile dysfunction  -     tadalafiL (Cialis) 20 mg tablet; Take 1 Tab by mouth as needed (erectile dysfunction). Every 3 days. 3. Sprain of right ankle, unspecified ligament, initial encounter  -     AMB SUPPLY ORDER    4. Edema of both legs    5. Severe obesity ([address]    6. Essential hypertension    7. Umbilical hernia without obstruction and without gangrene      The complexity of medical decision making for this visit is moderate         I spent at least 13 minutes on this visit with this established patient. Subjective:     Trying to get refill of controlled med for anxiety. Went to ER for twisted ankle. Ref to ortho. Ref to surgeon for umb hernia. Advised unable to refill controlled med b/o pos drug test.  Legs still swelling. Rec card ref once taken care of other. Pt trying to get in to see psychiatrist about anxiety. Prior to Admission medications    Medication Sig Start Date End Date Taking? Authorizing Provider   furosemide (LASIX) 40 mg tablet Increase your morning dose to 40 mg for 5 days 7/27/20   MARIEL Vargas   cloNIDine HCL (CATAPRES) 0.2 mg tablet Take 1 Tab by mouth two (2) times a day. 7/13/20   Lidia Hinton MD   cyclobenzaprine (FLEXERIL) 10 mg tablet Take 1 Tab by mouth nightly. 7/13/20   Lidia Hinton MD   montelukast (Singulair) 10 mg tablet Take 1 Tab by mouth daily. 7/13/20   Lidia Hinton MD   lisinopriL (PRINIVIL, ZESTRIL) 20 mg tablet Take 1 Tab by mouth daily. 7/13/20   Lidia Hinton MD   diclofenac EC (VOLTAREN) 50 mg EC tablet Take 1 Tab by mouth two (2) times a day.  7/13/20   Lidia Hinton MD   tadalafiL (Cialis) 20 mg tablet Take 1 Tab by mouth as needed (erectile dysfunction). Every 3 days. 7/13/20   Myrna Hinton MD   fluticasone propionate (Flovent HFA) 220 mcg/actuation inhaler INHALE 1 OR 2 PUFFS 2 TIMES A DAY. 7/13/20   Myrna Hinton MD   fluticasone propionate (Flonase Allergy Relief) 50 mcg/actuation nasal spray 2 Sprays by Both Nostrils route daily. 7/13/20   Myrna Hinton MD   furosemide (LASIX) 20 mg tablet Take 1 Tab by mouth two (2) times a day. 7/13/20   Myrna Hinton MD   amLODIPine (NORVASC) 10 mg tablet Take 1 Tab by mouth daily. Indications: high blood pressure 6/24/20   Myrna Hinton MD   clonazePAM (KlonoPIN) 1 mg tablet TAKE ONE TABLET BY MOUTH 2-3 TIMES A DAY AS NEEDED. 6/3/20   Myrna Hinton MD   lidocaine (Lidoderm) 5 % Apply patch to the affected area for 12 hours a day and remove for 12 hours a day. 6/3/20   Myrna Hinton MD   citalopram (CELEXA) 10 mg tablet TAKE ONE TABLET BY MOUTH EVERY DAY 6/2/20   Myrna Hinton MD   methylPREDNISolone (Medrol, Kunal,) 4 mg tablet Please take by mouth as directed until completion 5/9/20   Cecy Miner MD   aspirin delayed-release 81 mg tablet Take 1 Tab by mouth daily. 4/21/20   Myrna Hinton MD   LINZESS 290 mcg cap capsule  2/15/20   Provider, Historical   polyethylene glycol (MIRALAX) 17 gram packet Take 1 Packet by mouth daily. 3/4/20   Myrna Hinton MD   dextromethorphan-guaiFENesin (ROBITUSSIN COUGH-CHEST DAVID DM) 5-100 mg/5 mL liqd Take 10 mL by mouth every four (4) hours as needed for Cough. 2/11/20   Myrna Hinton MD   Nebulizer Accessories kit Nebulizer cup w/ tubing; use every 4 hrs prn wheezing 1/20/20   Lorelei Quiroz MD   [address] 18 mcg inhalation capsule  1/15/20   Provider, Historical   albuterol (PROVENTIL VENTOLIN) 2.5 mg /3 mL (0.083 %) nebu 3 mL by Nebulization route every four (4) hours as needed for Wheezing. 12/9/19   Shoaib Bernard DO   cetirizine (ZYRTEC) 10 mg tablet Take 1 Tab by mouth daily. 11/25/19   MARIEL Sherman   methadone (DOLOPHINE) 10 mg tablet Take  by mouth. Provider, Historical   VENTOLIN HFA 90 mcg/actuation inhaler Take 2 Puffs by inhalation every four (4) hours as needed for Wheezing or Shortness of Breath. Indications: bronchospasm prevention 11/19/19   Trish Huffman MD     Patient Active Problem List   Diagnosis Code    Tobacco abuse Z72.0    Essential hypertension I10    GERD (gastroesophageal reflux disease) K21.9    Chronic left-sided low back pain without sciatica M54.5, G89.29    Acute respiratory failure with hypoxia (HCC) J96.01    Anxiety F41.9    Severe obesity ([address] E66.01    Depression F32.9    Insomnia G47.00    Alcohol abuse, daily use F10.10    Arthralgia M25.50    Edema of both legs R60.0    Acute pain of both shoulders M25.511, M25.512    Chronic constipation K59.09    Allergic rhinitis J30.9    Erectile dysfunction N52.9    Severe persistent asthma with acute exacerbation J45.51    Abnormal weight gain R63.5     Current Outpatient Medications   Medication Sig Dispense Refill    furosemide (LASIX) 40 mg tablet Increase your morning dose to 40 mg for 5 days 5 Tab 0    cloNIDine HCL (CATAPRES) 0.2 mg tablet Take 1 Tab by mouth two (2) times a day. 180 Tab 0    cyclobenzaprine (FLEXERIL) 10 mg tablet Take 1 Tab by mouth nightly. 30 Tab 0    montelukast (Singulair) 10 mg tablet Take 1 Tab by mouth daily. 90 Tab 0    lisinopriL (PRINIVIL, ZESTRIL) 20 mg tablet Take 1 Tab by mouth daily. 90 Tab 0    diclofenac EC (VOLTAREN) 50 mg EC tablet Take 1 Tab by mouth two (2) times a day. 180 Tab 0    tadalafiL (Cialis) 20 mg tablet Take 1 Tab by mouth as needed (erectile dysfunction). Every 3 days. 50 Tab 0    fluticasone propionate (Flovent HFA) 220 mcg/actuation inhaler INHALE 1 OR 2 PUFFS 2 TIMES A DAY. 3 Inhaler 0    fluticasone propionate (Flonase Allergy Relief) 50 mcg/actuation nasal spray 2 Sprays by Both Nostrils route daily.  1 Bottle 5  furosemide (LASIX) 20 mg tablet Take 1 Tab by mouth two (2) times a day. 180 Tab 0    amLODIPine (NORVASC) 10 mg tablet Take 1 Tab by mouth daily. Indications: high blood pressure 90 Tab 0    clonazePAM (KlonoPIN) 1 mg tablet TAKE ONE TABLET BY MOUTH 2-3 TIMES A DAY AS NEEDED. 90 Tab 2    lidocaine (Lidoderm) 5 % Apply patch to the affected area for 12 hours a day and remove for 12 hours a day. 60 Each 2    citalopram (CELEXA) 10 mg tablet TAKE ONE TABLET BY MOUTH EVERY DAY 30 Tab 2    methylPREDNISolone (Medrol, Kunal,) 4 mg tablet Please take by mouth as directed until completion 1 Dose Pack 0    aspirin delayed-release 81 mg tablet Take 1 Tab by mouth daily. 100 Tab 3    LINZESS 290 mcg cap capsule       polyethylene glycol (MIRALAX) 17 gram packet Take 1 Packet by mouth daily. 1 Each 5    dextromethorphan-guaiFENesin (ROBITUSSIN COUGH-CHEST DAVID DM) 5-100 mg/5 mL liqd Take 10 mL by mouth every four (4) hours as needed for Cough. 1 Bottle 0    Nebulizer Accessories kit Nebulizer cup w/ tubing; use every 4 hrs prn wheezing 1 Kit 5    SPIRIVA WITH HANDIHALER 18 mcg inhalation capsule       albuterol (PROVENTIL VENTOLIN) 2.5 mg /3 mL (0.083 %) nebu 3 mL by Nebulization route every four (4) hours as needed for Wheezing. 90 Nebule 0    cetirizine (ZYRTEC) 10 mg tablet Take 1 Tab by mouth daily. 20 Tab 0    methadone (DOLOPHINE) 10 mg tablet Take  by mouth.  VENTOLIN HFA 90 mcg/actuation inhaler Take 2 Puffs by inhalation every four (4) hours as needed for Wheezing or Shortness of Breath.  Indications: bronchospasm prevention 3 Inhaler 3     Allergies   Allergen Reactions    Vicodin [Hydrocodone-Acetaminophen] Other (comments)     Headache     Past Medical History:   Diagnosis Date    Arthritis     Asthma     Chronic low back pain     Hypertension      Past Surgical History:   Procedure Laterality Date    HX ORTHOPAEDIC       Family History   Problem Relation Age of Onset    Cancer Mother  Heart Disease Father      Social History     Tobacco Use    Smoking status: Current Some Day Smoker     Packs/day: 0.25     Years: 10.00     Pack years: 2.50    Smokeless tobacco: Never Used   Substance Use Topics    Alcohol use: Yes     Comment: occ       ROS    Objective:     Patient-Reported Vitals 7/13/2020   Patient-Reported Weight 316lb   Patient-Reported Height 6'3   Patient-Reported Pulse 88   Patient-Reported Systolic  897   Patient-Reported Diastolic 215        [INSTRUCTIONS:  \"[x]\" Indicates a positive item  \"[]\" Indicates a negative item  -- DELETE ALL ITEMS NOT EXAMINED]    Constitutional: [x] Appears well-developed and well-nourished [x] No apparent distress      [] Abnormal -     Mental status: [x] Alert and awake  [x] Oriented to person/place/time [x] Able to follow commands    [] Abnormal -     Eyes:   EOM    [x]  Normal    [] Abnormal -   Sclera  [x]  Normal    [] Abnormal -          Discharge [x]  None visible   [] Abnormal -     Psychiatric:       [x] Normal Affect [] Abnormal -        [x] No Hallucinations    Other pertinent observable physical exam findings:-        We discussed the expected course, resolution and complications of the diagnosis(es) in detail. Medication risks, benefits, costs, interactions, and alternatives were discussed as indicated. I advised him to contact the office if his condition worsens, changes or fails to improve as anticipated. He expressed understanding with the diagnosis(es) and plan. Lisa Ferrer, who was evaluated through a patient-initiated, synchronous (real-time) audio-video encounter, and/or his healthcare decision maker, is aware that it is a billable service, with coverage as determined by his insurance carrier. He provided verbal consent to proceed: Yes, and patient identification was verified.  It was conducted pursuant to the emergency declaration under the [address] [facility] waiver authority and the Coronavirus Preparedness and Response Supplemental Appropriations Act. A caregiver was present when appropriate. Ability to conduct physical exam was limited. I was at home. The patient was at home.       Navneet Cedeno MD

## 2020-08-13 DIAGNOSIS — E66.01 SEVERE OBESITY (HCC): ICD-10-CM

## 2020-08-13 DIAGNOSIS — R63.5 ABNORMAL WEIGHT GAIN: ICD-10-CM

## 2020-08-17 NOTE — TELEPHONE ENCOUNTER
The patient called to follow up on his medication. The patient would like his medication filled today.  Please call [phone]

## 2020-08-24 ENCOUNTER — TELEPHONE (OUTPATIENT)
Dept: FAMILY MEDICINE CLINIC | Age: 55
End: 2020-08-24

## 2020-08-24 DIAGNOSIS — N52.9 ERECTILE DYSFUNCTION, UNSPECIFIED ERECTILE DYSFUNCTION TYPE: Primary | ICD-10-CM

## 2020-08-24 RX ORDER — TADALAFIL 5 MG/1
5 TABLET ORAL
Qty: 30 TAB | Refills: 0 | Status: SHIPPED | OUTPATIENT
Start: 2020-08-24 | End: 2020-09-09 | Stop reason: ALTCHOICE

## 2020-08-24 NOTE — TELEPHONE ENCOUNTER
Pt med not covered for cialis 20 mg, pt would like rx to be written for 5 mg once a day  so it can be covered.

## 2020-08-25 ENCOUNTER — PATIENT OUTREACH (OUTPATIENT)
Dept: CASE MANAGEMENT | Age: 55
End: 2020-08-25

## 2020-08-25 NOTE — PROGRESS NOTES
Patient resolved from Transition of Care episode on 8/25/20-f/u-[facility] ED 8/10/20 dx-acute Rt ankle pain (leg swelling, ankle injury, back pain)  Noted COVID-19 related testing which was not done at this time. Test results were not done. Patient informed of results, if available? n/a     Patient/family has been provided the following resources and education related to COVID-19:                         Signs, symptoms and red flags related to COVID-19            CDC exposure and quarantine guidelines            [employer] exposure contact - [phone]            Contact for their local Department of Health                 Patient currently reports that the following symptoms have improved: no new or worsening leg swelling, ankle injury, back pain; pt stated he has an appt on 8/31/20 with another doctor. No further outreach scheduled with this CTN/ACM/LPN/HC/ MA. Episode of Care resolved. Patient has this CTN/ACM/LPN/HC/MA contact information if future needs arise.

## 2020-09-01 DIAGNOSIS — N52.9 ERECTILE DYSFUNCTION, UNSPECIFIED ERECTILE DYSFUNCTION TYPE: ICD-10-CM

## 2020-09-01 RX ORDER — TADALAFIL 5 MG/1
5 TABLET ORAL
Qty: 30 TAB | Refills: 0 | OUTPATIENT
Start: 2020-09-01

## 2020-09-01 NOTE — TELEPHONE ENCOUNTER
PCP: Vinita Garibay MD    Last appt: 8/12/2020  No future appointments. Requested Prescriptions     Pending Prescriptions Disp Refills    tadalafiL (CIALIS) 5 mg tablet 30 Tab 0     Sig: Take 1 Tab by mouth daily as needed for Erectile Dysfunction. Pharmacy [facility] pharmacy    Patient has 0 days' supply of medication available.     Prior labs and Blood pressures:  BP Readings from Last 3 Encounters:   08/10/20 (!) 145/99   07/27/20 (!) 165/93   07/21/20 (!) 173/108     Lab Results   Component Value Date/Time    Sodium 138 08/10/2020 03:17 PM    Potassium 4.2 08/10/2020 03:17 PM    Chloride 103 08/10/2020 03:17 PM    CO2 32 08/10/2020 03:17 PM    Anion gap 3 (L) 08/10/2020 03:17 PM    Glucose 98 08/10/2020 03:17 PM    BUN 11 08/10/2020 03:17 PM    Creatinine 1.26 08/10/2020 03:17 PM    BUN/Creatinine ratio 9 (L) 08/10/2020 03:17 PM    GFR est AA >60 08/10/2020 03:17 PM    GFR est non-AA 59 (L) 08/10/2020 03:17 PM    Calcium 9.2 08/10/2020 03:17 PM     Lab Results   Component Value Date/Time    Hemoglobin A1c 5.8 (H) 04/15/2019 10:40 AM    Hemoglobin A1c (POC) 5.4 11/12/2019 03:03 PM     Lab Results   Component Value Date/Time    Cholesterol, total 201 (H) 04/15/2019 10:40 AM    Cholesterol (POC) 154 11/12/2019 03:04 PM    HDL Cholesterol 55 04/15/2019 10:40 AM    HDL Cholesterol (POC) 82 11/12/2019 03:04 PM    LDL Cholesterol (POC) 49 11/12/2019 03:04 PM    LDL, calculated 124 (H) 04/15/2019 10:40 AM    VLDL, calculated 22 04/15/2019 10:40 AM    Triglyceride 108 04/15/2019 10:40 AM    Triglycerides (POC) 116 11/12/2019 03:04 PM     Lab Results   Component Value Date/Time    VITAMIN D, 25-HYDROXY 10.8 (L) 11/18/2019 09:15 AM       Lab Results   Component Value Date/Time    TSH 1.630 11/18/2019 09:15 AM

## 2020-09-01 NOTE — TELEPHONE ENCOUNTER
----- Message from [employer] sent at 9/1/2020  9:35 AM EDT -----  Regarding: Dr. Angel Luis Berrios: [phone]   [facility] (if not patient): n/a  Relationship of caller (if not patient): n/a  Best contact number(s): [phone]  Name of medication and dosage if known: Cialis, bp medication, Buspar, and clonapin, Mg unknown,   Is patient out of this medication (yes/no): yes  Pharmacy name: [id number] listed in chart? (yes/no): yes  Pharmacy phone number: n/a  Date of last visit: 8/12/20  Details to clarify the request: Pt is very upset and has been trying to get all of his prescriptions filled for two weeks. He requests a response today.

## 2020-09-02 ENCOUNTER — TELEPHONE (OUTPATIENT)
Dept: FAMILY MEDICINE CLINIC | Age: 55
End: 2020-09-02

## 2020-09-02 NOTE — TELEPHONE ENCOUNTER
Pt called in very frustrated about lack of communication with the office and not being able to have his Rx filled. Pt states that he would like his Cialis filled, advised that PCP put in order for Rx on 8/24/20 and that it needs a PA. Mandie, spoke with pharmacist and asked if he could refax over PA today 9/2/20 so that I can complete PA for pt and give him a call back if it is approved or denied. Informed pt that I will give him a call once completed.

## 2020-09-03 NOTE — TELEPHONE ENCOUNTER
The patient called to follow up on his medication Cialis. The patient is aware that the medication is waiting for a Prior Authorization. The patient will like to speak to someone today about his medication. The patient would like to know why he has been waiting two weeks for the medication.  Please call [phone]

## 2020-09-08 ENCOUNTER — TELEPHONE (OUTPATIENT)
Dept: FAMILY MEDICINE CLINIC | Age: 55
End: 2020-09-08

## 2020-09-08 NOTE — TELEPHONE ENCOUNTER
The patient called to follow up on his medication Cialis. The patient is aware that the medication is waiting for a Prior Authorization. The patient will like to speak to someone today about his medication. The patient would like to know why he has been waiting two weeks for the medication. Deshaun Rodriguez said she will call him back. Please call [phone]

## 2020-09-08 NOTE — TELEPHONE ENCOUNTER
Dr. Ayde Vazquez, I came across a letter that says pt should seek care elsewhere if he still requires his controlled substances. . Pt has called the office numerous times for Cialis as well as a few other medications. . I have asked pharmacist for prior authorization form for Cialis but was unsure if he needed to find care elsewhere or just for the controlled substances? Please advise. HRH

## 2020-09-09 ENCOUNTER — TELEPHONE (OUTPATIENT)
Dept: INTERNAL MEDICINE CLINIC | Age: 55
End: 2020-09-09

## 2020-09-09 DIAGNOSIS — F41.9 ANXIETY: Primary | ICD-10-CM

## 2020-09-09 DIAGNOSIS — N52.9 ERECTILE DYSFUNCTION, UNSPECIFIED ERECTILE DYSFUNCTION TYPE: ICD-10-CM

## 2020-09-09 RX ORDER — SILDENAFIL CITRATE 20 MG/1
20-100 TABLET ORAL
Qty: 20 TAB | Refills: 0 | Status: SHIPPED | OUTPATIENT
Start: 2020-09-09 | End: 2020-10-12 | Stop reason: SDUPTHER

## 2020-09-09 RX ORDER — HYDROXYZINE 25 MG/1
25 TABLET, FILM COATED ORAL
Qty: 30 TAB | Refills: 0 | Status: SHIPPED | OUTPATIENT
Start: 2020-09-09 | End: 2020-09-14

## 2020-09-09 NOTE — TELEPHONE ENCOUNTER
Patient called stating he is having withdrawals from his klonopin, which was stopped after he failed his drug test. He states the Buspar which he was given instead is not working, and his mind will not calm down enough to allow him to rest. Patient has not made an appt with another provider to manage his controlled substance, despite having been followed by the nurse care manager at Dr. Myron Pinon office.      Patient is requesting a change to his treatment plan to allow him get some relief while he is making arrangements to see another provider. Also, notified the patient that we have sent another PA to his insurance company for the Cialis 5mg, as we were made aware this morning by the pharmacist that this is not a covered med. Patient states you were going to give him something to take in its place. Please send the response to the nurse to get back to Mr. Ileana Briones, as he states he has not heard back from the messages he has been leaving.

## 2020-09-11 ENCOUNTER — TELEPHONE (OUTPATIENT)
Dept: FAMILY MEDICINE CLINIC | Age: 55
End: 2020-09-11

## 2020-09-11 NOTE — TELEPHONE ENCOUNTER
Member services called to follow up on the patient medication Cialisis. They said the patient was on the other line and will like to know the status of his medication. The provider services assistant Dilma ROMERO Will like a phone call back as soon as possible.  Please call Norrine Habermann [phone]

## 2020-09-13 DIAGNOSIS — I10 ESSENTIAL HYPERTENSION: Chronic | ICD-10-CM

## 2020-09-14 ENCOUNTER — TELEPHONE (OUTPATIENT)
Dept: FAMILY MEDICINE CLINIC | Age: 55
End: 2020-09-14

## 2020-09-14 ENCOUNTER — VIRTUAL VISIT (OUTPATIENT)
Dept: FAMILY MEDICINE CLINIC | Age: 55
End: 2020-09-14
Payer: MEDICARE

## 2020-09-14 DIAGNOSIS — I10 ESSENTIAL HYPERTENSION: Chronic | ICD-10-CM

## 2020-09-14 PROCEDURE — G9717 DOC PT DX DEP/BP F/U NT REQ: HCPCS | Performed by: INTERNAL MEDICINE

## 2020-09-14 PROCEDURE — 3017F COLORECTAL CA SCREEN DOC REV: CPT | Performed by: INTERNAL MEDICINE

## 2020-09-14 PROCEDURE — 99213 OFFICE O/P EST LOW 20 MIN: CPT | Performed by: INTERNAL MEDICINE

## 2020-09-14 PROCEDURE — G8417 CALC BMI ABV UP PARAM F/U: HCPCS | Performed by: INTERNAL MEDICINE

## 2020-09-14 PROCEDURE — G8756 NO BP MEASURE DOC: HCPCS | Performed by: INTERNAL MEDICINE

## 2020-09-14 PROCEDURE — G8427 DOCREV CUR MEDS BY ELIG CLIN: HCPCS | Performed by: INTERNAL MEDICINE

## 2020-09-14 RX ORDER — LISINOPRIL 20 MG/1
20 TABLET ORAL DAILY
Qty: 90 TAB | Refills: 0 | Status: SHIPPED | OUTPATIENT
Start: 2020-09-14 | End: 2020-10-12 | Stop reason: SDUPTHER

## 2020-09-14 RX ORDER — CLONIDINE HYDROCHLORIDE 0.2 MG/1
TABLET ORAL
Qty: 180 TAB | Refills: 0 | Status: SHIPPED | OUTPATIENT
Start: 2020-09-14 | End: 2021-02-25 | Stop reason: SDUPTHER

## 2020-09-14 RX ORDER — AMLODIPINE BESYLATE 10 MG/1
10 TABLET ORAL DAILY
Qty: 90 TAB | Refills: 0 | Status: SHIPPED | OUTPATIENT
Start: 2020-09-14 | End: 2020-11-18 | Stop reason: SDUPTHER

## 2020-09-14 RX ORDER — CLONIDINE HYDROCHLORIDE 0.2 MG/1
0.2 TABLET ORAL 2 TIMES DAILY
Qty: 180 TAB | Refills: 0 | Status: CANCELLED | OUTPATIENT
Start: 2020-09-14

## 2020-09-14 RX ORDER — CLONIDINE HYDROCHLORIDE 0.2 MG/1
0.2 TABLET ORAL 2 TIMES DAILY
Qty: 180 TAB | Refills: 0 | Status: SHIPPED | OUTPATIENT
Start: 2020-09-14 | End: 2020-09-22 | Stop reason: SDUPTHER

## 2020-09-14 RX ORDER — CLONAZEPAM 1 MG/1
1 TABLET ORAL 2 TIMES DAILY
Qty: 60 TAB | Status: CANCELLED | OUTPATIENT
Start: 2020-09-14

## 2020-09-14 NOTE — PROGRESS NOTES
Chief Complaint   Patient presents with    New Patient     HPI:  Maycol Huff is a 54 y.o. male who was seen by synchronous (real-time) audio-video technology on 9/14/2020 for New Patient    Assessment & Plan:   Diagnoses and all orders for this visit:    1. Essential hypertension  -     cloNIDine HCL (CATAPRES) 0.2 mg tablet; Take 1 Tab by mouth two (2) times a day. -     amLODIPine (NORVASC) 10 mg tablet; Take 1 Tab by mouth daily. Indications: high blood pressure  -     lisinopriL (PRINIVIL, ZESTRIL) 20 mg tablet; Take 1 Tab by mouth daily. I spent at least 20 minutes on this visit with this established patient. 712  Subjective:   Maycol Huff is a 54 y.o. AA male who is seen as a new pt. He is looking refill on clonopin 2 mg 3 times day. Advised I will not be able to refill medication. So he asked for refill on his blood pressure medications. Prior to Admission medications    Medication Sig Start Date End Date Taking? Authorizing Provider   sildenafiL, pulmonary hypertension, (REVATIO) 20 mg tablet Take 1-5 Tabs by mouth daily as needed (ED). 9/9/20  Yes Tanner Hinton MD   pantoprazole (PROTONIX) 40 mg tablet  8/7/20  Yes Provider, Historical   busPIRone (BUSPAR) 10 mg tablet Take 1-2 Tabs by mouth three (3) times daily. 8/12/20  Yes Tanner Hinton MD   furosemide (LASIX) 40 mg tablet Increase your morning dose to 40 mg for 5 days 7/27/20  Yes MARIEL Ledesma   cyclobenzaprine (FLEXERIL) 10 mg tablet Take 1 Tab by mouth nightly. 7/13/20  Yes Tanner Hinton MD   montelukast (Singulair) 10 mg tablet Take 1 Tab by mouth daily. 7/13/20  Yes Tanner Hinton MD   lisinopriL (PRINIVIL, ZESTRIL) 20 mg tablet Take 1 Tab by mouth daily. 7/13/20  Yes Tanner Hinton MD   diclofenac EC (VOLTAREN) 50 mg EC tablet Take 1 Tab by mouth two (2) times a day.  7/13/20  Yes Read, Powderly Rodolfo, MD   fluticasone propionate (Flovent HFA) 220 mcg/actuation inhaler INHALE 1 OR 2 PUFFS 2 TIMES A DAY. 7/13/20 Yes Areli Hinton MD   fluticasone propionate (Flonase Allergy Relief) 50 mcg/actuation nasal spray 2 Sprays by Both Nostrils route daily. 7/13/20  Yes Areli Hinton MD   cloNIDine HCL (CATAPRES) 0.2 mg tablet Take 1 Tab by mouth two (2) times a day. 7/13/20 9/14/20 Yes Areli Hinton MD   amLODIPine (NORVASC) 10 mg tablet Take 1 Tab by mouth daily. Indications: high blood pressure 6/24/20  Yes Areli Hinton MD   aspirin delayed-release 81 mg tablet Take 1 Tab by mouth daily. 4/21/20  Yes Areli Hinton MD   LINZESS 290 mcg cap capsule  2/15/20  Yes Provider, Tay   polyethylene glycol (MIRALAX) 17 gram packet Take 1 Packet by mouth daily. 3/4/20  Yes Areli Hinton MD   Nebulizer Accessories kit Nebulizer cup w/ tubing; use every 4 hrs prn wheezing 1/20/20  Yes Leonardo Rueda MD   albuterol (PROVENTIL VENTOLIN) 2.5 mg /3 mL (0.083 %) nebu 3 mL by Nebulization route every four (4) hours as needed for Wheezing. 12/9/19  Yes Isaac Cortes DO   cetirizine (ZYRTEC) 10 mg tablet Take 1 Tab by mouth daily. 11/25/19  Yes MARIEL Castellanos   VENTOLIN HFA 90 mcg/actuation inhaler Take 2 Puffs by inhalation every four (4) hours as needed for Wheezing or Shortness of Breath. Indications: bronchospasm prevention 11/19/19  Yes Leonardo Rueda MD   cloNIDine HCL (CATAPRES) 0.2 mg tablet TAKE ONE TABLET BY MOUTH 2 TIMES A DAY 9/14/20   Areli Hinton MD   hydrOXYzine HCL (ATARAX) 25 mg tablet Take 1 Tab by mouth three (3) times daily as needed for Anxiety or Agitation for up to 10 days. 9/9/20 9/14/20  Areli Hinton MD   furosemide (LASIX) 20 mg tablet Take 1 Tab by mouth two (2) times a day. 7/13/20 9/14/20  Areli Hinton MD   lidocaine (Lidoderm) 5 % Apply patch to the affected area for 12 hours a day and remove for 12 hours a day.  6/3/20 9/14/20  Areli Hinton MD   methylPREDNISolone (Medrol, Kunal,) 4 mg tablet Please take by mouth as directed until completion 5/9/20 9/14/20  Maycol Loyd, MD   dextromethorphan-guaiFENesin (ROBITUSSIN COUGH-CHEST DAVID DM) 5-100 mg/5 mL liqd Take 10 mL by mouth every four (4) hours as needed for Cough. 2/11/20 9/14/20  Cata Hniton MD SPIRIVA WITH HANDIHALER 18 mcg inhalation capsule  1/15/20 9/14/20  Provider, Historical   methadone (DOLOPHINE) 10 mg tablet Take  by mouth. 9/14/20  Provider, Historical     Patient Active Problem List   Diagnosis Code    Tobacco abuse Z72.0    Essential hypertension I10    GERD (gastroesophageal reflux disease) K21.9    Chronic left-sided low back pain without sciatica M54.5, G89.29    Acute respiratory failure with hypoxia ([facility]) J96.01    Anxiety F41.9    Severe obesity ([facility]) E66.01    Depression F32.9    Insomnia G47.00    Alcohol abuse, daily use F10.10    Arthralgia M25.50    Edema of both legs R60.0    Acute pain of both shoulders M25.511, M25.512    Chronic constipation K59.09    Allergic rhinitis J30.9    Erectile dysfunction N52.9    Severe persistent asthma with acute exacerbation J45.51    Abnormal weight gain R63.5    Sprain of right ankle W65.064E    Umbilical hernia without obstruction and without gangrene K42.9     Current Outpatient Medications   Medication Sig Dispense Refill    sildenafiL, pulmonary hypertension, (REVATIO) 20 mg tablet Take 1-5 Tabs by mouth daily as needed (ED). 20 Tab 0    pantoprazole (PROTONIX) 40 mg tablet       busPIRone (BUSPAR) 10 mg tablet Take 1-2 Tabs by mouth three (3) times daily. 20 Tab 0    furosemide (LASIX) 40 mg tablet Increase your morning dose to 40 mg for 5 days 5 Tab 0    cyclobenzaprine (FLEXERIL) 10 mg tablet Take 1 Tab by mouth nightly. 30 Tab 0    montelukast (Singulair) 10 mg tablet Take 1 Tab by mouth daily. 90 Tab 0    lisinopriL (PRINIVIL, ZESTRIL) 20 mg tablet Take 1 Tab by mouth daily. 90 Tab 0    diclofenac EC (VOLTAREN) 50 mg EC tablet Take 1 Tab by mouth two (2) times a day.  180 Tab 0    fluticasone propionate (Flovent HFA) 220 mcg/actuation inhaler INHALE 1 OR 2 PUFFS 2 TIMES A DAY. 3 Inhaler 0    fluticasone propionate (Flonase Allergy Relief) 50 mcg/actuation nasal spray 2 Sprays by Both Nostrils route daily. 1 Bottle 5    amLODIPine (NORVASC) 10 mg tablet Take 1 Tab by mouth daily. Indications: high blood pressure 90 Tab 0    aspirin delayed-release 81 mg tablet Take 1 Tab by mouth daily. 100 Tab 3    LINZESS 290 mcg cap capsule       polyethylene glycol (MIRALAX) 17 gram packet Take 1 Packet by mouth daily. 1 Each 5    Nebulizer Accessories kit Nebulizer cup w/ tubing; use every 4 hrs prn wheezing 1 Kit 5    albuterol (PROVENTIL VENTOLIN) 2.5 mg /3 mL (0.083 %) nebu 3 mL by Nebulization route every four (4) hours as needed for Wheezing. 90 Nebule 0    cetirizine (ZYRTEC) 10 mg tablet Take 1 Tab by mouth daily. 20 Tab 0    VENTOLIN HFA 90 mcg/actuation inhaler Take 2 Puffs by inhalation every four (4) hours as needed for Wheezing or Shortness of Breath.  Indications: bronchospasm prevention 3 Inhaler 3    cloNIDine HCL (CATAPRES) 0.2 mg tablet TAKE ONE TABLET BY MOUTH 2 TIMES A  Tab 0     Allergies   Allergen Reactions    Vicodin [Hydrocodone-Acetaminophen] Other (comments)     Headache     Past Medical History:   Diagnosis Date    Arthritis     Asthma     Chronic low back pain     Hypertension      Past Surgical History:   Procedure Laterality Date    HX ORTHOPAEDIC       Family History   Problem Relation Age of Onset    Cancer Mother     Heart Disease Father      Social History     Tobacco Use    Smoking status: Former Smoker     Packs/day: 0.25     Years: 10.00     Pack years: 2.50    Smokeless tobacco: Never Used   Substance Use Topics    Alcohol use: Yes     Comment: occ       ROS    Objective:     Patient-Reported Vitals 7/13/2020   Patient-Reported Weight 316lb   Patient-Reported Height 6'3   Patient-Reported Pulse 88   Patient-Reported Systolic  022   Patient-Reported Diastolic 149      General: alert, cooperative, no distress   Mental  status: normal mood, behavior, speech, dress, motor activity, and thought processes, able to follow commands   HENT: NCAT   Neck: no visualized mass   Resp: no respiratory distress   Neuro: no gross deficits   Skin: no discoloration or lesions of concern on visible areas     Additional exam findings: We discussed the expected course, resolution and complications of the diagnosis(es) in detail. Medication risks, benefits, costs, interactions, and alternatives were discussed as indicated. I advised him to contact the office if his condition worsens, changes or fails to improve as anticipated. He expressed understanding with the diagnosis(es) and plan. Sedrick Lundberg, who was evaluated through a patient-initiated, synchronous (real-time) audio-video encounter, and/or his healthcare decision maker, is aware that it is a billable service, with coverage as determined by his insurance carrier. He provided verbal consent to proceed: Yes, and patient identification was verified. It was conducted pursuant to the emergency declaration under the [address] [place] [address] authority and the [employer] Act. A caregiver was present when appropriate. Ability to conduct physical exam was limited. I was in the office. The patient was at home.       Pamela Toribio MD

## 2020-09-18 ENCOUNTER — TELEPHONE (OUTPATIENT)
Dept: INTERNAL MEDICINE CLINIC | Age: 55
End: 2020-09-18

## 2020-09-18 NOTE — TELEPHONE ENCOUNTER
Patient return call     Caller's first and last name and relationship (if not the patient):       Best contact number(s): [phone]       Whose call is being returned:Oren Shields LPN       Details to clarify the request:       Nehal Krueger

## 2020-09-22 ENCOUNTER — HOSPITAL ENCOUNTER (EMERGENCY)
Age: 55
Discharge: HOME | End: 2020-09-22
Attending: EMERGENCY MEDICINE
Payer: MEDICARE

## 2020-09-22 ENCOUNTER — APPOINTMENT (OUTPATIENT)
Dept: GENERAL RADIOLOGY | Age: 55
End: 2020-09-22
Attending: EMERGENCY MEDICINE
Payer: MEDICARE

## 2020-09-22 ENCOUNTER — APPOINTMENT (OUTPATIENT)
Dept: CT IMAGING | Age: 55
End: 2020-09-22
Attending: EMERGENCY MEDICINE
Payer: MEDICARE

## 2020-09-22 ENCOUNTER — APPOINTMENT (OUTPATIENT)
Dept: ULTRASOUND IMAGING | Age: 55
End: 2020-09-22
Attending: EMERGENCY MEDICINE
Payer: MEDICARE

## 2020-09-22 VITALS
SYSTOLIC BLOOD PRESSURE: 147 MMHG | RESPIRATION RATE: 11 BRPM | TEMPERATURE: 98.9 F | WEIGHT: 315 LBS | BODY MASS INDEX: 39.17 KG/M2 | HEART RATE: 80 BPM | OXYGEN SATURATION: 97 % | HEIGHT: 75 IN | DIASTOLIC BLOOD PRESSURE: 92 MMHG

## 2020-09-22 DIAGNOSIS — R07.89 OTHER CHEST PAIN: Primary | ICD-10-CM

## 2020-09-22 DIAGNOSIS — M79.89 LEG SWELLING: ICD-10-CM

## 2020-09-22 DIAGNOSIS — I10 ESSENTIAL HYPERTENSION: Chronic | ICD-10-CM

## 2020-09-22 LAB
ALBUMIN SERPL-MCNC: 3.9 G/DL (ref 3.5–5)
ALBUMIN/GLOB SERPL: 1.1 {RATIO} (ref 1.1–2.2)
ALP SERPL-CCNC: 127 U/L (ref 45–117)
ALT SERPL-CCNC: 92 U/L (ref 12–78)
ANION GAP SERPL CALC-SCNC: 4 MMOL/L (ref 5–15)
AST SERPL-CCNC: 70 U/L (ref 15–37)
ATRIAL RATE: 92 BPM
BASOPHILS # BLD: 0 K/UL (ref 0–0.1)
BASOPHILS NFR BLD: 0 % (ref 0–1)
BILIRUB SERPL-MCNC: 0.4 MG/DL (ref 0.2–1)
BNP SERPL-MCNC: 22 PG/ML
BUN SERPL-MCNC: 18 MG/DL (ref 6–20)
BUN/CREAT SERPL: 17 (ref 12–20)
CALCIUM SERPL-MCNC: 9.1 MG/DL (ref 8.5–10.1)
CALCULATED P AXIS, ECG09: 56 DEGREES
CALCULATED R AXIS, ECG10: 19 DEGREES
CALCULATED T AXIS, ECG11: 27 DEGREES
CHLORIDE SERPL-SCNC: 105 MMOL/L (ref 97–108)
CK MB CFR SERPL CALC: 0.9 % (ref 0–2.5)
CK MB SERPL-MCNC: 6 NG/ML (ref 5–25)
CK SERPL-CCNC: 689 U/L (ref 39–308)
CO2 SERPL-SCNC: 29 MMOL/L (ref 21–32)
CREAT SERPL-MCNC: 1.04 MG/DL (ref 0.7–1.3)
DIAGNOSIS, 93000: NORMAL
DIFFERENTIAL METHOD BLD: NORMAL
EOSINOPHIL # BLD: 0.2 K/UL (ref 0–0.4)
EOSINOPHIL NFR BLD: 3 % (ref 0–7)
ERYTHROCYTE [DISTWIDTH] IN BLOOD BY AUTOMATED COUNT: 13.3 % (ref 11.5–14.5)
GLOBULIN SER CALC-MCNC: 3.7 G/DL (ref 2–4)
GLUCOSE SERPL-MCNC: 100 MG/DL (ref 65–100)
HCT VFR BLD AUTO: 43.7 % (ref 36.6–50.3)
HGB BLD-MCNC: 14.3 G/DL (ref 12.1–17)
IMM GRANULOCYTES # BLD AUTO: 0 K/UL (ref 0–0.04)
IMM GRANULOCYTES NFR BLD AUTO: 0 % (ref 0–0.5)
LYMPHOCYTES # BLD: 1.4 K/UL (ref 0.8–3.5)
LYMPHOCYTES NFR BLD: 21 % (ref 12–49)
MCH RBC QN AUTO: 28.7 PG (ref 26–34)
MCHC RBC AUTO-ENTMCNC: 32.7 G/DL (ref 30–36.5)
MCV RBC AUTO: 87.8 FL (ref 80–99)
MONOCYTES # BLD: 0.7 K/UL (ref 0–1)
MONOCYTES NFR BLD: 10 % (ref 5–13)
NEUTS SEG # BLD: 4.5 K/UL (ref 1.8–8)
NEUTS SEG NFR BLD: 66 % (ref 32–75)
NRBC # BLD: 0 K/UL (ref 0–0.01)
NRBC BLD-RTO: 0 PER 100 WBC
P-R INTERVAL, ECG05: 142 MS
PLATELET # BLD AUTO: 210 K/UL (ref 150–400)
PMV BLD AUTO: 10.6 FL (ref 8.9–12.9)
POTASSIUM SERPL-SCNC: 4.3 MMOL/L (ref 3.5–5.1)
PROT SERPL-MCNC: 7.6 G/DL (ref 6.4–8.2)
Q-T INTERVAL, ECG07: 378 MS
QRS DURATION, ECG06: 86 MS
QTC CALCULATION (BEZET), ECG08: 467 MS
RBC # BLD AUTO: 4.98 M/UL (ref 4.1–5.7)
SODIUM SERPL-SCNC: 138 MMOL/L (ref 136–145)
TROPONIN I SERPL-MCNC: <0.05 NG/ML
VENTRICULAR RATE, ECG03: 92 BPM
WBC # BLD AUTO: 6.8 K/UL (ref 4.1–11.1)

## 2020-09-22 PROCEDURE — 71045 X-RAY EXAM CHEST 1 VIEW: CPT

## 2020-09-22 PROCEDURE — 99284 EMERGENCY DEPT VISIT MOD MDM: CPT

## 2020-09-22 PROCEDURE — 74011000636 HC RX REV CODE- 636: Performed by: EMERGENCY MEDICINE

## 2020-09-22 PROCEDURE — 36415 COLL VENOUS BLD VENIPUNCTURE: CPT

## 2020-09-22 PROCEDURE — 82550 ASSAY OF CK (CPK): CPT

## 2020-09-22 PROCEDURE — 93005 ELECTROCARDIOGRAM TRACING: CPT

## 2020-09-22 PROCEDURE — 85025 COMPLETE CBC W/AUTO DIFF WBC: CPT

## 2020-09-22 PROCEDURE — 80053 COMPREHEN METABOLIC PANEL: CPT

## 2020-09-22 PROCEDURE — 71275 CT ANGIOGRAPHY CHEST: CPT

## 2020-09-22 PROCEDURE — 83880 ASSAY OF NATRIURETIC PEPTIDE: CPT

## 2020-09-22 PROCEDURE — 82553 CREATINE MB FRACTION: CPT

## 2020-09-22 PROCEDURE — 93971 EXTREMITY STUDY: CPT

## 2020-09-22 PROCEDURE — 84484 ASSAY OF TROPONIN QUANT: CPT

## 2020-09-22 RX ORDER — CLONIDINE HYDROCHLORIDE 0.2 MG/1
0.1 TABLET ORAL 2 TIMES DAILY
Qty: 180 TAB | Refills: 0 | Status: SHIPPED | OUTPATIENT
Start: 2020-09-22 | End: 2021-02-26 | Stop reason: SDUPTHER

## 2020-09-22 RX ORDER — HYDROXYZINE 50 MG/1
50 TABLET, FILM COATED ORAL
Qty: 10 TAB | Refills: 0 | Status: SHIPPED | OUTPATIENT
Start: 2020-09-22 | End: 2020-10-02

## 2020-09-22 RX ORDER — CLONIDINE HYDROCHLORIDE 0.1 MG/1
0.1 TABLET ORAL 2 TIMES DAILY
Qty: 28 TAB | Refills: 0 | Status: SHIPPED | OUTPATIENT
Start: 2020-09-22 | End: 2020-10-12 | Stop reason: SDUPTHER

## 2020-09-22 RX ORDER — SODIUM CHLORIDE 0.9 % (FLUSH) 0.9 %
10 SYRINGE (ML) INJECTION
Status: COMPLETED | OUTPATIENT
Start: 2020-09-22 | End: 2020-09-22

## 2020-09-22 RX ADMIN — Medication 10 ML: at 12:27

## 2020-09-22 RX ADMIN — IOPAMIDOL 57 ML: 755 INJECTION, SOLUTION INTRAVENOUS at 12:27

## 2020-09-22 NOTE — ED NOTES
Dr. Shin Child and Mary Carmen Bridges RN reviewed discharge instructions with the patient. The patient verbalized understanding. All questions and concerns were addressed. The patient declined a wheelchair and is discharged ambulatory in the care of family members with instructions and prescriptions in hand. Pt is alert and oriented x 4. Respirations are clear and unlabored.

## 2020-09-22 NOTE — DISCHARGE INSTRUCTIONS
You were seen in the ER for your symptoms. Please take the medication atarax at night for anxiety and resume your clonidine. Please follow-up with one of the primary care doctors below and the cardiologist. Please return for worsening symptoms at any time. Please keep your leg elevated and consider using compression stockings.

## 2020-09-22 NOTE — ED PROVIDER NOTES
EMERGENCY DEPARTMENT HISTORY AND PHYSICAL EXAM      Date: 9/22/2020  Patient Name: Octavio Valenzuela    History of Presenting Illness     Chief Complaint   Patient presents with    Chest Pain     transient left-sided chest pain x 1 week    Leg Swelling     LLE edema. pt had BLLE last week and took extra fluid pills. LLE edema did not resolve       History Provided By: Patient    HPI: Octavio Valenzuela, 54 y.o. male presents to the ED with cc of chest pain x weeks. No history of CHF but does have a history of HTN. Patient rerpeorts pain is intermittent, under left breast, lasts seconds and resolves. Not really a pain, feels like \"muscle spasm. \" No alleviating or aggravating factors. Reports some BRICENO. Patient does report his clonazepam was discontinued by his PCP. He was also previously on clonidine for hypertension. Patient was seen in ED prior for bilateral leg swelling, reports lasix aiding in R leg, but still with significant edema and left. He reports he completed the 5 days of extra Lasix. Denies history of DVT or PE. Denies leg pain. Patient denies fevers or chills. Does have history of asthma, has gained weight with steroids in the past.  No other symptoms. There are no other complaints, changes, or physical findings at this time. PCP: Norman Hinton MD    No current facility-administered medications on file prior to encounter. Current Outpatient Medications on File Prior to Encounter   Medication Sig Dispense Refill    cloNIDine HCL (CATAPRES) 0.2 mg tablet TAKE ONE TABLET BY MOUTH 2 TIMES A  Tab 0    amLODIPine (NORVASC) 10 mg tablet Take 1 Tab by mouth daily. Indications: high blood pressure 90 Tab 0    lisinopriL (PRINIVIL, ZESTRIL) 20 mg tablet Take 1 Tab by mouth daily. 90 Tab 0    [DISCONTINUED] cloNIDine HCL (CATAPRES) 0.2 mg tablet Take 1 Tab by mouth two (2) times a day.  180 Tab 0    sildenafiL, pulmonary hypertension, (REVATIO) 20 mg tablet Take 1-5 Tabs by mouth daily as needed (ED). 20 Tab 0    pantoprazole (PROTONIX) 40 mg tablet       busPIRone (BUSPAR) 10 mg tablet Take 1-2 Tabs by mouth three (3) times daily. 20 Tab 0    furosemide (LASIX) 40 mg tablet Increase your morning dose to 40 mg for 5 days 5 Tab 0    cyclobenzaprine (FLEXERIL) 10 mg tablet Take 1 Tab by mouth nightly. 30 Tab 0    montelukast (Singulair) 10 mg tablet Take 1 Tab by mouth daily. 90 Tab 0    diclofenac EC (VOLTAREN) 50 mg EC tablet Take 1 Tab by mouth two (2) times a day. 180 Tab 0    fluticasone propionate (Flovent HFA) 220 mcg/actuation inhaler INHALE 1 OR 2 PUFFS 2 TIMES A DAY. 3 Inhaler 0    fluticasone propionate (Flonase Allergy Relief) 50 mcg/actuation nasal spray 2 Sprays by Both Nostrils route daily. 1 Bottle 5    aspirin delayed-release 81 mg tablet Take 1 Tab by mouth daily. 100 Tab 3    LINZESS 290 mcg cap capsule       polyethylene glycol (MIRALAX) 17 gram packet Take 1 Packet by mouth daily. 1 Each 5    Nebulizer Accessories kit Nebulizer cup w/ tubing; use every 4 hrs prn wheezing 1 Kit 5    albuterol (PROVENTIL VENTOLIN) 2.5 mg /3 mL (0.083 %) nebu 3 mL by Nebulization route every four (4) hours as needed for Wheezing. 90 Nebule 0    cetirizine (ZYRTEC) 10 mg tablet Take 1 Tab by mouth daily. 20 Tab 0    VENTOLIN HFA 90 mcg/actuation inhaler Take 2 Puffs by inhalation every four (4) hours as needed for Wheezing or Shortness of Breath.  Indications: bronchospasm prevention 3 Inhaler 3       Past History     Past Medical History:  Past Medical History:   Diagnosis Date    Arthritis     Asthma     Chronic low back pain     Hypertension        Past Surgical History:  Past Surgical History:   Procedure Laterality Date    HX ORTHOPAEDIC         Family History:  Family History   Problem Relation Age of Onset    Cancer Mother     Heart Disease Father        Social History:  Social History     Tobacco Use    Smoking status: Former Smoker     Packs/day: 0.25 Years: 10.00     Pack years: 2.50    Smokeless tobacco: Never Used   Substance Use Topics    Alcohol use: Yes     Comment: occ    Drug use: No       Allergies: Allergies   Allergen Reactions    Vicodin [Hydrocodone-Acetaminophen] Other (comments)     Headache         Review of Systems   Review of Systems   Constitutional: Negative for fever. HENT: Negative for voice change. Eyes: Negative for pain and redness. Respiratory: Positive for cough. Negative for chest tightness. Cardiovascular: Positive for chest pain and leg swelling. Gastrointestinal: Negative for diarrhea, nausea and vomiting. Genitourinary: Negative for hematuria. Musculoskeletal: Negative for gait problem. Skin: Negative for color change, pallor and rash. Neurological: Negative for facial asymmetry, weakness and headaches. Hematological: Does not bruise/bleed easily. Psychiatric/Behavioral: Negative for behavioral problems. The patient is nervous/anxious. All other systems reviewed and are negative. Physical Exam   Physical Exam  Vitals signs and nursing note reviewed. Constitutional:       Comments: 54-year-old male, resting bed, no distress. HENT:      Head: Normocephalic. Right Ear: External ear normal.      Left Ear: External ear normal.      Nose: Nose normal.   Eyes:      Conjunctiva/sclera: Conjunctivae normal.   Cardiovascular:      Rate and Rhythm: Normal rate and regular rhythm. Heart sounds: No murmur. No friction rub. No gallop. Pulmonary:      Effort: Pulmonary effort is normal.      Breath sounds: Normal breath sounds. No wheezing, rhonchi or rales. Comments: Not hypoxic on room air. Abdominal:      Palpations: Abdomen is soft. Tenderness: There is no abdominal tenderness. Musculoskeletal: Normal range of motion. Left lower leg: Edema present. Comments: Good pulses left lower extremity. Neurovascular intact. Skin:     General: Skin is warm.       Capillary Refill: Capillary refill takes less than 2 seconds. Neurological:      Mental Status: He is alert. Mental status is at baseline. Psychiatric:         Mood and Affect: Mood normal.         Behavior: Behavior normal.         Diagnostic Study Results     Labs -     Recent Results (from the past 12 hour(s))   EKG, 12 LEAD, INITIAL    Collection Time: 09/22/20 10:53 AM   Result Value Ref Range    Ventricular Rate 92 BPM    Atrial Rate 92 BPM    P-R Interval 142 ms    QRS Duration 86 ms    Q-T Interval 378 ms    QTC Calculation (Bezet) 467 ms    Calculated P Axis 56 degrees    Calculated R Axis 19 degrees    Calculated T Axis 27 degrees    Diagnosis       Normal sinus rhythm  Nonspecific T wave abnormality  Prolonged QT  When compared with ECG of 10-AUG-2020 14:25,  No significant change was found     CBC WITH AUTOMATED DIFF    Collection Time: 09/22/20 11:11 AM   Result Value Ref Range    WBC 6.8 4.1 - 11.1 K/uL    RBC 4.98 4.10 - 5.70 M/uL    HGB 14.3 12.1 - 17.0 g/dL    HCT 43.7 36.6 - 50.3 %    MCV 87.8 80.0 - 99.0 FL    MCH 28.7 26.0 - 34.0 PG    MCHC 32.7 30.0 - 36.5 g/dL    RDW 13.3 11.5 - 14.5 %    PLATELET 328 462 - 765 K/uL    MPV 10.6 8.9 - 12.9 FL    NRBC 0.0 0  WBC    ABSOLUTE NRBC 0.00 0.00 - 0.01 K/uL    NEUTROPHILS 66 32 - 75 %    LYMPHOCYTES 21 12 - 49 %    MONOCYTES 10 5 - 13 %    EOSINOPHILS 3 0 - 7 %    BASOPHILS 0 0 - 1 %    IMMATURE GRANULOCYTES 0 0.0 - 0.5 %    ABS. NEUTROPHILS 4.5 1.8 - 8.0 K/UL    ABS. LYMPHOCYTES 1.4 0.8 - 3.5 K/UL    ABS. MONOCYTES 0.7 0.0 - 1.0 K/UL    ABS. EOSINOPHILS 0.2 0.0 - 0.4 K/UL    ABS. BASOPHILS 0.0 0.0 - 0.1 K/UL    ABS. IMM.  GRANS. 0.0 0.00 - 0.04 K/UL    DF AUTOMATED     METABOLIC PANEL, COMPREHENSIVE    Collection Time: 09/22/20 11:11 AM   Result Value Ref Range    Sodium 138 136 - 145 mmol/L    Potassium 4.3 3.5 - 5.1 mmol/L    Chloride 105 97 - 108 mmol/L    CO2 29 21 - 32 mmol/L    Anion gap 4 (L) 5 - 15 mmol/L    Glucose 100 65 - 100 mg/dL    BUN 18 6 - 20 MG/DL    Creatinine 1.04 0.70 - 1.30 MG/DL    BUN/Creatinine ratio 17 12 - 20      GFR est AA >60 >60 ml/min/1.73m2    GFR est non-AA >60 >60 ml/min/1.73m2    Calcium 9.1 8.5 - 10.1 MG/DL    Bilirubin, total 0.4 0.2 - 1.0 MG/DL    ALT (SGPT) 92 (H) 12 - 78 U/L    AST (SGOT) 70 (H) 15 - 37 U/L    Alk. phosphatase 127 (H) 45 - 117 U/L    Protein, total 7.6 6.4 - 8.2 g/dL    Albumin 3.9 3.5 - 5.0 g/dL    Globulin 3.7 2.0 - 4.0 g/dL    A-G Ratio 1.1 1.1 - 2.2     CK W/ REFLX CKMB    Collection Time: 09/22/20 11:11 AM   Result Value Ref Range     (H) 39 - 308 U/L   TROPONIN I    Collection Time: 09/22/20 11:11 AM   Result Value Ref Range    Troponin-I, Qt. <0.05 <0.05 ng/mL   NT-PRO BNP    Collection Time: 09/22/20 11:11 AM   Result Value Ref Range    NT pro-BNP 22 <125 PG/ML   CK-MB,QUANT. Collection Time: 09/22/20 11:11 AM   Result Value Ref Range    CK - MB 6.0 (H) <3.6 NG/ML    CK-MB Index 0.9 0.0 - 2.5         Radiologic Studies -   CTA CHEST W OR W WO CONT   Final Result   IMPRESSION:    1. Overall poor opacification, patient declined reinjection. No saddle thrombus   or thrombus in the main pulmonary arteries. The right upper lobe pulmonary   artery is obscured by artifact, the remainder of the lobar arteries are patent. Given clinical chief complaint of left-sided chest pain, right upper lobe   embolism is considered unlikely. 2. Lungs are clear. XR CHEST PORT   Final Result   Impression: No acute process. CT Results  (Last 48 hours)               09/22/20 1226  CTA CHEST W OR W WO CONT Final result    Impression:  IMPRESSION:    1. Overall poor opacification, patient declined reinjection. No saddle thrombus   or thrombus in the main pulmonary arteries. The right upper lobe pulmonary   artery is obscured by artifact, the remainder of the lobar arteries are patent. Given clinical chief complaint of left-sided chest pain, right upper lobe   embolism is considered unlikely. 2. Lungs are clear. Narrative:  EXAM:  CTA CHEST W OR W WO CONT       INDICATION: Chest pain, leg swelling. Evaluate for PE.       COMPARISON: None. TECHNIQUE: Helical thin section chest CT following uneventful intravenous   administration of nonionic contrast 57 mL of isovue 370 according to   departmental PE protocol. Coronal and sagittal reformats were performed. 3D post   processing was performed. CT dose reduction was achieved through the use of a   standardized protocol tailored for this examination and automatic exposure   control for dose modulation. Overall poor pulmonary opacification. The patient declined reinjection. FINDINGS: This is a good quality study for the evaluation of pulmonary embolism   to the first segmental arterial level, except in the right upper lobe where   several segments are partially obscured by artifact relating to dense   opacification of the SVC. Otherwise, There is no saddle, main pulmonary artery,   or lobar defects. The obscured, hypodense segments in the right upper lobe are   below the expected density of thrombus. THYROID: No nodule. MEDIASTINUM: No mass or lymphadenopathy. COLTON: No mass or lymphadenopathy. THORACIC AORTA: No aneurysm. HEART: Normal in size. ESOPHAGUS: No mucosal thickening. Small amount of fluid in the mid to distal   esophagus. TRACHEA/BRONCHI: Patent. PLEURA: No effusion or pneumothorax. LUNGS: No nodule, mass, or airspace disease. UPPER ABDOMEN: Hepatic steatosis. BONES: No aggressive bone lesion or fracture. CXR Results  (Last 48 hours)               09/22/20 1107  XR CHEST PORT Final result    Impression:  Impression: No acute process. Narrative: Indication: Chest pain       Comparison: 8/10/2020       Portable exam of the chest obtained at 1105 demonstrates normal heart size. There is no acute process in the lung fields.  The osseous structures are unremarkable. Medical Decision Making   I am the first provider for this patient. I reviewed the vital signs, available nursing notes, past medical history, past surgical history, family history and social history. Vital Signs-Reviewed the patient's vital signs. Patient Vitals for the past 12 hrs:   Temp Pulse Resp BP SpO2   09/22/20 1400  80 11 (!) 147/92 97 %   09/22/20 1308  81 16 (!) 146/84 98 %   09/22/20 1200  85 14 (!) 150/70 96 %   09/22/20 1143     99 %   09/22/20 1047 98.9 °F (37.2 °C) 99 20 (!) 162/104 96 %       EKG interpretation: (Preliminary)    Preliminary EKG interpreted by me. Shows sinus rhythm with a HR of 92. No ST elevations or depressions concerning for ischemia. No STEMI. Records Reviewed: Nursing Notes    Provider Notes (Medical Decision Making):     51-year-old male presents emergency department with intermittent chest pain x2 weeks as well as left lower extremity swelling. Chest pain atypical for ACS. Troponin negative, BNP normal.  EKG nonischemic. Given left leg swelling and intermittent chest pain, differential includes fluid retention, CHF, PE, venous disease. Will check duplex of lower extremity and CTA. Doubt infectious causes. ED Course:   Initial assessment performed. The patients presenting problems have been discussed, and they are in agreement with the care plan formulated and outlined with them. I have encouraged them to ask questions as they arise throughout their visit. ED Course as of Sep 22 1816   Tue Sep 22, 2020   1437 CTA is slightly suboptimal, but no saddle embolism, DVT US with DVT. [MB]   0783 Labs reviewed, mild transaminitis, minimally changed from prior. Will inform patient. [MB]   8556 Patient concerned as his symptoms worsened after being taken off his klonopin, discussed can not prescribe from ED. Also discussed can reesume clonidine for HTN as patient reports his blood pressures have been elevated.  Will trial atarax with PCP referrals. Patient reassessed, in no distress. [MB]      ED Course User Index  [MB] Andre Riggs MD     Patient given strict return precautions, has follow-up with new PCP arranged. Given prescription for clonidine for BP control at lower dose. Octavio Isaacs MD      Disposition:    Reassessments as above. Labs and ED course reviewed. Patient was discharged home and was provided strict return precautions. Patient to follow-up with PCP or specialist per discharge instructions or return to ED for worsening symptoms. DISCHARGE PLAN:  1. Discharge Medication List as of 9/22/2020  2:54 PM      START taking these medications    Details   hydrOXYzine HCL (ATARAX) 50 mg tablet Take 1 Tab by mouth nightly as needed for Anxiety for up to 10 days. , Normal, Disp-10 Tab,R-0         CONTINUE these medications which have CHANGED    Details   !! cloNIDine HCL (CATAPRES) 0.1 mg tablet Take 1 Tab by mouth two (2) times a day. Can increase to two tabs twice a day if tolerated, Normal, Disp-28 Tab,R-0      !! cloNIDine HCL (CATAPRES) 0.2 mg tablet Take 0.5 Tabs by mouth two (2) times a day., Normal, Disp-180 Tab,R-0       !! - Potential duplicate medications found. Please discuss with provider. CONTINUE these medications which have NOT CHANGED    Details   !! cloNIDine HCL (CATAPRES) 0.2 mg tablet TAKE ONE TABLET BY MOUTH 2 TIMES A DAY, Normal, Disp-180 Tab,R-0      amLODIPine (NORVASC) 10 mg tablet Take 1 Tab by mouth daily. Indications: high blood pressure, Normal, Disp-90 Tab,R-0      lisinopriL (PRINIVIL, ZESTRIL) 20 mg tablet Take 1 Tab by mouth daily. , Normal, Disp-90 Tab,R-0Please cancel earlier script w/ 3 refills. Thanks.       sildenafiL, pulmonary hypertension, (REVATIO) 20 mg tablet Take 1-5 Tabs by mouth daily as needed (ED)., Normal, Disp-20 Tab,R-0      pantoprazole (PROTONIX) 40 mg tablet Historical Med      busPIRone (BUSPAR) 10 mg tablet Take 1-2 Tabs by mouth three (3) times daily. , Normal, Disp-20 Tab,R-0      furosemide (LASIX) 40 mg tablet Increase your morning dose to 40 mg for 5 days, Normal, Disp-5 Tab,R-0      cyclobenzaprine (FLEXERIL) 10 mg tablet Take 1 Tab by mouth nightly., Normal, Disp-30 Tab,R-0      montelukast (Singulair) 10 mg tablet Take 1 Tab by mouth daily. , Normal, Disp-90 Tab,R-0      diclofenac EC (VOLTAREN) 50 mg EC tablet Take 1 Tab by mouth two (2) times a day., Normal, Disp-180 Tab,R-0      fluticasone propionate (Flovent HFA) 220 mcg/actuation inhaler INHALE 1 OR 2 PUFFS 2 TIMES A DAY., Normal, Disp-3 Inhaler,R-0      fluticasone propionate (Flonase Allergy Relief) 50 mcg/actuation nasal spray 2 Sprays by Both Nostrils route daily. , Normal, Disp-1 Bottle,R-5      aspirin delayed-release 81 mg tablet Take 1 Tab by mouth daily. , Normal, Disp-100 Tab, R-3      LINZESS 290 mcg cap capsule Historical Med, NIKO      polyethylene glycol (MIRALAX) 17 gram packet Take 1 Packet by mouth daily. , Normal, Disp-1 Each, R-5      Nebulizer Accessories kit Nebulizer cup w/ tubing; use every 4 hrs prn wheezing, Normal, Disp-1 Kit, R-5      albuterol (PROVENTIL VENTOLIN) 2.5 mg /3 mL (0.083 %) nebu 3 mL by Nebulization route every four (4) hours as needed for Wheezing., Normal, Disp-90 Nebule, R-0      cetirizine (ZYRTEC) 10 mg tablet Take 1 Tab by mouth daily. , Normal, Disp-20 Tab, R-0      VENTOLIN HFA 90 mcg/actuation inhaler Take 2 Puffs by inhalation every four (4) hours as needed for Wheezing or Shortness of Breath. Indications: bronchospasm prevention, Normal, Disp-3 Inhaler, R-3, NIKO       !! - Potential duplicate medications found. Please discuss with provider.         2.   Follow-up Information     Follow up With Specialties Details Why Contact Anastasia Obrien MD Family Medicine In 2 days  [address]  [place] [address] [place]      Lizeth Chiu MD Cardiology, Cardio Vascular Surgery, Internal Medicine In 1 week As needed [address]  [phone]          3. Return to ED if worse     Diagnosis     Clinical Impression:   1. Other chest pain    2. Leg swelling    3. Essential hypertension        Attestations:    Melody Hein MD    Please note that this dictation was completed with [employer], the computer voice recognition software. Quite often unanticipated grammatical, syntax, homophones, and other interpretive errors are inadvertently transcribed by the computer software. Please disregard these errors. Please excuse any errors that have escaped final proofreading. Thank you.

## 2020-09-23 ENCOUNTER — PATIENT OUTREACH (OUTPATIENT)
Dept: CASE MANAGEMENT | Age: 55
End: 2020-09-23

## 2020-09-23 NOTE — PROGRESS NOTES
Patient contacted regarding recent discharge and COVID-19 risk. Discussed COVID-19 related testing which was not done at this time. Test results were not done. Patient informed of results, if available? n/a    Care Transition Nurse/ Ambulatory Care Manager/ LPN Care Coordinator contacted the patient by telephone to perform post discharge assessment. Verified name and  with patient as identifiers. Patient has following risk factors of: asthma. CTN/ACM/LPN reviewed discharge instructions, medical action plan and red flags related to discharge diagnosis. Reviewed and educated them on any new and changed medications related to discharge diagnosis. Advised obtaining a 90-day supply of all daily and as-needed medications. Patient reports he has obtained his discharge medications and is taking as prescribed. Advance Care Planning:   Does patient have an Advance Directive: currently not on file; patient declined education and ACP referral placed -Patient stated he could not continue conversation at this time-he needed to take his son to school. Education provided regarding infection prevention, and signs and symptoms of COVID-19 and when to seek medical attention with patient who verbalized understanding. Discussed exposure protocols and quarantine from [facility] you at higher risk for severe illness  and given an opportunity for questions and concerns. The patient agrees to contact the COVID-19 hotline [phone] or PCP office for questions related to their healthcare. CTN/ACM/LPN provided contact information for future reference. Patient declined 800#s -stated he has information he needs at this time. Patient has follow up appointment with new PCP =Dr Mani Brown on 10/12/20 7760. Patient reports he has requested an earlier appointment if it becomes available. From CDC: Are you at higher risk for severe illness?  Wash your hands often.    Avoid close contact (6 feet, which is about two arm lengths) with people who are sick.  Put distance between yourself and other people if COVID-19 is spreading in your community.  Clean and disinfect frequently touched surfaces.  Avoid all cruise travel and non-essential air travel.  Call your healthcare professional if you have concerns about COVID-19 and your underlying condition or if you are sick. For more information on steps you can take to protect yourself, see CDC's How to Protect Yourself      Patient/family/caregiver given information for Amber Rios and agrees to enroll no  Patient's preferred e-mail:  n/a  Patient's preferred phone number: n/a    Plan to follow up in 14 days based on severity of symptoms and risk factors.  DMB

## 2020-10-03 ENCOUNTER — HOSPITAL ENCOUNTER (EMERGENCY)
Age: 55
Discharge: HOME | End: 2020-10-03
Attending: EMERGENCY MEDICINE | Admitting: EMERGENCY MEDICINE
Payer: MEDICARE

## 2020-10-03 ENCOUNTER — APPOINTMENT (OUTPATIENT)
Dept: GENERAL RADIOLOGY | Age: 55
End: 2020-10-03
Attending: EMERGENCY MEDICINE
Payer: MEDICARE

## 2020-10-03 VITALS
DIASTOLIC BLOOD PRESSURE: 99 MMHG | HEART RATE: 91 BPM | HEIGHT: 75 IN | SYSTOLIC BLOOD PRESSURE: 163 MMHG | WEIGHT: 315 LBS | TEMPERATURE: 99.7 F | OXYGEN SATURATION: 92 % | RESPIRATION RATE: 15 BRPM | BODY MASS INDEX: 39.17 KG/M2

## 2020-10-03 DIAGNOSIS — J45.901 ASTHMA WITH ACUTE EXACERBATION, UNSPECIFIED ASTHMA SEVERITY, UNSPECIFIED WHETHER PERSISTENT: Primary | ICD-10-CM

## 2020-10-03 DIAGNOSIS — I87.2 VENOUS INSUFFICIENCY (CHRONIC) (PERIPHERAL): ICD-10-CM

## 2020-10-03 DIAGNOSIS — I10 ESSENTIAL HYPERTENSION: ICD-10-CM

## 2020-10-03 LAB
ALBUMIN SERPL-MCNC: 3.7 G/DL (ref 3.5–5)
ALBUMIN/GLOB SERPL: 1 {RATIO} (ref 1.1–2.2)
ALP SERPL-CCNC: 109 U/L (ref 45–117)
ALT SERPL-CCNC: 63 U/L (ref 12–78)
ANION GAP SERPL CALC-SCNC: 6 MMOL/L (ref 5–15)
AST SERPL-CCNC: 52 U/L (ref 15–37)
ATRIAL RATE: 81 BPM
BASOPHILS # BLD: 0 K/UL (ref 0–0.1)
BASOPHILS NFR BLD: 0 % (ref 0–1)
BILIRUB SERPL-MCNC: 0.3 MG/DL (ref 0.2–1)
BNP SERPL-MCNC: 48 PG/ML
BUN SERPL-MCNC: 11 MG/DL (ref 6–20)
BUN/CREAT SERPL: 13 (ref 12–20)
CALCIUM SERPL-MCNC: 9 MG/DL (ref 8.5–10.1)
CALCULATED P AXIS, ECG09: 62 DEGREES
CALCULATED R AXIS, ECG10: 39 DEGREES
CALCULATED T AXIS, ECG11: 32 DEGREES
CHLORIDE SERPL-SCNC: 108 MMOL/L (ref 97–108)
CO2 SERPL-SCNC: 28 MMOL/L (ref 21–32)
CREAT SERPL-MCNC: 0.85 MG/DL (ref 0.7–1.3)
DIAGNOSIS, 93000: NORMAL
DIFFERENTIAL METHOD BLD: NORMAL
EOSINOPHIL # BLD: 0.3 K/UL (ref 0–0.4)
EOSINOPHIL NFR BLD: 4 % (ref 0–7)
ERYTHROCYTE [DISTWIDTH] IN BLOOD BY AUTOMATED COUNT: 13.3 % (ref 11.5–14.5)
GLOBULIN SER CALC-MCNC: 3.6 G/DL (ref 2–4)
GLUCOSE SERPL-MCNC: 95 MG/DL (ref 65–100)
HCT VFR BLD AUTO: 41.2 % (ref 36.6–50.3)
HGB BLD-MCNC: 13.3 G/DL (ref 12.1–17)
IMM GRANULOCYTES # BLD AUTO: 0 K/UL (ref 0–0.04)
IMM GRANULOCYTES NFR BLD AUTO: 0 % (ref 0–0.5)
LYMPHOCYTES # BLD: 1.7 K/UL (ref 0.8–3.5)
LYMPHOCYTES NFR BLD: 25 % (ref 12–49)
MAGNESIUM SERPL-MCNC: 2.2 MG/DL (ref 1.6–2.4)
MCH RBC QN AUTO: 28.6 PG (ref 26–34)
MCHC RBC AUTO-ENTMCNC: 32.3 G/DL (ref 30–36.5)
MCV RBC AUTO: 88.6 FL (ref 80–99)
MONOCYTES # BLD: 0.5 K/UL (ref 0–1)
MONOCYTES NFR BLD: 7 % (ref 5–13)
NEUTS SEG # BLD: 4.5 K/UL (ref 1.8–8)
NEUTS SEG NFR BLD: 64 % (ref 32–75)
NRBC # BLD: 0 K/UL (ref 0–0.01)
NRBC BLD-RTO: 0 PER 100 WBC
P-R INTERVAL, ECG05: 156 MS
PLATELET # BLD AUTO: 184 K/UL (ref 150–400)
PMV BLD AUTO: 10.2 FL (ref 8.9–12.9)
POTASSIUM SERPL-SCNC: 3.9 MMOL/L (ref 3.5–5.1)
PROT SERPL-MCNC: 7.3 G/DL (ref 6.4–8.2)
Q-T INTERVAL, ECG07: 392 MS
QRS DURATION, ECG06: 80 MS
QTC CALCULATION (BEZET), ECG08: 455 MS
RBC # BLD AUTO: 4.65 M/UL (ref 4.1–5.7)
SODIUM SERPL-SCNC: 142 MMOL/L (ref 136–145)
TROPONIN I SERPL-MCNC: <0.05 NG/ML
VENTRICULAR RATE, ECG03: 81 BPM
WBC # BLD AUTO: 7.1 K/UL (ref 4.1–11.1)

## 2020-10-03 PROCEDURE — 99284 EMERGENCY DEPT VISIT MOD MDM: CPT

## 2020-10-03 PROCEDURE — 83880 ASSAY OF NATRIURETIC PEPTIDE: CPT

## 2020-10-03 PROCEDURE — 83735 ASSAY OF MAGNESIUM: CPT

## 2020-10-03 PROCEDURE — 74011250637 HC RX REV CODE- 250/637: Performed by: EMERGENCY MEDICINE

## 2020-10-03 PROCEDURE — 36415 COLL VENOUS BLD VENIPUNCTURE: CPT

## 2020-10-03 PROCEDURE — 71045 X-RAY EXAM CHEST 1 VIEW: CPT

## 2020-10-03 PROCEDURE — 96375 TX/PRO/DX INJ NEW DRUG ADDON: CPT

## 2020-10-03 PROCEDURE — 80053 COMPREHEN METABOLIC PANEL: CPT

## 2020-10-03 PROCEDURE — 96365 THER/PROPH/DIAG IV INF INIT: CPT

## 2020-10-03 PROCEDURE — 74011250636 HC RX REV CODE- 250/636: Performed by: EMERGENCY MEDICINE

## 2020-10-03 PROCEDURE — 84484 ASSAY OF TROPONIN QUANT: CPT

## 2020-10-03 PROCEDURE — 93005 ELECTROCARDIOGRAM TRACING: CPT

## 2020-10-03 PROCEDURE — 85025 COMPLETE CBC W/AUTO DIFF WBC: CPT

## 2020-10-03 PROCEDURE — 74011000250 HC RX REV CODE- 250: Performed by: EMERGENCY MEDICINE

## 2020-10-03 RX ORDER — AMLODIPINE BESYLATE 5 MG/1
10 TABLET ORAL DAILY
Status: DISCONTINUED | OUTPATIENT
Start: 2020-10-03 | End: 2020-10-03 | Stop reason: HOSPADM

## 2020-10-03 RX ORDER — HYDRALAZINE HYDROCHLORIDE 20 MG/ML
10 INJECTION INTRAMUSCULAR; INTRAVENOUS ONCE
Status: COMPLETED | OUTPATIENT
Start: 2020-10-03 | End: 2020-10-03

## 2020-10-03 RX ORDER — MAGNESIUM SULFATE HEPTAHYDRATE 40 MG/ML
2 INJECTION, SOLUTION INTRAVENOUS ONCE
Status: COMPLETED | OUTPATIENT
Start: 2020-10-03 | End: 2020-10-03

## 2020-10-03 RX ORDER — IPRATROPIUM BROMIDE AND ALBUTEROL SULFATE 2.5; .5 MG/3ML; MG/3ML
3 SOLUTION RESPIRATORY (INHALATION)
Status: COMPLETED | OUTPATIENT
Start: 2020-10-03 | End: 2020-10-03

## 2020-10-03 RX ORDER — LISINOPRIL 20 MG/1
20 TABLET ORAL
Status: COMPLETED | OUTPATIENT
Start: 2020-10-03 | End: 2020-10-03

## 2020-10-03 RX ORDER — PREDNISONE 50 MG/1
50 TABLET ORAL DAILY
Qty: 5 TAB | Refills: 0 | Status: SHIPPED | OUTPATIENT
Start: 2020-10-03 | End: 2020-10-08

## 2020-10-03 RX ORDER — CLONIDINE HYDROCHLORIDE 0.1 MG/1
0.2 TABLET ORAL
Status: COMPLETED | OUTPATIENT
Start: 2020-10-03 | End: 2020-10-03

## 2020-10-03 RX ADMIN — LISINOPRIL 20 MG: 20 TABLET ORAL at 06:14

## 2020-10-03 RX ADMIN — MAGNESIUM SULFATE IN WATER 2 G: 40 INJECTION, SOLUTION INTRAVENOUS at 06:06

## 2020-10-03 RX ADMIN — IPRATROPIUM BROMIDE AND ALBUTEROL SULFATE 3 ML: .5; 3 SOLUTION RESPIRATORY (INHALATION) at 05:22

## 2020-10-03 RX ADMIN — CLONIDINE HYDROCHLORIDE 0.2 MG: 0.1 TABLET ORAL at 06:02

## 2020-10-03 RX ADMIN — IPRATROPIUM BROMIDE AND ALBUTEROL SULFATE 3 ML: .5; 3 SOLUTION RESPIRATORY (INHALATION) at 05:23

## 2020-10-03 RX ADMIN — HYDRALAZINE HYDROCHLORIDE 10 MG: 20 INJECTION INTRAMUSCULAR; INTRAVENOUS at 05:27

## 2020-10-03 RX ADMIN — METHYLPREDNISOLONE SODIUM SUCCINATE 125 MG: 125 INJECTION, POWDER, FOR SOLUTION INTRAMUSCULAR; INTRAVENOUS at 05:33

## 2020-10-03 NOTE — ED PROVIDER NOTES
EMERGENCY DEPARTMENT HISTORY AND PHYSICAL EXAM    Please note that this dictation was completed with [employer], the computer voice recognition software. Quite often unanticipated grammatical, syntax, homophones, and other interpretive errors are inadvertently transcribed by the computer software. Please disregard these errors. Please excuse any errors that have escaped final proofreading. Date: 10/3/2020  Patient Name: Sandra Krishnamurthy  Patient Age and Sex: 54 y.o. male    History of Presenting Illness     Chief Complaint   Patient presents with    Shortness of Breath     Pt reported having trouble breathing for 2 days. Pt states he took his at home neb treatments with no relief. Pt has Hx of asthma. History Provided By: Patient    HPI: Sandra Krishnamurthy, is a 54 y.o. male former smoker whose medical history is noted below and includes asthma, poorly controlled htn, obesity, presents to the ED with shortness of breath and chest tightness for the past 2 days. Patient states that the symptoms were likely triggered by weather change. No fever, chills, congestion, cough. initially sob was moderate and improved with home nebulizers. However, this evening dyspnea got worse and progressed to severe, no longer responded to the home nebs. He has associated chest tightness that he says is typical of his asthma attacks. No pleuritic chest pain. No fevers or chills. No recent illness. Dry cough. History of peripheral lower extremity edema from venous insufficiency. This has not worsened. Stopped smoking about 6 months ago when he was diagnosed with asthma. No prolonged hospitalizations for asthma exacerbations, no recent steroid use, never intubated. Pt denies any other alleviating or exacerbating factors. No other associated signs or symptoms. There are no other complaints, changes or physical findings at this time.      PCP: Rick Mo MD    Past History   All documented elements of the [facility] reviewed and verified by me. -Leanna Meza MD    Past Medical History:  Past Medical History:   Diagnosis Date    Arthritis     Asthma     Chronic low back pain     Hypertension        Past Surgical History:  Past Surgical History:   Procedure Laterality Date    HX ORTHOPAEDIC         Family History:  Family History   Problem Relation Age of Onset    Cancer Mother     Heart Disease Father        Social History:  Social History     Tobacco Use    Smoking status: Former Smoker     Packs/day: 0.25     Years: 10.00     Pack years: 2.50    Smokeless tobacco: Never Used   Substance Use Topics    Alcohol use: Yes     Comment: occ    Drug use: No       Allergies: Allergies   Allergen Reactions    Vicodin [Hydrocodone-Acetaminophen] Other (comments)     Headache       Review of Systems   All other systems reviewed and negative    Review of Systems   Constitutional: Negative for appetite change, diaphoresis and fever. HENT: Negative for congestion, sinus pain, sore throat, trouble swallowing and voice change. Eyes: Negative. Negative for visual disturbance. Respiratory: Positive for cough, chest tightness, shortness of breath and wheezing. Cardiovascular: Positive for leg swelling. Negative for chest pain and palpitations. Gastrointestinal: Negative for abdominal pain, nausea and vomiting. Endocrine: Negative. Negative for polydipsia and polyuria. Genitourinary: Negative for dysuria, flank pain and hematuria. Musculoskeletal: Negative for back pain and joint swelling. Skin: Negative. Negative for rash. Neurological: Negative for dizziness, weakness, light-headedness, numbness and headaches. Hematological: Negative for adenopathy. All other systems reviewed and are negative. Physical Exam   Reviewed patients vital signs and nursing note    Physical Exam  Vitals signs and nursing note reviewed. Constitutional:       Appearance: He is obese. HENT:      Head: Atraumatic. Mouth/Throat:      Mouth: Mucous membranes are moist.      Pharynx: Oropharynx is clear. No oropharyngeal exudate. Eyes:      General: No scleral icterus. Extraocular Movements: Extraocular movements intact. Conjunctiva/sclera: Conjunctivae normal.      Pupils: Pupils are equal, round, and reactive to light. Neck:      Musculoskeletal: Normal range of motion and neck supple. Vascular: No JVD. Cardiovascular:      Rate and Rhythm: Normal rate and regular rhythm. Pulses: Normal pulses. Heart sounds: Normal heart sounds. Pulmonary:      Effort: Tachypnea, accessory muscle usage and respiratory distress present. Breath sounds: No stridor. Wheezing (expiratory and diffuse) present. Chest:      Chest wall: No tenderness. Abdominal:      Palpations: Abdomen is soft. Tenderness: There is no abdominal tenderness. Musculoskeletal: Normal range of motion. Right lower leg: He exhibits no tenderness. Edema present. Left lower leg: He exhibits no tenderness. Edema present. Lymphadenopathy:      Cervical: No cervical adenopathy. Skin:     General: Skin is warm and dry. Capillary Refill: Capillary refill takes less than 2 seconds. Coloration: Skin is not pale. Neurological:      General: No focal deficit present. Mental Status: He is alert and oriented to person, place, and time. Psychiatric:         Mood and Affect: Mood normal.         Behavior: Behavior normal.         Diagnostic Study Results     Labs - I have personally reviewed and interpreted all laboratory results.  Apolonia Prater MD, MSc  Recent Results (from the past 24 hour(s))   EKG, 12 LEAD, INITIAL    Collection Time: 10/03/20  4:39 AM   Result Value Ref Range    Ventricular Rate 81 BPM    Atrial Rate 81 BPM    P-R Interval 156 ms    QRS Duration 80 ms    Q-T Interval 392 ms    QTC Calculation (Bezet) 455 ms    Calculated P Axis 62 degrees    Calculated R Axis 39 degrees    Calculated T Axis 32 degrees    Diagnosis       Normal sinus rhythm  Normal ECG  When compared with ECG of 22-SEP-2020 10:53,  No significant change was found         Radiologic Studies - I have personally reviewed and interpreted all imaging studies and agree with radiology interpretation and report. - Shaniqua Herrera MD, MSc  XR CHEST PORT   Final Result   IMPRESSION:   No acute process. Medical Decision Making   I am the first provider for this patient. Records Reviewed: I reviewed our electronic medical record system for any past medical records that were available that may contribute to the patient's current condition, including their PMH, surgical history, social and family history. Reviewed the nursing notes and vital signs from today's visit. Nursing notes will be reviewed as they become available in realtime while the pt has been in the ED. In addition, I read most recent discharge summaries, if available and reviewed prior ECGs or imaging studies for comparison purposes. Shaniqua Herrera MD Msc    Vital Signs-Reviewed the patient's vital signs. Patient Vitals for the past 24 hrs:   Temp Pulse Resp BP SpO2   10/03/20 0645  91 15 (!) 163/99 92 %   10/03/20 0630  99 21 (!) 181/94 90 %   10/03/20 0615  92 15 (!) 160/94 93 %   10/03/20 0602  97  (!) 180/82    10/03/20 0527  89  (!) 161/92    10/03/20 0419 99.7 °F (37.6 °C) 91 20 (!) 174/102 96 %       ECG interpretation: Normal sinus rhythm with rate 81, normal intervals, no ST elevations, depressions or other changes concerning for acute ischemia. This ECG has been viewed and interpreted by me personally. Shaniqua Herrera MD, Msc    Provider Notes (Medical Decision Making):   Patient presents with dry cough and expiratory wheezing most likely 2/2 asthma exacerbation. On arrival has increased wob, supraclavicular retractions, speaking in 3 to 4-word sentences before becoming very winded.  No AMS, silent respirations, and no immediate signs of impending ventilatory failure but given comorbidity and severity of symptoms at this time, will watch him very closely on monitor. If no improvement with breathing tx, will get abg. Never intubated or admitted to the hospital for asthma exacerbation. Ddx: severe asthma exacerbation, PNA, CHF, FBAO, bronchitis. Patient also presents with elevated BP. Denies confusion, chest pain, hematuria, orthopnea, all of which would be concerning for end-organ damage. Etiology of high bp is most likely essential hypertension as patient's history does not support secondary causes, such as renal or endocrine. Based on patient' s history and my evaluation today, I have a low suspicion for CV, AMI, heart failure, renal infarction or failure or other pathologies concerning for end-organ damage and malignant HTN. At this time, rapid and emergent lowering of patient's BP is not indicated and may even be harmful as the elevated BP is most likely long-standing. Patient confirms ability to follow up with primary care in 1 or latest 2 weeks and plans to do so. Plan in the meantime is to start BP management by providing  po antihypertensives in the ED with the understanding that normalization of BP will be a gradual process and should be closely monitored. I will also provide patient with a prescription for the medication so that it can be continued until patient can see their doctor. Reassessment: Patient improved significantly and within 3 hours after getting treated in the ED. O2 sat is normal on room air. Work of breathing has significantly decreased and feels well enough to go home.       Counseling  Education provided in regard to long-term preventative as well as medical management of high blood pressure including diet, sodium restriction, maintaining ideal body weight and regular exercise program. Patient is counseled on importance of medication compliance and consequences of chronic, uncontrolled hypertension including kidney disease, heart disease, stroke or even death. ED Course:   Initial assessment performed. The patients presenting problems have been discussed, and they are in agreement with the care plan formulated and outlined with them. I have encouraged them to ask questions as they arise throughout their visit. Progress note:  Patient has been reassessed and reports feeling considerably better, has normal vital signs and feels comfortable going home. I think this is reasonable as no findings today suggest a life-threatening condition. Advised to follow up with primary care physician within next 24-48 hours. Aside from this acute exacerbation patient has been well controlled on baseline home regimen. Rx short steroid course, albuterol prn. Urged regular BP checks and BP diary, discussed low salt diet and other dietary improvements that may help control his bp, also discussed importance of daily exercise/walking. DISPOSITION: DISCHARGE  The patient's results have been reviewed with patient and available family and/or caregiver. They verbally convey their understanding and agreement of the patient's signs, symptoms, diagnosis, treatment and prognosis and additionally agree to follow up as recommended in the discharge instructions or to return to the Emergency Department should the patient's condition change prior to their follow-up appointment. The patient and available family and/or caregiver verbally agree with the care plan and all of their questions have been answered. The discharge instructions have also been provided to the them with educational information regarding the patient's diagnosis as well a list of reasons why the patient would want to return to the ER prior to their follow-up appointment should any concerns arise, the patient's condition change or symptoms worsen.     Suzette Adames MD, Msc    PLAN:  Current Discharge Medication List      START taking these medications    Details   predniSONE (DELTASONE) 50 mg tablet Take 1 Tab by mouth daily for 5 days. Qty: 5 Tab, Refills: 0         1.   2.     Follow-up Information     Follow up With Specialties Details Why Contact Info    Read, Stacy Ma MD Family Medicine Call in 2 days  [address] [facility]  Robyn Ordonez [address] [place] [facility]  [phone]      \[facility]\"" EMERGENCY DEPT Emergency Medicine  As needed, If symptoms worsen [address]  [address] [facility]  [phone]        3. Return to ED if worse        CRITICAL CARE NOTE :    IMPENDING DETERIORATION -Respiratory  ASSOCIATED RISK FACTORS - incrased work of breathing and concern for ventilatory failure  MANAGEMENT- Bedside Assessment  INTERPRETATION -  Xrays and ECG  INTERVENTIONS - hemodynamic mngmt and respiratory interventions  CASE REVIEW - Hospitalist and Nursing  TREATMENT RESPONSE -Improved  PERFORMED BY - Self    NOTES   :    I have spent 35 minutes of critical care time involved in lab review, consultations with specialist, family decision- making, bedside attention and documentation. During this entire length of time I was immediately available to the patient . Critical Care: The reason for providing this level of medical care for this critically ill patient was due to a critical illness that impaired one or more vital organ systems, such that there was a high probability of imminent or life threatening deterioration in the patient's condition. This care involved high complexity decision making to assess, manipulate, and support vital system functions, to treat this degree of vital organ system failure, and to prevent further life threatening deterioration of the patients condition. Ashwin Dumont MD      I, Lola Vanegas MD, am the attending of record for this patient encounter. Diagnosis     Clinical Impression:   1. Asthma with acute exacerbation, unspecified asthma severity, unspecified whether persistent    2. Essential hypertension    3.  Venous insufficiency (chronic) (peripheral) Attestation:  I personally performed the services described in this documentation on this date 10/3/2020 for patient Loer Wheeler.   Deja Lucas MD

## 2020-10-03 NOTE — DISCHARGE INSTRUCTIONS
Patient Education        Asthma Attack: Care Instructions  Your Care Instructions     During an asthma attack, the airways swell and narrow. This makes it hard to breathe. Severe asthma attacks can be life-threatening, but you can help prevent them by keeping your asthma under control and treating symptoms before they get bad. Symptoms include being short of breath, having chest tightness, coughing, and wheezing. Noting and treating these symptoms can also help you avoid future trips to the emergency room. The doctor has checked you carefully, but problems can develop later. If you notice any problems or new symptoms, get medical treatment right away. Follow-up care is a key part of your treatment and safety. Be sure to make and go to all appointments, and call your doctor if you are having problems. It's also a good idea to know your test results and keep a list of the medicines you take. How can you care for yourself at home? · Follow your asthma action plan to prevent and treat attacks. If you don't have an asthma action plan, work with your doctor to create one. · Take your asthma medicines exactly as prescribed. Talk to your doctor right away if you have any questions about how to take them. ? Use your quick-relief medicine when you have symptoms of an attack. Quick-relief medicine is usually an albuterol inhaler. Some people need to use quick-relief medicine before they exercise. ? Take your controller medicine every day, not just when you have symptoms. Controller medicine is usually an inhaled corticosteroid. The goal is to prevent problems before they occur. Don't use your controller medicine to treat an attack that has already started. It doesn't work fast enough to help. ? If your doctor prescribed corticosteroid pills to use during an attack, take them exactly as prescribed. It may take hours for the pills to work, but they may make the episode shorter and help you breathe better. ?  Keep your quick-relief medicine with you at all times. · Talk to your doctor before using other medicines. Some medicines, such as aspirin, can cause asthma attacks in some people. · If you have a peak flow meter, use it to check how well you are breathing. This can help you predict when an asthma attack is going to occur. Then you can take medicine to prevent the asthma attack or make it less severe. · Do not smoke or allow others to smoke around you. Avoid smoky places. Smoking makes asthma worse. If you need help quitting, talk to your doctor about stop-smoking programs and medicines. These can increase your chances of quitting for good. · Learn what triggers an asthma attack for you, and avoid the triggers when you can. Common triggers include colds, smoke, air pollution, dust, pollen, mold, pets, cockroaches, stress, and cold air. · Avoid colds and the flu. Get a pneumococcal vaccine shot. If you have had one before, ask your doctor if you need a second dose. Get a flu vaccine every fall. If you must be around people with colds or the flu, wash your hands often. When should you call for help? Call 911 anytime you think you may need emergency care. For example, call if:    · You have severe trouble breathing. Call your doctor now or seek immediate medical care if:    · Your symptoms do not get better after you have followed your asthma action plan.     · You have new or worse trouble breathing.     · Your coughing and wheezing get worse.     · You cough up dark brown or bloody mucus (sputum).     · You have a new or higher fever. Watch closely for changes in your health, and be sure to contact your doctor if:    · You need to use quick-relief medicine on more than 2 days a week (unless it is just for exercise).     · You cough more deeply or more often, especially if you notice more mucus or a change in the color of your mucus.     · You are not getting better as expected. Where can you learn more?   Go to http://www.gray.com/  Enter [id number] in the search box to learn more about \"Asthma Attack: Care Instructions. \"  Current as of: February 24, 2020               Content Version: 12.6  © 2006-2020 [employer]. Care instructions adapted under license by [employer] (which disclaims liability or warranty for this information). If you have questions about a medical condition or this instruction, always ask your healthcare professional. [address] any warranty or liability for your use of this information. Patient Education        High Blood Pressure: Care Instructions  Overview     It's normal for blood pressure to go up and down throughout the day. But if it stays up, you have high blood pressure. Another name for high blood pressure is hypertension. Despite what a lot of people think, high blood pressure usually doesn't cause headaches or make you feel dizzy or lightheaded. It usually has no symptoms. But it does increase your risk of stroke, heart attack, and other problems. You and your doctor will talk about your risks of these problems based on your blood pressure. Your doctor will give you a goal for your blood pressure. Your goal will be based on your health and your age. Lifestyle changes, such as eating healthy and being active, are always important to help lower blood pressure. You might also take medicine to reach your blood pressure goal.  Follow-up care is a key part of your treatment and safety. Be sure to make and go to all appointments, and call your doctor if you are having problems. It's also a good idea to know your test results and keep a list of the medicines you take. How can you care for yourself at home? Medical treatment  · If you stop taking your medicine, your blood pressure will go back up. You may take one or more types of medicine to lower your blood pressure. Be safe with medicines.  Take your medicine exactly as prescribed. Call your doctor if you think you are having a problem with your medicine. · Talk to your doctor before you start taking aspirin every day. Aspirin can help certain people lower their risk of a heart attack or stroke. But taking aspirin isn't right for everyone, because it can cause serious bleeding. · See your doctor regularly. You may need to see the doctor more often at first or until your blood pressure comes down. · If you are taking blood pressure medicine, talk to your doctor before you take decongestants or anti-inflammatory medicine, such as ibuprofen. Some of these medicines can raise blood pressure. · Learn how to check your blood pressure at home. Lifestyle changes  · Stay at a healthy weight. This is especially important if you put on weight around the waist. Losing even 10 pounds can help you lower your blood pressure. · If your doctor recommends it, get more exercise. Walking is a good choice. Bit by bit, increase the amount you walk every day. Try for at least 30 minutes on most days of the week. You also may want to swim, bike, or do other activities. · Avoid or limit alcohol. Talk to your doctor about whether you can drink any alcohol. · Try to limit how much sodium you eat to less than 2,300 milligrams (mg) a day. Your doctor may ask you to try to eat less than 1,500 mg a day. · Eat plenty of fruits (such as bananas and oranges), vegetables, legumes, whole grains, and low-fat dairy products. · Lower the amount of saturated fat in your diet. Saturated fat is found in animal products such as milk, cheese, and meat. Limiting these foods may help you lose weight and also lower your risk for heart disease. · Do not smoke. Smoking increases your risk for heart attack and stroke. If you need help quitting, talk to your doctor about stop-smoking programs and medicines. These can increase your chances of quitting for good. When should you call for help?    Call  [place] anytime you think you may need emergency care. This may mean having symptoms that suggest that your blood pressure is causing a serious heart or blood vessel problem. Your blood pressure may be over 180/120. For example, call 911 if:    · You have symptoms of a heart attack. These may include:  ? Chest pain or pressure, or a strange feeling in the chest.  ? Sweating. ? Shortness of breath. ? Nausea or vomiting. ? Pain, pressure, or a strange feeling in the back, neck, jaw, or upper belly or in one or both shoulders or arms. ? Lightheadedness or sudden weakness. ? A fast or irregular heartbeat.     · You have symptoms of a stroke. These may include:  ? Sudden numbness, tingling, weakness, or loss of movement in your face, arm, or leg, especially on only one side of your body. ? Sudden vision changes. ? Sudden trouble speaking. ? Sudden confusion or trouble understanding simple statements. ? Sudden problems with walking or balance. ? A sudden, severe headache that is different from past headaches.     · You have severe back or belly pain. Do not wait until your blood pressure comes down on its own. Get help right away. Call your doctor now or seek immediate care if:    · Your blood pressure is much higher than normal (such as 180/120 or higher), but you don't have symptoms.     · You think high blood pressure is causing symptoms, such as:  ? Severe headache.  ? Blurry vision. Watch closely for changes in your health, and be sure to contact your doctor if:    · Your blood pressure measures higher than your doctor recommends at least 2 times. That means the top number is higher or the bottom number is higher, or both.     · You think you may be having side effects from your blood pressure medicine. Where can you learn more? Go to http://www.gray.com/  Enter [id number] in the search box to learn more about \"High Blood Pressure: Care Instructions. \"  Current as of: December 16, 9291               VSNTGFN Version: 12.6  © 3939-4999 [employer]. Care instructions adapted under license by [employer] (which disclaims liability or warranty for this information). If you have questions about a medical condition or this instruction, always ask your healthcare professional. [address] any warranty or liability for your use of this information. Patient Education        Low Sodium Diet (2,000 Milligram): Care Instructions  Your Care Instructions     Too much sodium causes your body to hold on to extra water. This can raise your blood pressure and force your heart and kidneys to work harder. In very serious cases, this could cause you to be put in the hospital. It might even be life-threatening. By limiting sodium, you will feel better and lower your risk of serious problems. The most common source of sodium is salt. People get most of the salt in their diet from canned, prepared, and packaged foods. Fast food and restaurant meals also are very high in sodium. Your doctor will probably limit your sodium to less than 2,000 milligrams (mg) a day. This limit counts all the sodium in prepared and packaged foods and any salt you add to your food. Follow-up care is a key part of your treatment and safety. Be sure to make and go to all appointments, and call your doctor if you are having problems. It's also a good idea to know your test results and keep a list of the medicines you take. How can you care for yourself at home? Read food labels  · Read labels on cans and food packages. The labels tell you how much sodium is in each serving. Make sure that you look at the serving size. If you eat more than the serving size, you have eaten more sodium. · Food labels also tell you the Percent Daily Value for sodium. Choose products with low Percent Daily Values for sodium.   · Be aware that sodium can come in forms other than salt, including monosodium glutamate (MSG), sodium citrate, and sodium bicarbonate (baking soda). MSG is often added to Asian food. When you eat out, you can sometimes ask for food without MSG or added salt. Buy low-sodium foods  · Buy foods that are labeled \"unsalted\" (no salt added), \"sodium-free\" (less than 5 mg of sodium per serving), or \"low-sodium\" (less than 140 mg of sodium per serving). Foods labeled \"reduced-sodium\" and \"light sodium\" may still have too much sodium. Be sure to read the label to see how much sodium you are getting. · Buy fresh vegetables, or frozen vegetables without added sauces. Buy low-sodium versions of canned vegetables, soups, and other canned goods. Prepare low-sodium meals  · Cut back on the amount of salt you use in cooking. This will help you adjust to the taste. Do not add salt after cooking. One teaspoon of salt has about 2,300 mg of sodium. · Take the salt shaker off the table. · Flavor your food with garlic, lemon juice, onion, vinegar, herbs, and spices. Do not use soy sauce, lite soy sauce, steak sauce, onion salt, garlic salt, celery salt, mustard, or ketchup on your food. · Use low-sodium salad dressings, sauces, and ketchup. Or make your own salad dressings and sauces without adding salt. · Use less salt (or none) when recipes call for it. You can often use half the salt a recipe calls for without losing flavor. Other foods such as rice, pasta, and grains do not need added salt. · Rinse canned vegetables, and cook them in fresh water. This removes some--but not all--of the salt. · Avoid water that is naturally high in sodium or that has been treated with water softeners, which add sodium. Call your local water company to find out the sodium content of your water supply. If you buy bottled water, read the label and choose a sodium-free brand. Avoid high-sodium foods  · Avoid eating:  ? Smoked, cured, salted, and canned meat, fish, and poultry.   ? Ham, perry, hot dogs, and luncheon meats.  ? Regular, hard, and processed cheese and regular peanut butter. ? Crackers with salted tops, and other salted snack foods such as pretzels, chips, and salted popcorn. ? Frozen prepared meals, unless labeled low-sodium. ? Canned and dried soups, broths, and bouillon, unless labeled sodium-free or low-sodium. ? Canned vegetables, unless labeled sodium-free or low-sodium. ? [place] fries, pizza, tacos, and other fast foods. ? Pickles, olives, ketchup, and other condiments, especially soy sauce, unless labeled sodium-free or low-sodium. Where can you learn more? Go to http://www.gray.com/  Enter V843 in the search box to learn more about \"Low Sodium Diet (2,000 Milligram): Care Instructions. \"  Current as of: August 22, 2019               Content Version: 12.6  © 3694-3935 [employer]. Care instructions adapted under license by [employer] (which disclaims liability or warranty for this information). If you have questions about a medical condition or this instruction, always ask your healthcare professional. [place] [address] any warranty or liability for your use of this information. Patient Education        Learning About Venous Insufficiency  What is it? Venous insufficiency is a problem with the flow of blood from the veins of the legs back to the heart. It's also called chronic venous insufficiency or chronic venous stasis. Your veins bring blood back to the heart after it flows through your body. Veins have valves that keep the blood moving in one direction--toward the heart. But with venous insufficiency, the veins of the legs might not work as they should. This can allow blood to leak backward. Fluid can pool in the legs. This can lead to problems that include varicose veins. What causes it? Venous insufficiency is sometimes caused by deep vein thrombosis and high blood pressure inside leg veins.   You are more likely to have venous insufficiency if you:  · Are older. · Are female. · Are overweight. · Don't get enough physical activity. · Smoke. · Have a family history of varicose veins. What are the symptoms? Symptoms of venous insufficiency affect the legs. They may include:  · Swelling, often in the ankles. · A rash. · Varicose veins. · Itching. · Cramping. · Skin sores (ulcers). · Aching or a feeling of heaviness. · Changes in skin color. How is it diagnosed? Your doctor can diagnose venous insufficiency by examining your legs and by using a type of ultrasound test (duplex Doppler) to find out how well blood is flowing in your legs. How is it treated? To reduce swelling and relieve pain caused by venous insufficiency, you can wear compression stockings. They are tighter at the ankles than at the top of the legs. They also can help venous skin ulcers heal. But there are different types of stockings, and they need to fit right. So your doctor will recommend what you need. You also can try to:  · Get more exercise, especially walking. It can increase blood flow. · Avoid standing still or sitting for a long time, which can make the fluid pool in your legs. · Try not to sit with your legs crossed at the knee. · Keep your legs raised above your heart when you're lying down. This reduces swelling. If these treatments don't work, you may need medicine or a procedure to help relieve symptoms. Procedures might be done to close the vein, to remove the vein, or to improve blood flow. Follow-up care is a key part of your treatment and safety. Be sure to make and go to all appointments, and call your doctor if you are having problems. It's also a good idea to know your test results and keep a list of the medicines you take. Current as of: March 4, 2020               Content Version: 12.6  © 9099-0159 [employer].    Care instructions adapted under license by [employer] (which disclaims liability or warranty for this information). If you have questions about a medical condition or this instruction, always ask your healthcare professional. [place] [address] any warranty or liability for your use of this information.

## 2020-10-05 ENCOUNTER — PATIENT OUTREACH (OUTPATIENT)
Dept: FAMILY MEDICINE CLINIC | Age: 55
End: 2020-10-05

## 2020-10-05 NOTE — PROGRESS NOTES
Patient contacted regarding recent discharge and COVID-19 risk. Discussed COVID-19 related testing which was not done at this time. Test results were not done. Patient informed of results, if available? n/a    Care Transition Nurse/ Ambulatory Care Manager/ LPN Care Coordinator contacted the patient by telephone to perform post discharge assessment. Verified name and  with patient as identifiers. Patient has following risk factors of: asthma. CTN/ACM/LPN reviewed discharge instructions, medical action plan and red flags related to discharge diagnosis. Reviewed and educated them on any new and changed medications related to discharge diagnosis. Advised obtaining a 90-day supply of all daily and as-needed medications. Advance Care Planning:   Does patient have an Advance Directive: currently not on file; patient declined education    Education provided regarding infection prevention, and signs and symptoms of COVID-19 and when to seek medical attention with patient who verbalized understanding. Discussed exposure protocols and quarantine from [address] you at higher risk for severe illness  and given an opportunity for questions and concerns. The patient agrees to contact the COVID-19 hotline [phone] or PCP office for questions related to their healthcare. CTN/ACM/LPN provided contact information for future reference. From CDC: Are you at higher risk for severe illness?  Wash your hands often.  Avoid close contact (6 feet, which is about two arm lengths) with people who are sick.  Put distance between yourself and other people if COVID-19 is spreading in your community.  Clean and disinfect frequently touched surfaces.  Avoid all cruise travel and non-essential air travel.  Call your healthcare professional if you have concerns about COVID-19 and your underlying condition or if you are sick.     For more information on steps you can take to protect yourself, see CDC's How to Protect Yourself      Patient/family/caregiver given information for [facility] and agrees to enroll no  Patient's preferred e-mail:  n/a  Patient's preferred phone number: n/a  Based on Loop alert triggers, patient will be contacted by nurse care manager for worsening symptoms. Plan for follow-up call in 7-14 days based on severity of symptoms and risk factors.

## 2020-10-12 ENCOUNTER — OFFICE VISIT (OUTPATIENT)
Dept: INTERNAL MEDICINE CLINIC | Age: 55
End: 2020-10-12
Payer: MEDICARE

## 2020-10-12 VITALS
RESPIRATION RATE: 16 BRPM | SYSTOLIC BLOOD PRESSURE: 170 MMHG | BODY MASS INDEX: 39.17 KG/M2 | HEART RATE: 91 BPM | OXYGEN SATURATION: 96 % | HEIGHT: 75 IN | DIASTOLIC BLOOD PRESSURE: 97 MMHG | TEMPERATURE: 98.2 F | WEIGHT: 315 LBS

## 2020-10-12 DIAGNOSIS — R60.9 EDEMA, UNSPECIFIED TYPE: ICD-10-CM

## 2020-10-12 DIAGNOSIS — J45.51 SEVERE PERSISTENT ASTHMA WITH ACUTE EXACERBATION: Primary | ICD-10-CM

## 2020-10-12 DIAGNOSIS — N52.9 ERECTILE DYSFUNCTION, UNSPECIFIED ERECTILE DYSFUNCTION TYPE: ICD-10-CM

## 2020-10-12 DIAGNOSIS — J30.9 ALLERGIC RHINITIS, UNSPECIFIED SEASONALITY, UNSPECIFIED TRIGGER: ICD-10-CM

## 2020-10-12 DIAGNOSIS — I10 ESSENTIAL HYPERTENSION: Chronic | ICD-10-CM

## 2020-10-12 DIAGNOSIS — K58.1 IRRITABLE BOWEL SYNDROME WITH CONSTIPATION: ICD-10-CM

## 2020-10-12 DIAGNOSIS — M54.50 CHRONIC MIDLINE LOW BACK PAIN WITHOUT SCIATICA: Chronic | ICD-10-CM

## 2020-10-12 DIAGNOSIS — J42 CHRONIC BRONCHITIS, UNSPECIFIED CHRONIC BRONCHITIS TYPE (HCC): ICD-10-CM

## 2020-10-12 DIAGNOSIS — F41.9 ANXIETY: ICD-10-CM

## 2020-10-12 DIAGNOSIS — G89.29 CHRONIC PAIN OF LEFT KNEE: ICD-10-CM

## 2020-10-12 DIAGNOSIS — K21.9 GASTROESOPHAGEAL REFLUX DISEASE WITHOUT ESOPHAGITIS: ICD-10-CM

## 2020-10-12 DIAGNOSIS — M54.50 CHRONIC LEFT-SIDED LOW BACK PAIN WITHOUT SCIATICA: ICD-10-CM

## 2020-10-12 DIAGNOSIS — G89.29 CHRONIC MIDLINE LOW BACK PAIN WITHOUT SCIATICA: Chronic | ICD-10-CM

## 2020-10-12 DIAGNOSIS — G89.29 CHRONIC LEFT-SIDED LOW BACK PAIN WITHOUT SCIATICA: ICD-10-CM

## 2020-10-12 DIAGNOSIS — M25.562 CHRONIC PAIN OF LEFT KNEE: ICD-10-CM

## 2020-10-12 PROCEDURE — 3017F COLORECTAL CA SCREEN DOC REV: CPT | Performed by: INTERNAL MEDICINE

## 2020-10-12 PROCEDURE — G8755 DIAS BP > OR = 90: HCPCS | Performed by: INTERNAL MEDICINE

## 2020-10-12 PROCEDURE — 99215 OFFICE O/P EST HI 40 MIN: CPT | Performed by: INTERNAL MEDICINE

## 2020-10-12 PROCEDURE — G8417 CALC BMI ABV UP PARAM F/U: HCPCS | Performed by: INTERNAL MEDICINE

## 2020-10-12 PROCEDURE — G9717 DOC PT DX DEP/BP F/U NT REQ: HCPCS | Performed by: INTERNAL MEDICINE

## 2020-10-12 PROCEDURE — G8753 SYS BP > OR = 140: HCPCS | Performed by: INTERNAL MEDICINE

## 2020-10-12 PROCEDURE — G8427 DOCREV CUR MEDS BY ELIG CLIN: HCPCS | Performed by: INTERNAL MEDICINE

## 2020-10-12 RX ORDER — CYCLOBENZAPRINE HCL 10 MG
10 TABLET ORAL
Qty: 30 TAB | Refills: 0 | Status: SHIPPED | OUTPATIENT
Start: 2020-10-12 | End: 2021-05-03 | Stop reason: SDUPTHER

## 2020-10-12 RX ORDER — ALBUTEROL SULFATE 90 UG/1
2 AEROSOL, METERED RESPIRATORY (INHALATION)
Qty: 3 INHALER | Refills: 3 | Status: SHIPPED | OUTPATIENT
Start: 2020-10-12 | End: 2021-05-03 | Stop reason: SDUPTHER

## 2020-10-12 RX ORDER — CETIRIZINE HCL 10 MG
10 TABLET ORAL DAILY
Qty: 20 TAB | Refills: 0 | Status: SHIPPED | OUTPATIENT
Start: 2020-10-12 | End: 2021-05-03 | Stop reason: SDUPTHER

## 2020-10-12 RX ORDER — DICLOFENAC SODIUM 50 MG/1
50 TABLET, DELAYED RELEASE ORAL 2 TIMES DAILY
Qty: 180 TAB | Refills: 0 | Status: SHIPPED | OUTPATIENT
Start: 2020-10-12 | End: 2021-05-03 | Stop reason: SDUPTHER

## 2020-10-12 RX ORDER — FLUTICASONE PROPIONATE 220 UG/1
AEROSOL, METERED RESPIRATORY (INHALATION)
Qty: 3 INHALER | Refills: 0 | Status: SHIPPED | OUTPATIENT
Start: 2020-10-12 | End: 2021-05-03 | Stop reason: SDUPTHER

## 2020-10-12 RX ORDER — CLONIDINE HYDROCHLORIDE 0.2 MG/1
0.2 TABLET ORAL 2 TIMES DAILY
Qty: 180 TAB | Refills: 3 | Status: SHIPPED | OUTPATIENT
Start: 2020-10-12 | End: 2021-02-26 | Stop reason: SDUPTHER

## 2020-10-12 RX ORDER — SILDENAFIL 100 MG/1
100 TABLET, FILM COATED ORAL AS NEEDED
Qty: 6 TAB | Refills: 5 | Status: SHIPPED | OUTPATIENT
Start: 2020-10-12 | End: 2021-05-03 | Stop reason: SDUPTHER

## 2020-10-12 RX ORDER — SILDENAFIL CITRATE 20 MG/1
20-100 TABLET ORAL
Qty: 20 TAB | Refills: 0 | Status: SHIPPED | OUTPATIENT
Start: 2020-10-12 | End: 2020-10-12

## 2020-10-12 RX ORDER — ALBUTEROL SULFATE 0.83 MG/ML
2.5 SOLUTION RESPIRATORY (INHALATION)
Qty: 90 NEBULE | Refills: 0 | Status: SHIPPED | OUTPATIENT
Start: 2020-10-12 | End: 2020-11-01

## 2020-10-12 RX ORDER — LINACLOTIDE 290 UG/1
290 CAPSULE, GELATIN COATED ORAL DAILY
Qty: 90 CAP | Refills: 3 | Status: SHIPPED | OUTPATIENT
Start: 2020-10-12 | End: 2021-10-25

## 2020-10-12 RX ORDER — FUROSEMIDE 40 MG/1
TABLET ORAL
Qty: 5 TAB | Refills: 0 | Status: SHIPPED | OUTPATIENT
Start: 2020-10-12 | End: 2021-05-03 | Stop reason: SDUPTHER

## 2020-10-12 RX ORDER — ASPIRIN 81 MG/1
81 TABLET ORAL DAILY
Qty: 100 TAB | Refills: 3 | Status: SHIPPED | OUTPATIENT
Start: 2020-10-12 | End: 2021-05-03 | Stop reason: SDUPTHER

## 2020-10-12 RX ORDER — FLUTICASONE PROPIONATE 50 MCG
2 SPRAY, SUSPENSION (ML) NASAL DAILY
Qty: 1 BOTTLE | Refills: 5 | Status: SHIPPED | OUTPATIENT
Start: 2020-10-12 | End: 2021-05-03 | Stop reason: SDUPTHER

## 2020-10-12 RX ORDER — LISINOPRIL 20 MG/1
20 TABLET ORAL DAILY
Qty: 90 TAB | Refills: 0 | Status: SHIPPED | OUTPATIENT
Start: 2020-10-12 | End: 2021-07-02 | Stop reason: SDUPTHER

## 2020-10-12 RX ORDER — PANTOPRAZOLE SODIUM 40 MG/1
40 TABLET, DELAYED RELEASE ORAL DAILY
Qty: 90 TAB | Refills: 3 | Status: SHIPPED | OUTPATIENT
Start: 2020-10-12 | End: 2022-01-04

## 2020-10-12 RX ORDER — MONTELUKAST SODIUM 10 MG/1
10 TABLET ORAL DAILY
Qty: 90 TAB | Refills: 0 | Status: SHIPPED | OUTPATIENT
Start: 2020-10-12 | End: 2021-07-27 | Stop reason: SDUPTHER

## 2020-10-12 NOTE — PROGRESS NOTES
SUBJECTIVE:   Mr. Dre Hayden is a 54 y.o. male who is here for follow up of routine medical issues. He previously saw Dr. Henry Chadwick. Chief Complaint   Patient presents with   [facility] Establish Care     new pt to establish care with Dr Kassandra Gilbert Weight Gain     pt states that he is constantly gaining weight; pt has asthma and it getting harder for him breathe- pt was seen at [facility] ER for breathing issues-pt was given steroid     Back Pain     pt has ongoing back paim and knee pain- due to weight gain- pain is worse    Documentation     pt wants to discuss getting DMV placard        One of his concerns is weight gain: he was 286 lb 1 year ago. Over the past 6 months:   Wt Readings from Last 10 Encounters:   10/12/20 335 lb 3.2 oz (152 kg)   10/03/20 332 lb 7.3 oz (150.8 kg)   09/22/20 330 lb 0.5 oz (149.7 kg)   08/10/20 324 lb 11.8 oz (147.3 kg)   07/27/20 333 lb 5.4 oz (151.2 kg)   07/21/20 331 lb 9.2 oz (150.4 kg)   07/18/20 324 lb 8.3 oz (147.2 kg)   06/11/20 318 lb 5.5 oz (144.4 kg)   06/03/20 310 lb (140.6 kg)   05/09/20 311 lb 11.7 oz (141.4 kg)     Asthma has been flaring a lot. He was seen in ER, most recently 10/3; and has gotten steroid tapers. He is on inhalers below. Back pain:   Knee pain:   At this time, he is otherwise doing well and has brought no other complaints to my attention today. For a list of the medical issues addressed today, see the assessment and plan below. PMH:   Past Medical History:   Diagnosis Date    Arthritis     Asthma     Chronic low back pain     Hypertension        Past Surgical History:   Procedure Laterality Date    HX ORTHOPAEDIC         All: He is allergic to vicodin [hydrocodone-acetaminophen]. Current Outpatient Medications   Medication Sig    cloNIDine HCL (CATAPRES) 0.1 mg tablet Take 1 Tab by mouth two (2) times a day.  Can increase to two tabs twice a day if tolerated    cloNIDine HCL (CATAPRES) 0.2 mg tablet Take 0.5 Tabs by mouth two (2) times a day.    cloNIDine HCL (CATAPRES) 0.2 mg tablet TAKE ONE TABLET BY MOUTH 2 TIMES A DAY    amLODIPine (NORVASC) 10 mg tablet Take 1 Tab by mouth daily. Indications: high blood pressure    lisinopriL (PRINIVIL, ZESTRIL) 20 mg tablet Take 1 Tab by mouth daily.  sildenafiL, pulmonary hypertension, (REVATIO) 20 mg tablet Take 1-5 Tabs by mouth daily as needed (ED).  pantoprazole (PROTONIX) 40 mg tablet     furosemide (LASIX) 40 mg tablet Increase your morning dose to 40 mg for 5 days    cyclobenzaprine (FLEXERIL) 10 mg tablet Take 1 Tab by mouth nightly.  montelukast (Singulair) 10 mg tablet Take 1 Tab by mouth daily.  diclofenac EC (VOLTAREN) 50 mg EC tablet Take 1 Tab by mouth two (2) times a day.  fluticasone propionate (Flovent HFA) 220 mcg/actuation inhaler INHALE 1 OR 2 PUFFS 2 TIMES A DAY.  fluticasone propionate (Flonase Allergy Relief) 50 mcg/actuation nasal spray 2 Sprays by Both Nostrils route daily.  aspirin delayed-release 81 mg tablet Take 1 Tab by mouth daily.  LINZESS 290 mcg cap capsule     polyethylene glycol (MIRALAX) 17 gram packet Take 1 Packet by mouth daily.  Nebulizer Accessories kit Nebulizer cup w/ tubing; use every 4 hrs prn wheezing    albuterol (PROVENTIL VENTOLIN) 2.5 mg /3 mL (0.083 %) nebu 3 mL by Nebulization route every four (4) hours as needed for Wheezing.  cetirizine (ZYRTEC) 10 mg tablet Take 1 Tab by mouth daily.  VENTOLIN HFA 90 mcg/actuation inhaler Take 2 Puffs by inhalation every four (4) hours as needed for Wheezing or Shortness of Breath. Indications: bronchospasm prevention    busPIRone (BUSPAR) 10 mg tablet Take 1-2 Tabs by mouth three (3) times daily. No current facility-administered medications for this visit. FH: His family history includes Cancer in his mother; Heart Disease in his father. SH: He  reports that he has quit smoking. He has a 2.50 pack-year smoking history.  He has never used smokeless tobacco. He reports current alcohol use. He reports that he does not use drugs. ROS: See above; Complete ROS otherwise negative. OBJECTIVE:   Vitals:   Visit Vitals  BP (!) 170/97 (BP 1 Location: Left arm, BP Patient Position: Sitting)   Pulse 91   Temp 98.2 °F (36.8 °C) (Oral)   Resp 16   Ht 6' 3\" (1.905 m)   Wt 335 lb 3.2 oz (152 kg)   SpO2 96%   BMI 41.90 kg/m²      Gen: Pleasant, obese 54 y.o.  male in NAD. HEENT: PERRLA. EOMI. OP moist and pink. Neck: Supple. No LAD. HEART: RRR, No M/G/R.    LUNGS: CTAB No W/R. ABDOMEN: S, NT, ND, BS+. EXTREMITIES: Warm. No C/C/E. Bilateral compression stockings. L knee sleeve. MUSCULOSKELETAL: Normal ROM, muscle strength 5/5 all groups. NEURO: Alert and oriented x 3. Cranial nerves grossly intact. No focal sensory or motor deficits noted. SKIN: Warm. Dry. No rashes or other lesions noted. HEME: No LAD. PSYCH: Normal affect; Mood mildly anxious due to health issues. No psychomotor agitation Normal rate/rhythm/fluency of speech. Lab Results   Component Value Date/Time    Sodium 142 10/03/2020 04:56 AM    Potassium 3.9 10/03/2020 04:56 AM    Chloride 108 10/03/2020 04:56 AM    CO2 28 10/03/2020 04:56 AM    Anion gap 6 10/03/2020 04:56 AM    Glucose 95 10/03/2020 04:56 AM    BUN 11 10/03/2020 04:56 AM    Creatinine 0.85 10/03/2020 04:56 AM    BUN/Creatinine ratio 13 10/03/2020 04:56 AM    GFR est AA >60 10/03/2020 04:56 AM    GFR est non-AA >60 10/03/2020 04:56 AM    Calcium 9.0 10/03/2020 04:56 AM    Bilirubin, total 0.3 10/03/2020 04:56 AM    ALT (SGPT) 63 10/03/2020 04:56 AM    Alk.  phosphatase 109 10/03/2020 04:56 AM    Protein, total 7.3 10/03/2020 04:56 AM    Albumin 3.7 10/03/2020 04:56 AM    Globulin 3.6 10/03/2020 04:56 AM    A-G Ratio 1.0 (L) 10/03/2020 04:56 AM       Lab Results   Component Value Date/Time    Cholesterol, total 201 (H) 04/15/2019 10:40 AM    HDL Cholesterol 55 04/15/2019 10:40 AM    LDL, calculated 124 (H) 04/15/2019 10:40 AM    Triglyceride 108 04/15/2019 10:40 AM        Lab Results   Component Value Date/Time    Hemoglobin A1c 5.8 (H) 04/15/2019 10:40 AM       Lab Results   Component Value Date/Time    WBC 7.1 10/03/2020 04:56 AM    HGB 13.3 10/03/2020 04:56 AM    HCT 41.2 10/03/2020 04:56 AM    PLATELET 643 80/32/0734 04:56 AM    MCV 88.6 10/03/2020 04:56 AM         ASSESSMENT/ PLAN: Diagnoses and all orders for this visit:    1. Severe persistent asthma with acute exacerbation  -     albuterol (PROVENTIL VENTOLIN) 2.5 mg /3 mL (0.083 %) nebu; 3 mL by Nebulization route every four (4) hours as needed for Wheezing.  -     fluticasone propionate (Flovent HFA) 220 mcg/actuation inhaler; INHALE 1 OR 2 PUFFS 2 TIMES A DAY. -     montelukast (Singulair) 10 mg tablet; Take 1 Tab by mouth daily.  -     REFERRAL TO PULMONARY DISEASE    2. Erectile dysfunction, unspecified erectile dysfunction type  -     sildenafil citrate (Viagra) 100 mg tablet; Take 1 Tab by mouth as needed for Erectile Dysfunction. 3. Anxiety    4. Chronic left-sided low back pain without sciatica  -     cyclobenzaprine (FLEXERIL) 10 mg tablet; Take 1 Tab by mouth nightly. 5. Chronic midline low back pain without sciatica  -     diclofenac EC (VOLTAREN) 50 mg EC tablet; Take 1 Tab by mouth two (2) times a day. -     REFERRAL TO ORTHOPEDICS    6. Allergic rhinitis, unspecified seasonality, unspecified trigger  -     cetirizine (ZyrTEC) 10 mg tablet; Take 1 Tab by mouth daily. -     fluticasone propionate (Flonase Allergy Relief) 50 mcg/actuation nasal spray; 2 Sprays by Both Nostrils route daily. -     montelukast (Singulair) 10 mg tablet; Take 1 Tab by mouth daily. 7. Essential hypertension  -     cloNIDine HCL (CATAPRES) 0.2 mg tablet; Take 1 Tab by mouth two (2) times a day. Can increase to two tabs twice a day if tolerated  -     lisinopriL (PRINIVIL, ZESTRIL) 20 mg tablet; Take 1 Tab by mouth daily. -     aspirin delayed-release 81 mg tablet; Take 1 Tab by mouth daily.     8. Chronic bronchitis, unspecified chronic bronchitis type (HCC)  -     Ventolin HFA 90 mcg/actuation inhaler; Take 2 Puffs by inhalation every four (4) hours as needed for Wheezing or Shortness of Breath. Indications: bronchospasm prevention    9. Irritable bowel syndrome with constipation  -     Linzess 290 mcg cap capsule; Take 1 Cap by mouth daily. 10. Gastroesophageal reflux disease without esophagitis  -     pantoprazole (PROTONIX) 40 mg tablet; Take 1 Tab by mouth daily. 11. Edema, unspecified type  -     furosemide (LASIX) 40 mg tablet; Increase your morning dose to 40 mg for 5 days  -     ECHO ADULT COMPLETE; Future    12. Chronic pain of left knee  -     REFERRAL TO ORTHOPEDICS         Follow-up and Dispositions    · Return in about 4 months (around 2/12/2021) for HTN. I have reviewed the patient's medications and risks/side effects/benefits were discussed. Diagnosis(-es) explained to patient and questions answered. Literature provided where appropriate.

## 2020-10-12 NOTE — PATIENT INSTRUCTIONS
Office Policies Phone calls/patient messages: Please allow up to 24 hours for someone in the office to contact you about your call or message. Be mindful your provider may be out of the office or your message may require further review. We encourage you to use "[employer] for your messages as this is a faster, more efficient way to communicate with our office Medication Refills: 
         
Prescription medications require 48-72 business hours to process. We encourage you to use "[employer] for your refills. For controlled medications: Please allow 72 business hours to process. Certain medications may require you to  a written prescription at our office. NO narcotic/controlled medications will be prescribed after 4pm Monday through Friday or on weekends Form/Paperwork Completion: 
         
Please note a $25 fee may incur for all paperwork for completed by our providers. We ask that you allow 7-10 business days. Pre-payment is due prior to picking up/faxing the completed form. You may also download your forms to "[employer] to have your doctor print off. 
 
 
1. Have you been to the ER, urgent care clinic since your last visit? Hospitalized since your last visit?no 2. Have you seen or consulted any other health care providers outside of the [address] [place] [address] since your last visit? Include any pap smears or colon screening.  no

## 2020-10-14 ENCOUNTER — TELEPHONE (OUTPATIENT)
Dept: INTERNAL MEDICINE CLINIC | Age: 55
End: 2020-10-14

## 2020-11-01 ENCOUNTER — HOSPITAL ENCOUNTER (EMERGENCY)
Age: 55
Discharge: HOME | End: 2020-11-01
Attending: EMERGENCY MEDICINE | Admitting: EMERGENCY MEDICINE
Payer: MEDICARE

## 2020-11-01 ENCOUNTER — APPOINTMENT (OUTPATIENT)
Dept: GENERAL RADIOLOGY | Age: 55
End: 2020-11-01
Attending: EMERGENCY MEDICINE
Payer: MEDICARE

## 2020-11-01 VITALS
OXYGEN SATURATION: 93 % | WEIGHT: 315 LBS | HEART RATE: 99 BPM | RESPIRATION RATE: 15 BRPM | DIASTOLIC BLOOD PRESSURE: 91 MMHG | HEIGHT: 75 IN | BODY MASS INDEX: 39.17 KG/M2 | TEMPERATURE: 98.2 F | SYSTOLIC BLOOD PRESSURE: 165 MMHG

## 2020-11-01 DIAGNOSIS — R06.02 SOB (SHORTNESS OF BREATH): ICD-10-CM

## 2020-11-01 DIAGNOSIS — J45.51 SEVERE PERSISTENT ASTHMA WITH ACUTE EXACERBATION: ICD-10-CM

## 2020-11-01 DIAGNOSIS — J45.21 MILD INTERMITTENT ASTHMA WITH ACUTE EXACERBATION: Primary | ICD-10-CM

## 2020-11-01 DIAGNOSIS — F41.9 ANXIETY DISORDER, UNSPECIFIED TYPE: ICD-10-CM

## 2020-11-01 DIAGNOSIS — I10 ACCELERATED HYPERTENSION: ICD-10-CM

## 2020-11-01 LAB
ALBUMIN SERPL-MCNC: 3.5 G/DL (ref 3.5–5)
ALBUMIN/GLOB SERPL: 1.1 {RATIO} (ref 1.1–2.2)
ALP SERPL-CCNC: 94 U/L (ref 45–117)
ALT SERPL-CCNC: 60 U/L (ref 12–78)
ANION GAP SERPL CALC-SCNC: 8 MMOL/L (ref 5–15)
AST SERPL-CCNC: 44 U/L (ref 15–37)
BASOPHILS # BLD: 0 K/UL (ref 0–0.1)
BASOPHILS NFR BLD: 0 % (ref 0–1)
BILIRUB SERPL-MCNC: 0.2 MG/DL (ref 0.2–1)
BNP SERPL-MCNC: 15 PG/ML
BUN SERPL-MCNC: 9 MG/DL (ref 6–20)
BUN/CREAT SERPL: 12 (ref 12–20)
CALCIUM SERPL-MCNC: 8.8 MG/DL (ref 8.5–10.1)
CHLORIDE SERPL-SCNC: 108 MMOL/L (ref 97–108)
CK MB CFR SERPL CALC: 1.4 % (ref 0–2.5)
CK MB SERPL-MCNC: 5.7 NG/ML (ref 5–25)
CK SERPL-CCNC: 405 U/L (ref 39–308)
CO2 SERPL-SCNC: 25 MMOL/L (ref 21–32)
CREAT SERPL-MCNC: 0.76 MG/DL (ref 0.7–1.3)
DIFFERENTIAL METHOD BLD: NORMAL
EOSINOPHIL # BLD: 0.4 K/UL (ref 0–0.4)
EOSINOPHIL NFR BLD: 5 % (ref 0–7)
ERYTHROCYTE [DISTWIDTH] IN BLOOD BY AUTOMATED COUNT: 13.7 % (ref 11.5–14.5)
GLOBULIN SER CALC-MCNC: 3.2 G/DL (ref 2–4)
GLUCOSE SERPL-MCNC: 113 MG/DL (ref 65–100)
HCT VFR BLD AUTO: 41 % (ref 36.6–50.3)
HGB BLD-MCNC: 13.2 G/DL (ref 12.1–17)
IMM GRANULOCYTES # BLD AUTO: 0 K/UL (ref 0–0.04)
IMM GRANULOCYTES NFR BLD AUTO: 0 % (ref 0–0.5)
LYMPHOCYTES # BLD: 1.7 K/UL (ref 0.8–3.5)
LYMPHOCYTES NFR BLD: 25 % (ref 12–49)
MAGNESIUM SERPL-MCNC: 1.8 MG/DL (ref 1.6–2.4)
MCH RBC QN AUTO: 28.3 PG (ref 26–34)
MCHC RBC AUTO-ENTMCNC: 32.2 G/DL (ref 30–36.5)
MCV RBC AUTO: 88 FL (ref 80–99)
MONOCYTES # BLD: 0.7 K/UL (ref 0–1)
MONOCYTES NFR BLD: 11 % (ref 5–13)
NEUTS SEG # BLD: 4 K/UL (ref 1.8–8)
NEUTS SEG NFR BLD: 59 % (ref 32–75)
NRBC # BLD: 0 K/UL (ref 0–0.01)
NRBC BLD-RTO: 0 PER 100 WBC
PLATELET # BLD AUTO: 209 K/UL (ref 150–400)
PMV BLD AUTO: 10.3 FL (ref 8.9–12.9)
POTASSIUM SERPL-SCNC: 4.2 MMOL/L (ref 3.5–5.1)
PROT SERPL-MCNC: 6.7 G/DL (ref 6.4–8.2)
RBC # BLD AUTO: 4.66 M/UL (ref 4.1–5.7)
SODIUM SERPL-SCNC: 141 MMOL/L (ref 136–145)
TROPONIN I SERPL-MCNC: <0.05 NG/ML
WBC # BLD AUTO: 6.9 K/UL (ref 4.1–11.1)

## 2020-11-01 PROCEDURE — 94640 AIRWAY INHALATION TREATMENT: CPT

## 2020-11-01 PROCEDURE — 99284 EMERGENCY DEPT VISIT MOD MDM: CPT

## 2020-11-01 PROCEDURE — 77030029684 HC NEB SM VOL KT MONA -A

## 2020-11-01 PROCEDURE — 82553 CREATINE MB FRACTION: CPT

## 2020-11-01 PROCEDURE — 36415 COLL VENOUS BLD VENIPUNCTURE: CPT

## 2020-11-01 PROCEDURE — 82550 ASSAY OF CK (CPK): CPT

## 2020-11-01 PROCEDURE — 80053 COMPREHEN METABOLIC PANEL: CPT

## 2020-11-01 PROCEDURE — 85025 COMPLETE CBC W/AUTO DIFF WBC: CPT

## 2020-11-01 PROCEDURE — 74011250636 HC RX REV CODE- 250/636: Performed by: EMERGENCY MEDICINE

## 2020-11-01 PROCEDURE — 74011250637 HC RX REV CODE- 250/637: Performed by: EMERGENCY MEDICINE

## 2020-11-01 PROCEDURE — 83880 ASSAY OF NATRIURETIC PEPTIDE: CPT

## 2020-11-01 PROCEDURE — 84484 ASSAY OF TROPONIN QUANT: CPT

## 2020-11-01 PROCEDURE — 83735 ASSAY OF MAGNESIUM: CPT

## 2020-11-01 PROCEDURE — 93005 ELECTROCARDIOGRAM TRACING: CPT

## 2020-11-01 PROCEDURE — 74011000250 HC RX REV CODE- 250: Performed by: EMERGENCY MEDICINE

## 2020-11-01 PROCEDURE — 2709999900 HC NON-CHARGEABLE SUPPLY

## 2020-11-01 PROCEDURE — 71045 X-RAY EXAM CHEST 1 VIEW: CPT

## 2020-11-01 PROCEDURE — 96374 THER/PROPH/DIAG INJ IV PUSH: CPT

## 2020-11-01 RX ORDER — GUAIFENESIN DEXTROMETHORPHAN HYDROBROMIDE ORAL SOLUTION 10; 100 MG/5ML; MG/5ML
10 SOLUTION ORAL
Qty: 236 ML | Refills: 0 | Status: SHIPPED | OUTPATIENT
Start: 2020-11-01 | End: 2022-01-27

## 2020-11-01 RX ORDER — PREDNISONE 50 MG/1
50 TABLET ORAL DAILY
Qty: 5 TAB | Refills: 0 | Status: SHIPPED | OUTPATIENT
Start: 2020-11-01 | End: 2020-11-04 | Stop reason: SDUPTHER

## 2020-11-01 RX ORDER — ALBUTEROL SULFATE 0.83 MG/ML
2.5 SOLUTION RESPIRATORY (INHALATION)
Qty: 90 NEBULE | Refills: 0 | Status: SHIPPED | OUTPATIENT
Start: 2020-11-01 | End: 2021-11-12 | Stop reason: SDUPTHER

## 2020-11-01 RX ORDER — NITROGLYCERIN 0.4 MG/1
0.4 TABLET SUBLINGUAL
Status: COMPLETED | OUTPATIENT
Start: 2020-11-01 | End: 2020-11-01

## 2020-11-01 RX ORDER — HYDROXYZINE 50 MG/1
50 TABLET, FILM COATED ORAL
Qty: 20 TAB | Refills: 0 | Status: SHIPPED | OUTPATIENT
Start: 2020-11-01 | End: 2020-11-11

## 2020-11-01 RX ORDER — IPRATROPIUM BROMIDE AND ALBUTEROL SULFATE 2.5; .5 MG/3ML; MG/3ML
3 SOLUTION RESPIRATORY (INHALATION)
Status: COMPLETED | OUTPATIENT
Start: 2020-11-01 | End: 2020-11-01

## 2020-11-01 RX ORDER — LORAZEPAM 2 MG/ML
0.5 INJECTION INTRAMUSCULAR ONCE
Status: COMPLETED | OUTPATIENT
Start: 2020-11-01 | End: 2020-11-01

## 2020-11-01 RX ADMIN — NITROGLYCERIN 0.4 MG: 0.4 TABLET, ORALLY DISINTEGRATING SUBLINGUAL at 04:25

## 2020-11-01 RX ADMIN — LORAZEPAM 0.5 MG: 2 INJECTION INTRAMUSCULAR; INTRAVENOUS at 05:10

## 2020-11-01 RX ADMIN — NITROGLYCERIN 0.4 MG: 0.4 TABLET, ORALLY DISINTEGRATING SUBLINGUAL at 04:37

## 2020-11-01 RX ADMIN — IPRATROPIUM BROMIDE AND ALBUTEROL SULFATE 3 ML: .5; 3 SOLUTION RESPIRATORY (INHALATION) at 04:13

## 2020-11-01 NOTE — ED PROVIDER NOTES
EMERGENCY DEPARTMENT HISTORY AND PHYSICAL EXAM      Please note that this dictation was completed with the assistance of \"Dragon\", the computer voice recognition software. Quite often unanticipated grammatical, syntax, homophones, and other interpretive errors are inadvertently transcribed by the computer software. Please disregard these errors and any errors that have escaped final proofreading. Thank you. Patient Name: Guera Mccracken  : 1965  MRN: [id number]  History of Presenting Illness     Chief Complaint   Patient presents with    Shortness of Breath     Patient ambulatory to triage w c/o respiratory distress onset about 60 min PTA reports use of his inhaler       History Provided By: Patient and EMS    HPI: Guera Mccracken, 54 y.o. male with past medical history as documented below presents to the ED with c/o of acute onset of SOB and wheezing about 60 mins PTA. Per EMS, pt called them due to acute onset of sx's. Pt states sx's woke him up from sleep. He tried to use his inhaler without relief of sx's. He states he has a hx of anxiety and is requesting an anxiolytic. He denies chest pain, cough, or fever. He reports no leg swelling, recent prolonged travel or hx of DVT. He denies any intubation history for his asthma. He denies sick contacts or exposure to COVID-19. Pt denies any other alleviating or exacerbating factors. Additionally, pt specifically denies any recent fever, chills, headache, nausea, vomiting, abdominal pain, CP, lightheadedness, dizziness, numbness, weakness, lower extremity swelling, heart palpitations, urinary sxs, diarrhea, constipation, melena, hematochezia, cough, or congestion. There are no other complaints, changes or physical findings pertinent to the HPI at this time.     PCP: Kiko Cain MD    Past History   Past Medical History:  Past Medical History:   Diagnosis Date    Arthritis     Asthma     Chronic low back pain     Hypertension        Past Surgical History:  Past Surgical History:   Procedure Laterality Date    HX ORTHOPAEDIC         Family History:  Family History   Problem Relation Age of Onset    Cancer Mother     Heart Disease Father        Social History:  Social History     Tobacco Use    Smoking status: Former Smoker     Packs/day: 0.25     Years: 10.00     Pack years: 2.50    Smokeless tobacco: Never Used   Substance Use Topics    Alcohol use: Yes     Comment: occ    Drug use: No       Allergies: Allergies   Allergen Reactions    Vicodin [Hydrocodone-Acetaminophen] Other (comments)     Headache       Current Medications:  No current facility-administered medications on file prior to encounter. Current Outpatient Medications on File Prior to Encounter   Medication Sig Dispense Refill    albuterol (PROVENTIL VENTOLIN) 2.5 mg /3 mL (0.083 %) nebu 3 mL by Nebulization route every four (4) hours as needed for Wheezing. 90 Nebule 0    cloNIDine HCL (CATAPRES) 0.2 mg tablet Take 1 Tab by mouth two (2) times a day. Can increase to two tabs twice a day if tolerated 180 Tab 3    cetirizine (ZyrTEC) 10 mg tablet Take 1 Tab by mouth daily. 20 Tab 0    cyclobenzaprine (FLEXERIL) 10 mg tablet Take 1 Tab by mouth nightly. 30 Tab 0    diclofenac EC (VOLTAREN) 50 mg EC tablet Take 1 Tab by mouth two (2) times a day. 180 Tab 0    fluticasone propionate (Flonase Allergy Relief) 50 mcg/actuation nasal spray 2 Sprays by Both Nostrils route daily. 1 Bottle 5    fluticasone propionate (Flovent HFA) 220 mcg/actuation inhaler INHALE 1 OR 2 PUFFS 2 TIMES A DAY. 3 Inhaler 0    furosemide (LASIX) 40 mg tablet Increase your morning dose to 40 mg for 5 days 5 Tab 0    Linzess 290 mcg cap capsule Take 1 Cap by mouth daily. 90 Cap 3    lisinopriL (PRINIVIL, ZESTRIL) 20 mg tablet Take 1 Tab by mouth daily. 90 Tab 0    montelukast (Singulair) 10 mg tablet Take 1 Tab by mouth daily.  90 Tab 0    pantoprazole (PROTONIX) 40 mg tablet Take 1 Tab by mouth daily. 90 Tab 3    Ventolin HFA 90 mcg/actuation inhaler Take 2 Puffs by inhalation every four (4) hours as needed for Wheezing or Shortness of Breath. Indications: bronchospasm prevention 3 Inhaler 3    aspirin delayed-release 81 mg tablet Take 1 Tab by mouth daily. 100 Tab 3    sildenafil citrate (Viagra) 100 mg tablet Take 1 Tab by mouth as needed for Erectile Dysfunction. 6 Tab 5    cloNIDine HCL (CATAPRES) 0.2 mg tablet Take 0.5 Tabs by mouth two (2) times a day. 180 Tab 0    cloNIDine HCL (CATAPRES) 0.2 mg tablet TAKE ONE TABLET BY MOUTH 2 TIMES A  Tab 0    amLODIPine (NORVASC) 10 mg tablet Take 1 Tab by mouth daily. Indications: high blood pressure 90 Tab 0    busPIRone (BUSPAR) 10 mg tablet Take 1-2 Tabs by mouth three (3) times daily. 20 Tab 0    polyethylene glycol (MIRALAX) 17 gram packet Take 1 Packet by mouth daily. 1 Each 5    Nebulizer Accessories kit Nebulizer cup w/ tubing; use every 4 hrs prn wheezing 1 Kit 5     Review of Systems   Review of Systems   Constitutional: Negative. Negative for chills and fever. HENT: Negative. Negative for congestion, facial swelling, rhinorrhea, sore throat, trouble swallowing and voice change. Eyes: Negative. Respiratory: Positive for shortness of breath and wheezing. Negative for apnea, cough and chest tightness. Cardiovascular: Negative. Negative for chest pain, palpitations and leg swelling. Gastrointestinal: Negative. Negative for abdominal distention, abdominal pain, blood in stool, constipation, diarrhea, nausea and vomiting. Endocrine: Negative. Negative for cold intolerance, heat intolerance and polyuria. Genitourinary: Negative. Negative for difficulty urinating, dysuria, flank pain, frequency, hematuria and urgency. Musculoskeletal: Negative. Negative for arthralgias, back pain, myalgias, neck pain and neck stiffness. Skin: Negative. Negative for color change and rash. Neurological: Negative.   Negative for dizziness, syncope, facial asymmetry, speech difficulty, weakness, light-headedness, numbness and headaches. Hematological: Negative. Does not bruise/bleed easily. Psychiatric/Behavioral: Negative. Negative for confusion and self-injury. The patient is not nervous/anxious. Physical Exam   Physical Exam  Vitals signs and nursing note reviewed. Constitutional:       Appearance: He is well-developed. He is not toxic-appearing. HENT:      Head: Normocephalic and atraumatic. Mouth/Throat:      Pharynx: No posterior oropharyngeal erythema. Eyes:      Conjunctiva/sclera: Conjunctivae normal.      Pupils: Pupils are equal, round, and reactive to light. Neck:      Musculoskeletal: Normal range of motion. Cardiovascular:      Rate and Rhythm: Normal rate and regular rhythm. Heart sounds: Normal heart sounds. No murmur. No friction rub. No gallop. Pulmonary:      Effort: Tachypnea and accessory muscle usage present. No respiratory distress. Breath sounds: Examination of the right-upper field reveals wheezing. Examination of the left-upper field reveals wheezing. Examination of the right-middle field reveals wheezing. Examination of the left-middle field reveals wheezing. Examination of the right-lower field reveals wheezing. Examination of the left-lower field reveals wheezing. Wheezing present. No rales. Chest:      Chest wall: No tenderness. Abdominal:      General: Bowel sounds are normal. There is no distension. Palpations: Abdomen is soft. There is no mass. Tenderness: There is no abdominal tenderness. There is no guarding or rebound. Musculoskeletal: Normal range of motion. General: No tenderness or deformity. Skin:     General: Skin is warm. Findings: No rash. Neurological:      Mental Status: He is alert and oriented to person, place, and time. Cranial Nerves: No cranial nerve deficit. Motor: No abnormal muscle tone.       Coordination: Coordination normal.      Deep Tendon Reflexes: Reflexes normal.   Psychiatric:         Behavior: Behavior is cooperative. Diagnostic Study Results     Labs -   I have personally reviewed and interpreted all laboratory results. Recent Results (from the past 24 hour(s))   CBC WITH AUTOMATED DIFF    Collection Time: 11/01/20  4:17 AM   Result Value Ref Range    WBC 6.9 4.1 - 11.1 K/uL    RBC 4.66 4.10 - 5.70 M/uL    HGB 13.2 12.1 - 17.0 g/dL    HCT 41.0 36.6 - 50.3 %    MCV 88.0 80.0 - 99.0 FL    MCH 28.3 26.0 - 34.0 PG    MCHC 32.2 30.0 - 36.5 g/dL    RDW 13.7 11.5 - 14.5 %    PLATELET 987 652 - 293 K/uL    MPV 10.3 8.9 - 12.9 FL    NRBC 0.0 0  WBC    ABSOLUTE NRBC 0.00 0.00 - 0.01 K/uL    NEUTROPHILS 59 32 - 75 %    LYMPHOCYTES 25 12 - 49 %    MONOCYTES 11 5 - 13 %    EOSINOPHILS 5 0 - 7 %    BASOPHILS 0 0 - 1 %    IMMATURE GRANULOCYTES 0 0.0 - 0.5 %    ABS. NEUTROPHILS 4.0 1.8 - 8.0 K/UL    ABS. LYMPHOCYTES 1.7 0.8 - 3.5 K/UL    ABS. MONOCYTES 0.7 0.0 - 1.0 K/UL    ABS. EOSINOPHILS 0.4 0.0 - 0.4 K/UL    ABS. BASOPHILS 0.0 0.0 - 0.1 K/UL    ABS. IMM.  GRANS. 0.0 0.00 - 0.04 K/UL    DF AUTOMATED          CBC WITH AUTOMATED DIFF (Final result)    Component (Lab Inquiry)   Collection Time  Result Time  WBC  RBC  HGB  HCT  MCV  MCH  MCHC  RDW  PLT  MPLV    11/01/20 04:17:00  11/01/20 04:35:23  6.9  4.66  13.2  41.0  88.0  28.3  32.2  13.7  209  10.3    Previous Results    10/03/20 04:56:00  10/03/20 05:13:14  7.1  4.65  13.3  41.2  88.6  28.6  32.3  13.3  184  10.2    09/22/20 11:11:00  09/22/20 11:19:05  6.8  4.98  14.3  43.7  87.8  28.7  32.7  13.3  210  10.6    08/10/20 15:17:00  08/10/20 15:37:57  9.1  5.16  14.6  46.6  90.3  28.3  31.3  12.9  220  10.7    07/27/20 11:44:00  07/27/20 11:57:36  6.0  4.72  13.5  42.6  90.3  28.6  31.7  13.2  189  10.9    07/21/20 09:49:00  07/21/20 10:03:57  7.1  4.80  13.7  43.7  91.0  28.5  31.4  13.5  216  10.8         Collection Time  Result Time  [facility] GRANS  LYMPH  MONOS  EOS  BASOS  IG  ABG  ABL    11/01/20 04:17:00  11/01/20 04:35:23  0.0  0.00  59  25  11  5  0  0  4.0  1.7    Previous Results    10/03/20 04:56:00  10/03/20 05:13:14  0.0  0.00  64  25  7  4  0  0  4.5  1.7    09/22/20 11:11:00  09/22/20 11:19:05  0.0  0.00  66  21  10  3  0  0  4.5  1.4    08/10/20 15:17:00  08/10/20 15:37:57  0.0  0.00  74  16  7  2  0  1High    6.8  1.4    07/27/20 11:44:00  07/27/20 11:57:36  0.0  0.00  64  24  8  4  0  0  3.8  1.4    07/21/20 09:49:00  07/21/20 10:03:57  0.0  0.00  66  22  9  3  0  0  4.7  1.6         Collection Time  Result Time  ABM  MELISSA  ABB  AIG  DF    11/01/20 04:17:00  11/01/20 04:35:23  0.7  0.4  0.0  0.0  AUTOMATED    Previous Results    10/03/20 04:56:00  10/03/20 05:13:14  0.5  0.3  0.0  0.0  AUTOMATED    09/22/20 11:11:00  09/22/20 11:19:05  0.7  0.2  0.0  0.0  AUTOMATED    08/10/20 15:17:00  08/10/20 15:37:57  0.7  0.2  0.0  0.1High    AUTOMATED    07/27/20 11:44:00  07/27/20 11:57:36  0.5  0.3  0.0  0.0  AUTOMATED    07/21/20 09:49:00  07/21/20 10:03:57  0.6  0.2  0.0  0.0  AUTOMATED           Final result                             Contains abnormal data  METABOLIC PANEL, COMPREHENSIVE (Final result)    Component (Lab Inquiry)   Collection Time  Result Time  NA  K  CL  CO2  AGAP  GLU  BUN  CREA  BUCR  GFRAA    11/01/20 04:17:00  11/01/20 05:27:18  141  4.2  108  25  8  113High    9  0.76  12  >60    Previous Results    10/03/20 04:56:00  10/03/20 05:34:34  142  3.9  108  28  6  95  11  0.85  13  >60    09/22/20 11:11:00  09/22/20 11:46:34  138  4.3  105  29  4Low    100  18  1.04  17  >60    08/10/20 15:17:00  08/10/20 16:03:16  138  4.2  103  32  3Low    98  11  1.26  9Low    >60    07/27/20 11:44:00  07/27/20 12:25:36  139  4.5  105  30  4Low    106High    15  0.97  15  >60    07/21/20 09:49:00  07/21/20 10:30:06  139  4.2  105  31  3Low    104High    8  0.81  10Low    >60         Collection Time  Result Time  GFRNA  CA  TBIL  GPT  SGOT AP  TP  ALB  GLOB  AGRAT    11/01/20 04:17:00  11/01/20 05:27:18  >60   Estimated GFR is calcu. ..  8.8  0.2  60  44High    94  6.7  3.5  3.2  1.1    Previous Results    10/03/20 04:56:00  10/03/20 05:34:34  >60   Estimated GFR is calcu. ..  9.0  0.3  63  52High    109  7.3  3.7  3.6  1.0Low      09/22/20 11:11:00  09/22/20 11:46:34  >60   Estimated GFR is calcu. ..  9.1  0.4  92High    70High    127High    7.6  3.9  3.7  1.1    08/10/20 15:17:00  08/10/20 16:03:16  59   Estimated GFR is calcu. [facility] [place] Low    9.2  0.3  80High    47High    110  7.9  4.1  3.8  1.1    07/27/20 11:44:00  07/27/20 12:25:36  >60  9.1  0.3  66  44High    95  7.4  3.7  3.7  1.0Low      07/21/20 09:49:00  07/21/20 10:30:06  >60   Estimated GFR is calcu. ..  9.7  0.3  73  47High    106  7.3  3.7  3.6  1.0Low           Final result                           TROPONIN I (Final result)    Component (Lab Inquiry)   Collection Time  Result Time  TROIQ    11/01/20 04:17:00  11/01/20 05:26:17  <0.05   The presence of detect. .. Previous Results    10/03/20 04:56:00  10/03/20 05:34:34  <0.05   The presence of detect. ..    09/22/20 11:11:00  09/22/20 11:46:34  <0.05   The presence of detect. ..    08/10/20 15:17:00  08/10/20 16:03:16  <0.05   The presence of detect. ..    07/21/20 09:49:00  07/21/20 11:02:01  <0.05   The presence of detect. ..    05/09/20 16:36:00  05/09/20 17:32:08  <0.05   The presence of detect. ..           Final result                             Contains abnormal data  CK W/ REFLX CKMB (Final result)    Component (Lab Inquiry)   Collection Time  Result Time  CKELE    11/01/20 04:17:00  11/01/20 05:39:39  405High      Previous Results    09/22/20 11:11:00  09/22/20 12:01:28  689High      08/10/20 15:17:00  08/10/20 16:23:14  491High      06/13/19 00:01:00  06/13/19 00:55:39  513High      02/22/19 02:58:00  02/22/19 03:46:46  622High           Final result                           MAGNESIUM (Final result)    Component (Lab Inquiry) Collection Time  Result Time  MG    11/01/20 04:17:00  11/01/20 05:27:18  1.8    Previous Results    10/03/20 04:56:00  10/03/20 05:34:34  2.2    06/14/19 03:17:00  06/14/19 04:51:40  2.2           Final result                             NT-PRO BNP (Final result)    Component (Lab Inquiry)   Collection Time  Result Time  PBNP    11/01/20 04:17:00  11/01/20 05:26:17  15     NT-proBNP is high. .. Previous Results    10/03/20 04:56:00  10/03/20 05:34:34  48     NT-proBNP is high. ..    09/22/20 11:11:00  09/22/20 11:46:34  22     NT-proBNP is high. ..    08/10/20 15:17:00  08/10/20 16:03:16  20     NT-proBNP is high. ..    07/27/20 11:44:00  07/27/20 12:25:26  26     NT-proBNP is high. ..    05/09/20 16:36:00  05/09/20 17:32:07  15     NT-proBNP is high. ..           Final result                             Contains abnormal data  CK-MB,QUANT. (Final result)    Component (Lab Inquiry)   Collection Time  Result Time  CPKMB  CKNDX    11/01/20 04:17:00  11/01/20 05:39:39  5.7   CK MB increases in 3-4. [place] High    1.4   Elevation of both CK M. .. Previous Results    09/22/20 11:11:00  09/22/20 12:01:28  6.0   CK MB increases in 3-4. [place] High    0.9   Elevation of both CK M. ..    08/10/20 15:17:00  08/10/20 16:23:14  4.2   CK MB increases in 3-4. [place] High    0.9   Elevation of both CK M...    10/14/19 00:03:00  10/14/19 01:03:19  3.8   CK MB increases in 3-4. [place] High    1.1   Elevation of both CK M...    06/14/19 03:17:00  06/14/19 04:51:40  2.5   CK MB increases in 3-4. ..  1.1   Elevation of both CK M...    06/13/19 00:01:00  06/13/19 00:55:39  5.9   CK MB increases in 3-4. [place] High    1.2   Elevation of both CK M. ..         Final result                          Radiologic Studies -   I have personally reviewed and interpreted all imaging studies and agree with radiology interpretation and report. XR CHEST PORT   Final Result   IMPRESSION:      No acute process.            CT Results  (Last 48 hours)    None        CXR Results  (Last 48 hours)               11/01/20 0415  XR CHEST PORT Final result    Impression:  IMPRESSION:       No acute process. Narrative:  EXAM:  XR CHEST PORT       INDICATION: Shortness of breath       COMPARISON: 10/3/2020       TECHNIQUE: Portable AP upright chest view at 0412 hours       FINDINGS: Cardiac monitoring wires overlie the thorax. The cardiomediastinal   contours are stable. The pulmonary vasculature is within normal limits. The lungs and pleural spaces are clear. There is no pneumothorax. The bones and   upper abdomen are stable. Medical Decision Making   I reviewed the vital signs, available nursing notes, past medical history, past surgical history, family history and social history. Vital Signs-Reviewed the patient's vital signs. Patient Vitals for the past 24 hrs:   Temp Pulse Resp BP SpO2   11/01/20 0353 98.2 °F (36.8 °C) (!) 104 26 (!) 160/144 97 %       Pulse Oximetry Analysis - 97% on RA    Cardiac Monitor:   Rate: 97 bpm  Rhythm: Normal Sinus Rhythm      ED EKG interpretation:  Rhythm: normal sinus rhythm; and regular . Rate (approx.): 100; Axis: normal; P wave: normal; QRS interval: normal ; ST/T wave: normal; Other findings: normal. This EKG was interpreted by Aly Fregoso M.D. Records Reviewed: Nursing Notes, Old Medical Records, Previous electrocardiograms, Previous Radiology Studies and Previous Laboratory Studies    Provider Notes (Medical Decision Making):   Patient presents with acute dyspnea. DDx: asthma, copd, pna, pulmonary edema, acute bronchitis, ACS, ptx, pna. Will obtain EKG, labs, CXR, provide O2 as needed for hypoxia, treat symptomatically and reassess. Will continue to monitor closely in ED. ED Course:   I am the first provider for this patient's ED visit today. Initial assessment performed. I discussed presenting problems, concerns and my formulated plan for today's visit with the patient and any available family members at bedside.  I encouraged them to ask questions as they arise throughout the visit. HYPERTENSION COUNSELING  For 10 minutes, education was provided to the patient today regarding their hypertension. Patient is made aware of their elevated blood pressure and is instructed to follow up this week with their Primary Care for a recheck. Patient is counseled regarding consequences of chronic, uncontrolled hypertension including kidney disease, heart disease, stroke or even death. Patient states their understanding and agrees to follow up this week. Additionally, during their visit, I discussed sodium restriction, maintaining ideal body weight and regular exercise program as physiologic means to achieve blood pressure control. The patient will strive towards this. I reviewed our electronic medical record system for any past medical records that were available that may contribute to the patient's current condition, the nursing notes and vital signs from today's visit. Tadeo Brennan MD    ED Orders Placed :  Orders Placed This Encounter    XR CHEST PORT    CBC WITH AUTOMATED DIFF    METABOLIC PANEL, COMPREHENSIVE    TROPONIN I    CK W/REFLEX CKMB    MAGNESIUM    NT-PRO BNP    CARDIAC/RESPIRATORY MONITORING    EKG 12 LEAD INITIAL    INSERT PERIPHERAL IV ONE TIME STAT    albuterol-ipratropium (DUO-NEB) 2.5 MG-0.5 MG/3 ML    nitroglycerin (NITROSTAT) tablet 0.4 mg    LORazepam (ATIVAN) injection 0.5 mg       ED Medications Administered:  Medications   albuterol-ipratropium (DUO-NEB) 2.5 MG-0.5 MG/3 ML (3 mL Nebulization Given 11/1/20 0970)   nitroglycerin (NITROSTAT) tablet 0.4 mg (0.4 mg SubLINGual Given 11/1/20 7887)   LORazepam (ATIVAN) injection 0.5 mg (0.5 mg IntraVENous Given 11/1/20 0510)         Progress Note:  The patient has been re-examined after nebulizer treatments and states that they are feeling better and have no new complaints. Stable vitals without hypoxia.  On auscultation, wheezing is significantly improved. The patient expresses understanding and agreement with diagnosis, care plan; including oral steroids, nebulizer or inhaler use, follow up and return instructions. The patient agrees to return in 24 hours if their symptoms are not improving or immediately if they have any change in their condition including worsening wheezing or any signs of increasing work of breathing. Progress Note:  Patient has been reassessed and reports feeling better and symptoms have improved significantly after ED treatment. Geremias Johns Jr's final labs and imaging have been reviewed with him and available family and/or caregiver. They have been counseled regarding his diagnosis. He verbally conveys understanding and agreement of the signs, symptoms, diagnosis, treatment and prognosis and additionally agrees to follow up as recommended with Dr. Elise Reinoso MD and/or specialist in 24 - 48 hours. He also agrees with the care-plan we created together and conveys that all of his questions have been answered. I have also put together some discharge instructions for him that include: 1) educational information regarding their diagnosis, 2) how to care for their diagnosis at home, as well a 3) list of reasons why they would want to return to the ED prior to their follow-up appointment should the patient's condition change or symptoms worsen. I have answered all questions to the patient's satisfaction. Strict return precautions given. He both understood and agreed with plan as discussed. Vital signs stable for discharge. Pt very appreciative of care today. Disposition: Discharge  The pt is ready for discharge. The pt's signs, symptoms, diagnosis, and discharge instructions have been discussed and pt and/or family have conveyed their understanding. The pt is to follow up as recommended or return to ER should their symptoms worsen. Plan has been discussed and all parties are in full agreement. Plan:  1.  Return precautions as discussed with patient and available family and/or caregiver. 2.   Discharge Medication List as of 11/1/2020  5:37 AM      START taking these medications    Details   predniSONE (DELTASONE) 50 mg tablet Take 1 Tab by mouth daily for 5 days. , Normal, Disp-5 Tab,R-0      guaiFENesin-dextromethorphan (TUSSI-ORGANIDIN DM)  mg/5 mL liqd Take 10 mL by mouth every four (4) hours as needed for Cough or Congestion. , Normal, Disp-236 mL,R-0      hydrOXYzine HCL (ATARAX) 50 mg tablet Take 1 Tab by mouth every six (6) hours as needed for Anxiety for up to 10 days. , Normal, Disp-20 Tab,R-0         CONTINUE these medications which have CHANGED    Details   albuterol (PROVENTIL VENTOLIN) 2.5 mg /3 mL (0.083 %) nebu 3 mL by Nebulization route every four (4) hours as needed for Wheezing., Normal, Disp-90 Nebule,R-0         CONTINUE these medications which have NOT CHANGED    Details   !! cloNIDine HCL (CATAPRES) 0.2 mg tablet Take 1 Tab by mouth two (2) times a day. Can increase to two tabs twice a day if tolerated, Normal, Disp-180 Tab,R-3      cetirizine (ZyrTEC) 10 mg tablet Take 1 Tab by mouth daily. , Normal, Disp-20 Tab,R-0      cyclobenzaprine (FLEXERIL) 10 mg tablet Take 1 Tab by mouth nightly., Normal, Disp-30 Tab,R-0      diclofenac EC (VOLTAREN) 50 mg EC tablet Take 1 Tab by mouth two (2) times a day., Normal, Disp-180 Tab,R-0      fluticasone propionate (Flonase Allergy Relief) 50 mcg/actuation nasal spray 2 Sprays by Both Nostrils route daily. , Normal, Disp-1 Bottle,R-5      fluticasone propionate (Flovent HFA) 220 mcg/actuation inhaler INHALE 1 OR 2 PUFFS 2 TIMES A DAY., Normal, Disp-3 Inhaler,R-0      furosemide (LASIX) 40 mg tablet Increase your morning dose to 40 mg for 5 days, Normal, Disp-5 Tab,R-0      Linzess 290 mcg cap capsule Take 1 Cap by mouth daily. , Normal, Disp-90 Cap,R-3,NIKO      lisinopriL (PRINIVIL, ZESTRIL) 20 mg tablet Take 1 Tab by mouth daily. , Normal, Disp-90 Tab,R-0Please cancel earlier script w/ 3 refills. Thanks. montelukast (Singulair) 10 mg tablet Take 1 Tab by mouth daily. , Normal, Disp-90 Tab,R-0      pantoprazole (PROTONIX) 40 mg tablet Take 1 Tab by mouth daily. , Normal, Disp-90 Tab,R-3      Ventolin HFA 90 mcg/actuation inhaler Take 2 Puffs by inhalation every four (4) hours as needed for Wheezing or Shortness of Breath. Indications: bronchospasm prevention, Normal, Disp-3 Inhaler,R-3,NIKO      aspirin delayed-release 81 mg tablet Take 1 Tab by mouth daily. , Normal, Disp-100 Tab,R-3      sildenafil citrate (Viagra) 100 mg tablet Take 1 Tab by mouth as needed for Erectile Dysfunction. , Normal, Disp-6 Tab,R-5      !! cloNIDine HCL (CATAPRES) 0.2 mg tablet Take 0.5 Tabs by mouth two (2) times a day., Normal, Disp-180 Tab,R-0      !! cloNIDine HCL (CATAPRES) 0.2 mg tablet TAKE ONE TABLET BY MOUTH 2 TIMES A DAY, Normal, Disp-180 Tab,R-0      amLODIPine (NORVASC) 10 mg tablet Take 1 Tab by mouth daily. Indications: high blood pressure, Normal, Disp-90 Tab,R-0      busPIRone (BUSPAR) 10 mg tablet Take 1-2 Tabs by mouth three (3) times daily. , Normal, Disp-20 Tab,R-0      polyethylene glycol (MIRALAX) 17 gram packet Take 1 Packet by mouth daily. , Normal, Disp-1 Each, R-5      Nebulizer Accessories kit Nebulizer cup w/ tubing; use every 4 hrs prn wheezing, Normal, Disp-1 Kit, R-5       !! - Potential duplicate medications found. Please discuss with provider.         3.   Follow-up Information     Follow up With Specialties Details Why Contact Info    Jared Hinton MD Family Medicine   [address] [place] [address] [address] [facility]  [phone]      [facility] EMERGENCY DEPT Emergency Medicine  As needed, If symptoms worsen [address] [place] [address]  [phone]    Jared Hinton MD Family Medicine   [address] [place] [address] [address] [facility]  [phone]      Javier Torres MD Internal Medicine, Pulmonary Disease Schedule an appointment as soon as possible for a visit As needed, If symptoms worsen [facility] [address]  [address] [facility]  [phone]            Instructed to return to ED if worse  Diagnosis   Clinical Impression:  1. Mild intermittent asthma with acute exacerbation    2. Anxiety disorder, unspecified type    3. Severe persistent asthma with acute exacerbation    4. Accelerated hypertension    5. SOB (shortness of breath)        Attestation:  Forrest Ashley MD, am the attending of record for this patient. I personally performed the services described in this documentation on this date, 11/1/2020 for patient, Sandra Krishnamurthy. I have reviewed the chart and verified that the record is accurate and complete.

## 2020-11-01 NOTE — ED NOTES
Pt discharged home with written and verbal instructions given and pt ambulated out of ED without difficulty.

## 2020-11-01 NOTE — DISCHARGE INSTRUCTIONS
Patient Education        Patient Education     Learning About Asthma Triggers  What are triggers? When you have asthma, certain things can make your symptoms worse. These are called triggers. They include:  · Cigarette smoke or air pollution. · Things you are allergic to, such as:  ¨ Pollen, mold, or dust mites. ¨ Pet hair, skin, or saliva. · Illnesses, like colds, flu, or pneumonia. · Exercise. · Dry, cold air. How do triggers affect asthma? Triggers can make it harder for your lungs to work as they should and can lead to sudden difficulty breathing and other symptoms. When you are around a trigger, an asthma attack is more likely. If your symptoms are severe, you may need emergency treatment or have to go to the hospital for treatment. If you know what your triggers are and can avoid them, you may be able to prevent asthma attacks, reduce how often you have them, and make them less severe. What can you do to avoid triggers? The first thing is to know your triggers. When you are having symptoms, note the things around you that might be causing them. Then look for patterns in what may be triggering your symptoms. Record your triggers on a piece of paper or in an asthma diary. When you have your list of possible triggers, work with your doctor to find ways to avoid them. You also can check how well your lungs are working by measuring your peak expiratory flow (PEF) throughout the day. Your PEF may drop when you are near things that trigger symptoms. Here are some ways to avoid a few common triggers. · Do not smoke or allow others to smoke around you. If you need help quitting, talk to your doctor about stop-smoking programs and medicines. These can increase your chances of quitting for good. · If there is a lot of pollution, pollen, or dust outside, stay at home and keep your windows closed. Use an air conditioner or air filter in your home.  Check your local weather report or newspaper for air quality and pollen reports. · Get a flu shot every year. Talk to your doctor about getting a pneumococcal shot. Wash your hands often to prevent infections. · Avoid exercising outdoors in cold weather. If you are outdoors in cold weather, wear a scarf around your face and breathe through your nose. How can you manage an asthma attack? · If you have an asthma action plan, follow the plan. In general:  ¨ Use your quick-relief inhaler as directed by your doctor. If your symptoms do not get better after you use your medicine, have someone take you to the emergency room. Call an ambulance if needed. ¨ If your doctor has given you other inhaled medicines or steroid pills, take them as directed. Where can you learn more? Go to [employer]  Enter M564 in the search box to learn more about \"Learning About Asthma Triggers. \"   © 1915-2739 Healthwise, Incorporated. Care instructions adapted under license by [facility] [employer] (which disclaims liability or warranty for this information). This care instruction is for use with your licensed healthcare professional. If you have questions about a medical condition or this instruction, always ask your healthcare professional. [place] [address] any warranty or liability for your use of this information. Content Version: 27.2.951364; Last Revised: February 23, 2012                 Anxiety Disorder: Care Instructions  Your Care Instructions     Anxiety is a normal reaction to stress. Difficult situations can cause you to have symptoms such as sweaty palms and a nervous feeling. In an anxiety disorder, the symptoms are far more severe. Constant worry, muscle tension, trouble sleeping, nausea and diarrhea, and other symptoms can make normal daily activities difficult or impossible. These symptoms may occur for no reason, and they can affect your work, school, or social life. Medicines, counseling, and self-care can all help.   Follow-up care is a key part of your treatment and safety. Be sure to make and go to all appointments, and call your doctor if you are having problems. It's also a good idea to know your test results and keep a list of the medicines you take. How can you care for yourself at home? · Take medicines exactly as directed. Call your doctor if you think you are having a problem with your medicine. · Go to your counseling sessions and follow-up appointments. · Recognize and accept your anxiety. Then, when you are in a situation that makes you anxious, say to yourself, \"This is not an emergency. I feel uncomfortable, but I am not in danger. I can keep going even if I feel anxious. \"  · Be kind to your body:  ? Relieve tension with exercise or a massage. ? Get enough rest.  ? Avoid alcohol, caffeine, nicotine, and illegal drugs. They can increase your anxiety level and cause sleep problems. ? Learn and do relaxation techniques. See below for more about these techniques. · Engage your mind. Get out and do something you enjoy. Go to a funny movie, or take a walk or hike. Plan your day. Having too much or too little to do can make you anxious. · Keep a record of your symptoms. Discuss your fears with a good friend or family member, or join a support group for people with similar problems. Talking to others sometimes relieves stress. · Get involved in social groups, or volunteer to help others. Being alone sometimes makes things seem worse than they are. · Get at least 30 minutes of exercise on most days of the week to relieve stress. Walking is a good choice. You also may want to do other activities, such as running, swimming, cycling, or playing tennis or team sports. Relaxation techniques  Do relaxation exercises 10 to 20 minutes a day. You can play soothing, relaxing music while you do them, if you wish. · Tell others in your house that you are going to do your relaxation exercises. Ask them not to disturb you.   · Find a comfortable place, away from all distractions and noise. · Lie down on your back, or sit with your back straight. · Focus on your breathing. Make it slow and steady. · Breathe in through your nose. Breathe out through either your nose or mouth. · Breathe deeply, filling up the area between your navel and your rib cage. Breathe so that your belly goes up and down. · Do not hold your breath. · Breathe like this for 5 to 10 minutes. Notice the feeling of calmness throughout your whole body. As you continue to breathe slowly and deeply, relax by doing the following for another 5 to 10 minutes:  · Tighten and relax each muscle group in your body. You can begin at your toes and work your way up to your head. · Imagine your muscle groups relaxing and becoming heavy. · Empty your mind of all thoughts. · Let yourself relax more and more deeply. · Become aware of the state of calmness that surrounds you. · When your relaxation time is over, you can bring yourself back to alertness by moving your fingers and toes and then your hands and feet and then stretching and moving your entire body. Sometimes people fall asleep during relaxation, but they usually wake up shortly afterward. · Always give yourself time to return to full alertness before you drive a car or do anything that might cause an accident if you are not fully alert. Never play a relaxation tape while you drive a car. When should you call for help? Call 911 anytime you think you may need emergency care. For example, call if:    · You feel you cannot stop from hurting yourself or someone else. Keep the numbers for these national suicide hotlines: 6-670-685-TALK (0-089-774-623.230.9560) and 6-654-YFPJGJS (4-974.837.3674). If you or someone you know talks about suicide or feeling hopeless, get help right away.   Watch closely for changes in your health, and be sure to contact your doctor if:    · You have anxiety or fear that affects your life.     · You have symptoms of anxiety that are new or different from those you had before. Where can you learn more? Go to http://www.nodishes.co.uk.com/  Enter P754 in the search box to learn more about \"Anxiety Disorder: Care Instructions. \"  Current as of: January 31, 2020               Content Version: 12.6  © 6103-8408 [employer]. Care instructions adapted under license by [employer] (which disclaims liability or warranty for this information). If you have questions about a medical condition or this instruction, always ask your healthcare professional. [address] any warranty or liability for your use of this information.

## 2020-11-01 NOTE — ED NOTES
Pt states he just had a covid test last week and does not want another one. MD notified and test to be canceled.

## 2020-11-02 ENCOUNTER — PATIENT OUTREACH (OUTPATIENT)
Dept: CASE MANAGEMENT | Age: 55
End: 2020-11-02

## 2020-11-02 ENCOUNTER — TELEPHONE (OUTPATIENT)
Dept: INTERNAL MEDICINE CLINIC | Age: 55
End: 2020-11-02

## 2020-11-02 DIAGNOSIS — J45.51 SEVERE PERSISTENT ASTHMA WITH ACUTE EXACERBATION: Primary | ICD-10-CM

## 2020-11-02 LAB
ATRIAL RATE: 100 BPM
CALCULATED P AXIS, ECG09: 61 DEGREES
CALCULATED R AXIS, ECG10: 49 DEGREES
CALCULATED T AXIS, ECG11: 51 DEGREES
DIAGNOSIS, 93000: NORMAL
P-R INTERVAL, ECG05: 140 MS
Q-T INTERVAL, ECG07: 360 MS
QRS DURATION, ECG06: 80 MS
QTC CALCULATION (BEZET), ECG08: 464 MS
VENTRICULAR RATE, ECG03: 100 BPM

## 2020-11-02 NOTE — PROGRESS NOTES
Patient contacted regarding recent discharge and COVID-19 risk. Discussed COVID-19 related testing which was not done at this time. Test results were not done. Patient informed of results, if available? N/A      Care Transition Nurse/ Ambulatory Care Manager/ LPN Care Coordinator contacted the patient by telephone to perform post discharge assessment; unable to reach patient. Patient has following risk factors of: asthma. Rx - albuterol nebulizer, prednisone, tussi-organidin dm, atarax. Pt was advised to f/u with PCP (Dr. Jany Lowe Provider) and Dr. Delmy Dooley (Pulmonology) post-discharge.

## 2020-11-02 NOTE — TELEPHONE ENCOUNTER
#[phone] pt states he had to go to the ED on Sunday, 11-1-20. Pt was prescribed prednisone and nitroglycerine. Pt had put medication on table at a restaurant and forgot it there. He will need the prednisone. Pt also states he needs a f/u appt as soon as possible as there are referrals and orders he will need. He will need a cpap machine for one pt states. Please call pertaining to medication and an appt.

## 2020-11-03 NOTE — TELEPHONE ENCOUNTER
Identified patient 2 identifiers verified. Patient  Requesting that  Prednisone  Be called  In to pharmacy that was prescribed from  ED. Patient ws prescribed 50 mg by mouth daily by Dr. Brea Giraldo. For 5 days. Per patient medication was lost in Restaurant. Patient was told to see Cardiology and Pulmonary and requesting referral. Patient has a referral  With Pulmonary already from Dr. Denver Goad. Patient aware that Dr. Denver Goad is out of office until [facility] and requesting a call back.

## 2020-11-04 RX ORDER — PREDNISONE 50 MG/1
50 TABLET ORAL DAILY
Qty: 5 TAB | Refills: 0 | Status: SHIPPED | OUTPATIENT
Start: 2020-11-04 | End: 2020-11-09

## 2020-11-05 NOTE — PROGRESS NOTES
11/5/2020  1:11 PM      ACM outreach to patient today in order to perform remainder of initial post-discharge assessment. Patient reports that he is currently unable to complete remainder of ACM follow-up call at this time; states, \"I have a lot going on right now and I don't have time to deal with this today, I truly don't. \"    Patient has ACM contact information for use as needed. Plan for follow-up call in 7-14 days based on severity of symptoms and risk factors.

## 2020-11-13 ENCOUNTER — TELEPHONE (OUTPATIENT)
Dept: INTERNAL MEDICINE CLINIC | Age: 55
End: 2020-11-13

## 2020-11-13 NOTE — TELEPHONE ENCOUNTER
I return call to pt, 2 pt identifiers verified- pt has been give the names & numbers of the doctor from referral submitted by Dr Denver Goad on 10/12/20 for pulmonary, orthopedic doctors, and # to scheduling for echocardiogram.

## 2020-11-13 NOTE — TELEPHONE ENCOUNTER
----- Message from Mikael Power sent at 11/13/2020 10:26 AM EST -----  Regarding: Dr Olivier Batista  General Message/Vendor Calls    Caller's first and last name: self      Reason for call: specialist request       Callback required yes/no and why: yes      Best contact number(s):[phone]      Details to clarify the request: Pt is requesting information on the referred pulmonologist and cardiologist.       Message from [facility]

## 2020-11-18 DIAGNOSIS — I10 ESSENTIAL HYPERTENSION: Chronic | ICD-10-CM

## 2020-11-18 RX ORDER — AMLODIPINE BESYLATE 10 MG/1
10 TABLET ORAL DAILY
Qty: 90 TAB | Refills: 0 | Status: SHIPPED | OUTPATIENT
Start: 2020-11-18 | End: 2021-05-03 | Stop reason: SDUPTHER

## 2020-11-19 ENCOUNTER — PATIENT OUTREACH (OUTPATIENT)
Dept: CASE MANAGEMENT | Age: 55
End: 2020-11-19

## 2020-11-19 NOTE — PROGRESS NOTES
Patient resolved from [address] episode on 11/19/2020. Discussed COVID-19 related testing which was not done at this time. Test results were not done. Patient informed of results, if available? N/A    Patient/family has been provided the following resources and education related to COVID-19:                         Signs, symptoms and red flags related to COVID-19            [facility] exposure and quarantine guidelines            [employer] exposure contact - [phone]            Contact for their local Department of Health                 Patient currently reports that the following symptoms have improved:  no new symptoms. No further outreach scheduled with this CTN/ACM/LPN/HC/ MA. Episode of Care resolved. Patient has this CTN/ACM/LPN/HC/MA contact information if future needs arise.

## 2020-12-01 ENCOUNTER — HOSPITAL ENCOUNTER (OUTPATIENT)
Dept: NON INVASIVE DIAGNOSTICS | Age: 55
Discharge: HOME | End: 2020-12-01
Attending: INTERNAL MEDICINE
Payer: MEDICARE

## 2020-12-01 VITALS
WEIGHT: 315 LBS | BODY MASS INDEX: 39.17 KG/M2 | SYSTOLIC BLOOD PRESSURE: 165 MMHG | HEIGHT: 75 IN | DIASTOLIC BLOOD PRESSURE: 91 MMHG

## 2020-12-01 DIAGNOSIS — R60.9 EDEMA, UNSPECIFIED TYPE: ICD-10-CM

## 2020-12-01 DIAGNOSIS — I50.9 HEART FAILURE, UNSPECIFIED HF CHRONICITY, UNSPECIFIED HEART FAILURE TYPE (HCC): ICD-10-CM

## 2020-12-01 LAB
ECHO AV AREA PEAK VELOCITY: 3.5 CM2
ECHO AV AREA/BSA PEAK VELOCITY: 1.3 CM2/M2
ECHO AV PEAK GRADIENT: 7.05 MMHG
ECHO AV PEAK VELOCITY: 132.8 CM/S
ECHO EST RA PRESSURE: 10 MMHG
ECHO LA AREA 4C: 15.22 CM2
ECHO LA TO AORTIC ROOT RATIO: 1.44
ECHO LA VOL 4C: 42.43 ML (ref 18–58)
ECHO LA VOLUME INDEX A4C: 15.44 ML/M2 (ref 16–28)
ECHO LV E' LATERAL VELOCITY: 10.95 CM/S
ECHO LV E' SEPTAL VELOCITY: 11.81 CM/S
ECHO LV INTERNAL DIMENSION DIASTOLIC: 4.7 CM (ref 4.2–5.9)
ECHO LV INTERNAL DIMENSION SYSTOLIC: 2.98 CM
ECHO LV IVSD: 1.55 CM (ref 0.6–1)
ECHO LV MASS 2D: 297 G (ref 88–224)
ECHO LV MASS INDEX 2D: 108.1 G/M2 (ref 49–115)
ECHO LV POSTERIOR WALL DIASTOLIC: 1.47 CM (ref 0.6–1)
ECHO LVOT DIAM: 2.24 CM
ECHO LVOT PEAK GRADIENT: 5.52 MMHG
ECHO LVOT PEAK VELOCITY: 117.47 CM/S
ECHO MV A VELOCITY: 82.84 CM/S
ECHO MV E DECELERATION TIME (DT): 221.49 MS
ECHO MV E VELOCITY: 73.52 CM/S
ECHO MV E/A RATIO: 0.89
ECHO MV E/E' LATERAL: 6.71
ECHO MV E/E' RATIO (AVERAGED): 6.47
ECHO MV E/E' SEPTAL: 6.23
ECHO PV PEAK INSTANTANEOUS GRADIENT SYSTOLIC: 3.87 MMHG
ECHO PV REGURGITANT MAX VELOCITY: 98.41 CM/S
ECHO RIGHT VENTRICULAR SYSTOLIC PRESSURE (RVSP): 34.52 MMHG
ECHO RV INTERNAL DIMENSION: 3.74 CM
ECHO TV REGURGITANT MAX VELOCITY: 247.54 CM/S
ECHO TV REGURGITANT PEAK GRADIENT: 24.52 MMHG

## 2020-12-01 PROCEDURE — 93306 TTE W/DOPPLER COMPLETE: CPT

## 2020-12-15 DIAGNOSIS — I10 ESSENTIAL HYPERTENSION: Chronic | ICD-10-CM

## 2020-12-15 RX ORDER — LISINOPRIL 20 MG/1
TABLET ORAL
Qty: 30 TAB | Refills: 0 | OUTPATIENT
Start: 2020-12-15

## 2021-01-13 DIAGNOSIS — I10 ESSENTIAL HYPERTENSION: Chronic | ICD-10-CM

## 2021-01-14 RX ORDER — LISINOPRIL 20 MG/1
TABLET ORAL
Qty: 90 TAB | Refills: 0 | Status: SHIPPED | OUTPATIENT
Start: 2021-01-14 | End: 2021-03-19

## 2021-01-19 ENCOUNTER — TELEPHONE (OUTPATIENT)
Dept: INTERNAL MEDICINE CLINIC | Age: 56
End: 2021-01-19

## 2021-01-19 NOTE — TELEPHONE ENCOUNTER
Identified patient 2 identifiers verified. Spoke with patient , BP readings today were 180/103 before taking Lisinopril 20 mg and Norvasc 10 mg. Patient also took Clonidine 0.2 mg  two tabs BP went down to 171/114. Patient reports swelling in left foot. Denies numbness tingling, no SOB, No complaint of chest pains. Recommended to patient to go to ED for evaluation. Blaine has been doubling up on BP medication.

## 2021-02-25 DIAGNOSIS — I10 ESSENTIAL HYPERTENSION: Chronic | ICD-10-CM

## 2021-02-25 NOTE — TELEPHONE ENCOUNTER
----- Message from Jeremy Stover sent at 2/25/2021 11:01 AM EST -----  Regarding: Dr. Marlene Friedman: [phone]  Medication Refill    Caller (if not patient): N/A      Relationship of caller (if not patient): N/A      Best contact number(s): [phone]      Name of medication and dosage if known: cloNIDine HCL (CATAPRES) 0.2 mg tablet       Is patient out of this medication (yes/no): Yes      Pharmacy name: [facility] listed in chart? (yes/no): yes  Pharmacy phone number: [phone]      Details to clarify the request: PT requesting callback to speak with nurse or  To discuss BP. PT getting inaccurate readings of BP  because he needs a larger cuff for BP reader. PT also ran out of Clonidine, due to having to increase dosage, due to elevated BP.       Jeremy Stover     copy/paste [facility]

## 2021-02-26 RX ORDER — CLONIDINE HYDROCHLORIDE 0.2 MG/1
TABLET ORAL
Qty: 180 TAB | Refills: 0 | Status: SHIPPED | OUTPATIENT
Start: 2021-02-26 | End: 2021-04-02

## 2021-02-26 NOTE — TELEPHONE ENCOUNTER
Pt given info for ortho Dr Marli Roth- pt is requesting a refill -see pended medication. Pt has made an in office visit with Dr Jnoi Blackwood on 4/1/21.

## 2021-03-03 DIAGNOSIS — I10 ESSENTIAL HYPERTENSION: Chronic | ICD-10-CM

## 2021-03-03 RX ORDER — CLONIDINE HYDROCHLORIDE 0.2 MG/1
TABLET ORAL
Qty: 180 TAB | Refills: 0 | Status: CANCELLED | OUTPATIENT
Start: 2021-03-03

## 2021-03-03 NOTE — TELEPHONE ENCOUNTER
----- Message from [employer] sent at 3/3/2021  9:20 AM EST -----  Regarding: Dr. Rita Sauer Refill  Medication Refill    Caller (if not patient): Pt      Relationship of caller (if not patient): Pt      Best contact number(s): [phone]      Name of medication and dosage if known: Clonidine 2 mg      Is patient out of this medication (yes/no): yes      Pharmacy name: [facility] listed in chart? (yes/no):  Pharmacy phone number:       Details to clarify the request: Pt states that he has called several times about refills on this medication      Message from [facility]

## 2021-03-03 NOTE — TELEPHONE ENCOUNTER
Pended medication clonidine- filled by Dr Juhi Cox on 2/26/21. I called pt, 2 pt identifiers verified- pt states that he has received his medication.

## 2021-03-19 DIAGNOSIS — I10 ESSENTIAL HYPERTENSION: Chronic | ICD-10-CM

## 2021-03-19 RX ORDER — LISINOPRIL 20 MG/1
TABLET ORAL
Qty: 90 TAB | Refills: 0 | Status: SHIPPED | OUTPATIENT
Start: 2021-03-19 | End: 2021-05-06

## 2021-04-02 ENCOUNTER — VIRTUAL VISIT (OUTPATIENT)
Dept: INTERNAL MEDICINE CLINIC | Age: 56
End: 2021-04-02
Payer: MEDICARE

## 2021-04-02 DIAGNOSIS — M25.469 KNEE SWELLING: ICD-10-CM

## 2021-04-02 DIAGNOSIS — R60.9 EDEMA, UNSPECIFIED TYPE: ICD-10-CM

## 2021-04-02 DIAGNOSIS — E66.01 SEVERE OBESITY (HCC): ICD-10-CM

## 2021-04-02 DIAGNOSIS — I10 ESSENTIAL HYPERTENSION: Primary | ICD-10-CM

## 2021-04-02 DIAGNOSIS — R73.9 BORDERLINE HYPERGLYCEMIA: ICD-10-CM

## 2021-04-02 DIAGNOSIS — J45.909 ASTHMA, UNSPECIFIED ASTHMA SEVERITY, UNSPECIFIED WHETHER COMPLICATED, UNSPECIFIED WHETHER PERSISTENT: ICD-10-CM

## 2021-04-02 DIAGNOSIS — N52.9 ERECTILE DYSFUNCTION, UNSPECIFIED ERECTILE DYSFUNCTION TYPE: ICD-10-CM

## 2021-04-02 PROCEDURE — 99214 OFFICE O/P EST MOD 30 MIN: CPT | Performed by: INTERNAL MEDICINE

## 2021-04-02 PROCEDURE — 3017F COLORECTAL CA SCREEN DOC REV: CPT | Performed by: INTERNAL MEDICINE

## 2021-04-02 PROCEDURE — G8756 NO BP MEASURE DOC: HCPCS | Performed by: INTERNAL MEDICINE

## 2021-04-02 PROCEDURE — G9717 DOC PT DX DEP/BP F/U NT REQ: HCPCS | Performed by: INTERNAL MEDICINE

## 2021-04-02 PROCEDURE — G8427 DOCREV CUR MEDS BY ELIG CLIN: HCPCS | Performed by: INTERNAL MEDICINE

## 2021-04-02 PROCEDURE — G8417 CALC BMI ABV UP PARAM F/U: HCPCS | Performed by: INTERNAL MEDICINE

## 2021-04-02 RX ORDER — ATENOLOL 50 MG/1
50 TABLET ORAL DAILY
Qty: 30 TAB | Refills: 11 | Status: SHIPPED | OUTPATIENT
Start: 2021-04-02 | End: 2021-05-03 | Stop reason: SDUPTHER

## 2021-04-02 RX ORDER — PHENTERMINE HYDROCHLORIDE 37.5 MG/1
37.5 TABLET ORAL
Qty: 30 TAB | Refills: 0 | Status: SHIPPED | OUTPATIENT
Start: 2021-04-02 | End: 2021-06-25

## 2021-04-02 RX ORDER — HYDROCHLOROTHIAZIDE 25 MG/1
25 TABLET ORAL DAILY
Qty: 30 TAB | Refills: 11 | Status: SHIPPED | OUTPATIENT
Start: 2021-04-02 | End: 2021-06-25

## 2021-04-02 NOTE — PROGRESS NOTES
Sidra Akers is a 54 y.o. male who was seen by synchronous (real-time) audio-video technology on 4/2/2021 for Hypertension (pt wants to discuss his blood pressure mediation and that fact that he is gaining weight) and Weight Gain        Progress Note             SUBJECTIVE:   Mr. Sidra Akers is a 54 y.o. male who is here for follow up of routine medical issues. He previously saw Dr. Jovita nAdujar. Chief Complaint   Patient presents with    Hypertension     pt wants to discuss his blood pressure mediation and that fact that he is gaining weight    Weight Gain       \"My blood pressure is staying high. \" Today 188/123. One of his concerns is weight gain: he was 286 lb 1 year ago. Over the past 6 months:   Wt Readings from Last 10 Encounters:   12/01/20 339 lb 11.7 oz (154.1 kg)   11/01/20 339 lb 11.7 oz (154.1 kg)   10/12/20 335 lb 3.2 oz (152 kg)   10/03/20 332 lb 7.3 oz (150.8 kg)   09/22/20 330 lb 0.5 oz (149.7 kg)   08/10/20 324 lb 11.8 oz (147.3 kg)   07/27/20 333 lb 5.4 oz (151.2 kg)   07/21/20 331 lb 9.2 oz (150.4 kg)   07/18/20 324 lb 8.3 oz (147.2 kg)   06/11/20 318 lb 5.5 oz (144.4 kg)     Asthma has been flaring a lot. He was seen in ER, most recently 10/3; and has gotten steroid tapers. He is on inhalers below. Back pain:   Knee pain:   At this time, he is otherwise doing well and has brought no other complaints to my attention today. For a list of the medical issues addressed today, see the assessment and plan below. PMH:   Past Medical History:   Diagnosis Date    Arthritis     Asthma     Chronic low back pain     Hypertension        Past Surgical History:   Procedure Laterality Date    HX ORTHOPAEDIC         All: He is allergic to vicodin [hydrocodone-acetaminophen]. Current Outpatient Medications   Medication Sig    lisinopriL (PRINIVIL, ZESTRIL) 20 mg tablet TAKE ONE TABLET BY MOUTH EVERY DAY    amLODIPine (NORVASC) 10 mg tablet Take 1 Tab by mouth daily.  Indications: high blood pressure    albuterol (PROVENTIL VENTOLIN) 2.5 mg /3 mL (0.083 %) nebu 3 mL by Nebulization route every four (4) hours as needed for Wheezing.  guaiFENesin-dextromethorphan (TUSSI-ORGANIDIN DM)  mg/5 mL liqd Take 10 mL by mouth every four (4) hours as needed for Cough or Congestion.  cetirizine (ZyrTEC) 10 mg tablet Take 1 Tab by mouth daily.  cyclobenzaprine (FLEXERIL) 10 mg tablet Take 1 Tab by mouth nightly.  diclofenac EC (VOLTAREN) 50 mg EC tablet Take 1 Tab by mouth two (2) times a day.  fluticasone propionate (Flonase Allergy Relief) 50 mcg/actuation nasal spray 2 Sprays by Both Nostrils route daily.  fluticasone propionate (Flovent HFA) 220 mcg/actuation inhaler INHALE 1 OR 2 PUFFS 2 TIMES A DAY.  furosemide (LASIX) 40 mg tablet Increase your morning dose to 40 mg for 5 days    Linzess 290 mcg cap capsule Take 1 Cap by mouth daily.  montelukast (Singulair) 10 mg tablet Take 1 Tab by mouth daily.  pantoprazole (PROTONIX) 40 mg tablet Take 1 Tab by mouth daily.  Ventolin HFA 90 mcg/actuation inhaler Take 2 Puffs by inhalation every four (4) hours as needed for Wheezing or Shortness of Breath. Indications: bronchospasm prevention    aspirin delayed-release 81 mg tablet Take 1 Tab by mouth daily.  polyethylene glycol (MIRALAX) 17 gram packet Take 1 Packet by mouth daily.  Nebulizer Accessories kit Nebulizer cup w/ tubing; use every 4 hrs prn wheezing    cloNIDine HCL (CATAPRES) 0.2 mg tablet TAKE ONE TABLET BY MOUTH 2 TIMES A DAY    sildenafil citrate (Viagra) 100 mg tablet Take 1 Tab by mouth as needed for Erectile Dysfunction.  busPIRone (BUSPAR) 10 mg tablet Take 1-2 Tabs by mouth three (3) times daily. No current facility-administered medications for this visit. FH: His family history includes Cancer in his mother; Heart Disease in his father. SH: He  reports that he has quit smoking. He has a 2.50 pack-year smoking history.  He has never used smokeless tobacco. He reports current alcohol use. He reports that he does not use drugs. ROS: See above; Complete ROS otherwise negative. OBJECTIVE:   Vitals: There were no vitals taken for this visit. Gen: Pleasant, obese 54 y.o.  male in NAD. Lab Results   Component Value Date/Time    Sodium 141 11/01/2020 04:17 AM    Potassium 4.2 11/01/2020 04:17 AM    Chloride 108 11/01/2020 04:17 AM    CO2 25 11/01/2020 04:17 AM    Anion gap 8 11/01/2020 04:17 AM    Glucose 113 (H) 11/01/2020 04:17 AM    BUN 9 11/01/2020 04:17 AM    Creatinine 0.76 11/01/2020 04:17 AM    BUN/Creatinine ratio 12 11/01/2020 04:17 AM    GFR est AA >60 11/01/2020 04:17 AM    GFR est non-AA >60 11/01/2020 04:17 AM    Calcium 8.8 11/01/2020 04:17 AM    Bilirubin, total 0.2 11/01/2020 04:17 AM    ALT (SGPT) 60 11/01/2020 04:17 AM    Alk. phosphatase 94 11/01/2020 04:17 AM    Protein, total 6.7 11/01/2020 04:17 AM    Albumin 3.5 11/01/2020 04:17 AM    Globulin 3.2 11/01/2020 04:17 AM    A-G Ratio 1.1 11/01/2020 04:17 AM       Lab Results   Component Value Date/Time    Cholesterol, total 201 (H) 04/15/2019 10:40 AM    HDL Cholesterol 55 04/15/2019 10:40 AM    LDL, calculated 124 (H) 04/15/2019 10:40 AM    Triglyceride 108 04/15/2019 10:40 AM        Lab Results   Component Value Date/Time    Hemoglobin A1c 5.8 (H) 04/15/2019 10:40 AM       Lab Results   Component Value Date/Time    WBC 6.9 11/01/2020 04:17 AM    HGB 13.2 11/01/2020 04:17 AM    HCT 41.0 11/01/2020 04:17 AM    PLATELET 392 64/63/4236 04:17 AM    MCV 88.0 11/01/2020 04:17 AM         ASSESSMENT/ PLAN:     1. Asthma: Improved since last visit. -     albuterol (PROVENTIL VENTOLIN) 2.5 mg /3 mL (0.083 %) nebu; 3 mL by Nebulization route every four (4) hours as needed for Wheezing.  -     fluticasone propionate (Flovent HFA) 220 mcg/actuation inhaler; INHALE 1 OR 2 PUFFS 2 TIMES A DAY. -     montelukast (Singulair) 10 mg tablet; Take 1 Tab by mouth daily.     2. Erectile dysfunction, unspecified erectile dysfunction type  -     sildenafil citrate (Viagra) 100 mg tablet; Take 1 Tab by mouth as needed for Erectile Dysfunction. 3. Anxiety: Stable. 4. Chronic low back pain with probable sciatica: He complains of tingling in R foot. -     cyclobenzaprine (FLEXERIL) 10 mg tablet; Take 1 Tab by mouth nightly. -     diclofenac EC (VOLTAREN) 50 mg EC tablet; Take 1 Tab by mouth two (2) times a day. -     REFERRAL TO ORTHOPEDICS    5. Hyperglycemia: We'll check A1C.     6. Allergic rhinitis, unspecified seasonality, unspecified trigger  -     cetirizine (ZyrTEC) 10 mg tablet; Take 1 Tab by mouth daily. -     fluticasone propionate (Flonase Allergy Relief) 50 mcg/actuation nasal spray; 2 Sprays by Both Nostrils route daily. -     montelukast (Singulair) 10 mg tablet; Take 1 Tab by mouth daily. 7. Essential hypertension: I'll add beta blocker and HCTZ. Work on diet, etc.   -     aspirin delayed-release 81 mg tablet; Take 1 Tab by mouth daily. 8. Chronic bronchitis, unspecified chronic bronchitis type (HCC)  -     Ventolin HFA 90 mcg/actuation inhaler; Take 2 Puffs by inhalation every four (4) hours as needed for Wheezing or Shortness of Breath. Indications: bronchospasm prevention    9. Irritable bowel syndrome with constipation: Not discussed today. -     Linzess 290 mcg cap capsule; Take 1 Cap by mouth daily. 10. Gastroesophageal reflux disease without esophagitis  -     pantoprazole (PROTONIX) 40 mg tablet; Take 1 Tab by mouth daily. 11. Edema, unspecified type:   -     HCTZ 25 daily   -     ECHO ADULT COMPLETE; Future    12. Chronic pain of left knee:   -     REFERRAL TO ORTHOPEDICS--Dr. Maxine Reeves    He already has appt on 4/29--keep this appt. I have reviewed the patient's medications and risks/side effects/benefits were discussed. Diagnosis(-es) explained to patient and questions answered. Literature provided where appropriate.          Objective: Patient-Reported Vitals 4/2/2021   Patient-Reported Weight -   Patient-Reported Height -   Patient-Reported Pulse 83   Patient-Reported Systolic  940   Patient-Reported Diastolic 472        [INSTRUCTIONS:  \"[x]\" Indicates a positive item  \"[]\" Indicates a negative item  -- DELETE ALL ITEMS NOT EXAMINED]    Constitutional: [x] Appears well-developed and well-nourished [x] No apparent distress      [] Abnormal -     Mental status: [x] Alert and awake  [x] Oriented to person/place/time [x] Able to follow commands    [] Abnormal -     Eyes:   EOM    [x]  Normal    [] Abnormal -   Sclera  [x]  Normal    [] Abnormal -          Discharge [x]  None visible   [] Abnormal -     HENT: [x] Normocephalic, atraumatic  [] Abnormal -   [x] Mouth/Throat: Mucous membranes are moist    External Ears [x] Normal  [] Abnormal -    Neck: [x] No visualized mass [] Abnormal -     Pulmonary/Chest: [x] Respiratory effort normal   [x] No visualized signs of difficulty breathing or respiratory distress        [] Abnormal -      Musculoskeletal:   [x] Normal gait with no signs of ataxia         [x] Normal range of motion of neck        [] Abnormal -     Neurological:        [x] No Facial Asymmetry (Cranial nerve 7 motor function) (limited exam due to video visit)          [x] No gaze palsy        [] Abnormal -          Skin:        [x] No significant exanthematous lesions or discoloration noted on facial skin         [] Abnormal -            Psychiatric:       [x] Normal Affect [] Abnormal -       Other pertinent observable physical exam findings:-        We discussed the expected course, resolution and complications of the diagnosis(es) in detail. Medication risks, benefits, costs, interactions, and alternatives were discussed as indicated. I advised him to contact the office if his condition worsens, changes or fails to improve as anticipated. He expressed understanding with the diagnosis(es) and plan.        Octavio Valenzuela, who was evaluated through a patient-initiated, synchronous (real-time) audio-video encounter, and/or his healthcare decision maker, is aware that it is a billable service, with coverage as determined by his insurance carrier. He provided verbal consent to proceed: YES, and patient identification was verified. It was conducted pursuant to the emergency declaration under the [address] [place] [address] and the [employer] and [employer] Act. A caregiver was present when appropriate. Ability to conduct physical exam was limited. I was in office. The patient was at home or otherwise outside the office.       Usha Evans MD

## 2021-04-06 ENCOUNTER — TELEPHONE (OUTPATIENT)
Dept: INTERNAL MEDICINE CLINIC | Age: 56
End: 2021-04-06

## 2021-04-22 ENCOUNTER — TELEPHONE (OUTPATIENT)
Dept: INTERNAL MEDICINE CLINIC | Age: 56
End: 2021-04-22

## 2021-04-22 NOTE — TELEPHONE ENCOUNTER
Spoke w/ pt confirmed appt for 4/29 @ 10:15 am. Pt stated that DR. Juhi Cox perscribed new BP medications and his BP is still pretty high pt would like to discuss if there is other medications he could use to lower BP

## 2021-05-03 DIAGNOSIS — J42 CHRONIC BRONCHITIS, UNSPECIFIED CHRONIC BRONCHITIS TYPE (HCC): ICD-10-CM

## 2021-05-03 DIAGNOSIS — N52.9 ERECTILE DYSFUNCTION, UNSPECIFIED ERECTILE DYSFUNCTION TYPE: ICD-10-CM

## 2021-05-03 DIAGNOSIS — J30.9 ALLERGIC RHINITIS, UNSPECIFIED SEASONALITY, UNSPECIFIED TRIGGER: ICD-10-CM

## 2021-05-03 DIAGNOSIS — J45.51 SEVERE PERSISTENT ASTHMA WITH ACUTE EXACERBATION: ICD-10-CM

## 2021-05-03 DIAGNOSIS — G89.29 CHRONIC MIDLINE LOW BACK PAIN WITHOUT SCIATICA: Chronic | ICD-10-CM

## 2021-05-03 DIAGNOSIS — M54.50 CHRONIC LEFT-SIDED LOW BACK PAIN WITHOUT SCIATICA: ICD-10-CM

## 2021-05-03 DIAGNOSIS — I10 ESSENTIAL HYPERTENSION: Chronic | ICD-10-CM

## 2021-05-03 DIAGNOSIS — M54.50 CHRONIC MIDLINE LOW BACK PAIN WITHOUT SCIATICA: Chronic | ICD-10-CM

## 2021-05-03 DIAGNOSIS — F41.9 ANXIETY: ICD-10-CM

## 2021-05-03 DIAGNOSIS — G89.29 CHRONIC LEFT-SIDED LOW BACK PAIN WITHOUT SCIATICA: ICD-10-CM

## 2021-05-03 DIAGNOSIS — R60.9 EDEMA, UNSPECIFIED TYPE: ICD-10-CM

## 2021-05-03 RX ORDER — CETIRIZINE HCL 10 MG
10 TABLET ORAL DAILY
Qty: 20 TAB | Refills: 0 | Status: SHIPPED | OUTPATIENT
Start: 2021-05-03

## 2021-05-03 RX ORDER — SILDENAFIL 100 MG/1
100 TABLET, FILM COATED ORAL AS NEEDED
Qty: 6 TAB | Refills: 5 | Status: SHIPPED | OUTPATIENT
Start: 2021-05-03 | End: 2022-01-27

## 2021-05-03 RX ORDER — AMLODIPINE BESYLATE 10 MG/1
10 TABLET ORAL DAILY
Qty: 90 TAB | Refills: 0 | Status: SHIPPED | OUTPATIENT
Start: 2021-05-03 | End: 2021-06-25

## 2021-05-03 RX ORDER — FUROSEMIDE 40 MG/1
TABLET ORAL
Qty: 5 TAB | Refills: 0 | OUTPATIENT
Start: 2021-05-03 | End: 2021-06-11

## 2021-05-03 RX ORDER — CYCLOBENZAPRINE HCL 10 MG
10 TABLET ORAL
Qty: 30 TAB | Refills: 0 | Status: SHIPPED | OUTPATIENT
Start: 2021-05-03 | End: 2022-01-27

## 2021-05-03 RX ORDER — ALBUTEROL SULFATE 90 UG/1
2 AEROSOL, METERED RESPIRATORY (INHALATION)
Qty: 3 INHALER | Refills: 3 | Status: SHIPPED | OUTPATIENT
Start: 2021-05-03 | End: 2021-06-07

## 2021-05-03 RX ORDER — BUSPIRONE HYDROCHLORIDE 10 MG/1
10-20 TABLET ORAL 3 TIMES DAILY
Qty: 20 TAB | Refills: 0 | Status: SHIPPED | OUTPATIENT
Start: 2021-05-03 | End: 2021-06-23

## 2021-05-03 RX ORDER — FLUTICASONE PROPIONATE 50 MCG
2 SPRAY, SUSPENSION (ML) NASAL DAILY
Qty: 1 BOTTLE | Refills: 5 | Status: SHIPPED | OUTPATIENT
Start: 2021-05-03

## 2021-05-03 RX ORDER — ASPIRIN 81 MG/1
81 TABLET ORAL DAILY
Qty: 100 TAB | Refills: 3 | Status: SHIPPED | OUTPATIENT
Start: 2021-05-03 | End: 2022-08-07

## 2021-05-03 RX ORDER — DICLOFENAC SODIUM 50 MG/1
50 TABLET, DELAYED RELEASE ORAL 2 TIMES DAILY
Qty: 180 TAB | Refills: 0 | Status: SHIPPED | OUTPATIENT
Start: 2021-05-03 | End: 2021-06-23

## 2021-05-03 RX ORDER — ATENOLOL 50 MG/1
50 TABLET ORAL DAILY
Qty: 30 TAB | Refills: 11 | Status: SHIPPED | OUTPATIENT
Start: 2021-05-03 | End: 2021-06-25

## 2021-05-03 RX ORDER — FLUTICASONE PROPIONATE 220 UG/1
AEROSOL, METERED RESPIRATORY (INHALATION)
Qty: 3 INHALER | Refills: 0 | Status: SHIPPED | OUTPATIENT
Start: 2021-05-03 | End: 2022-01-27

## 2021-05-03 NOTE — TELEPHONE ENCOUNTER
[phone]      Med problem:    Pt states that he went out of town and lost his mediation bag while gone. Pt states it had all of his medications in the bag that he needs for bp and all. Pt states please call to advise and can they be refilled? ?

## 2021-05-03 NOTE — TELEPHONE ENCOUNTER
Identified patient 2 identifiers verified. Patient was told by pharmacy that his medications had to be sent electronically. Patient was seen on 4/2/21.

## 2021-05-05 DIAGNOSIS — I10 ESSENTIAL HYPERTENSION: Chronic | ICD-10-CM

## 2021-05-06 RX ORDER — LISINOPRIL 20 MG/1
TABLET ORAL
Qty: 90 TAB | Refills: 0 | Status: SHIPPED | OUTPATIENT
Start: 2021-05-06 | End: 2021-06-25

## 2021-05-13 ENCOUNTER — TELEPHONE (OUTPATIENT)
Dept: INTERNAL MEDICINE CLINIC | Age: 56
End: 2021-05-13

## 2021-05-13 NOTE — TELEPHONE ENCOUNTER
Ana Rosa Naik, [address] [place] [address]  Ref number: [id number]    [employer] states they faxed a requisition form on 4/28 and again on 5/4. They state requisition fax was confirmed received by \"Sonali\" on 5/12. States they need a decision please. States due to being so long he is sending a denial fax over as well. States have dr sign the denial fax or sign the requisition that was faxed as to however doctor decides to proced,  please as they need an answer.

## 2021-05-24 ENCOUNTER — TELEPHONE (OUTPATIENT)
Dept: INTERNAL MEDICINE CLINIC | Age: 56
End: 2021-05-24

## 2021-05-24 DIAGNOSIS — I10 ESSENTIAL HYPERTENSION: Chronic | ICD-10-CM

## 2021-05-24 RX ORDER — CLONIDINE HYDROCHLORIDE 0.2 MG/1
TABLET ORAL
Qty: 180 TABLET | Refills: 0 | Status: SHIPPED | OUTPATIENT
Start: 2021-05-24 | End: 2021-06-25

## 2021-05-24 NOTE — TELEPHONE ENCOUNTER
Sanam Lowry from [employer] is asking that you sign the order for the testing and fax back. It doctor doesn't want this done, please fax back as denied.   Fax #[phone]    Sanam Lowry states that if he doesn't hear anything he will continue to call as this order has been open since April 2021

## 2021-05-27 NOTE — TELEPHONE ENCOUNTER
[address] [facility], [facility]  Ref number: [id number]        States they have faxed over a request and denial faxes over 12 different times and nothing is done. States that the request has been open since April with no word from provider. States he is closing out the request and putting it as medically denied. States please inform patient that  The patient's request  was medically denied.

## 2021-06-07 ENCOUNTER — OFFICE VISIT (OUTPATIENT)
Dept: INTERNAL MEDICINE CLINIC | Age: 56
End: 2021-06-07
Payer: MEDICARE

## 2021-06-07 VITALS
TEMPERATURE: 96.9 F | BODY MASS INDEX: 39.17 KG/M2 | RESPIRATION RATE: 18 BRPM | SYSTOLIC BLOOD PRESSURE: 128 MMHG | HEART RATE: 89 BPM | OXYGEN SATURATION: 100 % | HEIGHT: 75 IN | DIASTOLIC BLOOD PRESSURE: 82 MMHG | WEIGHT: 315 LBS

## 2021-06-07 DIAGNOSIS — N52.9 ERECTILE DYSFUNCTION, UNSPECIFIED ERECTILE DYSFUNCTION TYPE: ICD-10-CM

## 2021-06-07 DIAGNOSIS — M25.562 LEFT KNEE PAIN, UNSPECIFIED CHRONICITY: ICD-10-CM

## 2021-06-07 DIAGNOSIS — J45.51 SEVERE PERSISTENT ASTHMA WITH ACUTE EXACERBATION: ICD-10-CM

## 2021-06-07 DIAGNOSIS — G57.91 NEUROPATHY OF RIGHT LOWER EXTREMITY: Primary | ICD-10-CM

## 2021-06-07 PROCEDURE — G8754 DIAS BP LESS 90: HCPCS | Performed by: INTERNAL MEDICINE

## 2021-06-07 PROCEDURE — G9717 DOC PT DX DEP/BP F/U NT REQ: HCPCS | Performed by: INTERNAL MEDICINE

## 2021-06-07 PROCEDURE — G8752 SYS BP LESS 140: HCPCS | Performed by: INTERNAL MEDICINE

## 2021-06-07 PROCEDURE — 3017F COLORECTAL CA SCREEN DOC REV: CPT | Performed by: INTERNAL MEDICINE

## 2021-06-07 PROCEDURE — G8417 CALC BMI ABV UP PARAM F/U: HCPCS | Performed by: INTERNAL MEDICINE

## 2021-06-07 PROCEDURE — 99214 OFFICE O/P EST MOD 30 MIN: CPT | Performed by: INTERNAL MEDICINE

## 2021-06-07 PROCEDURE — G8427 DOCREV CUR MEDS BY ELIG CLIN: HCPCS | Performed by: INTERNAL MEDICINE

## 2021-06-07 RX ORDER — TADALAFIL 5 MG/1
5 TABLET ORAL DAILY
Qty: 30 TABLET | Refills: 5 | Status: SHIPPED | OUTPATIENT
Start: 2021-06-07 | End: 2022-01-27

## 2021-06-07 RX ORDER — ALBUTEROL SULFATE 90 UG/1
1 AEROSOL, METERED RESPIRATORY (INHALATION)
Qty: 1 INHALER | Refills: 5 | Status: SHIPPED | OUTPATIENT
Start: 2021-06-07 | End: 2021-11-12 | Stop reason: SDUPTHER

## 2021-06-07 RX ORDER — PREGABALIN 100 MG/1
100 CAPSULE ORAL 2 TIMES DAILY
Qty: 60 CAPSULE | Refills: 5 | Status: SHIPPED | OUTPATIENT
Start: 2021-06-07 | End: 2021-12-21

## 2021-06-07 NOTE — PROGRESS NOTES
SUBJECTIVE  Mr. Mehul Finley presents today acutely for     Chief Complaint   Patient presents with    Foot Pain     numbness and tingling on right foot started about 2 weeks ago       He is having neuropathic sensations R ventral MTP area. Also L knee bothers him. \"I can barely walk. \"  \"The swelling is back. A couple years ago, they drained a lot of fluid off of it, and I think that may be needed again. \"  He also requests DMV form. Requests cane. He complains that viagra doesn't work for him for ED. He was on once a day cialis at one time and that seemed to \"do okay\". \"I need a prescription for my rescue inhaler. \"    Past Medical History:   Diagnosis Date    Arthritis     Asthma     Chronic low back pain     Hypertension      Current Outpatient Medications on File Prior to Visit   Medication Sig Dispense Refill    cloNIDine HCL (CATAPRES) 0.2 mg tablet TAKE ONE TABLET BY MOUTH 2 TIMES A  Tablet 0    lisinopriL (PRINIVIL, ZESTRIL) 20 mg tablet TAKE ONE TABLET BY MOUTH EVERY DAY 90 Tab 0    amLODIPine (NORVASC) 10 mg tablet Take 1 Tab by mouth daily. Indications: high blood pressure 90 Tab 0    aspirin delayed-release 81 mg tablet Take 1 Tab by mouth daily. 100 Tab 3    atenoloL (TENORMIN) 50 mg tablet Take 1 Tab by mouth daily. 30 Tab 11    busPIRone (BUSPAR) 10 mg tablet Take 1-2 Tabs by mouth three (3) times daily. 20 Tab 0    furosemide (LASIX) 40 mg tablet Increase your morning dose to 40 mg for 5 days 5 Tab 0    fluticasone propionate (Flovent HFA) 220 mcg/actuation inhaler INHALE 1 OR 2 PUFFS 2 TIMES A DAY. 3 Inhaler 0    fluticasone propionate (Flonase Allergy Relief) 50 mcg/actuation nasal spray 2 Sprays by Both Nostrils route daily. 1 Bottle 5    diclofenac EC (VOLTAREN) 50 mg EC tablet Take 1 Tab by mouth two (2) times a day. 180 Tab 0    cyclobenzaprine (FLEXERIL) 10 mg tablet Take 1 Tab by mouth nightly.  30 Tab 0    cetirizine (ZyrTEC) 10 mg tablet Take 1 Tab by mouth daily. 20 Tab 0    sildenafil citrate (Viagra) 100 mg tablet Take 1 Tab by mouth as needed for Erectile Dysfunction. 6 Tab 5    hydroCHLOROthiazide (HYDRODIURIL) 25 mg tablet Take 1 Tab by mouth daily. 30 Tab 11    phentermine (ADIPEX-P) 37.5 mg tablet Take 1 Tab by mouth every morning. Max Daily Amount: 37.5 mg. 30 Tab 0    albuterol (PROVENTIL VENTOLIN) 2.5 mg /3 mL (0.083 %) nebu 3 mL by Nebulization route every four (4) hours as needed for Wheezing. 90 Nebule 0    guaiFENesin-dextromethorphan (TUSSI-ORGANIDIN DM)  mg/5 mL liqd Take 10 mL by mouth every four (4) hours as needed for Cough or Congestion. 236 mL 0    Linzess 290 mcg cap capsule Take 1 Cap by mouth daily. 90 Cap 3    montelukast (Singulair) 10 mg tablet Take 1 Tab by mouth daily. 90 Tab 0    pantoprazole (PROTONIX) 40 mg tablet Take 1 Tab by mouth daily. 90 Tab 3    polyethylene glycol (MIRALAX) 17 gram packet Take 1 Packet by mouth daily. 1 Each 5    Nebulizer Accessories kit Nebulizer cup w/ tubing; use every 4 hrs prn wheezing 1 Kit 5    [DISCONTINUED] lisinopriL (PRINIVIL, ZESTRIL) 20 mg tablet Take 1 Tab by mouth daily. 90 Tab 0     No current facility-administered medications on file prior to visit. ROS: Complete review of systems was performed and is otherwise unremarkable except as noted elsewhere. OBJECTIVE  Visit Vitals  /82 (BP 1 Location: Left arm, BP Patient Position: Sitting)   Pulse 89   Temp 96.9 °F (36.1 °C) (Temporal)   Resp 18   Ht 6' 3\" (1.905 m)   Wt 329 lb (149.2 kg)   SpO2 100%   BMI 41.12 kg/m²     Gen: Pleasant 64 y.o.  male in NAD.   HEENT: PERRLA. EOMI. OP moist and pink.  Neck: Supple.  No LAD.  HEART: RRR, No M/G/R.   LUNGS: CTAB No W/R.   ABDOMEN: S, NT, ND, BS+.   EXTREMITIES: Warm. No C/C/E.  L knee swelling and pain noted. ASSESSMENT / PLAN  Diagnoses and all orders for this visit:    Neuropathy of right lower extremity / Sciatica. Continue current meds.    -     REFERRAL TO ORTHOPEDICS  -     pregabalin (LYRICA) 100 mg capsule; Take 1 Capsule by mouth two (2) times a day. Max Daily Amount: 200 mg., Normal, Disp-60 Capsule, R-5    Left knee pain, unspecified chronicity: Swelling and pain on exam.   -     REFERRAL TO ORTHOPEDICS  -     AMB SUPPLY ORDER: Cane. -     DMV handicap form completed. Erectile dysfunction, unspecified erectile dysfunction type  -     tadalafiL (Cialis) 5 mg tablet; Take 1 Tablet by mouth daily. , Normal, Disp-30 Tablet, R-5    Severe persistent asthma with acute exacerbation  -     albuterol (PROVENTIL HFA, VENTOLIN HFA, PROAIR HFA) 90 mcg/actuation inhaler; Take 1 Puff by inhalation every four (4) hours as needed for Wheezing., Normal, Disp-1 Inhaler, R-5    Follow-up and Dispositions    · Return if symptoms worsen or fail to improve.

## 2021-06-11 ENCOUNTER — HOSPITAL ENCOUNTER (EMERGENCY)
Age: 56
Discharge: HOME | End: 2021-06-11
Attending: EMERGENCY MEDICINE
Payer: MEDICARE

## 2021-06-11 ENCOUNTER — APPOINTMENT (OUTPATIENT)
Dept: GENERAL RADIOLOGY | Age: 56
End: 2021-06-11
Attending: EMERGENCY MEDICINE
Payer: MEDICARE

## 2021-06-11 ENCOUNTER — APPOINTMENT (OUTPATIENT)
Dept: ULTRASOUND IMAGING | Age: 56
End: 2021-06-11
Attending: EMERGENCY MEDICINE
Payer: MEDICARE

## 2021-06-11 VITALS
HEART RATE: 90 BPM | WEIGHT: 315 LBS | RESPIRATION RATE: 16 BRPM | OXYGEN SATURATION: 99 % | DIASTOLIC BLOOD PRESSURE: 79 MMHG | SYSTOLIC BLOOD PRESSURE: 138 MMHG | BODY MASS INDEX: 39.17 KG/M2 | HEIGHT: 75 IN | TEMPERATURE: 98 F

## 2021-06-11 DIAGNOSIS — N17.9 AKI (ACUTE KIDNEY INJURY) (HCC): ICD-10-CM

## 2021-06-11 DIAGNOSIS — R13.10 DYSPHAGIA, UNSPECIFIED TYPE: Primary | ICD-10-CM

## 2021-06-11 LAB
ALBUMIN SERPL-MCNC: 3.4 G/DL (ref 3.5–5)
ALBUMIN/GLOB SERPL: 1 {RATIO} (ref 1.1–2.2)
ALP SERPL-CCNC: 86 U/L (ref 45–117)
ALT SERPL-CCNC: 50 U/L (ref 12–78)
ANION GAP SERPL CALC-SCNC: 5 MMOL/L (ref 5–15)
AST SERPL-CCNC: 26 U/L (ref 15–37)
BASOPHILS # BLD: 0 K/UL (ref 0–0.1)
BASOPHILS NFR BLD: 0 % (ref 0–1)
BILIRUB SERPL-MCNC: 0.2 MG/DL (ref 0.2–1)
BUN SERPL-MCNC: 44 MG/DL (ref 6–20)
BUN/CREAT SERPL: 19 (ref 12–20)
CALCIUM SERPL-MCNC: 7.9 MG/DL (ref 8.5–10.1)
CHLORIDE SERPL-SCNC: 108 MMOL/L (ref 97–108)
CO2 SERPL-SCNC: 26 MMOL/L (ref 21–32)
CREAT SERPL-MCNC: 2.37 MG/DL (ref 0.7–1.3)
DIFFERENTIAL METHOD BLD: NORMAL
EOSINOPHIL # BLD: 0.1 K/UL (ref 0–0.4)
EOSINOPHIL NFR BLD: 1 % (ref 0–7)
ERYTHROCYTE [DISTWIDTH] IN BLOOD BY AUTOMATED COUNT: 13.7 % (ref 11.5–14.5)
GLOBULIN SER CALC-MCNC: 3.4 G/DL (ref 2–4)
GLUCOSE SERPL-MCNC: 151 MG/DL (ref 65–100)
HCT VFR BLD AUTO: 39 % (ref 36.6–50.3)
HGB BLD-MCNC: 12.5 G/DL (ref 12.1–17)
IMM GRANULOCYTES # BLD AUTO: 0 K/UL (ref 0–0.04)
IMM GRANULOCYTES NFR BLD AUTO: 0 % (ref 0–0.5)
LIPASE SERPL-CCNC: 379 U/L (ref 73–393)
LYMPHOCYTES # BLD: 1.2 K/UL (ref 0.8–3.5)
LYMPHOCYTES NFR BLD: 22 % (ref 12–49)
MCH RBC QN AUTO: 28.7 PG (ref 26–34)
MCHC RBC AUTO-ENTMCNC: 32.1 G/DL (ref 30–36.5)
MCV RBC AUTO: 89.4 FL (ref 80–99)
MONOCYTES # BLD: 0.6 K/UL (ref 0–1)
MONOCYTES NFR BLD: 11 % (ref 5–13)
NEUTS SEG # BLD: 3.7 K/UL (ref 1.8–8)
NEUTS SEG NFR BLD: 66 % (ref 32–75)
NRBC # BLD: 0 K/UL (ref 0–0.01)
NRBC BLD-RTO: 0 PER 100 WBC
PLATELET # BLD AUTO: 167 K/UL (ref 150–400)
PMV BLD AUTO: 10.5 FL (ref 8.9–12.9)
POTASSIUM SERPL-SCNC: 4.1 MMOL/L (ref 3.5–5.1)
PROT SERPL-MCNC: 6.8 G/DL (ref 6.4–8.2)
RBC # BLD AUTO: 4.36 M/UL (ref 4.1–5.7)
SODIUM SERPL-SCNC: 139 MMOL/L (ref 136–145)
TROPONIN I SERPL-MCNC: <0.05 NG/ML
WBC # BLD AUTO: 5.6 K/UL (ref 4.1–11.1)

## 2021-06-11 PROCEDURE — 85025 COMPLETE CBC W/AUTO DIFF WBC: CPT

## 2021-06-11 PROCEDURE — 83690 ASSAY OF LIPASE: CPT

## 2021-06-11 PROCEDURE — 76770 US EXAM ABDO BACK WALL COMP: CPT

## 2021-06-11 PROCEDURE — 96361 HYDRATE IV INFUSION ADD-ON: CPT

## 2021-06-11 PROCEDURE — 84484 ASSAY OF TROPONIN QUANT: CPT

## 2021-06-11 PROCEDURE — 80053 COMPREHEN METABOLIC PANEL: CPT

## 2021-06-11 PROCEDURE — 36415 COLL VENOUS BLD VENIPUNCTURE: CPT

## 2021-06-11 PROCEDURE — 74011250636 HC RX REV CODE- 250/636: Performed by: EMERGENCY MEDICINE

## 2021-06-11 PROCEDURE — 71045 X-RAY EXAM CHEST 1 VIEW: CPT

## 2021-06-11 PROCEDURE — 96375 TX/PRO/DX INJ NEW DRUG ADDON: CPT

## 2021-06-11 PROCEDURE — 99283 EMERGENCY DEPT VISIT LOW MDM: CPT

## 2021-06-11 PROCEDURE — 96374 THER/PROPH/DIAG INJ IV PUSH: CPT

## 2021-06-11 RX ORDER — METOCLOPRAMIDE HYDROCHLORIDE 5 MG/ML
10 INJECTION INTRAMUSCULAR; INTRAVENOUS
Status: COMPLETED | OUTPATIENT
Start: 2021-06-11 | End: 2021-06-11

## 2021-06-11 RX ADMIN — SODIUM CHLORIDE 1000 ML: 9 INJECTION, SOLUTION INTRAVENOUS at 13:49

## 2021-06-11 RX ADMIN — GLUCAGON HYDROCHLORIDE 1 MG: 1 INJECTION, POWDER, FOR SOLUTION INTRAMUSCULAR; INTRAVENOUS; SUBCUTANEOUS at 12:02

## 2021-06-11 RX ADMIN — METOCLOPRAMIDE 10 MG: 5 INJECTION, SOLUTION INTRAMUSCULAR; INTRAVENOUS at 12:01

## 2021-06-11 NOTE — ED NOTES
RAJ Alas reviewed discharge instructions with the patient. The patient verbalized understanding. All questions and concerns were addressed. The patient declined a wheelchair and is discharged ambulatory in the care of family members with instructions and prescriptions in hand. Pt is alert and oriented x 4. Respirations are clear and unlabored.

## 2021-06-11 NOTE — ED PROVIDER NOTES
EMERGENCY DEPARTMENT HISTORY AND PHYSICAL EXAM      Date: 6/11/2021  Patient Name: Corinne Loop    History of Presenting Illness     Chief Complaint   Patient presents with    Drooling     trouble swallowing  3 to 4 days with couple of episodes of regurge in his mouth; feels like it sits in middle of his chest.        History Provided By: Patient    HPI: Corinne Loop, 64 y.o. male presents to the ED with cc of difficulty swallowing liquids. Patient symptoms started 3 to 4 days ago. He states that he is tolerating solids fine, but whenever he drinks liquids he feels as if it gets stuck in the middle of his chest and he gets heartburn. He has only regurgitated a few times. He has more problems with alcohol or carbonated drinks than water. He denies lightheadedness or dizziness. He says he had 2 episodes of diarrhea since yesterday. Denies abdominal pain, shortness of breath, cough, fever or chills. When the heartburn occurs it last for minutes at a time. Is not associated with diaphoresis. There is no radiation of pain to the neck, back, jaw or arms. According to EMS, when the patient arrived at the fire station for evaluation, he was sweaty and short of breath. The patient states that is normal for him due to his asthma. There are no other complaints, changes, or physical findings at this time. PCP: Tom Casiano MD    No current facility-administered medications on file prior to encounter. Current Outpatient Medications on File Prior to Encounter   Medication Sig Dispense Refill    tadalafiL (Cialis) 5 mg tablet Take 1 Tablet by mouth daily. 30 Tablet 5    pregabalin (LYRICA) 100 mg capsule Take 1 Capsule by mouth two (2) times a day. Max Daily Amount: 200 mg. 60 Capsule 5    albuterol (PROVENTIL HFA, VENTOLIN HFA, PROAIR HFA) 90 mcg/actuation inhaler Take 1 Puff by inhalation every four (4) hours as needed for Wheezing.  1 Inhaler 5    cloNIDine HCL (CATAPRES) 0.2 mg tablet TAKE ONE TABLET BY MOUTH 2 TIMES A  Tablet 0    lisinopriL (PRINIVIL, ZESTRIL) 20 mg tablet TAKE ONE TABLET BY MOUTH EVERY DAY 90 Tab 0    amLODIPine (NORVASC) 10 mg tablet Take 1 Tab by mouth daily. Indications: high blood pressure 90 Tab 0    aspirin delayed-release 81 mg tablet Take 1 Tab by mouth daily. 100 Tab 3    atenoloL (TENORMIN) 50 mg tablet Take 1 Tab by mouth daily. 30 Tab 11    busPIRone (BUSPAR) 10 mg tablet Take 1-2 Tabs by mouth three (3) times daily. 20 Tab 0    furosemide (LASIX) 40 mg tablet Increase your morning dose to 40 mg for 5 days 5 Tab 0    fluticasone propionate (Flovent HFA) 220 mcg/actuation inhaler INHALE 1 OR 2 PUFFS 2 TIMES A DAY. 3 Inhaler 0    fluticasone propionate (Flonase Allergy Relief) 50 mcg/actuation nasal spray 2 Sprays by Both Nostrils route daily. 1 Bottle 5    diclofenac EC (VOLTAREN) 50 mg EC tablet Take 1 Tab by mouth two (2) times a day. 180 Tab 0    cyclobenzaprine (FLEXERIL) 10 mg tablet Take 1 Tab by mouth nightly. 30 Tab 0    cetirizine (ZyrTEC) 10 mg tablet Take 1 Tab by mouth daily. 20 Tab 0    sildenafil citrate (Viagra) 100 mg tablet Take 1 Tab by mouth as needed for Erectile Dysfunction. 6 Tab 5    hydroCHLOROthiazide (HYDRODIURIL) 25 mg tablet Take 1 Tab by mouth daily. 30 Tab 11    phentermine (ADIPEX-P) 37.5 mg tablet Take 1 Tab by mouth every morning. Max Daily Amount: 37.5 mg. 30 Tab 0    albuterol (PROVENTIL VENTOLIN) 2.5 mg /3 mL (0.083 %) nebu 3 mL by Nebulization route every four (4) hours as needed for Wheezing. 90 Nebule 0    guaiFENesin-dextromethorphan (TUSSI-ORGANIDIN DM)  mg/5 mL liqd Take 10 mL by mouth every four (4) hours as needed for Cough or Congestion. 236 mL 0    Linzess 290 mcg cap capsule Take 1 Cap by mouth daily. 90 Cap 3    montelukast (Singulair) 10 mg tablet Take 1 Tab by mouth daily. 90 Tab 0    pantoprazole (PROTONIX) 40 mg tablet Take 1 Tab by mouth daily.  90 Tab 3    [DISCONTINUED] lisinopriL (PRINIVIL, ZESTRIL) 20 mg tablet Take 1 Tab by mouth daily. 90 Tab 0    polyethylene glycol (MIRALAX) 17 gram packet Take 1 Packet by mouth daily. 1 Each 5    Nebulizer Accessories kit Nebulizer cup w/ tubing; use every 4 hrs prn wheezing 1 Kit 5       Past History     Past Medical History:  Past Medical History:   Diagnosis Date    Arthritis     Asthma     Chronic low back pain     Hypertension        Past Surgical History:  Past Surgical History:   Procedure Laterality Date    HX ORTHOPAEDIC         Family History:  Family History   Problem Relation Age of Onset    Cancer Mother     Heart Disease Father        Social History:  Social History     Tobacco Use    Smoking status: Former Smoker     Packs/day: 0.25     Years: 10.00     Pack years: 2.50    Smokeless tobacco: Never Used   Vaping Use    Vaping Use: Never used   Substance Use Topics    Alcohol use: Yes     Comment: occ    Drug use: No       Allergies: Allergies   Allergen Reactions    Vicodin [Hydrocodone-Acetaminophen] Other (comments)     Headache         Review of Systems   Review of Systems   Constitutional: Negative for fever. HENT: Positive for trouble swallowing. Negative for congestion. Eyes: Negative. Respiratory: Negative for shortness of breath. Cardiovascular: Positive for chest pain. Gastrointestinal: Positive for vomiting. Negative for abdominal pain. Endocrine: Negative for heat intolerance. Genitourinary: Negative. Musculoskeletal: Negative for back pain. Skin: Negative for rash. Allergic/Immunologic: Negative for immunocompromised state. Neurological: Negative for dizziness. Hematological: Does not bruise/bleed easily. Psychiatric/Behavioral: Negative. All other systems reviewed and are negative. Physical Exam   Physical Exam  Vitals and nursing note reviewed. Constitutional:       General: He is not in acute distress. Appearance: He is well-developed.    HENT:      Head: Normocephalic and atraumatic. Cardiovascular:      Rate and Rhythm: Normal rate and regular rhythm. Pulses: Normal pulses. Heart sounds: Normal heart sounds. Pulmonary:      Effort: Pulmonary effort is normal.      Breath sounds: Normal breath sounds. Abdominal:      General: Bowel sounds are normal.      Palpations: Abdomen is soft. Musculoskeletal:         General: Normal range of motion. Cervical back: Normal range of motion. Skin:     General: Skin is warm and dry. Neurological:      General: No focal deficit present. Mental Status: He is alert and oriented to person, place, and time. Coordination: Coordination normal.   Psychiatric:         Mood and Affect: Mood normal.         Behavior: Behavior normal.         Diagnostic Study Results     Labs -     Recent Results (from the past 12 hour(s))   CBC WITH AUTOMATED DIFF    Collection Time: 06/11/21 12:46 PM   Result Value Ref Range    WBC 5.6 4.1 - 11.1 K/uL    RBC 4.36 4.10 - 5.70 M/uL    HGB 12.5 12.1 - 17.0 g/dL    HCT 39.0 36.6 - 50.3 %    MCV 89.4 80.0 - 99.0 FL    MCH 28.7 26.0 - 34.0 PG    MCHC 32.1 30.0 - 36.5 g/dL    RDW 13.7 11.5 - 14.5 %    PLATELET 846 349 - 871 K/uL    MPV 10.5 8.9 - 12.9 FL    NRBC 0.0 0  WBC    ABSOLUTE NRBC 0.00 0.00 - 0.01 K/uL    NEUTROPHILS 66 32 - 75 %    LYMPHOCYTES 22 12 - 49 %    MONOCYTES 11 5 - 13 %    EOSINOPHILS 1 0 - 7 %    BASOPHILS 0 0 - 1 %    IMMATURE GRANULOCYTES 0 0.0 - 0.5 %    ABS. NEUTROPHILS 3.7 1.8 - 8.0 K/UL    ABS. LYMPHOCYTES 1.2 0.8 - 3.5 K/UL    ABS. MONOCYTES 0.6 0.0 - 1.0 K/UL    ABS. EOSINOPHILS 0.1 0.0 - 0.4 K/UL    ABS. BASOPHILS 0.0 0.0 - 0.1 K/UL    ABS. IMM.  GRANS. 0.0 0.00 - 0.04 K/UL    DF AUTOMATED     METABOLIC PANEL, COMPREHENSIVE    Collection Time: 06/11/21 12:46 PM   Result Value Ref Range    Sodium 139 136 - 145 mmol/L    Potassium 4.1 3.5 - 5.1 mmol/L    Chloride 108 97 - 108 mmol/L    CO2 26 21 - 32 mmol/L    Anion gap 5 5 - 15 mmol/L Glucose 151 (H) 65 - 100 mg/dL    BUN 44 (H) 6 - 20 MG/DL    Creatinine 2.37 (H) 0.70 - 1.30 MG/DL    BUN/Creatinine ratio 19 12 - 20      GFR est AA 35 (L) >60 ml/min/1.73m2    GFR est non-AA 29 (L) >60 ml/min/1.73m2    Calcium 7.9 (L) 8.5 - 10.1 MG/DL    Bilirubin, total 0.2 0.2 - 1.0 MG/DL    ALT (SGPT) 50 12 - 78 U/L    AST (SGOT) 26 15 - 37 U/L    Alk. phosphatase 86 45 - 117 U/L    Protein, total 6.8 6.4 - 8.2 g/dL    Albumin 3.4 (L) 3.5 - 5.0 g/dL    Globulin 3.4 2.0 - 4.0 g/dL    A-G Ratio 1.0 (L) 1.1 - 2.2     TROPONIN I    Collection Time: 06/11/21 12:46 PM   Result Value Ref Range    Troponin-I, Qt. <0.05 <0.05 ng/mL   LIPASE    Collection Time: 06/11/21 12:46 PM   Result Value Ref Range    Lipase 379 73 - 393 U/L       Radiologic Studies -   US RETROPERITONEUM COMP   Final Result   1. No gross hydronephrosis or large shadowing calculus. 2. Moderate hepatic steatosis. XR CHEST PORT   Final Result   No acute process identified. CT Results  (Last 48 hours)    None        CXR Results  (Last 48 hours)               06/11/21 1250  XR CHEST PORT Final result    Impression:  No acute process identified. Narrative:  Clinical history: Pain   INDICATION:   Pain   COMPARISON: 11/1/2020       FINDINGS:   AP portable upright view of the chest demonstrates a stable  cardiopericardial   silhouette. There is no pleural effusion. .There is no focal consolidation. .There   is no pneumothorax. .                  Medical Decision Making   I am the first provider for this patient. I reviewed the vital signs, available nursing notes, past medical history, past surgical history, family history and social history. Vital Signs-Reviewed the patient's vital signs.   Patient Vitals for the past 12 hrs:   Temp Pulse Resp BP SpO2   06/11/21 1207     99 %   06/11/21 1127 98 °F (36.7 °C) 90 16 138/79 99 %           Records Reviewed: Nursing Notes, Old Medical Records and Previous Laboratory Studies    Provider Notes (Medical Decision Making):   Reflux, esophagitis, peptic ulcer disease, CAD, dehydration, electrolyte abnormality    ED Course:   Initial assessment performed. The patients presenting problems have been discussed, and they are in agreement with the care plan formulated and outlined with them. I have encouraged them to ask questions as they arise throughout their visit. Consult note:    I spoke to Eboni De La Garza, hospitalist, about the patient's creatinine. He states that  I  Should hydrate the patient, advise him to stop his Lasix until he has a follow-up creatinine next week and tell him to avoid NSAIDs,. He does not think the patient needs to be admitted. Progress note: The patient is feeling better. His results were reviewed. He is advised to follow-up and return to ER if worse               Critical Care Time:   0    Disposition:  home    DISCHARGE PLAN:  1. Discharge Medication List as of 6/11/2021  3:07 PM      CONTINUE these medications which have NOT CHANGED    Details   tadalafiL (Cialis) 5 mg tablet Take 1 Tablet by mouth daily. , Normal, Disp-30 Tablet, R-5      pregabalin (LYRICA) 100 mg capsule Take 1 Capsule by mouth two (2) times a day. Max Daily Amount: 200 mg., Normal, Disp-60 Capsule, R-5      albuterol (PROVENTIL HFA, VENTOLIN HFA, PROAIR HFA) 90 mcg/actuation inhaler Take 1 Puff by inhalation every four (4) hours as needed for Wheezing., Normal, Disp-1 Inhaler, R-5      cloNIDine HCL (CATAPRES) 0.2 mg tablet TAKE ONE TABLET BY MOUTH 2 TIMES A DAY, Normal, Disp-180 Tablet, R-0      lisinopriL (PRINIVIL, ZESTRIL) 20 mg tablet TAKE ONE TABLET BY MOUTH EVERY DAY, Normal, Disp-90 Tab, R-0PT STATES HE HAS LOST HIS RX ON [employer] BUS. amLODIPine (NORVASC) 10 mg tablet Take 1 Tab by mouth daily. Indications: high blood pressure, Normal, Disp-90 Tab, R-0      aspirin delayed-release 81 mg tablet Take 1 Tab by mouth daily. , Normal, Disp-100 Tab, R-3 atenoloL (TENORMIN) 50 mg tablet Take 1 Tab by mouth daily. , Normal, Disp-30 Tab, R-11      busPIRone (BUSPAR) 10 mg tablet Take 1-2 Tabs by mouth three (3) times daily. , Normal, Disp-20 Tab, R-0      fluticasone propionate (Flovent HFA) 220 mcg/actuation inhaler INHALE 1 OR 2 PUFFS 2 TIMES A DAY., Normal, Disp-3 Inhaler, R-0      fluticasone propionate (Flonase Allergy Relief) 50 mcg/actuation nasal spray 2 Sprays by Both Nostrils route daily. , Normal, Disp-1 Bottle, R-5      diclofenac EC (VOLTAREN) 50 mg EC tablet Take 1 Tab by mouth two (2) times a day., Normal, Disp-180 Tab, R-0      cyclobenzaprine (FLEXERIL) 10 mg tablet Take 1 Tab by mouth nightly., Normal, Disp-30 Tab, R-0      cetirizine (ZyrTEC) 10 mg tablet Take 1 Tab by mouth daily. , Normal, Disp-20 Tab, R-0      sildenafil citrate (Viagra) 100 mg tablet Take 1 Tab by mouth as needed for Erectile Dysfunction. , Normal, Disp-6 Tab, R-5      hydroCHLOROthiazide (HYDRODIURIL) 25 mg tablet Take 1 Tab by mouth daily. , Normal, Disp-30 Tab, R-11      phentermine (ADIPEX-P) 37.5 mg tablet Take 1 Tab by mouth every morning. Max Daily Amount: 37.5 mg., Normal, Disp-30 Tab, R-0      albuterol (PROVENTIL VENTOLIN) 2.5 mg /3 mL (0.083 %) nebu 3 mL by Nebulization route every four (4) hours as needed for Wheezing., Normal, Disp-90 Nebule,R-0      guaiFENesin-dextromethorphan (TUSSI-ORGANIDIN DM)  mg/5 mL liqd Take 10 mL by mouth every four (4) hours as needed for Cough or Congestion. , Normal, Disp-236 mL,R-0      Linzess 290 mcg cap capsule Take 1 Cap by mouth daily. , Normal, Disp-90 Cap,R-3,NIKO      montelukast (Singulair) 10 mg tablet Take 1 Tab by mouth daily. , Normal, Disp-90 Tab,R-0      pantoprazole (PROTONIX) 40 mg tablet Take 1 Tab by mouth daily. , Normal, Disp-90 Tab,R-3      polyethylene glycol (MIRALAX) 17 gram packet Take 1 Packet by mouth daily. , Normal, Disp-1 Each, R-5      Nebulizer Accessories kit Nebulizer cup w/ tubing; use every 4 hrs prn wheezing, Normal, Disp-1 Kit, R-5         STOP taking these medications       furosemide (LASIX) 40 mg tablet Comments:   Reason for Stoppin.   Follow-up Information     Follow up With Specialties Details Why Shanae Fernandez MD Internal Medicine In 3 days To have your kidney function test rechecked [address] [place] [address]  [phone]          Avoid NSAIDs (Motrin, Advil). Stop Lasix until your kidney function tests are reevaluated        3. Return to ED if worse     Diagnosis     Clinical Impression:   1. Dysphagia, unspecified type    2. ALEJA (acute kidney injury) ([facility] [address]        Attestations:    Vivien Alberto MD    Please note that this dictation was completed with [employer], the computer voice recognition software. Quite often unanticipated grammatical, syntax, homophones, and other interpretive errors are inadvertently transcribed by the computer software. Please disregard these errors. Please excuse any errors that have escaped final proofreading. Thank you.

## 2021-06-14 ENCOUNTER — PATIENT OUTREACH (OUTPATIENT)
Dept: INTERNAL MEDICINE CLINIC | Age: 56
End: 2021-06-14

## 2021-06-14 NOTE — PROGRESS NOTES
Ambulatory Care Management Note    Date/Time:  6/14/2021 11:22 AM    This patient was received as a referral from Daily assignment-[facility] ED 6/11/21 dx-dysphagia, ALEJA (drooling)  Ambulatory Care Manager outreached to patient today to offer care management services. Introduction to self and role of care manager provided. Patient accepted care management services at this time. Follow up call scheduled at this time. Patient has Ambulatory Care Manager's contact number for for any questions or concerns. Pt stated he is feeling a little better after the IV the ER gave him. Pt also stated he received two shots and was not told what they were and what for. Discussed and educated pt about metoclopramide and glucagon. He is not taking furosemide and stopped this on his own due to spent too much time in the bathroom. He does not even have the medication in his pill box. Educated pt on refraining from taking nsaids. He has diclofenac on his med list and will use this sparingly. He will call Dr Dianelys Baldwin about getting his kidney labs rechecked this week. Pt stated he uses transportation and it takes three days to set up a . Pt received both covid vaccines at [facility]. Advance Care Planning:   Does patient have an Advance Directive: not on file    Pt agreed to follow up in 7-14 days. It is noted that pt has a PCP appt 6/25/21.       Future Appointments   Date Time Provider Zenobia Benjamin   6/25/2021 11:30 AM Jaylan Barry MD [facility] BS AMB      Last Appointment My Department:  6/7/2021

## 2021-06-23 ENCOUNTER — HOSPITAL ENCOUNTER (INPATIENT)
Age: 56
LOS: 2 days | Discharge: HOME | DRG: 315 | End: 2021-06-25
Attending: EMERGENCY MEDICINE | Admitting: INTERNAL MEDICINE
Payer: MEDICARE

## 2021-06-23 ENCOUNTER — APPOINTMENT (OUTPATIENT)
Dept: CT IMAGING | Age: 56
DRG: 315 | End: 2021-06-23
Attending: EMERGENCY MEDICINE
Payer: MEDICARE

## 2021-06-23 ENCOUNTER — APPOINTMENT (OUTPATIENT)
Dept: GENERAL RADIOLOGY | Age: 56
DRG: 315 | End: 2021-06-23
Attending: EMERGENCY MEDICINE
Payer: MEDICARE

## 2021-06-23 DIAGNOSIS — I95.9 SEVERE HYPOTENSION: ICD-10-CM

## 2021-06-23 DIAGNOSIS — N17.9 ACUTE RENAL FAILURE, UNSPECIFIED ACUTE RENAL FAILURE TYPE (HCC): Primary | ICD-10-CM

## 2021-06-23 DIAGNOSIS — T46.5X1A CLONIDINE OVERDOSE, ACCIDENTAL OR UNINTENTIONAL, INITIAL ENCOUNTER: ICD-10-CM

## 2021-06-23 LAB
ALBUMIN SERPL-MCNC: 3.6 G/DL (ref 3.5–5)
ALBUMIN/GLOB SERPL: 1 {RATIO} (ref 1.1–2.2)
ALP SERPL-CCNC: 88 U/L (ref 45–117)
ALT SERPL-CCNC: 58 U/L (ref 12–78)
ANION GAP SERPL CALC-SCNC: 9 MMOL/L (ref 5–15)
AST SERPL-CCNC: 25 U/L (ref 15–37)
BASOPHILS # BLD: 0 K/UL (ref 0–0.1)
BASOPHILS NFR BLD: 0 % (ref 0–1)
BILIRUB SERPL-MCNC: 0.4 MG/DL (ref 0.2–1)
BUN SERPL-MCNC: 36 MG/DL (ref 6–20)
BUN/CREAT SERPL: 5 (ref 12–20)
CALCIUM SERPL-MCNC: 8.5 MG/DL (ref 8.5–10.1)
CHLORIDE SERPL-SCNC: 100 MMOL/L (ref 97–108)
CO2 SERPL-SCNC: 25 MMOL/L (ref 21–32)
CREAT SERPL-MCNC: 6.69 MG/DL (ref 0.7–1.3)
DIFFERENTIAL METHOD BLD: ABNORMAL
EOSINOPHIL # BLD: 0.2 K/UL (ref 0–0.4)
EOSINOPHIL NFR BLD: 2 % (ref 0–7)
ERYTHROCYTE [DISTWIDTH] IN BLOOD BY AUTOMATED COUNT: 14 % (ref 11.5–14.5)
GLOBULIN SER CALC-MCNC: 3.7 G/DL (ref 2–4)
GLUCOSE SERPL-MCNC: 107 MG/DL (ref 65–100)
HCT VFR BLD AUTO: 41.2 % (ref 36.6–50.3)
HGB BLD-MCNC: 13.2 G/DL (ref 12.1–17)
IMM GRANULOCYTES # BLD AUTO: 0.1 K/UL (ref 0–0.04)
IMM GRANULOCYTES NFR BLD AUTO: 1 % (ref 0–0.5)
LACTATE BLD-SCNC: 1.28 MMOL/L (ref 0.4–2)
LYMPHOCYTES # BLD: 2.1 K/UL (ref 0.8–3.5)
LYMPHOCYTES NFR BLD: 19 % (ref 12–49)
MCH RBC QN AUTO: 28.7 PG (ref 26–34)
MCHC RBC AUTO-ENTMCNC: 32 G/DL (ref 30–36.5)
MCV RBC AUTO: 89.6 FL (ref 80–99)
MONOCYTES # BLD: 0.9 K/UL (ref 0–1)
MONOCYTES NFR BLD: 8 % (ref 5–13)
NEUTS SEG # BLD: 7.7 K/UL (ref 1.8–8)
NEUTS SEG NFR BLD: 70 % (ref 32–75)
NRBC # BLD: 0 K/UL (ref 0–0.01)
NRBC BLD-RTO: 0 PER 100 WBC
PLATELET # BLD AUTO: 186 K/UL (ref 150–400)
PMV BLD AUTO: 10.5 FL (ref 8.9–12.9)
POTASSIUM SERPL-SCNC: 4.2 MMOL/L (ref 3.5–5.1)
PROT SERPL-MCNC: 7.3 G/DL (ref 6.4–8.2)
RBC # BLD AUTO: 4.6 M/UL (ref 4.1–5.7)
SODIUM SERPL-SCNC: 134 MMOL/L (ref 136–145)
TROPONIN I BLD-MCNC: <0.04 NG/ML (ref 0–0.08)
WBC # BLD AUTO: 10.9 K/UL (ref 4.1–11.1)

## 2021-06-23 PROCEDURE — 80053 COMPREHEN METABOLIC PANEL: CPT

## 2021-06-23 PROCEDURE — 85025 COMPLETE CBC W/AUTO DIFF WBC: CPT

## 2021-06-23 PROCEDURE — 99291 CRITICAL CARE FIRST HOUR: CPT

## 2021-06-23 PROCEDURE — 74176 CT ABD & PELVIS W/O CONTRAST: CPT

## 2021-06-23 PROCEDURE — 84484 ASSAY OF TROPONIN QUANT: CPT

## 2021-06-23 PROCEDURE — 83605 ASSAY OF LACTIC ACID: CPT

## 2021-06-23 PROCEDURE — 36415 COLL VENOUS BLD VENIPUNCTURE: CPT

## 2021-06-23 PROCEDURE — 71045 X-RAY EXAM CHEST 1 VIEW: CPT

## 2021-06-23 PROCEDURE — 74011250636 HC RX REV CODE- 250/636: Performed by: EMERGENCY MEDICINE

## 2021-06-23 PROCEDURE — 96360 HYDRATION IV INFUSION INIT: CPT

## 2021-06-23 PROCEDURE — 87040 BLOOD CULTURE FOR BACTERIA: CPT

## 2021-06-23 PROCEDURE — 65270000029 HC RM PRIVATE

## 2021-06-23 PROCEDURE — 71250 CT THORAX DX C-: CPT

## 2021-06-23 PROCEDURE — 93005 ELECTROCARDIOGRAM TRACING: CPT

## 2021-06-23 RX ORDER — MONTELUKAST SODIUM 10 MG/1
10 TABLET ORAL DAILY
Status: DISCONTINUED | OUTPATIENT
Start: 2021-06-24 | End: 2021-06-25 | Stop reason: HOSPADM

## 2021-06-23 RX ORDER — FLUTICASONE PROPIONATE 50 MCG
2 SPRAY, SUSPENSION (ML) NASAL DAILY
Status: DISCONTINUED | OUTPATIENT
Start: 2021-06-24 | End: 2021-06-25 | Stop reason: HOSPADM

## 2021-06-23 RX ORDER — CETIRIZINE HCL 10 MG
10 TABLET ORAL DAILY
Status: DISCONTINUED | OUTPATIENT
Start: 2021-06-24 | End: 2021-06-25 | Stop reason: HOSPADM

## 2021-06-23 RX ORDER — SODIUM CHLORIDE 0.9 % (FLUSH) 0.9 %
5-40 SYRINGE (ML) INJECTION EVERY 8 HOURS
Status: DISCONTINUED | OUTPATIENT
Start: 2021-06-23 | End: 2021-06-25 | Stop reason: HOSPADM

## 2021-06-23 RX ORDER — ACETAMINOPHEN 325 MG/1
650 TABLET ORAL
Status: DISCONTINUED | OUTPATIENT
Start: 2021-06-23 | End: 2021-06-25 | Stop reason: HOSPADM

## 2021-06-23 RX ORDER — POLYETHYLENE GLYCOL 3350 17 G/17G
17 POWDER, FOR SOLUTION ORAL DAILY PRN
Status: DISCONTINUED | OUTPATIENT
Start: 2021-06-23 | End: 2021-06-25 | Stop reason: HOSPADM

## 2021-06-23 RX ORDER — SODIUM CHLORIDE 0.9 % (FLUSH) 0.9 %
5-40 SYRINGE (ML) INJECTION AS NEEDED
Status: DISCONTINUED | OUTPATIENT
Start: 2021-06-23 | End: 2021-06-25 | Stop reason: HOSPADM

## 2021-06-23 RX ORDER — PANTOPRAZOLE SODIUM 40 MG/1
40 TABLET, DELAYED RELEASE ORAL DAILY
Status: DISCONTINUED | OUTPATIENT
Start: 2021-06-24 | End: 2021-06-25 | Stop reason: HOSPADM

## 2021-06-23 RX ORDER — SODIUM CHLORIDE 9 MG/ML
150 INJECTION, SOLUTION INTRAVENOUS CONTINUOUS
Status: DISCONTINUED | OUTPATIENT
Start: 2021-06-23 | End: 2021-06-25

## 2021-06-23 RX ORDER — ONDANSETRON 4 MG/1
4 TABLET, ORALLY DISINTEGRATING ORAL
Status: DISCONTINUED | OUTPATIENT
Start: 2021-06-23 | End: 2021-06-25 | Stop reason: HOSPADM

## 2021-06-23 RX ORDER — HEPARIN SODIUM 5000 [USP'U]/ML
7500 INJECTION, SOLUTION INTRAVENOUS; SUBCUTANEOUS EVERY 8 HOURS
Status: DISCONTINUED | OUTPATIENT
Start: 2021-06-23 | End: 2021-06-25 | Stop reason: HOSPADM

## 2021-06-23 RX ORDER — CYCLOBENZAPRINE HCL 10 MG
10 TABLET ORAL
Status: DISCONTINUED | OUTPATIENT
Start: 2021-06-23 | End: 2021-06-25 | Stop reason: HOSPADM

## 2021-06-23 RX ORDER — ALBUTEROL SULFATE 0.83 MG/ML
2.5 SOLUTION RESPIRATORY (INHALATION)
Status: DISCONTINUED | OUTPATIENT
Start: 2021-06-23 | End: 2021-06-25 | Stop reason: HOSPADM

## 2021-06-23 RX ORDER — ACETAMINOPHEN 650 MG/1
650 SUPPOSITORY RECTAL
Status: DISCONTINUED | OUTPATIENT
Start: 2021-06-23 | End: 2021-06-25 | Stop reason: HOSPADM

## 2021-06-23 RX ORDER — SODIUM CHLORIDE 0.9 % (FLUSH) 0.9 %
5-10 SYRINGE (ML) INJECTION AS NEEDED
Status: DISCONTINUED | OUTPATIENT
Start: 2021-06-23 | End: 2021-06-25 | Stop reason: HOSPADM

## 2021-06-23 RX ORDER — ASPIRIN 81 MG/1
81 TABLET ORAL DAILY
Status: DISCONTINUED | OUTPATIENT
Start: 2021-06-24 | End: 2021-06-25 | Stop reason: HOSPADM

## 2021-06-23 RX ORDER — ONDANSETRON 2 MG/ML
4 INJECTION INTRAMUSCULAR; INTRAVENOUS
Status: DISCONTINUED | OUTPATIENT
Start: 2021-06-23 | End: 2021-06-25 | Stop reason: HOSPADM

## 2021-06-23 RX ADMIN — SODIUM CHLORIDE, POTASSIUM CHLORIDE, SODIUM LACTATE AND CALCIUM CHLORIDE 1000 ML: 600; 310; 30; 20 INJECTION, SOLUTION INTRAVENOUS at 20:39

## 2021-06-23 RX ADMIN — SODIUM CHLORIDE, POTASSIUM CHLORIDE, SODIUM LACTATE AND CALCIUM CHLORIDE 1000 ML: 600; 310; 30; 20 INJECTION, SOLUTION INTRAVENOUS at 21:42

## 2021-06-24 LAB
ANION GAP SERPL CALC-SCNC: 8 MMOL/L (ref 5–15)
APPEARANCE UR: CLEAR
ATRIAL RATE: 71 BPM
BACTERIA URNS QL MICRO: NEGATIVE /HPF
BILIRUB UR QL CFM: NEGATIVE
BUN SERPL-MCNC: 37 MG/DL (ref 6–20)
BUN/CREAT SERPL: 7 (ref 12–20)
CALCIUM SERPL-MCNC: 7.9 MG/DL (ref 8.5–10.1)
CALCULATED P AXIS, ECG09: 59 DEGREES
CALCULATED R AXIS, ECG10: 28 DEGREES
CALCULATED T AXIS, ECG11: 28 DEGREES
CHLORIDE SERPL-SCNC: 103 MMOL/L (ref 97–108)
CO2 SERPL-SCNC: 25 MMOL/L (ref 21–32)
COLOR UR: ABNORMAL
CREAT SERPL-MCNC: 5.32 MG/DL (ref 0.7–1.3)
DIAGNOSIS, 93000: NORMAL
EPITH CASTS URNS QL MICRO: ABNORMAL /LPF
ERYTHROCYTE [DISTWIDTH] IN BLOOD BY AUTOMATED COUNT: 13.7 % (ref 11.5–14.5)
GLUCOSE SERPL-MCNC: 99 MG/DL (ref 65–100)
GLUCOSE UR STRIP.AUTO-MCNC: NEGATIVE MG/DL
HCT VFR BLD AUTO: 38.4 % (ref 36.6–50.3)
HGB BLD-MCNC: 12.3 G/DL (ref 12.1–17)
HGB UR QL STRIP: NEGATIVE
HYALINE CASTS URNS QL MICRO: ABNORMAL /LPF (ref 0–5)
KETONES UR QL STRIP.AUTO: NEGATIVE MG/DL
LEUKOCYTE ESTERASE UR QL STRIP.AUTO: NEGATIVE
MCH RBC QN AUTO: 28.7 PG (ref 26–34)
MCHC RBC AUTO-ENTMCNC: 32 G/DL (ref 30–36.5)
MCV RBC AUTO: 89.7 FL (ref 80–99)
MUCOUS THREADS URNS QL MICRO: ABNORMAL /LPF
NITRITE UR QL STRIP.AUTO: NEGATIVE
NRBC # BLD: 0 K/UL (ref 0–0.01)
NRBC BLD-RTO: 0 PER 100 WBC
P-R INTERVAL, ECG05: 166 MS
PH UR STRIP: 5 [PH] (ref 5–8)
PLATELET # BLD AUTO: 181 K/UL (ref 150–400)
PMV BLD AUTO: 11.1 FL (ref 8.9–12.9)
POTASSIUM SERPL-SCNC: 4.2 MMOL/L (ref 3.5–5.1)
PROT UR STRIP-MCNC: 30 MG/DL
Q-T INTERVAL, ECG07: 404 MS
QRS DURATION, ECG06: 84 MS
QTC CALCULATION (BEZET), ECG08: 439 MS
RBC # BLD AUTO: 4.28 M/UL (ref 4.1–5.7)
RBC #/AREA URNS HPF: ABNORMAL /HPF (ref 0–5)
SODIUM SERPL-SCNC: 136 MMOL/L (ref 136–145)
SP GR UR REFRACTOMETRY: 1.02 (ref 1–1.03)
TROPONIN I SERPL-MCNC: <0.05 NG/ML
UA: UC IF INDICATED,UAUC: ABNORMAL
UROBILINOGEN UR QL STRIP.AUTO: 1 EU/DL (ref 0.2–1)
VENTRICULAR RATE, ECG03: 71 BPM
WBC # BLD AUTO: 7.2 K/UL (ref 4.1–11.1)
WBC URNS QL MICRO: ABNORMAL /HPF (ref 0–4)

## 2021-06-24 PROCEDURE — 84484 ASSAY OF TROPONIN QUANT: CPT

## 2021-06-24 PROCEDURE — 74011250636 HC RX REV CODE- 250/636: Performed by: INTERNAL MEDICINE

## 2021-06-24 PROCEDURE — 65660000000 HC RM CCU STEPDOWN

## 2021-06-24 PROCEDURE — 99233 SBSQ HOSP IP/OBS HIGH 50: CPT | Performed by: INTERNAL MEDICINE

## 2021-06-24 PROCEDURE — 36415 COLL VENOUS BLD VENIPUNCTURE: CPT

## 2021-06-24 PROCEDURE — 85027 COMPLETE CBC AUTOMATED: CPT

## 2021-06-24 PROCEDURE — 81001 URINALYSIS AUTO W/SCOPE: CPT

## 2021-06-24 PROCEDURE — 74011250637 HC RX REV CODE- 250/637: Performed by: INTERNAL MEDICINE

## 2021-06-24 PROCEDURE — 80048 BASIC METABOLIC PNL TOTAL CA: CPT

## 2021-06-24 PROCEDURE — P9045 ALBUMIN (HUMAN), 5%, 250 ML: HCPCS | Performed by: NURSE PRACTITIONER

## 2021-06-24 PROCEDURE — 74011250636 HC RX REV CODE- 250/636: Performed by: NURSE PRACTITIONER

## 2021-06-24 RX ORDER — ALBUMIN HUMAN 50 G/1000ML
12.5 SOLUTION INTRAVENOUS ONCE
Status: COMPLETED | OUTPATIENT
Start: 2021-06-24 | End: 2021-06-24

## 2021-06-24 RX ADMIN — SODIUM CHLORIDE 150 ML/HR: 9 INJECTION, SOLUTION INTRAVENOUS at 00:11

## 2021-06-24 RX ADMIN — Medication 10 ML: at 14:19

## 2021-06-24 RX ADMIN — CYCLOBENZAPRINE 10 MG: 10 TABLET, FILM COATED ORAL at 02:14

## 2021-06-24 RX ADMIN — ACETAMINOPHEN 650 MG: 325 TABLET ORAL at 02:14

## 2021-06-24 RX ADMIN — HEPARIN SODIUM 7500 UNITS: 5000 INJECTION INTRAVENOUS; SUBCUTANEOUS at 00:14

## 2021-06-24 RX ADMIN — HEPARIN SODIUM 7500 UNITS: 5000 INJECTION INTRAVENOUS; SUBCUTANEOUS at 14:19

## 2021-06-24 RX ADMIN — PREGABALIN 100 MG: 75 CAPSULE ORAL at 17:03

## 2021-06-24 RX ADMIN — CETIRIZINE HYDROCHLORIDE 10 MG: 10 TABLET, FILM COATED ORAL at 08:51

## 2021-06-24 RX ADMIN — Medication 10 ML: at 00:13

## 2021-06-24 RX ADMIN — ACETAMINOPHEN 650 MG: 325 TABLET ORAL at 23:35

## 2021-06-24 RX ADMIN — PREGABALIN 100 MG: 75 CAPSULE ORAL at 08:50

## 2021-06-24 RX ADMIN — ALBUMIN (HUMAN) 12.5 G: 12.5 INJECTION, SOLUTION INTRAVENOUS at 02:48

## 2021-06-24 RX ADMIN — SODIUM CHLORIDE 150 ML/HR: 9 INJECTION, SOLUTION INTRAVENOUS at 14:19

## 2021-06-24 RX ADMIN — HEPARIN SODIUM 7500 UNITS: 5000 INJECTION INTRAVENOUS; SUBCUTANEOUS at 06:10

## 2021-06-24 RX ADMIN — Medication 10 ML: at 21:04

## 2021-06-24 RX ADMIN — SODIUM CHLORIDE 150 ML/HR: 9 INJECTION, SOLUTION INTRAVENOUS at 21:01

## 2021-06-24 RX ADMIN — PANTOPRAZOLE SODIUM 40 MG: 40 TABLET, DELAYED RELEASE ORAL at 08:50

## 2021-06-24 RX ADMIN — FLUTICASONE PROPIONATE 2 SPRAY: 50 SPRAY, METERED NASAL at 08:51

## 2021-06-24 RX ADMIN — CYCLOBENZAPRINE 10 MG: 10 TABLET, FILM COATED ORAL at 21:04

## 2021-06-24 RX ADMIN — MONTELUKAST 10 MG: 10 TABLET, FILM COATED ORAL at 08:50

## 2021-06-24 RX ADMIN — Medication 10 ML: at 06:09

## 2021-06-24 RX ADMIN — HEPARIN SODIUM 7500 UNITS: 5000 INJECTION INTRAVENOUS; SUBCUTANEOUS at 21:02

## 2021-06-24 RX ADMIN — ASPIRIN 81 MG: 81 TABLET, COATED ORAL at 08:50

## 2021-06-24 NOTE — PROGRESS NOTES
INTERNAL MEDICINE ATTENDING NOTE    S: Mr. Adrienne Calderon was seen by me today during rounds. At this time, he is resting comfortably. \"I feel better today. \" He tells me that yesterday he inadvertently took 4 clonidine tab instead of 2. ROS otherwise unremarkable except as noted elsewhere. O: Blood pressure (!) 91/54, pulse 63, temperature 97.7 °F (36.5 °C), resp. rate 20, height 6' 3\" (1.905 m), weight 330 lb (149.7 kg), SpO2 98 %. Gen: Patient is in no acute distress. Lungs: CTAB. Heart: RRR. Abd: S, NT, ND, BS present. Extremities: Warm.     Recent Results (from the past 12 hour(s))   POC TROPONIN-I    Collection Time: 06/23/21  8:54 PM   Result Value Ref Range    Troponin-I (POC) <0.04 0.00 - 0.08 ng/mL   POC LACTIC ACID    Collection Time: 06/23/21  8:57 PM   Result Value Ref Range    Lactic Acid (POC) 1.28 0.40 - 2.00 mmol/L   CULTURE, BLOOD    Collection Time: 06/23/21  9:03 PM    Specimen: Blood   Result Value Ref Range    Special Requests: NO SPECIAL REQUESTS      Culture result: NO GROWTH AFTER 6 HOURS     METABOLIC PANEL, BASIC    Collection Time: 06/24/21  6:15 AM   Result Value Ref Range    Sodium 136 136 - 145 mmol/L    Potassium 4.2 3.5 - 5.1 mmol/L    Chloride 103 97 - 108 mmol/L    CO2 25 21 - 32 mmol/L    Anion gap 8 5 - 15 mmol/L    Glucose 99 65 - 100 mg/dL    BUN 37 (H) 6 - 20 MG/DL    Creatinine 5.32 (H) 0.70 - 1.30 MG/DL    BUN/Creatinine ratio 7 (L) 12 - 20      GFR est AA 14 (L) >60 ml/min/1.73m2    GFR est non-AA 11 (L) >60 ml/min/1.73m2    Calcium 7.9 (L) 8.5 - 10.1 MG/DL   CBC W/O DIFF    Collection Time: 06/24/21  6:15 AM   Result Value Ref Range    WBC 7.2 4.1 - 11.1 K/uL    RBC 4.28 4.10 - 5.70 M/uL    HGB 12.3 12.1 - 17.0 g/dL    HCT 38.4 36.6 - 50.3 %    MCV 89.7 80.0 - 99.0 FL    MCH 28.7 26.0 - 34.0 PG    MCHC 32.0 30.0 - 36.5 g/dL    RDW 13.7 11.5 - 14.5 %    PLATELET 011 441 - 093 K/uL    MPV 11.1 8.9 - 12.9 FL    NRBC 0.0 0  WBC    ABSOLUTE NRBC 0.00 0.00 - 0.01 K/uL   TROPONIN I    Collection Time: 06/24/21  6:15 AM   Result Value Ref Range    Troponin-I, Qt. <0.05 <0.05 ng/mL   URINALYSIS W/ REFLEX CULTURE    Collection Time: 06/24/21  7:44 AM    Specimen: Urine    Urine specimen   Result Value Ref Range    Color YELLOW/STRAW      Appearance CLEAR CLEAR      Specific gravity 1.018 1.003 - 1.030      pH (UA) 5.0 5.0 - 8.0      Protein 30 (A) NEG mg/dL    Glucose Negative NEG mg/dL    Ketone Negative NEG mg/dL    Blood Negative NEG      Urobilinogen 1.0 0.2 - 1.0 EU/dL    Nitrites Negative NEG      Leukocyte Esterase Negative NEG      WBC 5-10 0 - 4 /hpf    RBC 5-10 0 - 5 /hpf    Epithelial cells FEW FEW /lpf    Bacteria Negative NEG /hpf    UA:UC IF INDICATED CULTURE NOT INDICATED BY UA RESULT CNI      Mucus TRACE (A) NEG /lpf    Hyaline cast 2-5 0 - 5 /lpf   BILIRUBIN, CONFIRM    Collection Time: 06/24/21  7:44 AM   Result Value Ref Range    Bilirubin UA, confirm Negative NEG          A / P:   1. Hypotension (6/23/2021): IVF; BP meds on hold. 2. ALEJA (acute kidney injury) ([facility] [address] (6/23/2021): Likely prerenal. IVF. Cr slightly improved. 3. Chronic back pain:   4. Asthma POA, controlled. 5. Morbid obesity. 6. Full code.      Vi Hart MD, FACP  Best contact is via Pager: [phone], or via hospital  at [phone]

## 2021-06-24 NOTE — ED NOTES
Patient is being transferred to [facility], Room # 25 [id number]. Report given to Jose Arteaga RN on Dalia Balderas for routine progression of care. Report consisted of the following information SBAR, ED Summary, MAR and Recent Results. Patient transferred to receiving unit by: Christy Palomino. Outstanding consults needed: No     Next labs due: Yes    The following personal items will be sent with the patient during transfer to the floor: All valuables:    Cardiac monitoring ordered: Yes    The following CURRENT information was reported to the receiving RN:    Code status: Full Code at time of transfer    Last set of vital signs:  Vital Signs  Level of Consciousness: Alert (0) (06/23/21 2345)  Temp: 97.8 °F (36.6 °C) (06/23/21 2345)  Temp Source: Oral (06/23/21 2345)  Pulse (Heart Rate): 74 (06/24/21 0055)  Resp Rate: 20 (06/24/21 0055)  BP: (!) 86/56 (06/24/21 0055)  MAP (Monitor): 67 (06/24/21 0055)  MAP (Calculated): 66 (06/24/21 0055)  BP 1 Location: Right upper arm (06/23/21 2027)  BP 1 Method: Automatic (06/23/21 2027)  BP Patient Position: At rest (06/23/21 2027)  MEWS Score: 2 (06/23/21 2345)         Oxygen Therapy  O2 Sat (%): 96 % (06/24/21 0055)  Pulse via Oximetry: 74 beats per minute (06/24/21 0055)      Last pain assessment:         Wounds: No     Urinary catheter: voiding  Is there a garza order: No     LDAs:       Peripheral IV 06/23/21 Left Antecubital (Active)   Site Assessment Clean, dry, & intact 06/23/21 2038   Phlebitis Assessment 0 06/23/21 2038   Infiltration Assessment 0 06/23/21 2038   Dressing Status Clean, dry, & intact 06/23/21 2038       Peripheral IV 06/23/21 Right Antecubital (Active)   Site Assessment Clean, dry, & intact 06/23/21 2046   Phlebitis Assessment 0 06/23/21 2046   Infiltration Assessment 0 06/23/21 2046   Dressing Status Clean, dry, & intact 06/23/21 2046         Opportunity for questions and clarification was provided.     Robina Herbert RN

## 2021-06-24 NOTE — PROGRESS NOTES
3294: Patient upset about his meal tray and saying \"[place] [facility] - only got small amount of scrambled eggs and 1 sausage link due to diet ordered. Attempted to notify Dr Ryder Cochran x2 to see if we can adjust diet. No response as of yet. 9189: Dr Ryder Cochran returned the page and explained that patients kidney function isn't great hence low k and phos diet. If pt refuses current diet MD said to switch to regular diet and get him options more to his liking. This RN educated pt on current diet restrictions and pt now understands why his diet is restricted and agrees. He states that he was unhappy with the small portion of food and that he will be compliant with current diet as best he can but he doesn't want to starve. Pt states \"I know I need to lose weight and you are doing what is in my best interest but I haven't eaten a lot since I came to the hospital yesterday\".

## 2021-06-24 NOTE — H&P
Hospitalist Admission Note    NAME: Marlen Lebron   :  1965   MRN:  [id number]     Date/Time:  2021 10:42 PM    Patient PCP: Bibi Wright MD  ______________________________________________________________________  Given the patient's current clinical presentation, I have a high level of concern for decompensation if discharged from the emergency department. Complex decision making was performed, which includes reviewing the patient's available past medical records, laboratory results, and x-ray films. My assessment of this patient's clinical condition and my plan of care is as follows.     Assessment / Plan:  Hypotension POA  ALEJA POA-likely prerenal due to severe hypotension (increased clonidine) + NSAID abuse for back pain + lisinopril (new)  Creatinine 6.6 , was 2.3 on  of this month, baseline 0.7 in 2020  Recent renal ultrasound= negative for hydronephrosis, moderate fatty liver incidental    Admit to renal telemetry bed  Status post IV fluid bolus in the ED, BP improved to systolic 53V now, continue aggressive IV hydration with normal saline 150 mL an hour  Hold all BP meds for now, seem to reduce one by one once creatinine improved and BP stabilized  Inpatient nephrology consult for further recommendations  Avoid nephrotoxic drugs-DC lisinopril, NSAIDs    Chronic back pain  History of hypertension  Asthma POA-controlled  Continue Lyrica, Flexeril as needed  Holding all BP meds as above, patient was recently started on clonidine and lisinopril by PCP for aggressive hypertension management  Continue albuterol neb as needed, Singulair, Flonase, cetirizine daily as at home  Continue baby aspirin daily    ED  Patient recently switched from Viagra to Cialis as per PCP    Morbid obesity POA  Weight loss recommended  Patient has been trying diet and exercise and has been started on phentermine for weight loss as per PCP    Code Status: Full code  Surrogate Decision Maker: Mother of diogenes Dacosta November    DVT Prophylaxis: Subcu heparin  GI Prophylaxis: not indicated    Baseline: Patient is independent with ADLs at home      Subjective:   CHIEF COMPLAINT: Dizziness with chest pain and abdominal pain since a.m. HISTORY OF PRESENT ILLNESS:     Jose Elias Whitmore is a 64 y.o.  male who presents with above complaints from home via EMS  Patient had episode of dizziness with chest pain and abdominal pain this morning which patient assumed that was due to high blood pressure (did not check it despite having machine at home) took 2 0.2 mg clonidine pills followed by another 1 later along with his other blood pressure meds amlodipine lisinopril and atenolol and hydrochlorothiazide thinking it will help him with his hypertension (presumed) symptoms. Patient was apparently found to have severe hypotension with blood pressure 70/40 as per the EMS, and patient was found to have ALEJA with creatinine of 6.6 on blood work in the ED with normal EKG and otherwise unremarkable routine blood work including lactate and troponin. Patient was found to have elevated creatinine after recently being started on clonidine and lisinopril by PCP for aggressive blood pressure management on routine blood work on 11th of this month-creatinine then was 2.3. But patient apparently has continued taking lisinopril and HCTZ and NSAIDs for his back pain despite elevated creatinine. Patient was worked up as outpatient with renal ultrasound on 11th ? As per PCP= which was negative for any hydronephrosis, and positive for incidental fatty liver. We were asked to admit for work up and evaluation of the above problems.      Past Medical History:   Diagnosis Date    Arthritis     Asthma     Chronic low back pain     Hypertension         Past Surgical History:   Procedure Laterality Date    HX ORTHOPAEDIC         Social History     Tobacco Use    Smoking status: Former Smoker     Packs/day: 0.25 Years: 10.00     Pack years: 2.50    Smokeless tobacco: Never Used   Substance Use Topics    Alcohol use: Yes     Comment: occ        Family History   Problem Relation Age of Onset    Cancer Mother     Heart Disease Father      Allergies   Allergen Reactions    Vicodin [Hydrocodone-Acetaminophen] Other (comments)     Headache        Prior to Admission medications    Medication Sig Start Date End Date Taking? Authorizing Provider   tadalafiL (Cialis) 5 mg tablet Take 1 Tablet by mouth daily. 6/7/21   Samm Paige MD   pregabalin (LYRICA) 100 mg capsule Take 1 Capsule by mouth two (2) times a day. Max Daily Amount: 200 mg. 6/7/21   Samm Paige MD   albuterol (PROVENTIL HFA, VENTOLIN HFA, PROAIR HFA) 90 mcg/actuation inhaler Take 1 Puff by inhalation every four (4) hours as needed for Wheezing. 6/7/21   Samm Paige MD   cloNIDine HCL (CATAPRES) 0.2 mg tablet TAKE ONE TABLET BY MOUTH 2 TIMES A DAY 5/24/21   Samm Paige MD   lisinopriL (PRINIVIL, ZESTRIL) 20 mg tablet TAKE ONE TABLET BY MOUTH EVERY DAY 5/6/21   Samm Paige MD   amLODIPine (NORVASC) 10 mg tablet Take 1 Tab by mouth daily. Indications: high blood pressure 5/3/21   Samm Paige MD   aspirin delayed-release 81 mg tablet Take 1 Tab by mouth daily. 5/3/21   Samm Paige MD   atenoloL (TENORMIN) 50 mg tablet Take 1 Tab by mouth daily. 5/3/21   Samm Paige MD   busPIRone (BUSPAR) 10 mg tablet Take 1-2 Tabs by mouth three (3) times daily. 5/3/21   Samm Paige MD   fluticasone propionate (Flovent HFA) 220 mcg/actuation inhaler INHALE 1 OR 2 PUFFS 2 TIMES A DAY. 5/3/21   Samm Paige MD   fluticasone propionate (Flonase Allergy Relief) 50 mcg/actuation nasal spray 2 Sprays by Both Nostrils route daily. 5/3/21   Samm Paige MD   diclofenac EC (VOLTAREN) 50 mg EC tablet Take 1 Tab by mouth two (2) times a day. 5/3/21   Samm Paige MD   cyclobenzaprine (FLEXERIL) 10 mg tablet Take 1 Tab by mouth nightly. 5/3/21   Shiloh Cesar MD   cetirizine (ZyrTEC) 10 mg tablet Take 1 Tab by mouth daily. 5/3/21   Shiloh Cesar MD   sildenafil citrate (Viagra) 100 mg tablet Take 1 Tab by mouth as needed for Erectile Dysfunction. 5/3/21   Shiloh Cesar MD   hydroCHLOROthiazide (HYDRODIURIL) 25 mg tablet Take 1 Tab by mouth daily. 4/2/21   Shiloh Cesar MD   phentermine (ADIPEX-P) 37.5 mg tablet Take 1 Tab by mouth every morning. Max Daily Amount: 37.5 mg. 4/2/21   Shiloh Cesar MD   albuterol (PROVENTIL VENTOLIN) 2.5 mg /3 mL (0.083 %) nebu 3 mL by Nebulization route every four (4) hours as needed for Wheezing. 11/1/20   Alejandra Lyn MD   guaiFENesin-dextromethorphan [facility])  mg/5 mL liqd Take 10 mL by mouth every four (4) hours as needed for Cough or Congestion. 11/1/20   Alejandra Lyn MD   Linzess 290 mcg cap capsule Take 1 Cap by mouth daily. 10/12/20   Shiloh Cesar MD   montelukast (Singulair) 10 mg tablet Take 1 Tab by mouth daily. 10/12/20   Shiloh Cesar MD   pantoprazole (PROTONIX) 40 mg tablet Take 1 Tab by mouth daily. 10/12/20   Shiloh Cesar MD   lisinopriL (PRINIVIL, ZESTRIL) 20 mg tablet Take 1 Tab by mouth daily. 10/12/20   Shiloh Cesar MD   polyethylene glycol [facility]) 17 gram packet Take 1 Packet by mouth daily.  3/4/20   Jeovanny Hinton MD   Nebulizer Accessories kit Nebulizer cup w/ tubing; use every 4 hrs prn wheezing 1/20/20   Alex Levy MD       REVIEW OF SYSTEMS      Total of 12 systems reviewed as follows:       POSITIVE= underlined text  Negative = text not underlined  General:  fever, chills, sweats, generalized weakness, weight loss/gain,      loss of appetite   Eyes:    blurred vision, eye pain, loss of vision, double vision  ENT:    rhinorrhea, pharyngitis   Respiratory:   cough, sputum production, SOB, BRICENO, wheezing, pleuritic pain   Cardiology:   chest pain, palpitations, orthopnea, PND, edema, syncope   Gastrointestinal:  abdominal pain , N/V, diarrhea, dysphagia, constipation, bleeding   Genitourinary:  Decreased frequency  urgency, dysuria, hematuria, incontinence   Muskuloskeletal :  arthralgia, myalgia, back pain  Hematology:  easy bruising, nose or gum bleeding, lymphadenopathy   Dermatological: rash, ulceration, pruritis, color change / jaundice  Endocrine:   hot flashes or polydipsia   Neurological:  headache, dizziness, confusion, focal weakness, paresthesia,     Speech difficulties, memory loss, gait difficulty  Psychological: Feelings of anxiety, depression, agitation    Objective:   VITALS:    Visit Vitals  BP (!) 92/55   Pulse 75   Temp 97.6 °F (36.4 °C)   Resp 16   Ht 6' 3\" (1.905 m)   Wt 149.7 kg (330 lb)   SpO2 95%   BMI 41.25 kg/m²       PHYSICAL EXAM:    General:    Alert, cooperative, no distress, appears stated age. Morbidly obese+  HEENT: Atraumatic, anicteric sclerae, pink conjunctivae     No oral ulcers, mucosa dry and parched+, throat clear, dentition fair  Neck:  Supple, symmetrical,  thyroid: non tender  Lungs:   Clear to auscultation bilaterally. No Wheezing or Rhonchi. No rales. Chest wall:  No tenderness  No Accessory muscle use. Heart:   Regular  rhythm,  No  murmur   No edema  Abdomen:   Soft, non-tender. Not distended. Bowel sounds normal  Extremities: No cyanosis. No clubbing,      Skin turgor normal, Capillary refill normal, Radial dial pulse 2+  Skin:     Not pale. Not Jaundiced  No rashes   Psych:  Good insight. Not depressed. Not anxious or agitated. Neurologic: EOMs intact. No facial asymmetry. No aphasia or slurred speech. Symmetrical strength, Sensation grossly intact.  Alert and oriented X 4.     _______________________________________________________________________  Care Plan discussed with:    Comments   Patient x    Family      RN x    Care Manager                    Consultant:  lara Dejesus   _______________________________________________________________________  Expected  Disposition:   Home with Family x   HH/PT/OT/RN    SNF/LTC    JAISON    ________________________________________________________________________  TOTAL TIME:  46 Minutes    Critical Care Provided     Minutes non procedure based      Comments    x Reviewed previous records   >50% of visit spent in counseling and coordination of care x Discussion with patient and questions answered       ________________________________________________________________________  Signed: Alessandro Mac MD    Procedures: see electronic medical records for all procedures/Xrays and details which were not copied into this note but were reviewed prior to creation of Plan. LAB DATA REVIEWED:    Recent Results (from the past 24 hour(s))   EKG, 12 LEAD, INITIAL    Collection Time: 06/23/21  8:27 PM   Result Value Ref Range    Ventricular Rate 71 BPM    Atrial Rate 71 BPM    P-R Interval 166 ms    QRS Duration 84 ms    Q-T Interval 404 ms    QTC Calculation (Bezet) 439 ms    Calculated P Axis 59 degrees    Calculated R Axis 28 degrees    Calculated T Axis 28 degrees    Diagnosis       Normal sinus rhythm  Normal ECG  When compared with ECG of 01-NOV-2020 03:02,  No significant change was found     CBC WITH AUTOMATED DIFF    Collection Time: 06/23/21  8:34 PM   Result Value Ref Range    WBC 10.9 4.1 - 11.1 K/uL    RBC 4.60 4.10 - 5.70 M/uL    HGB 13.2 12.1 - 17.0 g/dL    HCT 41.2 36.6 - 50.3 %    MCV 89.6 80.0 - 99.0 FL    MCH 28.7 26.0 - 34.0 PG    MCHC 32.0 30.0 - 36.5 g/dL    RDW 14.0 11.5 - 14.5 %    PLATELET 996 189 - 876 K/uL    MPV 10.5 8.9 - 12.9 FL    NRBC 0.0 0  WBC    ABSOLUTE NRBC 0.00 0.00 - 0.01 K/uL    NEUTROPHILS 70 32 - 75 %    LYMPHOCYTES 19 12 - 49 %    MONOCYTES 8 5 - 13 %    EOSINOPHILS 2 0 - 7 %    BASOPHILS 0 0 - 1 %    IMMATURE GRANULOCYTES 1 (H) 0.0 - 0.5 %    ABS. NEUTROPHILS 7.7 1.8 - 8.0 K/UL    ABS. LYMPHOCYTES 2.1 0.8 - 3.5 K/UL    ABS. MONOCYTES 0.9 0.0 - 1.0 K/UL    ABS. EOSINOPHILS 0.2 0.0 - 0.4 K/UL    ABS.  BASOPHILS 0.0 0.0 - 0.1 K/UL    ABS. IMM. GRANS. 0.1 (H) 0.00 - 0.04 K/UL    DF AUTOMATED     METABOLIC PANEL, COMPREHENSIVE    Collection Time: 06/23/21  8:34 PM   Result Value Ref Range    Sodium 134 (L) 136 - 145 mmol/L    Potassium 4.2 3.5 - 5.1 mmol/L    Chloride 100 97 - 108 mmol/L    CO2 25 21 - 32 mmol/L    Anion gap 9 5 - 15 mmol/L    Glucose 107 (H) 65 - 100 mg/dL    BUN 36 (H) 6 - 20 MG/DL    Creatinine 6.69 (H) 0.70 - 1.30 MG/DL    BUN/Creatinine ratio 5 (L) 12 - 20      GFR est AA 10 (L) >60 ml/min/1.73m2    GFR est non-AA 9 (L) >60 ml/min/1.73m2    Calcium 8.5 8.5 - 10.1 MG/DL    Bilirubin, total 0.4 0.2 - 1.0 MG/DL    ALT (SGPT) 58 12 - 78 U/L    AST (SGOT) 25 15 - 37 U/L    Alk.  phosphatase 88 45 - 117 U/L    Protein, total 7.3 6.4 - 8.2 g/dL    Albumin 3.6 3.5 - 5.0 g/dL    Globulin 3.7 2.0 - 4.0 g/dL    A-G Ratio 1.0 (L) 1.1 - 2.2     POC TROPONIN-I    Collection Time: 06/23/21  8:54 PM   Result Value Ref Range    Troponin-I (POC) <0.04 0.00 - 0.08 ng/mL   POC LACTIC ACID    Collection Time: 06/23/21  8:57 PM   Result Value Ref Range    Lactic Acid (POC) 1.28 0.40 - 2.00 mmol/L

## 2021-06-24 NOTE — PROGRESS NOTES
During medication pass in the evening pt verbalized he had trouble moving his right arm with some shoulder pain. Pt verbalized he has been endorsing these symptoms since arrival.   Pt verbalized that he told the doctor this morning on his rounds and again in the afternoon but did not receive comment. Pt endorsed shoulder pain from arthritis and was able to squeeze hands with equal  but had trouble lifting his right arm without expressing pain. Pt was able to bend both elbows and complete ADLs with both arms. Second RN to bedside to assess these symptoms. Nursing tech verbalized pt has been able to self care and use both arms throughout the day. RN attempted to notify MD of these symptoms again via perfect serve and calling the office. RN was unable to reach MD on perfect serve and called the office 3 x with no answer. RN to notify night RN and to monitor for signs of pain.

## 2021-06-24 NOTE — ED NOTES
Assumed care of patient via EMS. Patient having CP x 2 days worsening today. Patient took 4 of his BP pills today, only prescribed to take 2 maximum. Patient experiencing dizziness and hypotension. Patient also reports abd pain for a couple days, patient reports taking entire bottle of Pepto Bismol yesterday and several big sips today. Sandy Dejesus MD made aware of hypotension, putting in orders.

## 2021-06-24 NOTE — PROGRESS NOTES
Transition of Care Plan:    RUR: 16%  Disposition: home with robert and 15year old son   Follow up appointments: PCP  DME needed: cane   Transportation at Discharge: Medicaid transport   [address] or means to access home: yes       Medicare letter: to be reviewed prior to d/c   Caregiver Contact: Nelson young) [phone]  Discharge Caregiver contacted prior to discharge? To be contacted prior to d/c    Reason for Admission:  ALEJA, hypotension                      RUR Score: 16%                Plan for utilizing home health: TBD     PCP: First and Last name:  Mick Beatty MD     Name of Practice:    Are you a current patient: Yes/No: yes   Approximate date of last visit: Yvonne 10, 2021   Can you participate in a virtual visit with your PCP: yes                    Current Advanced Directive/Advance Care Plan: Full Code     Sotero 13 (ACP) Conversation    Date of Conversation: 6/24/2021  Conducted with: Patient with Decision Making Capacity    Healthcare Decision Maker:     Click here to complete Flixster including selection of the Flixster Relationship (ie \"Primary\")  Today we documented Decision Maker(s) consistent with Legal Next of Kin hierarchy. Content/Action Overview:   DECLINED ACP conversation - will revisit periodically   Reviewed DNR/DNI and patient elects Full Code (Attempt Resuscitation)    Length of Voluntary ACP Conversation in minutes:  <16 minutes (Non-Billable)    Chastity Aguilera                    Transition of Care Plan: home w/ follow up appointments     Chart reviewed. CM met with pt at bedside to introduce self and role. Pt alert and oriented at time of assessment. Pt admitted with ALEJA and hypotension. Prior to admission, pt resides with his robert and 15year old son in a one level home with no steps to enter. Pt reports being independent with ADLs and ambulatory without a device.  He reports he is in need of a cane for ambulation. No other DME use reported in the home. Pt does use a knee brace on his left leg. No prior hx of HHC/SNF rehab stays reported at this time. Preferred pharmacy [facility]. PCP is Dr. Netty Gottron with last known visit on June 10th. Pt identifies transportation difficulties and currently does not own a car. Pt will require Medicaid transport at time of discharge. Pt receives disability benefits and is unemployed. Pt will benefit from PT/OT evaluations prior to discharge. No ACP on file- pt declined conversation at this time. Pt is FULL code. CM will continue to follow for discharge planning needs. Care Management Interventions  PCP Verified by CM: Yes (Dr. Tre Aguilera )  Last Visit to PCP: 06/10/21  Palliative Care Criteria Met (RRAT>21 & CHF Dx)?: No  Mode of Transport at Discharge: Other (see comment) (Medicaid transport )  Transition of Care Consult (CM Consult): Discharge Planning  Discharge Durable Medical Equipment: No  Physical Therapy Consult: No  Occupational Therapy Consult: No  Speech Therapy Consult: No  Current Support Network:  Other, Own Home (lives with fiance )  Confirm Follow Up Transport: Cab  The Patient and/or Patient Representative was Provided with a Choice of Provider and Agrees with the Discharge Plan?: Yes  Freedom of Choice List was Provided with Basic Dialogue that Supports the Patient's Individualized Plan of Care/Goals, Treatment Preferences and Shares the Quality Data Associated with the Providers?: No  [employer] Resource Information Provided?: No  Discharge Location  Discharge Placement: Home    [place] [address], [address], [address] [facility]  [phone]

## 2021-06-24 NOTE — CONSULTS
Consultation Note    NAME: Dany Green   :  1965   MRN:  [id number]     Date/Time:  2021 11:28 AM    I have been asked to see this patient by Dr. Maurice Carmona  for advice/opinion re: ALEJA. Assessment :    Plan:  ALEJA  Hypotension  H/o HTN (on 5 BP medications prior to admission)   Creatinine 0.8 in , 2.4 on , 6.7 yesterday and today is 5.3; urine bland other than trace protein    nsaid use    CT w/o: KIDNEYS: No mass, calculus, or hydronephrosis. Prerenal (from low bp and nsaids) +/- ATN from low BP    Holding all BP medications, getting NS at 150 -- he had gained over 100 pounds after being on steroids for asthma a few years ago. He is trying to lose the weight and has lost 20 pounds or so, perhaps this weight loss is why his BP dropped? Check am cortisol as well. Subjective:   CHIEF COMPLAINT:  aleja    HISTORY OF PRESENT ILLNESS:     Vijay Kaplan is a 64 y.o.   male who has a history of the below. Came to ER with dizziness. Also with some cp and abdominal pain prior to admission. Now with no complaint. No family h/o esrd. He denied nsaid use to me. No n/v/d. Past Medical History:   Diagnosis Date    Arthritis     Asthma     Chronic low back pain     Hypertension       Past Surgical History:   Procedure Laterality Date    HX ORTHOPAEDIC       Social History     Tobacco Use    Smoking status: Former Smoker     Packs/day: 0.25     Years: 10.00     Pack years: 2.50    Smokeless tobacco: Never Used   Substance Use Topics    Alcohol use: Yes     Comment: occ      Family History   Problem Relation Age of Onset    Cancer Mother     Heart Disease Father       Allergies   Allergen Reactions    Vicodin [Hydrocodone-Acetaminophen] Other (comments)     Headache      Prior to Admission medications    Medication Sig Start Date End Date Taking? Authorizing Provider   tadalafiL (Cialis) 5 mg tablet Take 1 Tablet by mouth daily.  21   Remy Ray MD   pregabalin (LYRICA) 100 mg capsule Take 1 Capsule by mouth two (2) times a day. Max Daily Amount: 200 mg. 6/7/21   Мария Hays MD   albuterol (PROVENTIL HFA, VENTOLIN HFA, PROAIR HFA) 90 mcg/actuation inhaler Take 1 Puff by inhalation every four (4) hours as needed for Wheezing. 6/7/21   Мария Hays MD   cloNIDine HCL (CATAPRES) 0.2 mg tablet TAKE ONE TABLET BY MOUTH 2 TIMES A DAY 5/24/21   Мария Hays MD   lisinopriL (PRINIVIL, ZESTRIL) 20 mg tablet TAKE ONE TABLET BY MOUTH EVERY DAY 5/6/21   Мария Hays MD   amLODIPine (NORVASC) 10 mg tablet Take 1 Tab by mouth daily. Indications: high blood pressure 5/3/21   Мария Hays MD   aspirin delayed-release 81 mg tablet Take 1 Tab by mouth daily. 5/3/21   Мария Hays MD   atenoloL (TENORMIN) 50 mg tablet Take 1 Tab by mouth daily. 5/3/21   Мария Hays MD   fluticasone propionate (Flovent HFA) 220 mcg/actuation inhaler INHALE 1 OR 2 PUFFS 2 TIMES A DAY. 5/3/21   Мария Hays MD   fluticasone propionate (Flonase Allergy Relief) 50 mcg/actuation nasal spray 2 Sprays by Both Nostrils route daily. 5/3/21   Мария Hays MD   cyclobenzaprine (FLEXERIL) 10 mg tablet Take 1 Tab by mouth nightly. 5/3/21   Мария Hays MD   cetirizine (ZyrTEC) 10 mg tablet Take 1 Tab by mouth daily. 5/3/21   Мария Hays MD   sildenafil citrate (Viagra) 100 mg tablet Take 1 Tab by mouth as needed for Erectile Dysfunction. 5/3/21   Мария Hays MD   hydroCHLOROthiazide (HYDRODIURIL) 25 mg tablet Take 1 Tab by mouth daily. 4/2/21   Мария Hays MD   phentermine (ADIPEX-P) 37.5 mg tablet Take 1 Tab by mouth every morning. Max Daily Amount: 37.5 mg. 4/2/21   Мария Hays MD   albuterol (PROVENTIL VENTOLIN) 2.5 mg /3 mL (0.083 %) nebu 3 mL by Nebulization route every four (4) hours as needed for Wheezing.  11/1/20   Karli Painting MD   guaiFENesin-dextromethorphan [facility])  mg/5 mL liqd Take 10 mL by mouth every four (4) hours as needed for Cough or Congestion. 11/1/20   Mai Clements MD   Linzess 290 mcg cap capsule Take 1 Cap by mouth daily. 10/12/20   Marleni Hart MD   montelukast (Singulair) 10 mg tablet Take 1 Tab by mouth daily. 10/12/20   Marleni Hart MD   pantoprazole (PROTONIX) 40 mg tablet Take 1 Tab by mouth daily. 10/12/20   Marleni Hart MD   lisinopriL (PRINIVIL, ZESTRIL) 20 mg tablet Take 1 Tab by mouth daily. 10/12/20   Marleni Hart MD   polyethylene glycol [facility]) 17 gram packet Take 1 Packet by mouth daily.  3/4/20   Arjun Hinton MD   Nebulizer Accessories kit Nebulizer cup w/ tubing; use every 4 hrs prn wheezing 1/20/20   Bob Waller MD     REVIEW OF SYSTEMS:     []  Unable to obtain reliable ROS due to  [] mental status  [] sedated   [] intubated   [x] Total of 12 systems reviewed as follows:  Constitutional: negative fever, negative chills, negative weight loss  Eyes:   negative visual changes  ENT:   negative sore throat, tongue or lip swelling  Respiratory:  negative cough, negative dyspnea  Cards:  negative for chest pain, palpitations, lower extremity edema  GI:   negative for nausea, vomiting, diarrhea, and abdominal pain  :  negative for frequency, dysuria  Integument:  negative for rash and pruritus  Heme:  negative for easy bruising and gum/nose bleeding  Musculoskel: negative for myalgias,  back pain and muscle weakness  Neuro:  negative for headaches,  vertigo  Psych:  negative for feelings of anxiety, depression   Travel?: none    Objective:   VITALS:    Visit Vitals  BP (!) 91/54 (BP 1 Location: Right upper arm, BP Patient Position: At rest)   Pulse 63   Temp 97.7 °F (36.5 °C)   Resp 20   Ht 6' 3\" (1.905 m)   Wt 149.7 kg (330 lb)   SpO2 98%   BMI 41.25 kg/m²     PHYSICAL EXAM:  Gen:  []  WD []  WN  [] cachectic []  thin [x]  obese []  disheveled             []  ill apearing  []   Critical  []   Chronic    [x]  No acute distress    HEENT:   [x] NC/AT/PERRLA/EOMI    [] pink conjunctivae      [] pale conjunctivae                  PERRL  [] yes  [] no      [] moist mucosa    [] dry mucosa    hearing intact to voice [x] yes  [] No                 NECK:   supple [x] yes  [] no        masses [] yes  [] No               thyroid  []  non tender  []  tender    RESP:   [x] CTA bilaterally/no wheezing/rhonchi/rales/crackles    [] rhonchi bilaterally - no dullness  [] wheezing   [] rhonchi   [] crackles     use of accessory muscles [] yes [x] no    CARD:   [x]  regular rate and rhythm/No murmurs/rubs/gallops    murmur  [] yes ()  [] no      Rubs  [] yes  [] no       Gallops [] yes  [] no    Rate []  regular  []  irregular        carotid bruits  [] Right  []  Left                 LE edema [] yes  [x] no           JVP  []  yes   []  no    ABD:    [x] soft/non distended/non tender/+bowel sounds/no HSM    []  Rigid    tenderness [] yes [] no   Liver enlargement  []  yes []  no                Spleen enlargement  []  yes []  no     distended []  yes [] no     bowel sound  [] hypoactive   [] hyperactive    LYMPH:    Neck []  yes []  no       Axillae []  yes []  no    SKIN:   Rashes []  yes   [x]  no    Ulcers []  yes   []  no               [] tight to palpitation    skin turgor []  good  [] poor  [] decreased               Cyanosis/clubbing []  yes []  no    NEUR:   [x] cranial nerves II-XII grossly intact       [] Cranial nerves deficit                 []  facial droop    []  slurred speech   [] aphasic     [] Strength normal     []  weakness  []  LUE  []   RUE/ []  LLE  []   RLE    follows commands  [x]  yes []  no           PSYCH:   insight [] poor [x] good   Alert and Oriented to  [x] person  [x] place  [x]  time                    [] depressed [] anxious [] agitated  [] lethargic [] stuporous  [] sedated     LAB DATA REVIEWED:    Recent Labs     06/24/21 0615 06/23/21 2034   WBC 7.2 10.9   HGB 12.3 13.2   HCT 38.4 41.2    186     Recent Labs     06/24/21 0615 06/23/21 2034    134*   K 4.2 4.2    100 CO2 25 25   BUN 37* 36*   CREA 5.32* 6.69*   GLU 99 107*   CA 7.9* 8.5     Recent Labs     06/23/21  2034   ALT 58   AP 88   TBILI 0.4   ALB 3.6   GLOB 3.7     No results for input(s): INR, PTP, APTT, INREXT in the last 72 hours. No results for input(s): FE, TIBC, PSAT, FERR in the last 72 hours. No results for input(s): PH, PCO2, PO2 in the last 72 hours. No results for input(s): CPK, CKMB in the last 72 hours. No lab exists for component: TROPONINI  Lab Results   Component Value Date/Time    Glucose  11/12/2019 03:03 PM       Procedures: see electronic medical records for all procedures/Xrays and details which were not copied into this note but were reviewed prior to creation of Plan.    ________________________________________________________________________       ___________________________________________________  Consulting Physician:  Diya Gabriel MD

## 2021-06-24 NOTE — PROGRESS NOTES
TRANSFER - IN REPORT:    Verbal report received from Venancio(name) on Liliana Oquendo  being received from ED(unit) for routine progression of care      Report consisted of patients Situation, Background, Assessment and   Recommendations(SBAR). Information from the following report(s) SBAR, Kardex, ED Summary, Intake/Output, MAR, Recent Results and Cardiac Rhythm NSR was reviewed with the receiving nurse. Opportunity for questions and clarification was provided. Assessment completed upon patients arrival to unit and care assumed.

## 2021-06-24 NOTE — ED PROVIDER NOTES
EMERGENCY DEPARTMENT HISTORY AND PHYSICAL EXAM    Please note that this dictation was completed with [employer], the computer voice recognition software. Quite often unanticipated grammatical, syntax, homophones, and other interpretive errors are inadvertently transcribed by the computer software. Please disregard these errors. Please excuse any errors that have escaped final proofreading. Date: 6/23/2021  Patient Name: Essie Armendariz  Patient Age and Sex: 64 y.o. male    History of Presenting Illness     Chief Complaint   Patient presents with    Chest Pain     CP x2 days, worse today, also starting today dizziness and abd pain. Patient took 4 Clonidine today, hypotensive when patient checked and EMS checked. History Provided By: Patient    HPI: Essie Armendariz, is a 64 y.o. male whose medical history is noted below and includes uncontrolled HTN, chronic low back pain, presents to the ED after feeling lightheaded earlier during the day today, followed by dull, moderate, constant lower abd pain radiating to his back and profound hypotension which he measured while he was at [employer] prior to calling 911 and being brought to the emergency room. Patient states that he took a large amount of clonidine to control his blood pressure than he is prescribed. Below are doses of all of the medications he took today and this is prescribed. Ultimately, instead of taking 1 tab of 2 mg clonidine at 10 AM, patient took 4 mg this morning and then an additional 2 mg of clonidine around 5 PM.  He did so because in the past whenever he felt lightheaded this was due to him being hypertensive. When he felt that lightheadedness he assumed he is hypertensive which is why he took the extra doses. When his symptoms did not improve he checked his blood pressure and noted it to be in the 60F systolic. Has never had blood pressure that low before. It is when he called 911.   He denies having any chest pain, shortness of breath, nausea, vomiting, recent illness. The abdominal pain going to his back also started this morning and has been constant since put not severe. normal appetite. 10am BP meds:  Amlodipine 10mg  Lisinopril 20mg  Clonidine 2mg, took 2 tabs instead of 1.    5pm another 2mg tab clonidine      Pt denies any other alleviating or exacerbating factors. No other associated signs or symptoms. There are no other complaints, changes or physical findings at this time. PCP: Snow Santacruz MD    Past History   All documented elements of the [employer] reviewed and verified by me. -Chago Shannon MD    Past Medical History:  Past Medical History:   Diagnosis Date    Arthritis     Asthma     Chronic low back pain     Hypertension        Past Surgical History:  Past Surgical History:   Procedure Laterality Date    HX ORTHOPAEDIC         Family History:  Family History   Problem Relation Age of Onset    Cancer Mother     Heart Disease Father        Social History:  Social History     Tobacco Use    Smoking status: Former Smoker     Packs/day: 0.25     Years: 10.00     Pack years: 2.50    Smokeless tobacco: Never Used   Vaping Use    Vaping Use: Never used   Substance Use Topics    Alcohol use: Yes     Comment: occ    Drug use: No       Allergies: Allergies   Allergen Reactions    Vicodin [Hydrocodone-Acetaminophen] Other (comments)     Headache       Review of Systems   All other systems reviewed and negative    Review of Systems   Constitutional: Negative for appetite change, chills and fever. HENT: Negative. Negative for congestion. Eyes: Negative. Negative for photophobia and visual disturbance. Respiratory: Negative for cough, chest tightness and shortness of breath. Cardiovascular: Negative for chest pain, palpitations and leg swelling. Gastrointestinal: Positive for abdominal pain. Negative for abdominal distention, constipation, diarrhea, nausea and vomiting. Endocrine: Negative.     Genitourinary: Negative for dysuria, flank pain and hematuria. Musculoskeletal: Positive for back pain. Negative for gait problem and joint swelling. Skin: Negative. Negative for pallor and rash. Neurological: Positive for light-headedness. Negative for tremors, seizures, syncope, facial asymmetry, speech difficulty, weakness, numbness and headaches. Hematological: Negative for adenopathy. Does not bruise/bleed easily. All other systems reviewed and are negative. Physical Exam   Reviewed patients vital signs and nursing note    Physical Exam  Vitals and nursing note reviewed. Constitutional:       Appearance: He is obese. He is not diaphoretic. HENT:      Head: Atraumatic. Mouth/Throat:      Mouth: Mucous membranes are moist.   Eyes:      General: No scleral icterus. Extraocular Movements: Extraocular movements intact. Conjunctiva/sclera: Conjunctivae normal.      Pupils: Pupils are equal, round, and reactive to light. Cardiovascular:      Rate and Rhythm: Normal rate and regular rhythm. Pulses: Normal pulses. Heart sounds: Normal heart sounds. Pulmonary:      Effort: Pulmonary effort is normal.      Breath sounds: Normal breath sounds. No decreased breath sounds. Abdominal:      General: Bowel sounds are normal. There is no abdominal bruit. Palpations: Abdomen is soft. There is no hepatomegaly or mass. Tenderness: There is abdominal tenderness (generalized and to deep palpation only). There is no guarding or rebound. Musculoskeletal:         General: Normal range of motion. Cervical back: Normal range of motion and neck supple. Right lower leg: No tenderness. Left lower leg: No tenderness. Skin:     General: Skin is warm and dry. Capillary Refill: Capillary refill takes less than 2 seconds. Coloration: Skin is not pale. Findings: No ecchymosis or rash. Neurological:      General: No focal deficit present.       Mental Status: He is alert and oriented to person, place, and time. Psychiatric:         Mood and Affect: Mood normal.         Behavior: Behavior normal.         Diagnostic Study Results     Labs - I have personally reviewed and interpreted all laboratory results. Timothy Arnold MD, MSc  Recent Results (from the past 24 hour(s))   EKG, 12 LEAD, INITIAL    Collection Time: 06/23/21  8:27 PM   Result Value Ref Range    Ventricular Rate 71 BPM    Atrial Rate 71 BPM    P-R Interval 166 ms    QRS Duration 84 ms    Q-T Interval 404 ms    QTC Calculation (Bezet) 439 ms    Calculated P Axis 59 degrees    Calculated R Axis 28 degrees    Calculated T Axis 28 degrees    Diagnosis       Normal sinus rhythm  Normal ECG  When compared with ECG of 01-NOV-2020 03:02,  No significant change was found     CBC WITH AUTOMATED DIFF    Collection Time: 06/23/21  8:34 PM   Result Value Ref Range    WBC 10.9 4.1 - 11.1 K/uL    RBC 4.60 4.10 - 5.70 M/uL    HGB 13.2 12.1 - 17.0 g/dL    HCT 41.2 36.6 - 50.3 %    MCV 89.6 80.0 - 99.0 FL    MCH 28.7 26.0 - 34.0 PG    MCHC 32.0 30.0 - 36.5 g/dL    RDW 14.0 11.5 - 14.5 %    PLATELET 125 290 - 826 K/uL    MPV 10.5 8.9 - 12.9 FL    NRBC 0.0 0  WBC    ABSOLUTE NRBC 0.00 0.00 - 0.01 K/uL    NEUTROPHILS 70 32 - 75 %    LYMPHOCYTES 19 12 - 49 %    MONOCYTES 8 5 - 13 %    EOSINOPHILS 2 0 - 7 %    BASOPHILS 0 0 - 1 %    IMMATURE GRANULOCYTES 1 (H) 0.0 - 0.5 %    ABS. NEUTROPHILS 7.7 1.8 - 8.0 K/UL    ABS. LYMPHOCYTES 2.1 0.8 - 3.5 K/UL    ABS. MONOCYTES 0.9 0.0 - 1.0 K/UL    ABS. EOSINOPHILS 0.2 0.0 - 0.4 K/UL    ABS. BASOPHILS 0.0 0.0 - 0.1 K/UL    ABS. IMM. GRANS. 0.1 (H) 0.00 - 0.04 K/UL    DF AUTOMATED     POC LACTIC ACID    Collection Time: 06/23/21  8:57 PM   Result Value Ref Range    Lactic Acid (POC) 1.28 0.40 - 2.00 mmol/L       Radiologic Studies - I have personally reviewed and interpreted all imaging studies and agree with radiology interpretation and report.  - Timothy Arnold MD, MSc  XR CHEST PORT (Results Pending)         Medical Decision Making   I am the first provider for this patient. Records Reviewed: I reviewed our electronic medical record system for any past medical records that were available that may contribute to the patient's current condition, including their PMH, surgical history, social and family history. Reviewed the nursing notes and vital signs from today's visit. Nursing notes will be reviewed as they become available in realtime while the pt has been in the ED. In addition, I read most recent discharge summaries, if available and reviewed prior ECGs or imaging studies for comparison purposes. Katty Urbina MD Msc    Vital Signs-Reviewed the patient's vital signs. Patient Vitals for the past 24 hrs:   Temp Pulse Resp BP SpO2   06/23/21 2048  70 15 (!) 87/51 98 %   06/23/21 2027 97.6 °F (36.4 °C) 70 14 (!) 73/47 97 %       ECG interpretation: Normal sinus rhythm with rate 71, normal intervals, no ST elevations, depressions or other changes concerning for acute ischemia. This ECG has been viewed and interpreted by me personally. Katty Urbina MD, Msc    Provider Notes (Medical Decision Making):   Patient presents with lightheadedness, abd pain, severe hypotension in setting of accidentally taking too much clonidine for his BP. Ddx: hypotension related to medication overdose, aortic dissection, blood loss anemia, CHF and cardiogenic shock. No evidence of an underlying infectious process. Immediate fluid resuscitation initiated for treatment of the hypotension. . He has no history of chf that he knows about, has history of renal disease but making urine. No respiratory symptoms at this time nor evidence of fluid overload. Will get cxr to assess for pulm edema but clinically appears able to tolerate fluids.    - pressors if needed  - lactate, metabolic panel, trop to assess for hypotension related end organ damage  - no antidote that I am aware of but will check with poison control. In addition to hypotension related to med overdose, I was concerned about a vascular abnormality such as aortic dissection given the fact that the patient has abdominal pain that he says is rating into his lower back. He is on any beta-blockers and in setting of a dissection or any significant blood loss causing this level of hypertension, I would expect him to be released tachycardia. No pulsatile mass abdominal exam.  Abdomen is also soft, not distended and without any focal tenderness. He does have chronic back pain which also may explain the pain he is describing today. Unfortunately due to renal dysfunction only CT of abdomen pelvis without contrast was obtained. This showed no significant abnormalities. I have a low pretest probability for dissection or other active extravasation within the abdomen or pelvis, therefore feel that management of his renal dysfunction hypotension cannot take precedent and imaging can be reconsidered if the patient is not improving. Acute renal failure-significant jump in creatinine. No hematuria, no oliguria, dysuria or difficulty passing urine. He has been on NSAIDs due to low back pain which may have caused the increase in creatinine. He has also been on lisinopril. No indication for emergent dialysis at this time, however this will need to be followed closely. I will order urine lites and continue monitoring urine output. Patient is multiple issues that potentially may lead to severe critical illness for which he is at high risk. He will need to be admitted to the hospital for further management. ED Course:   Initial assessment performed. The patients presenting problems have been discussed, and they are in agreement with the care plan formulated and outlined with them. I have encouraged them to ask questions as they arise throughout their visit.              Consult Note:  Barbara Schilling MD spoke with  Poison control at 9:07 PM,   Discussed pt's hx, physical exam and available diagnostic and imaging results. Reviewed care plans. Agree with management and plan thus far. No other recommendations. norepi if BP does not improve with fluids. Consult Note:  Barbara Schilling MD spoke with  hospitalist,   Discussed pt's hx, physical exam and available diagnostic and imaging results. Reviewed care plans. Agree with management and plan thus far. Will admit to their service    DISPOSITION: ADMIT  Patient is being admitted to the hospital.  Their test results and reasons for admission have been discussed. The patient and/or available family express agreement with and understanding of the need to be admitted and their admission diagnosis. Thank you for resuming the care of this patient. Please don't hesitate to contact me in the emergency department if you  have any additional questions. Barbara Schilling MD, MSc    CRITICAL CARE NOTE :    IMPENDING DETERIORATION -Cardiovascular, Metabolic and Renal  ASSOCIATED RISK FACTORS - Hypotension, Metabolic changes and Vascular Compromise  MANAGEMENT- Bedside Assessment and Supervision of Care  INTERPRETATION -  Xrays, CT Scan, Blood Gases, ECG, Blood Pressure and Cardiac Output Measures   INTERVENTIONS - hemodynamic mngmt, vascular control and Metobolic interventions  CASE REVIEW - Hospitalist/Intensivist, Medical Sub-Specialist and Nursing  TREATMENT RESPONSE -Improved  PERFORMED BY - Self    NOTES   :    I have spent 65 minutes of critical care time involved in lab review, consultations with specialist, family decision- making, bedside attention and documentation. During this entire length of time I was immediately available to the patient . Critical Care:   The reason for providing this level of medical care for this critically ill patient was due to a critical illness that impaired one or more vital organ systems, such that there was a high probability of imminent or life threatening deterioration in the patient's condition. This care involved high complexity decision making to assess, manipulate, and support vital system functions, to treat this degree of vital organ system failure, and to prevent further life threatening deterioration of the patients condition. Buffy Vazquez MD        I, Milton Brizuela MD, am the attending of record for this patient encounter. Diagnosis     Clinical Impression:   1. Acute renal failure, unspecified acute renal failure type ([facility] [address]    2. Severe hypotension    3. Clonidine overdose, accidental or unintentional, initial encounter        Attestation:  I personally performed the services described in this documentation on this date 6/23/2021 for patient Justice April.   Buffy Vazquez MD

## 2021-06-24 NOTE — PROGRESS NOTES
End of Shift Note    Bedside shift change report given to [address] [place] [address] (oncoming nurse) by Ekaterina Rojas RN (offgoing nurse). Report included the following information SBAR, Kardex, Intake/Output, MAR, Recent Results and Cardiac Rhythm NSR    Shift worked:  7p-7a     Shift summary and any significant changes:     Pt had hypotensive episode throughout shift due to taking too many BP meds at home for chest pain & abdominal pain managed with Albumin human 5% order by night NP and continuous NS @ 150 ml/hr  Pt says only symptomatic when up ambulating  Pt stable for no Rapid response, labs drawn, tely box, PRN pain meds given, scheduled meds given  Also spoke with Poison Control about medication error/ BP med overdose    Concerns for physician to address:  see above     Zone phone for oncoming shift:   [phone]       Activity:  Activity Level: Up with Assistance  Number times ambulated in hallways past shift: 0  Number of times OOB to chair past shift: 0    Cardiac:   Cardiac Monitoring: Yes      Cardiac Rhythm: Sinus Rhythm    Access:   Current line(s): PIV     Genitourinary:   Urinary status: voiding    Respiratory:      Chronic home O2 use?: NO  Incentive spirometer at bedside: NO     GI:     Current diet:  ADULT DIET Regular; Low Fat/Low Chol/High Fiber/2 gm Na; Low Potassium (Less than 3000 mg/day); Low Phosphorus (Less than 1000 mg)  Passing flatus: YES  Tolerating current diet: YES       Pain Management:   Patient states pain is manageable on current regimen: YES    Skin:  [place] Score: 20  Interventions: float heels, increase time out of bed and limit briefs    Patient Safety:  Fall Score:  Total Score: 2  Interventions: bed/chair alarm, gripper socks and pt to call before getting OOB       Length of Stay:  Expected LOS: - - -  Actual LOS: [address], RN

## 2021-06-24 NOTE — PROGRESS NOTES
Received notification from bedside RN about patient with regards to: hypotension, reports dizziness when standing up  VS: BP 85/53, HR 72, RR 20, O2 sat 98%     Intervention given: 5% Albumin 12.5 g x 1 dose ordered

## 2021-06-24 NOTE — ED NOTES
Unsure why patient's creatinine result is not crossing over, spoke to CT and MD via phone to report critical value

## 2021-06-25 ENCOUNTER — TELEPHONE (OUTPATIENT)
Dept: INTERNAL MEDICINE CLINIC | Age: 56
End: 2021-06-25

## 2021-06-25 VITALS
SYSTOLIC BLOOD PRESSURE: 137 MMHG | TEMPERATURE: 98.4 F | RESPIRATION RATE: 18 BRPM | BODY MASS INDEX: 39.17 KG/M2 | HEART RATE: 75 BPM | HEIGHT: 75 IN | WEIGHT: 315 LBS | DIASTOLIC BLOOD PRESSURE: 74 MMHG | OXYGEN SATURATION: 97 %

## 2021-06-25 LAB
ANION GAP SERPL CALC-SCNC: 4 MMOL/L (ref 5–15)
BUN SERPL-MCNC: 32 MG/DL (ref 6–20)
BUN/CREAT SERPL: 17 (ref 12–20)
CALCIUM SERPL-MCNC: 8.6 MG/DL (ref 8.5–10.1)
CHLORIDE SERPL-SCNC: 110 MMOL/L (ref 97–108)
CO2 SERPL-SCNC: 25 MMOL/L (ref 21–32)
CORTIS SERPL-MCNC: 6 UG/DL
CREAT SERPL-MCNC: 1.89 MG/DL (ref 0.7–1.3)
GLUCOSE SERPL-MCNC: 95 MG/DL (ref 65–100)
POTASSIUM SERPL-SCNC: 4.4 MMOL/L (ref 3.5–5.1)
SODIUM SERPL-SCNC: 139 MMOL/L (ref 136–145)

## 2021-06-25 PROCEDURE — 74011250637 HC RX REV CODE- 250/637: Performed by: INTERNAL MEDICINE

## 2021-06-25 PROCEDURE — 99239 HOSP IP/OBS DSCHRG MGMT >30: CPT | Performed by: INTERNAL MEDICINE

## 2021-06-25 PROCEDURE — 80048 BASIC METABOLIC PNL TOTAL CA: CPT

## 2021-06-25 PROCEDURE — 36415 COLL VENOUS BLD VENIPUNCTURE: CPT

## 2021-06-25 PROCEDURE — 74011250636 HC RX REV CODE- 250/636: Performed by: INTERNAL MEDICINE

## 2021-06-25 PROCEDURE — 82533 TOTAL CORTISOL: CPT

## 2021-06-25 RX ADMIN — PREGABALIN 100 MG: 75 CAPSULE ORAL at 09:03

## 2021-06-25 RX ADMIN — SODIUM CHLORIDE 150 ML/HR: 9 INJECTION, SOLUTION INTRAVENOUS at 04:35

## 2021-06-25 RX ADMIN — FLUTICASONE PROPIONATE 2 SPRAY: 50 SPRAY, METERED NASAL at 09:05

## 2021-06-25 RX ADMIN — PANTOPRAZOLE SODIUM 40 MG: 40 TABLET, DELAYED RELEASE ORAL at 09:03

## 2021-06-25 RX ADMIN — MONTELUKAST 10 MG: 10 TABLET, FILM COATED ORAL at 09:03

## 2021-06-25 RX ADMIN — CETIRIZINE HYDROCHLORIDE 10 MG: 10 TABLET, FILM COATED ORAL at 09:08

## 2021-06-25 RX ADMIN — HEPARIN SODIUM 7500 UNITS: 5000 INJECTION INTRAVENOUS; SUBCUTANEOUS at 04:49

## 2021-06-25 RX ADMIN — ASPIRIN 81 MG: 81 TABLET, COATED ORAL at 09:03

## 2021-06-25 NOTE — TELEPHONE ENCOUNTER
----- Message from Dean Davison sent at 6/25/2021  9:20 AM EDT -----  Regarding: FW: Dr. Riki Rodriguez    ----- Message -----  From: Natalie Conner  Sent: 6/25/2021   9:11 AM EDT  To: Dashawn Vazquez Office Pool  Subject: Dr. Riki Rodriguez                             General Message/Vendor Calls    Caller's first and last name: Bernice Lao from [facility]      Reason for call: Needs to reschedule hospital follow up for patient. Patient is not being released until today. Patient has appointment for today. Callback required yes/no and why: yes, contact patient.        Best contact number(s):[phone]      Details to clarify the request: N/A    Message from [facility]

## 2021-06-25 NOTE — DISCHARGE SUMMARY
Physician Discharge Summary     Patient ID:  Deejay Belle  [id number]  63 y.o.  1965    Admit date: 6/23/2021    Discharge date: 6/25/2021    Admission Diagnoses: ALEJA (acute kidney injury) ([facility] [N17.9]; Hypotension [I95.9]    Discharge Diagnoses:  Principal Diagnosis <principal problem not specified>                                              Active Problems:    Hypotension (6/23/2021)      ALEJA (acute kidney injury) ([facility] [address] (6/23/2021)       Consults: Nephrology    Significant Diagnostic Studies:     CT Chest Abd Pelvis WO contrast: Colonic diverticulosis. Hepatic steatosis. No acute abnormality. Hospital Course: Mr. Deejay Belle is a patient of mine who presented with the problems above. See the initial H and P for full details. CHIEF COMPLAINT: Dizziness with chest pain and abdominal pain since a.m.     HISTORY OF PRESENT ILLNESS:     Deejay Belle is a 64 y.o.  male who presents with above complaints from home via EMS  Patient had episode of dizziness with chest pain and abdominal pain this morning which patient assumed that was due to high blood pressure (did not check it despite having machine at home) took 2 0.2 mg clonidine pills followed by another 1 later along with his other blood pressure meds amlodipine lisinopril and atenolol and hydrochlorothiazide thinking it will help him with his hypertension (presumed) symptoms. Patient was apparently found to have severe hypotension with blood pressure 70/40 as per the EMS, and patient was found to have ALEJA with creatinine of 6.6 on blood work in the ED with normal EKG and otherwise unremarkable routine blood work including lactate and troponin. Patient was found to have elevated creatinine after recently being started on clonidine and lisinopril by PCP for aggressive blood pressure management on routine blood work on 11th of this month-creatinine then was 2.3.   But patient apparently has continued taking lisinopril and HCTZ and NSAIDs for his back pain despite elevated creatinine. Patient was worked up as outpatient with renal ultrasound on 11th ? As per PCP= which was negative for any hydronephrosis, and positive for incidental fatty liver. By problem. ..    1. Hypotension (6/23/2021): IVF; BP meds on hold. Today he is normotensive off meds. Continue to stay off BP meds for now. Likely will need to reinstate one or more of them in the near future. 2. ALEJA (acute kidney injury) ([address] (6/23/2021): Likely prerenal. IVF. Cr has markedly improved, from 6.69 to 1.89.   3. Chronic back pain: He is on methadone from clinic at [facility].   4. R shoulder pain: Recommend that he make appointment with a shoulder specialist after discharge. 5. Neuropathy: This is not quite controlled on current dose of lyrica; may consider increasing this. 6. Asthma POA, controlled. 7. Morbid obesity. 8. Full code. Discharge Exam:  Visit Vitals  /70 (BP 1 Location: Right upper arm, BP Patient Position: At rest)   Pulse 75   Temp 98.2 °F (36.8 °C)   Resp 20   Ht 6' 3\" (1.905 m)   Wt 330 lb (149.7 kg)   SpO2 95%   BMI 41.25 kg/m²     Gen: Patient is in no acute distress. Lungs: CTAB. Heart: RRR. Abd: S, NT, ND, BS present. Extremities: Warm. L knee is swollen. Disposition: home    Patient Instructions:   Current Discharge Medication List      CONTINUE these medications which have NOT CHANGED    Details   tadalafiL (Cialis) 5 mg tablet Take 1 Tablet by mouth daily. Qty: 30 Tablet, Refills: 5    Associated Diagnoses: Erectile dysfunction, unspecified erectile dysfunction type      pregabalin (LYRICA) 100 mg capsule Take 1 Capsule by mouth two (2) times a day. Max Daily Amount: 200 mg.   Qty: 60 Capsule, Refills: 5    Associated Diagnoses: Neuropathy of right lower extremity      albuterol (PROVENTIL HFA, VENTOLIN HFA, PROAIR HFA) 90 mcg/actuation inhaler Take 1 Puff by inhalation every four (4) hours as needed for Wheezing. Qty: 1 Inhaler, Refills: 5    Associated Diagnoses: Severe persistent asthma with acute exacerbation      aspirin delayed-release 81 mg tablet Take 1 Tab by mouth daily. Qty: 100 Tab, Refills: 3    Associated Diagnoses: Essential hypertension      fluticasone propionate (Flovent HFA) 220 mcg/actuation inhaler INHALE 1 OR 2 PUFFS 2 TIMES A DAY. Qty: 3 Inhaler, Refills: 0    Associated Diagnoses: Severe persistent asthma with acute exacerbation      fluticasone propionate (Flonase Allergy Relief) 50 mcg/actuation nasal spray 2 Sprays by Both Nostrils route daily. Qty: 1 Bottle, Refills: 5    Associated Diagnoses: Allergic rhinitis, unspecified seasonality, unspecified trigger      cyclobenzaprine (FLEXERIL) 10 mg tablet Take 1 Tab by mouth nightly. Qty: 30 Tab, Refills: 0    Associated Diagnoses: Chronic left-sided low back pain without sciatica      cetirizine (ZyrTEC) 10 mg tablet Take 1 Tab by mouth daily. Qty: 20 Tab, Refills: 0    Associated Diagnoses: Allergic rhinitis, unspecified seasonality, unspecified trigger      sildenafil citrate (Viagra) 100 mg tablet Take 1 Tab by mouth as needed for Erectile Dysfunction. Qty: 6 Tab, Refills: 5    Associated Diagnoses: Erectile dysfunction, unspecified erectile dysfunction type      albuterol (PROVENTIL VENTOLIN) 2.5 mg /3 mL (0.083 %) nebu 3 mL by Nebulization route every four (4) hours as needed for Wheezing. Qty: 90 Nebule, Refills: 0    Associated Diagnoses: Severe persistent asthma with acute exacerbation      guaiFENesin-dextromethorphan (TUSSI-ORGANIDIN DM)  mg/5 mL liqd Take 10 mL by mouth every four (4) hours as needed for Cough or Congestion. Qty: 236 mL, Refills: 0      Linzess 290 mcg cap capsule Take 1 Cap by mouth daily. Qty: 90 Cap, Refills: 3    Associated Diagnoses: Irritable bowel syndrome with constipation      montelukast (Singulair) 10 mg tablet Take 1 Tab by mouth daily.   Qty: 90 Tab, Refills: 0    Associated Diagnoses: Severe persistent asthma with acute exacerbation; Allergic rhinitis, unspecified seasonality, unspecified trigger      pantoprazole (PROTONIX) 40 mg tablet Take 1 Tab by mouth daily. Qty: 90 Tab, Refills: 3    Associated Diagnoses: Gastroesophageal reflux disease without esophagitis      polyethylene glycol (MIRALAX) 17 gram packet Take 1 Packet by mouth daily. Qty: 1 Each, Refills: 5    Associated Diagnoses: Chronic constipation      Nebulizer Accessories kit Nebulizer cup w/ tubing; use every 4 hrs prn wheezing  Qty: 1 Kit, Refills: 5    Associated Diagnoses: Wheezing; Chronic bronchitis, unspecified chronic bronchitis type (HCC)         STOP taking these medications       cloNIDine HCL (CATAPRES) 0.2 mg tablet Comments:   Reason for Stopping:         lisinopriL (PRINIVIL, ZESTRIL) 20 mg tablet Comments:   Reason for Stopping:         amLODIPine (NORVASC) 10 mg tablet Comments:   Reason for Stopping:         atenoloL (TENORMIN) 50 mg tablet Comments:   Reason for Stopping:         hydroCHLOROthiazide (HYDRODIURIL) 25 mg tablet Comments:   Reason for Stopping:         phentermine (ADIPEX-P) 37.5 mg tablet Comments:   Reason for Stopping:         lisinopriL (PRINIVIL, ZESTRIL) 20 mg tablet Comments:   Reason for Stopping:             Activity: Activity as tolerated  Diet: Resume previous diet      Follow-up with Dr. Jeancarlos Singletary next week --about three days. Recommend setting up an appointment with orthopedist, Dr. Bret Kan, for R shoulder pain. Signed:  Lucero Gloria MD  6/25/2021  8:01 AM    Note: Greater than 30 minutes were spent on activities related to this discharge.

## 2021-06-25 NOTE — PROGRESS NOTES
Problem: Falls - Risk of  Goal: *Absence of Falls  Description: Document Ham Moses Fall Risk and appropriate interventions in the flowsheet.   Outcome: Progressing Towards Goal  Note: Fall Risk Interventions:  Mobility Interventions: Assess mobility with egress test, Bed/chair exit alarm, Patient to call before getting OOB         Medication Interventions: Assess postural VS orthostatic hypotension, Bed/chair exit alarm

## 2021-06-25 NOTE — PROGRESS NOTES
Transition of Care Plan:     RUR: 14%  Disposition: home with robert and 15year old son   Follow up appointments: PCP, Orthopedic  DME needed: cane   Transportation at Discharge: Medicaid transport ETA by 12pm   Keys or means to access home: yes      IM Medicare letter: Provided, signed and placed in pt chart. Caregiver Contact: Brandon young) [phone]  Discharge Caregiver contacted prior to discharge? Pt to contact. Update 12:21 PM: Transport delayed per Medicaid. Trip assigned to [facility]. New ETA 1230PM.  to call unit upon arrival.     Chart reviewed. CM aware of discharge order. CM met with pt at bedside to review and finalize discharge plan. CM attempted to schedule PCP follow-up appointment, office is fully booked next week. Office did state they could schedule pt for 2PM on Monday 6/28 with an arrival time of 930AM. Pt would be expected to wait until provider can see him. Pt not willing to do this. Due to lack of transportation, will need concrete appointment date and time so that pt can schedule his transportation. CM was advise office will call pt directly to schedule follow up appointment after discharge. Orthopedic follow up scheduled for 7/7/21 at 130PM.  Pt indicated need for a cane, but stated that he will purchase himself after discharge in order to save his Medicare benefit for DME. Medicaid transport scheduled ([phone]; ref # [id number]), ETA by 12pm. Representative to call nurse's station with transport time. IM letter provided, signed and placed in pt chart. No barriers to discharge identified. Pt is agreeable to the discharge plan and voiced no further questions/concerns regarding discharge at this time. Pt is ready for d/c from a CM standpoint. Assigned RN informed. Care Management Interventions  PCP Verified by CM: Yes  Last Visit to PCP: 06/10/21  Palliative Care Criteria Met (RRAT>21 & CHF Dx)?: No  Mode of Transport at Discharge:  Other (see comment) (Medicaid transport scheduled ([phone]; ref # [id number]), ETA by 12pm.)  Transition of Care Consult (CM Consult): Discharge Planning  Discharge Durable Medical Equipment: No  Physical Therapy Consult: No  Occupational Therapy Consult: No  Speech Therapy Consult: No  Current Support Network: Lives with Spouse  Confirm Follow Up Transport: Other (see comment) (Medicaid transport scheduled ([phone]; ref # [id number]), ETA by 12pm.)  The Patient and/or Patient Representative was Provided with a Choice of Provider and Agrees with the Discharge Plan?: Yes  Freedom of Choice List was Provided with Basic Dialogue that Supports the Patient's Individualized Plan of Care/Goals, Treatment Preferences and Shares the Quality Data Associated with the Providers?: No  [place] Resource Information Provided?: No  Discharge Location  Discharge Placement: [facility],  [address], [address], [address] [facility]

## 2021-06-25 NOTE — DISCHARGE INSTRUCTIONS
PATIENT DISCHARGE INSTRUCTIONS      PATIENT DISCHARGE INSTRUCTIONS    Sidra Delphine / [id number] : 1965    Admitted 2021 Discharged: 2021       · It is important that you take the medication exactly as they are prescribed. · Keep your medication in the bottles provided by the pharmacist and keep a list of the medication names, dosages, and times to be taken in your wallet. · Do not take other medications without consulting your doctor.      What to do at Home    Recommended Diet: Resume previous diet    Recommended Activity: Activity as tolerated

## 2021-06-25 NOTE — PROGRESS NOTES
Problem: Falls - Risk of  Goal: *Absence of Falls  Description: Document Jo Ann Patten Fall Risk and appropriate interventions in the flowsheet.   Outcome: Resolved/Met  Note: Fall Risk Interventions:  Mobility Interventions: Assess mobility with egress test, Bed/chair exit alarm, Patient to call before getting OOB         Medication Interventions: Assess postural VS orthostatic hypotension, Bed/chair exit alarm                   Problem: Patient Education: Go to Patient Education Activity  Goal: Patient/Family Education  Outcome: Resolved/Met

## 2021-06-25 NOTE — TELEPHONE ENCOUNTER
Ai Casanova from [address] [place] [address] to schedule a hosptital follow up for the the patient. States dr fatima wanted him seen on Monday. States transportation could be arranged for that day. Nurse Chasity gave me permission to book for 2 pm however stated to inform patient to arrive at 9:30 am due to them having to actually work him in that day bc the schedule was already fully book. This was declined due to patient not being able to arrange medicaid transport home without a concrete time to give them and patient may be waiting all day at the office for appt and transport. States please call pt to schedule for hospital follow up as  advises and states pt needs 5 days notice for [facility].     Patient phone not working so please call robert's phone:   Margarita Koo [phone]      Discharge today 6/25/21

## 2021-06-25 NOTE — PROGRESS NOTES
NAME: Bartolome Guevara        :  1965        MRN:  [id number]                     Assessment   :                                               Plan:  ALEJA  Hypotension  H/o HTN (on 5 BP medications prior to admission)    Creatinine is much better -- 6.7 on ,  today is 5.3 to 1.9; urine bland other than trace protein; creatinine 0.8 in late      CT w/o: KIDNEYS: No mass, calculus, or hydronephrosis.     Prerenal (from low bp) +/- ATN from low BP     Holding all BP medications, stop NS -- he had gained over 100 pounds after being on steroids for asthma a few years ago. He is trying to lose the weight and has lost 20 pounds or so, perhaps this weight loss is why his BP dropped? Pending am cortisol     SBP now 120. Will see in the office in a month or so to make sure all is well. Subjective:     Chief Complaint:  oob in chair. Denies complaint other than arthritis. We discussed the above. Review of Systems:    Symptom Y/N Comments  Symptom Y/N Comments   Fever/Chills    Chest Pain     Poor Appetite    Edema     Cough    Abdominal Pain     Sputum    Joint Pain     SOB/BRICENO    Pruritis/Rash     Nausea/vomit    Tolerating PT/OT     Diarrhea    Tolerating Diet     Constipation    Other       Could not obtain due to:      Objective:     VITALS:   Last 24hrs VS reviewed since prior progress note.  Most recent are:  Visit Vitals  /70 (BP 1 Location: Right upper arm, BP Patient Position: At rest)   Pulse 75   Temp 98.2 °F (36.8 °C)   Resp 20   Ht 6' 3\" (1.905 m)   Wt 149.7 kg (330 lb)   SpO2 95%   BMI 41.25 kg/m²       Intake/Output Summary (Last 24 hours) at 2021 0622  Last data filed at 2021 0442  Gross per 24 hour   Intake    Output 4301 ml   Net -4301 ml      Telemetry Reviewed:     PHYSICAL EXAM:  General: NAD      Lab Data Reviewed: (see below)    Medications Reviewed: (see below)    PMH/SH reviewed - no change compared to H&P  ________________________________________________________________________  Care Plan discussed with:  Patient     Family      RN     Care Manager                    Consultant:          Comments   >50% of visit spent in counseling and coordination of care       ________________________________________________________________________  Geoffrey Lemus MD     Procedures: see electronic medical records for all procedures/Xrays and details which  were not copied into this note but were reviewed prior to creation of Plan. LABS:  Recent Labs     06/24/21  0615 06/23/21 2034   WBC 7.2 10.9   HGB 12.3 13.2   HCT 38.4 41.2    186     Recent Labs     06/25/21  0419 06/24/21  0615 06/23/21 2034    136 134*   K 4.4 4.2 4.2   * 103 100   CO2 25 25 25   BUN 32* 37* 36*   CREA 1.89* 5.32* 6.69*   GLU 95 99 107*   CA 8.6 7.9* 8.5     Recent Labs     06/23/21 2034   AP 88   TP 7.3   ALB 3.6   GLOB 3.7     No results for input(s): INR, PTP, APTT, INREXT in the last 72 hours. No results for input(s): FE, TIBC, PSAT, FERR in the last 72 hours. No results found for: FOL, RBCF   No results for input(s): PH, PCO2, PO2 in the last 72 hours. No results for input(s): CPK, CKMB in the last 72 hours.     No lab exists for component: TROPONINI  No components found for: [place]  Lab Results   Component Value Date/Time    Color YELLOW/STRAW 06/24/2021 07:44 AM    Appearance CLEAR 06/24/2021 07:44 AM    Specific gravity 1.018 06/24/2021 07:44 AM    Specific gravity >1.030 (H) 10/13/2019 10:42 PM    pH (UA) 5.0 06/24/2021 07:44 AM    Protein 30 (A) 06/24/2021 07:44 AM    Glucose Negative 06/24/2021 07:44 AM    Ketone Negative 06/24/2021 07:44 AM    Urobilinogen 1.0 06/24/2021 07:44 AM    Nitrites Negative 06/24/2021 07:44 AM    Leukocyte Esterase Negative 06/24/2021 07:44 AM    Epithelial cells FEW 06/24/2021 07:44 AM    Bacteria Negative 06/24/2021 07:44 AM    WBC 5-10 06/24/2021 07:44 AM    RBC 5-10 06/24/2021 07:44 AM       MEDICATIONS:  Current Facility-Administered Medications   Medication Dose Route Frequency    sodium chloride (NS) flush 5-10 mL  5-10 mL IntraVENous PRN    0.9% sodium chloride infusion  150 mL/hr IntraVENous CONTINUOUS    albuterol (PROVENTIL VENTOLIN) nebulizer solution 2.5 mg  2.5 mg Nebulization Q4H PRN    aspirin delayed-release tablet 81 mg  81 mg Oral DAILY    cetirizine (ZYRTEC) tablet 10 mg  10 mg Oral DAILY    cyclobenzaprine (FLEXERIL) tablet 10 mg  10 mg Oral TID PRN    fluticasone propionate (FLONASE) 50 mcg/actuation nasal spray 2 Spray  2 Spray Both Nostrils DAILY    montelukast (SINGULAIR) tablet 10 mg  10 mg Oral DAILY    pantoprazole (PROTONIX) tablet 40 mg  40 mg Oral DAILY    pregabalin (LYRICA) capsule 100 mg  100 mg Oral BID    sodium chloride (NS) flush 5-40 mL  5-40 mL IntraVENous Q8H    sodium chloride (NS) flush 5-40 mL  5-40 mL IntraVENous PRN    acetaminophen (TYLENOL) tablet 650 mg  650 mg Oral Q6H PRN    Or    acetaminophen (TYLENOL) suppository 650 mg  650 mg Rectal Q6H PRN    polyethylene glycol (MIRALAX) packet 17 g  17 g Oral DAILY PRN    ondansetron (ZOFRAN ODT) tablet 4 mg  4 mg Oral Q8H PRN    Or    ondansetron (ZOFRAN) injection 4 mg  4 mg IntraVENous Q6H PRN    heparin (porcine) injection 7,500 Units  7,500 Units SubCUTAneous Q8H

## 2021-06-25 NOTE — PROGRESS NOTES
I have reviewed discharge instructions with the PATIENT PARENT GUARDIAN: patient. The patient verbalized understanding. Discharge medications reviewed with patient and appropriate educational materials and side effects teaching were provided. Follow-up appointments reviewed. Opportunity for questions and clarification was provided. Venous access removed without difficulty. Patient's belongings gathered and sent with patient. Patient is ready for discharge.      Latrelle Gaucher

## 2021-06-25 NOTE — PROGRESS NOTES
Volunteer wheeled patient out to front entrance to meet transport vehicle for discharge. Transport vehicle was not out front so volunteer brought patient back to his room 1121.

## 2021-06-27 ENCOUNTER — APPOINTMENT (OUTPATIENT)
Dept: CT IMAGING | Age: 56
End: 2021-06-27
Attending: EMERGENCY MEDICINE
Payer: MEDICARE

## 2021-06-27 ENCOUNTER — HOSPITAL ENCOUNTER (EMERGENCY)
Age: 56
Discharge: HOME | End: 2021-06-27
Attending: EMERGENCY MEDICINE
Payer: MEDICARE

## 2021-06-27 VITALS
DIASTOLIC BLOOD PRESSURE: 97 MMHG | RESPIRATION RATE: 16 BRPM | OXYGEN SATURATION: 96 % | HEART RATE: 96 BPM | SYSTOLIC BLOOD PRESSURE: 171 MMHG | TEMPERATURE: 98.2 F

## 2021-06-27 DIAGNOSIS — R51.9 ACUTE NONINTRACTABLE HEADACHE, UNSPECIFIED HEADACHE TYPE: Primary | ICD-10-CM

## 2021-06-27 LAB
ALBUMIN SERPL-MCNC: 3.7 G/DL (ref 3.5–5)
ALBUMIN/GLOB SERPL: 1 {RATIO} (ref 1.1–2.2)
ALP SERPL-CCNC: 81 U/L (ref 45–117)
ALT SERPL-CCNC: 70 U/L (ref 12–78)
ANION GAP SERPL CALC-SCNC: 6 MMOL/L (ref 5–15)
AST SERPL-CCNC: 57 U/L (ref 15–37)
BASOPHILS # BLD: 0 K/UL (ref 0–0.1)
BASOPHILS NFR BLD: 0 % (ref 0–1)
BILIRUB SERPL-MCNC: 0.4 MG/DL (ref 0.2–1)
BUN SERPL-MCNC: 10 MG/DL (ref 6–20)
BUN/CREAT SERPL: 10 (ref 12–20)
CALCIUM SERPL-MCNC: 9.7 MG/DL (ref 8.5–10.1)
CHLORIDE SERPL-SCNC: 109 MMOL/L (ref 97–108)
CO2 SERPL-SCNC: 27 MMOL/L (ref 21–32)
CREAT SERPL-MCNC: 0.97 MG/DL (ref 0.7–1.3)
DIFFERENTIAL METHOD BLD: NORMAL
EOSINOPHIL # BLD: 0.1 K/UL (ref 0–0.4)
EOSINOPHIL NFR BLD: 1 % (ref 0–7)
ERYTHROCYTE [DISTWIDTH] IN BLOOD BY AUTOMATED COUNT: 13.6 % (ref 11.5–14.5)
GLOBULIN SER CALC-MCNC: 3.7 G/DL (ref 2–4)
GLUCOSE SERPL-MCNC: 101 MG/DL (ref 65–100)
HCT VFR BLD AUTO: 41.5 % (ref 36.6–50.3)
HGB BLD-MCNC: 13.8 G/DL (ref 12.1–17)
IMM GRANULOCYTES # BLD AUTO: 0 K/UL (ref 0–0.04)
IMM GRANULOCYTES NFR BLD AUTO: 0 % (ref 0–0.5)
LYMPHOCYTES # BLD: 1.5 K/UL (ref 0.8–3.5)
LYMPHOCYTES NFR BLD: 28 % (ref 12–49)
MCH RBC QN AUTO: 29 PG (ref 26–34)
MCHC RBC AUTO-ENTMCNC: 33.3 G/DL (ref 30–36.5)
MCV RBC AUTO: 87.2 FL (ref 80–99)
MONOCYTES # BLD: 0.6 K/UL (ref 0–1)
MONOCYTES NFR BLD: 11 % (ref 5–13)
NEUTS SEG # BLD: 3.3 K/UL (ref 1.8–8)
NEUTS SEG NFR BLD: 60 % (ref 32–75)
NRBC # BLD: 0 K/UL (ref 0–0.01)
NRBC BLD-RTO: 0 PER 100 WBC
PLATELET # BLD AUTO: 218 K/UL (ref 150–400)
PMV BLD AUTO: 10.9 FL (ref 8.9–12.9)
POTASSIUM SERPL-SCNC: 4.6 MMOL/L (ref 3.5–5.1)
PROT SERPL-MCNC: 7.4 G/DL (ref 6.4–8.2)
RBC # BLD AUTO: 4.76 M/UL (ref 4.1–5.7)
SODIUM SERPL-SCNC: 142 MMOL/L (ref 136–145)
WBC # BLD AUTO: 5.5 K/UL (ref 4.1–11.1)

## 2021-06-27 PROCEDURE — 70450 CT HEAD/BRAIN W/O DYE: CPT

## 2021-06-27 PROCEDURE — 80053 COMPREHEN METABOLIC PANEL: CPT

## 2021-06-27 PROCEDURE — 99284 EMERGENCY DEPT VISIT MOD MDM: CPT

## 2021-06-27 PROCEDURE — 74011250637 HC RX REV CODE- 250/637: Performed by: EMERGENCY MEDICINE

## 2021-06-27 PROCEDURE — 85025 COMPLETE CBC W/AUTO DIFF WBC: CPT

## 2021-06-27 PROCEDURE — 36415 COLL VENOUS BLD VENIPUNCTURE: CPT

## 2021-06-27 PROCEDURE — 74011250636 HC RX REV CODE- 250/636: Performed by: EMERGENCY MEDICINE

## 2021-06-27 PROCEDURE — 96372 THER/PROPH/DIAG INJ SC/IM: CPT

## 2021-06-27 RX ORDER — LISINOPRIL 20 MG/1
20 TABLET ORAL
Status: COMPLETED | OUTPATIENT
Start: 2021-06-27 | End: 2021-06-27

## 2021-06-27 RX ORDER — ACETAMINOPHEN 500 MG
1000 TABLET ORAL ONCE
Status: COMPLETED | OUTPATIENT
Start: 2021-06-27 | End: 2021-06-27

## 2021-06-27 RX ORDER — KETOROLAC TROMETHAMINE 30 MG/ML
30 INJECTION, SOLUTION INTRAMUSCULAR; INTRAVENOUS
Status: DISCONTINUED | OUTPATIENT
Start: 2021-06-27 | End: 2021-06-27

## 2021-06-27 RX ORDER — AMLODIPINE BESYLATE 5 MG/1
10 TABLET ORAL
Status: COMPLETED | OUTPATIENT
Start: 2021-06-27 | End: 2021-06-27

## 2021-06-27 RX ORDER — KETOROLAC TROMETHAMINE 30 MG/ML
30 INJECTION, SOLUTION INTRAMUSCULAR; INTRAVENOUS
Status: COMPLETED | OUTPATIENT
Start: 2021-06-27 | End: 2021-06-27

## 2021-06-27 RX ADMIN — ACETAMINOPHEN 1000 MG: 500 TABLET ORAL at 07:21

## 2021-06-27 RX ADMIN — LISINOPRIL 20 MG: 20 TABLET ORAL at 09:48

## 2021-06-27 RX ADMIN — KETOROLAC TROMETHAMINE 30 MG: 30 INJECTION, SOLUTION INTRAMUSCULAR; INTRAVENOUS at 09:47

## 2021-06-27 RX ADMIN — AMLODIPINE BESYLATE 10 MG: 5 TABLET ORAL at 09:48

## 2021-06-27 NOTE — DISCHARGE INSTRUCTIONS
Your examination and CT scan are reassuring today. We recommend that you resume taking your daily blood pressure medication including lisinopril and Norvasc. Use Tylenol or extra strength ibuprofen for headache. Follow-up with your primary care physician. It was a pleasure taking care of you in our Emergency Department today. We know that when you come to [facility], you are entrusting us with your health, comfort, and safety. Our physicians and nurses honor that trust, and truly appreciate the opportunity to care for you and your loved ones. We also value your feedback. If you receive a survey about your Emergency Department experience today, please fill it out. We care about our patients' feedback, and we listen to what you have to say. Thank you!

## 2021-06-27 NOTE — ED NOTES
Bedside SBAR report received from [facility]. Opportunity for questions provided. Pt provided blankets for comfort.

## 2021-06-27 NOTE — ED NOTES
Assumed care of patient via triage. Patient has been having a headache since 10PM last night, states he was recently taken off BP meds after being hospitalized for hypotension, but BP got high again last night and caused a headache. No relief with OTC medications.

## 2021-06-27 NOTE — ED NOTES
Pt ambulatory to waiting room for ride provided by son. Denies dizziness/lightheadedness. DC papers reviewed and in hand, no acute distress noted.

## 2021-06-27 NOTE — ED PROVIDER NOTES
EMERGENCY DEPARTMENT HISTORY AND PHYSICAL EXAM      Date: 6/27/2021  Patient Name: Dany Green  Patient Age and Sex: 64 y.o. male    History of Presenting Illness     Chief Complaint   Patient presents with    Headache     pt with complaint of headache. notes he was recently taken off his BP medications. took lisinopril and tylenol without relief of symptoms. History Provided By: Patient    Ability to gather history was limited by:     HPI: Dany Green, 64 y.o. male with history of hypertension, complains of headache described as generalized and severe, starting yesterday, approximately 18 hours ago. Took Tylenol without significant relief. He has no neuro complaint such as weakness or numbness or vision changes or nausea or vomiting symptoms. Of note patient was discharged from the hospital 2 days ago, after a 2-day hospitalization for severe hypotension in the setting of inadvertent overdose of his hypertension medications. He believes that he was told to stop all of his antihypertensives upon discharge. Location:    Quality:      Severity:    Duration:   Timing:      Context:    Modifying factors:   Associated symptoms:     Past History      The patient's medical, surgical, and social history on file were reviewed by me today.      The family history was reviewed by me today and was non-contributory, unless otherwise specified below:    Past Medical History:  Past Medical History:   Diagnosis Date    Arthritis     Asthma     Chronic low back pain     Hypertension        Past Surgical History:  Past Surgical History:   Procedure Laterality Date    HX ORTHOPAEDIC         Family History:  Family History   Problem Relation Age of Onset    Cancer Mother     Heart Disease Father        Social History:  Social History     Tobacco Use    Smoking status: Former Smoker     Packs/day: 0.25     Years: 10.00     Pack years: 2.50    Smokeless tobacco: Never Used   Vaping Use    Vaping Use: Never used   Substance Use Topics    Alcohol use: Yes     Comment: occ    Drug use: No       Current Medications:  No current facility-administered medications on file prior to encounter. Current Outpatient Medications on File Prior to Encounter   Medication Sig Dispense Refill    tadalafiL (Cialis) 5 mg tablet Take 1 Tablet by mouth daily. 30 Tablet 5    pregabalin (LYRICA) 100 mg capsule Take 1 Capsule by mouth two (2) times a day. Max Daily Amount: 200 mg. 60 Capsule 5    albuterol (PROVENTIL HFA, VENTOLIN HFA, PROAIR HFA) 90 mcg/actuation inhaler Take 1 Puff by inhalation every four (4) hours as needed for Wheezing. 1 Inhaler 5    aspirin delayed-release 81 mg tablet Take 1 Tab by mouth daily. 100 Tab 3    fluticasone propionate (Flovent HFA) 220 mcg/actuation inhaler INHALE 1 OR 2 PUFFS 2 TIMES A DAY. 3 Inhaler 0    fluticasone propionate (Flonase Allergy Relief) 50 mcg/actuation nasal spray 2 Sprays by Both Nostrils route daily. 1 Bottle 5    cyclobenzaprine (FLEXERIL) 10 mg tablet Take 1 Tab by mouth nightly. 30 Tab 0    cetirizine (ZyrTEC) 10 mg tablet Take 1 Tab by mouth daily. 20 Tab 0    sildenafil citrate (Viagra) 100 mg tablet Take 1 Tab by mouth as needed for Erectile Dysfunction. 6 Tab 5    albuterol (PROVENTIL VENTOLIN) 2.5 mg /3 mL (0.083 %) nebu 3 mL by Nebulization route every four (4) hours as needed for Wheezing. 90 Nebule 0    guaiFENesin-dextromethorphan (TUSSI-ORGANIDIN DM)  mg/5 mL liqd Take 10 mL by mouth every four (4) hours as needed for Cough or Congestion. 236 mL 0    Linzess 290 mcg cap capsule Take 1 Cap by mouth daily. 90 Cap 3    montelukast (Singulair) 10 mg tablet Take 1 Tab by mouth daily. 90 Tab 0    pantoprazole (PROTONIX) 40 mg tablet Take 1 Tab by mouth daily. 90 Tab 3    [DISCONTINUED] lisinopriL (PRINIVIL, ZESTRIL) 20 mg tablet Take 1 Tab by mouth daily. 90 Tab 0    polyethylene glycol (MIRALAX) 17 gram packet Take 1 Packet by mouth daily.  1 Each 5    Nebulizer Accessories kit Nebulizer cup w/ tubing; use every 4 hrs prn wheezing 1 Kit 5       Allergies: Allergies   Allergen Reactions    Vicodin [Hydrocodone-Acetaminophen] Other (comments)     Headache     Review of Systems    A complete ROS was reviewed by me today and was negative, unless otherwise specified below:    Review of Systems   Constitutional: Negative for fatigue and fever. Respiratory: Negative for shortness of breath. Cardiovascular: Negative for chest pain. Gastrointestinal: Negative for abdominal pain, nausea and vomiting. Neurological: Positive for headaches. Negative for syncope and facial asymmetry. All other systems reviewed and are negative. Physical Exam   Vital Signs  Patient Vitals for the past 8 hrs:   Temp Pulse Resp BP SpO2   06/27/21 0915    (!) 171/97 96 %   06/27/21 0830    (!) 173/104    06/27/21 0715    (!) 169/106 97 %   06/27/21 0700    (!) 149/88 98 %   06/27/21 0603 98.2 °F (36.8 °C) 96 16 (!) 175/96 98 %          Physical Exam  Vitals and nursing note reviewed. Constitutional:       General: He is not in acute distress. Appearance: Normal appearance. He is well-developed. He is not ill-appearing. HENT:      Head: Normocephalic and atraumatic. Eyes:      General:         Right eye: No discharge. Left eye: No discharge. Conjunctiva/sclera: Conjunctivae normal.   Cardiovascular:      Rate and Rhythm: Normal rate and regular rhythm. Heart sounds: Normal heart sounds. No murmur heard. Pulmonary:      Effort: Pulmonary effort is normal. No respiratory distress. Breath sounds: Normal breath sounds. No wheezing. Abdominal:      General: There is no distension. Palpations: Abdomen is soft. Tenderness: There is no abdominal tenderness. Musculoskeletal:         General: No deformity. Normal range of motion. Cervical back: Normal range of motion and neck supple.    Skin:     General: Skin is warm and dry. Findings: No rash. Neurological:      General: No focal deficit present. Mental Status: He is alert and oriented to person, place, and time. Cranial Nerves: Cranial nerves are intact. No cranial nerve deficit or facial asymmetry. Sensory: Sensation is intact. Motor: Motor function is intact. Psychiatric:         Mood and Affect: Mood normal.         Behavior: Behavior normal.         Thought Content: Thought content normal.         Diagnostic Study Results   Labs  Recent Results (from the past 24 hour(s))   CBC WITH AUTOMATED DIFF    Collection Time: 06/27/21  7:28 AM   Result Value Ref Range    WBC 5.5 4.1 - 11.1 K/uL    RBC 4.76 4.10 - 5.70 M/uL    HGB 13.8 12.1 - 17.0 g/dL    HCT 41.5 36.6 - 50.3 %    MCV 87.2 80.0 - 99.0 FL    MCH 29.0 26.0 - 34.0 PG    MCHC 33.3 30.0 - 36.5 g/dL    RDW 13.6 11.5 - 14.5 %    PLATELET 504 450 - 973 K/uL    MPV 10.9 8.9 - 12.9 FL    NRBC 0.0 0  WBC    ABSOLUTE NRBC 0.00 0.00 - 0.01 K/uL    NEUTROPHILS 60 32 - 75 %    LYMPHOCYTES 28 12 - 49 %    MONOCYTES 11 5 - 13 %    EOSINOPHILS 1 0 - 7 %    BASOPHILS 0 0 - 1 %    IMMATURE GRANULOCYTES 0 0.0 - 0.5 %    ABS. NEUTROPHILS 3.3 1.8 - 8.0 K/UL    ABS. LYMPHOCYTES 1.5 0.8 - 3.5 K/UL    ABS. MONOCYTES 0.6 0.0 - 1.0 K/UL    ABS. EOSINOPHILS 0.1 0.0 - 0.4 K/UL    ABS. BASOPHILS 0.0 0.0 - 0.1 K/UL    ABS. IMM.  GRANS. 0.0 0.00 - 0.04 K/UL    DF AUTOMATED     METABOLIC PANEL, COMPREHENSIVE    Collection Time: 06/27/21  7:28 AM   Result Value Ref Range    Sodium 142 136 - 145 mmol/L    Potassium 4.6 3.5 - 5.1 mmol/L    Chloride 109 (H) 97 - 108 mmol/L    CO2 27 21 - 32 mmol/L    Anion gap 6 5 - 15 mmol/L    Glucose 101 (H) 65 - 100 mg/dL    BUN 10 6 - 20 MG/DL    Creatinine 0.97 0.70 - 1.30 MG/DL    BUN/Creatinine ratio 10 (L) 12 - 20      GFR est AA >60 >60 ml/min/1.73m2    GFR est non-AA >60 >60 ml/min/1.73m2    Calcium 9.7 8.5 - 10.1 MG/DL    Bilirubin, total 0.4 0.2 - 1.0 MG/DL    ALT (SGPT) 70 12 - 78 U/L    AST (SGOT) 57 (H) 15 - 37 U/L    Alk. phosphatase 81 45 - 117 U/L    Protein, total 7.4 6.4 - 8.2 g/dL    Albumin 3.7 3.5 - 5.0 g/dL    Globulin 3.7 2.0 - 4.0 g/dL    A-G Ratio 1.0 (L) 1.1 - 2.2         Radiologic Studies  CT HEAD WO CONT   Final Result   No acute intracranial bleed   Age-indeterminate left external capsule infarct        CT Results  (Last 48 hours)               06/27/21 0803  CT HEAD WO CONT Final result    Impression:  No acute intracranial bleed   Age-indeterminate left external capsule infarct       Narrative:  EXAM: CT HEAD WO CONT       INDICATION: headache, recent severe hypotensive event. normal neuro exam       COMPARISON: None. CONTRAST: None. TECHNIQUE: Unenhanced CT of the head was performed using 5 mm images. Brain and   bone windows were generated. Coronal and sagittal reformats. CT dose reduction   was achieved through use of a standardized protocol tailored for this   examination and automatic exposure control for dose modulation. FINDINGS:   The ventricles and sulci are normal in size, shape and configuration. . There is   an age indeterminate 7 mm hypodensity in the left external capsule (axial image   16 and coronal image 41). There is no intracranial hemorrhage, extra-axial   collection, or mass effect. The basilar cisterns are open. No CT evidence of   acute infarct. The bone windows demonstrate no abnormalities. The visualized portions of the   paranasal sinuses and mastoid air cells are clear. CXR Results  (Last 48 hours)    None          Billable Procedures   Procedures    Cardiac monitoring was not ordered for this patient. Medical Decision Making     I reviewed the patient's most recent Emergency Dept notes and diagnostic tests in formulating my MDM on today's visit.     Provider Notes (Medical Decision Making):   59-year-old male complaining of generalized severe headache without any neurologic complaints, 2 days after being discharged from the hospital after an episode of severe hypotension in the setting of inadvertent overdose of his antihypertensive medications. Clinically well-appearing, alert and oriented, no distress. Normal neurologic exam, no focal deficits noted. Mildly hypertensive. Likely uncomplicated headache, CT head to rule out ICH or signs of significant ischemia or swelling after his recent hypotensive episode the other day. Check laboratories. He had ALEJA the other day in the setting of hypotension. Zoey Meeks MD  7:36 AM  6/27/2021     CT scan reviewed. There is a small age-indeterminate infarct in the left external capsule, no intracranial bleed. Patient has no neurologic complaints. I do not think that he has suffered an acute CVA. Feeling better after Tylenol. He is already taking aspirin, and I have instructed him to resume taking his antihypertensive medications and follow-up with primary care. Consults:    Social History     Tobacco Use    Smoking status: Former Smoker     Packs/day: 0.25     Years: 10.00     Pack years: 2.50    Smokeless tobacco: Never Used   Vaping Use    Vaping Use: Never used   Substance Use Topics    Alcohol use: Yes     Comment: occ    Drug use: No       Medications Administered during ED course:  Medications   ketorolac (TORADOL) injection 30 mg (has no administration in time range)   lisinopriL (PRINIVIL, ZESTRIL) tablet 20 mg (has no administration in time range)   amLODIPine (NORVASC) tablet 10 mg (has no administration in time range)   acetaminophen (TYLENOL) tablet 1,000 mg (1,000 mg Oral Given 6/27/21 0721)          Prescriptions from today's ED visit:  Current Discharge Medication List         Diagnosis and Disposition     Disposition:  Discharged    Clinical Impression:   1.  Acute nonintractable headache, unspecified headache type        Attestation:  I personally performed the services described in this documentation on this date 6/27/2021 for patient Octavio Valenzuela. Mahsa Patel MD        I was the first provider for this patient on this visit. To the best of my ability I reviewed relevant prior medical records, electrocardiograms, laboratories, and radiologic studies. The patient's presenting problems were discussed, and the patient was in agreement with the care plan formulated and outlined with them. Mahsa Patel MD    Please note that this dictation was completed with Dragon voice recognition software. Quite often unanticipated grammatical, syntax, homophones, and other interpretive errors are inadvertently transcribed by the computer software. Please disregard these errors and excuse any errors that have escaped final proofreading.

## 2021-06-28 ENCOUNTER — PATIENT OUTREACH (OUTPATIENT)
Dept: CASE MANAGEMENT | Age: 56
End: 2021-06-28

## 2021-06-28 NOTE — TELEPHONE ENCOUNTER
Patient states he needs a Hospital F/U appt with Dr. Ricardo Wharton this week ED [facility] discharge 6/25/21. Please call. Thank you    No appts available in Time Frame needed.

## 2021-06-30 LAB
BACTERIA SPEC CULT: NORMAL
BACTERIA SPEC CULT: NORMAL
SERVICE CMNT-IMP: NORMAL
SERVICE CMNT-IMP: NORMAL

## 2021-06-30 NOTE — PROGRESS NOTES
Care Transitions Outreach Attempt    Call within 2 business days of discharge: Yes   Attempted to reach patient for transitions of care follow up. Unable to reach patient. Patient: Waldo Adams Patient : 1965 MRN: [id number]    Last Discharge [address]       Complaint Diagnosis Description Type Department Provider    21 Headache Acute nonintractable headache, unspecified headache type ED ([facility]) Sam Noel MD            Was this an external facility discharge? No     Noted following upcoming appointments from discharge chart review:   [facility] follow up appointment(s): No future appointments. Non-[facility] follow up appointment(s): ortho appt     [id number]  Unable to reach patient for [facility] call. VM left. Ctn to attempt to reach patient in 5 business days.  AR

## 2021-07-02 ENCOUNTER — OFFICE VISIT (OUTPATIENT)
Dept: INTERNAL MEDICINE CLINIC | Age: 56
End: 2021-07-02
Payer: MEDICARE

## 2021-07-02 VITALS
HEART RATE: 101 BPM | HEIGHT: 75 IN | DIASTOLIC BLOOD PRESSURE: 94 MMHG | SYSTOLIC BLOOD PRESSURE: 147 MMHG | WEIGHT: 315 LBS | OXYGEN SATURATION: 97 % | RESPIRATION RATE: 18 BRPM | BODY MASS INDEX: 39.17 KG/M2

## 2021-07-02 DIAGNOSIS — N17.9 AKI (ACUTE KIDNEY INJURY) (HCC): Primary | ICD-10-CM

## 2021-07-02 DIAGNOSIS — I50.9 HEART FAILURE, UNSPECIFIED HF CHRONICITY, UNSPECIFIED HEART FAILURE TYPE (HCC): ICD-10-CM

## 2021-07-02 DIAGNOSIS — Z09 HOSPITAL DISCHARGE FOLLOW-UP: ICD-10-CM

## 2021-07-02 DIAGNOSIS — I67.9 CEREBROVASCULAR DISEASE: ICD-10-CM

## 2021-07-02 DIAGNOSIS — J42 CHRONIC BRONCHITIS, UNSPECIFIED CHRONIC BRONCHITIS TYPE (HCC): ICD-10-CM

## 2021-07-02 DIAGNOSIS — I95.9 HYPOTENSION, UNSPECIFIED HYPOTENSION TYPE: ICD-10-CM

## 2021-07-02 DIAGNOSIS — I10 ESSENTIAL HYPERTENSION: Chronic | ICD-10-CM

## 2021-07-02 DIAGNOSIS — E66.01 OBESITY, CLASS III, BMI 40-49.9 (MORBID OBESITY) (HCC): ICD-10-CM

## 2021-07-02 PROCEDURE — 1111F DSCHRG MED/CURRENT MED MERGE: CPT | Performed by: INTERNAL MEDICINE

## 2021-07-02 PROCEDURE — 99495 TRANSJ CARE MGMT MOD F2F 14D: CPT | Performed by: INTERNAL MEDICINE

## 2021-07-02 PROCEDURE — G8427 DOCREV CUR MEDS BY ELIG CLIN: HCPCS | Performed by: INTERNAL MEDICINE

## 2021-07-02 RX ORDER — ATORVASTATIN CALCIUM 20 MG/1
20 TABLET, FILM COATED ORAL DAILY
Qty: 90 TABLET | Refills: 3 | Status: SHIPPED | OUTPATIENT
Start: 2021-07-02 | End: 2022-01-27 | Stop reason: SDUPTHER

## 2021-07-02 RX ORDER — LISINOPRIL 20 MG/1
20 TABLET ORAL DAILY
Qty: 90 TABLET | Refills: 0 | Status: SHIPPED | OUTPATIENT
Start: 2021-07-02 | End: 2021-08-20 | Stop reason: SDUPTHER

## 2021-07-02 RX ORDER — AMLODIPINE BESYLATE 10 MG/1
10 TABLET ORAL DAILY
Qty: 90 TABLET | Refills: 0 | Status: SHIPPED | OUTPATIENT
Start: 2021-07-02 | End: 2021-08-20 | Stop reason: SDUPTHER

## 2021-07-02 NOTE — PROGRESS NOTES
SUBJECTIVE  Mr. William Saini presents today acutely for     Chief Complaint   Patient presents with   [facility] Follow Up     He was hospitalized on my service 6/23 - 6/25:     Hospital Course: Mr. William Saini is a patient of mine who presented with the problems above. See the initial H and P for full details.      CHIEF COMPLAINT: Dizziness with chest pain and abdominal pain since a.m.     HISTORY OF PRESENT ILLNESS:     Mike Parks a 64 y.o.   male who presents with above complaints from home via EMS  Patient had episode of dizziness with chest pain and abdominal pain this morning which patient assumed that was due to high blood pressure (did not check it despite having machine at home) took 2 0.2 mg clonidine pills followed by another 1 later along with his other blood pressure meds amlodipine lisinopril and atenolol and hydrochlorothiazide thinking it will help him with his hypertension (presumed) symptoms. Patient was apparently found to have severe hypotension with blood pressure 70/40 as per the EMS, and patient was found to have ALEJA with creatinine of 6.6 on blood work in the ED with normal EKG and otherwise unremarkable routine blood work including lactate and troponin.  Patient was found to have elevated creatinine after recently being started on clonidine and lisinopril by PCP for aggressive blood pressure management on routine blood work on 11th of this month-creatinine then was 2.3. But patient apparently has continued taking lisinopril and HCTZ and NSAIDs for his back pain despite elevated creatinine. Patient was worked up as outpatient with renal ultrasound on 11th ?  As per PCP= which was negative for any hydronephrosis, and positive for incidental fatty liver.     By problem. ..     1. Hypotension (6/23/2021): IVF; BP meds on hold. Today he is normotensive off meds. Continue to stay off BP meds for now.  Likely will need to reinstate one or more of them in the near future. 2. ALEJA (acute kidney injury) ([facility] [address] (6/23/2021): Likely prerenal. IVF. Cr has markedly improved, from 6.69 to 1.89.   3. Chronic back pain: He is on methadone from clinic at [facility].   4. R shoulder pain: Recommend that he make appointment with a shoulder specialist after discharge. 5. Neuropathy: This is not quite controlled on current dose of lyrica; may consider increasing this. 6. Asthma POA, controlled. 7. Morbid obesity. 8. Full code.     Now, he is back home. He got a severe headache, and went back to ER on 6/27:  Magen Gaspar, 64 y.o. male with history of hypertension, complains of headache described as generalized and severe, starting yesterday, approximately 18 hours ago. Took Tylenol without significant relief. He has no neuro complaint such as weakness or numbness or vision changes or nausea or vomiting symptoms.     Of note patient was discharged from the hospital 2 days ago, after a 2-day hospitalization for severe hypotension in the setting of inadvertent overdose of his hypertension medications. He believes that he was told to stop all of his antihypertensives upon discharge. Labs were normal; GFR > 60. Head CT as below shows old stroke. BP was high, and he was put back on the lisinopril and norvasc. The headache has improved. He has been on steroids for his asthma, which is currently quiescent. \"When they gave them to me a few months ago, I blew up. I gained 103 lb in 3 months. \"    \"I can't exercise, because I can barely walk with this knee. \"  L knee pain is severe and ongoing. He is awaiting appointments with orthopdic surgeon at [address] [place] [address]. Past Medical History:   Diagnosis Date    Arthritis     Asthma     Chronic low back pain     Hypertension      Current Outpatient Medications on File Prior to Visit   Medication Sig Dispense Refill    tadalafiL (Cialis) 5 mg tablet Take 1 Tablet by mouth daily.  30 Tablet 5    pregabalin (LYRICA) 100 mg capsule Take 1 Capsule by mouth two (2) times a day. Max Daily Amount: 200 mg. 60 Capsule 5    albuterol (PROVENTIL HFA, VENTOLIN HFA, PROAIR HFA) 90 mcg/actuation inhaler Take 1 Puff by inhalation every four (4) hours as needed for Wheezing. 1 Inhaler 5    aspirin delayed-release 81 mg tablet Take 1 Tab by mouth daily. 100 Tab 3    fluticasone propionate (Flovent HFA) 220 mcg/actuation inhaler INHALE 1 OR 2 PUFFS 2 TIMES A DAY. 3 Inhaler 0    fluticasone propionate (Flonase Allergy Relief) 50 mcg/actuation nasal spray 2 Sprays by Both Nostrils route daily. 1 Bottle 5    cyclobenzaprine (FLEXERIL) 10 mg tablet Take 1 Tab by mouth nightly. 30 Tab 0    cetirizine (ZyrTEC) 10 mg tablet Take 1 Tab by mouth daily. 20 Tab 0    sildenafil citrate (Viagra) 100 mg tablet Take 1 Tab by mouth as needed for Erectile Dysfunction. 6 Tab 5    albuterol (PROVENTIL VENTOLIN) 2.5 mg /3 mL (0.083 %) nebu 3 mL by Nebulization route every four (4) hours as needed for Wheezing. 90 Nebule 0    guaiFENesin-dextromethorphan (TUSSI-ORGANIDIN DM)  mg/5 mL liqd Take 10 mL by mouth every four (4) hours as needed for Cough or Congestion. 236 mL 0    Linzess 290 mcg cap capsule Take 1 Cap by mouth daily. 90 Cap 3    montelukast (Singulair) 10 mg tablet Take 1 Tab by mouth daily. 90 Tab 0    pantoprazole (PROTONIX) 40 mg tablet Take 1 Tab by mouth daily. 90 Tab 3    polyethylene glycol (MIRALAX) 17 gram packet Take 1 Packet by mouth daily. 1 Each 5    Nebulizer Accessories kit Nebulizer cup w/ tubing; use every 4 hrs prn wheezing 1 Kit 5    [DISCONTINUED] lisinopriL (PRINIVIL, ZESTRIL) 20 mg tablet Take 1 Tab by mouth daily. 90 Tab 0     No current facility-administered medications on file prior to visit. ROS: Complete review of systems was performed and is otherwise unremarkable except as noted elsewhere.      OBJECTIVE  Visit Vitals  BP (!) 147/94 (BP 1 Location: Left arm, BP Patient Position: Sitting) Pulse (!) 101   Resp 18   Ht 6' 3\" (1.905 m)   Wt 333 lb 9.6 oz (151.3 kg)   SpO2 97%   BMI 41.70 kg/m²     Gen: Pleasant 64 y.o. morbidly obese male in NAD.   HEENT: PERRLA. EOMI. OP moist and pink.  Neck: Supple.  No LAD.  HEART: RRR, No M/G/R.   LUNGS: CTAB No W/R.   ABDOMEN: S, NT, ND, BS+.   EXTREMITIES: Warm. No C/C/E.  L knee swelling and pain noted. ASSESSMENT / PLAN  1. Hypotension (6/23/2021) in setting of accidental overdose on BP meds: s/p hospitalization, IVF; Improved. He is now back on BP meds, lisinopril and amlodipine, since 6/27. (He is not back on clonidine and never started atenolol). 2. Hypertension:   3. ALEJA (acute kidney injury) ([facility] [address] (6/23/2021): Likely prerenal. IVF. Cr has markedly improved, from 6.69 to 1.89. Normal renal function on 6/27. 4. Cerbrovascular disease: Old L internal capsule stroke seen on CT. Continue aspirin. We'll add statin--atorvastatin 20 mg.   5. Chronic back pain: He is on methadone from clinic at [facility]. No recent street drugs. 6. R shoulder pain: Not discussed. Awaiting orthopedist.   7. Asthma / chronic bronchitis: Currently controlled. 8. Hx of heart failure, compensated. 9. Morbid obesity, class III, BMI 40-49: Considering trying water aerobics, and I support this. Hospital discharge follow-up  -     SD DISCHARGE MEDS RECONCILED W/ CURRENT OUTPATIENT MED LIST    Follow-up and Dispositions    · Return in about 1 month (around 8/2/2021) for HTN.

## 2021-07-06 ENCOUNTER — PATIENT OUTREACH (OUTPATIENT)
Dept: CASE MANAGEMENT | Age: 56
End: 2021-07-06

## 2021-07-06 NOTE — PROGRESS NOTES
07/06/21  Unable to reach patient for [facility] call. VM left. Patient did attend PCP appt. Episode resolved at this time.  AR

## 2021-07-27 DIAGNOSIS — J45.51 SEVERE PERSISTENT ASTHMA WITH ACUTE EXACERBATION: ICD-10-CM

## 2021-07-27 DIAGNOSIS — J30.9 ALLERGIC RHINITIS, UNSPECIFIED SEASONALITY, UNSPECIFIED TRIGGER: ICD-10-CM

## 2021-07-27 RX ORDER — MONTELUKAST SODIUM 10 MG/1
10 TABLET ORAL DAILY
Qty: 90 TABLET | Refills: 0 | Status: SHIPPED | OUTPATIENT
Start: 2021-07-27 | End: 2021-11-15

## 2021-07-27 NOTE — TELEPHONE ENCOUNTER
----- Message from Gaby Segura sent at 7/27/2021 12:03 PM EDT -----  Regarding: Dr Ta Sanches  Medication Refill    Caller (if not patient):pt      Relationship of caller (if not patient):pt      Best contact number(s):[phone]      Name of medication and dosage if known: Clonidine 1 mg & Singulair      Is patient out of this medication (yes/no): yes      Pharmacy name:[facility]    Pharmacy listed in chart? (yes/no):yes  Pharmacy phone KRHOUP:[phone]      Details to clarify the request:Pt is requesting a refill for Clonidine 1 mg & Singulair to be called to the pharmacy on file. Pt advised when he was previously instructed not to take the Clonidine, he threw them away.        Message from [facility]

## 2021-07-27 NOTE — TELEPHONE ENCOUNTER
Future Appointments:  Future Appointments   Date Time Provider Zenobia Burti   8/18/2021  9:15 AM Aminata Sommers MD [facility] BS AMB        Last Appointment With Me:  7/2/2021     Requested Prescriptions     Pending Prescriptions Disp Refills    montelukast (Singulair) 10 mg tablet 90 Tablet 0     Sig: Take 1 Tablet by mouth daily.

## 2021-08-19 ENCOUNTER — TELEPHONE (OUTPATIENT)
Dept: INTERNAL MEDICINE CLINIC | Age: 56
End: 2021-08-19

## 2021-08-19 NOTE — TELEPHONE ENCOUNTER
Attempted to contact Kelsi Rosario at [facility]. No answer. Forms have been received and needs to be reviewed by Dr. Paulino Taylor.

## 2021-08-19 NOTE — TELEPHONE ENCOUNTER
----- Message from Chrissie Alexander sent at 8/19/2021 10:21 AM EDT -----  Regarding: Dr. Bran Army: [phone]  General Message/Vendor Calls    Caller's first and last name: Ekta Umaña. Reason for call: Faxed over 8 page document, needs to confirm if received. Callback required yes/no and why: Yes, confirm fax. Best contact number(s): [phone]      Details to clarify the request: Reference number: [id number].     Message from [facility]

## 2021-08-20 ENCOUNTER — TELEPHONE (OUTPATIENT)
Dept: INTERNAL MEDICINE CLINIC | Age: 56
End: 2021-08-20

## 2021-08-20 DIAGNOSIS — I10 ESSENTIAL HYPERTENSION: Chronic | ICD-10-CM

## 2021-08-20 RX ORDER — AMLODIPINE BESYLATE 10 MG/1
10 TABLET ORAL DAILY
Qty: 90 TABLET | Refills: 0 | Status: SHIPPED | OUTPATIENT
Start: 2021-08-20 | End: 2021-11-12 | Stop reason: SDUPTHER

## 2021-08-20 RX ORDER — LISINOPRIL 20 MG/1
20 TABLET ORAL DAILY
Qty: 90 TABLET | Refills: 0 | Status: SHIPPED | OUTPATIENT
Start: 2021-08-20 | End: 2021-11-12

## 2021-08-20 NOTE — TELEPHONE ENCOUNTER
Identified patient 2 identifiers verified. Patient aware of Dr. Rubio  response. Patient encouraged to record BP readings, come to office to have BP checked, and schedule routine appointment in November. No further question.

## 2021-08-20 NOTE — TELEPHONE ENCOUNTER
Identified patient 2 identifiers verified. Patient requesting a prescription for Clonidine and Amlodipine.

## 2021-08-20 NOTE — TELEPHONE ENCOUNTER
Refill amlodipine. Hold off clonidine. Let us know how pressures are doing. Let's have him make [place] for BP check in a couple of weeks.    Also will be due for routine follow up in about Nov

## 2021-08-20 NOTE — TELEPHONE ENCOUNTER
----- Message from Benito Beckwith sent at 8/20/2021 11:44 AM EDT -----  Regarding: Dr. Aimee Starks  Level 1/Escalated Issue      Caller's first and last name and relationship (if not the patient):  NA       Best contact number(s): [phone]       What are the symptoms: BP running high       Transfer successful - yes/no (include outcome): No, no answer at back line. Transfer declined - yes/no (include reason): No, no answer at back line. Was caller advised to seek appropriate level of care - yes/no:No.       Details to clarify the request: Patient stating that BP is running high due to being out medication. Advised of turnaround time of messages being 48 business hrs for medication refill. Offered VV for patient to speak about symptoms with physician, pt declined. Please send refill to [facility] by end of business day 08/20/21 if possible. Pt states he is aware that the physician provided 90 day supply of amlodipine on 07/02/21.         Message from [facility] [place]

## 2021-08-23 ENCOUNTER — TELEPHONE (OUTPATIENT)
Dept: INTERNAL MEDICINE CLINIC | Age: 56
End: 2021-08-23

## 2021-08-23 NOTE — TELEPHONE ENCOUNTER
#[phone] [facility] needs to know if you received an eight page lab report on pt from them?     Please call using ref #[id number]

## 2021-08-23 NOTE — TELEPHONE ENCOUNTER
I have received a multi-page form from them, requesting me to order a genomic test.   Did the patient actually want this? I don't usually view this as medically necessary. Perhaps we could refer to cardiology to assess this need.    BJF

## 2021-08-24 ENCOUNTER — TELEPHONE (OUTPATIENT)
Dept: INTERNAL MEDICINE CLINIC | Age: 56
End: 2021-08-24

## 2021-08-24 NOTE — TELEPHONE ENCOUNTER
----- Message from Bret Newby sent at 8/24/2021 12:20 PM EDT -----  Regarding: Dr Dalila Porras  General Message/Vendor Calls    Caller's first and last name: Lisbet Burgos of [employer]       Reason for call:request for medical assessment, lab report and requisition that need doctor's signature        Callback required yes/no and why: yes to verify receipt      Best contact number(s):[phone]      Details to clarify the request: Lisbet Burgos is requesting a call back to verify receipt of faxed request for medical assessment, lab report and requisition that need doctor's signature and advised of ref # [id number].       Bret Newby

## 2021-08-25 NOTE — TELEPHONE ENCOUNTER
Identified patient 2 identifiers verified. Patient reports that it is a swab test that he wants done. The test is done thru the mail.

## 2021-08-26 ENCOUNTER — TELEPHONE (OUTPATIENT)
Dept: INTERNAL MEDICINE CLINIC | Age: 56
End: 2021-08-26

## 2021-08-26 NOTE — TELEPHONE ENCOUNTER
Spoke with [employer] Dr. William Soto needs to sign 3rd page. This is for cardiovascular testing. Patient is requesting this.

## 2021-08-26 NOTE — TELEPHONE ENCOUNTER
----- Message from Roshni Mackay sent at 8/25/2021  5:46 PM EDT -----  Regarding: Dr. Dewayne Brice, to confirm receipt of fax in regards to pts medical assessment and report.       Originally sent Aug. 18th and refaxed Resent Aug. 20th    Joseph Salinas Phone:  [phone]    [facility]

## 2021-09-24 ENCOUNTER — TELEPHONE (OUTPATIENT)
Dept: INTERNAL MEDICINE CLINIC | Age: 56
End: 2021-09-24

## 2021-09-24 NOTE — TELEPHONE ENCOUNTER
----- Message from Albin Crigler sent at 9/24/2021  2:05 PM EDT -----  Regarding: /Telephone  General Message/Vendor Calls    Caller's first and last name:Kristan([email or web address])      Reason for call:pt d/c from [address] [place] [address] required yes/no and why:no      Best contact number(s):[phone] ext [phone]      Details to clarify the request:Kristan stated pt was d/c from [facility] on 09/23/21 with code F33.2 as depressive d/o.       Message from [facility]

## 2021-10-21 ENCOUNTER — TELEPHONE (OUTPATIENT)
Dept: INTERNAL MEDICINE CLINIC | Age: 56
End: 2021-10-21

## 2021-10-21 NOTE — TELEPHONE ENCOUNTER
----- Message from Charu Strong sent at 10/21/2021 11:15 AM EDT -----  Subject: Message to Provider    QUESTIONS  Information for Provider? Roxanna Jossie from [facility] wanted to confirm if 8   page fax was received by office for Dr. Anali Carbajal for Rambo Cifuentes. Roxanna Sethi ask if someone could call him back to confirm @ [phone] and   use [phone]#[id number].   ---------------------------------------------------------------------------  --------------  Michael Hinders INFO  What is the best way for the office to contact you? OK to leave message on   voicemail  Preferred Call Back Phone Number? [phone]  ---------------------------------------------------------------------------  --------------  SCRIPT ANSWERS  Relationship to Patient? Third Party  Representative Name? Itzel Shafer      Message received from the [facility] (RISHABH).

## 2021-10-22 NOTE — TELEPHONE ENCOUNTER
Yes,   This is not a test I order. If the patient is who actually wants it, then let's refer to cardiology.  BJF

## 2021-10-22 NOTE — TELEPHONE ENCOUNTER
Attempted to call patient. Stated phone was no in service. Per Dr. Torres Model, this is not something he does.  Pt would need to a cardiologist.

## 2021-10-25 DIAGNOSIS — K58.1 IRRITABLE BOWEL SYNDROME WITH CONSTIPATION: ICD-10-CM

## 2021-10-25 RX ORDER — LINACLOTIDE 290 UG/1
CAPSULE, GELATIN COATED ORAL
Qty: 90 CAPSULE | Refills: 0 | Status: SHIPPED | OUTPATIENT
Start: 2021-10-25 | End: 2022-07-26

## 2021-11-02 ENCOUNTER — TELEPHONE (OUTPATIENT)
Dept: PHARMACY | Age: 56
End: 2021-11-02

## 2021-11-02 NOTE — TELEPHONE ENCOUNTER
CLINICAL PHARMACY: ADHERENCE REVIEW  Identified care gap per Gabi; fills at [address]: Statin adherence    Last Visit: 7/2/21    Patient also appears to be prescribed: ACEi    Patient not found in Outcomes MTM    ASSESSMENT  ACE/ARB ADHERENCE    Per Insurance Records through 10/24/21 (JUAN Coelho = 100%; Potential Fail Date: passed 2021):   Lisinopril 20mg daily last filled on 9/25/21 for 90 day supply. Next refill due: 1/9/22      BP Readings from Last 3 Encounters:   07/02/21 (!) 147/94   06/27/21 (!) 171/97   06/25/21 137/74     Estimated Creatinine Clearance: 133.7 mL/min (by C-G formula based on SCr of 0.97 mg/dL). STATIN ADHERENCE    Per Insurance Records through 10/24/21 (Charmaine Coelho = filled only once; Potential fail date: 11/5/21): Atorvastatin 20mg daily last filled on 7/2/21 for 90 day supply. Next refill due: 9/30/21    Per chart, new rx 7/2/21 d/t \"Old L internal capsule stroke seen on CT\"; rx for #90, 3 refills    Lab Results   Component Value Date/Time    Cholesterol, total 201 (H) 04/15/2019 10:40 AM    Cholesterol (POC) 154 11/12/2019 03:04 PM    HDL Cholesterol 55 04/15/2019 10:40 AM    HDL Cholesterol (POC) 82 11/12/2019 03:04 PM    LDL Cholesterol (POC) 49 11/12/2019 03:04 PM    LDL, calculated 124 (H) 04/15/2019 10:40 AM    VLDL, calculated 22 04/15/2019 10:40 AM    Triglyceride 108 04/15/2019 10:40 AM    Triglycerides (POC) 116 11/12/2019 03:04 PM     ALT (SGPT)   Date Value Ref Range Status   06/27/2021 70 12 - 78 U/L Final     AST (SGOT)   Date Value Ref Range Status   06/27/2021 57 (H) 15 - 37 U/L Final     Comment:     Sample is hemolyzed (hemoglobin is  likely >50 mg/dL) and thus the  potassium, AST, ammonia, and  phosphorus results may be adversely  affected. If the clinical situation  warrants, recommend recollection of  a new specimen with attention to  preventing hemolysis.        The 10-year ASCVD risk score (Angela Warner, et al., 2013) is: 12.7%*    Values used to calculate the Pt verified name, , current location.  Pt gave verbal consent for telehealth treatment today, Joanne Doran, LCSW witnessed this.  Pt denies any changes or issues with medications.  Pt arrived late to program due to Zoom link issues. This writer had reached out to pt, resent link to pt.  SUSAN CastroN, RN-BC         score:      Age: 64 years      Sex: Male      Is Non- : Yes      Diabetic: No      Tobacco smoker: No      Systolic Blood Pressure: 896 mmHg      Is BP treated: Yes      HDL Cholesterol: 82 mg/dL*      Total Cholesterol: 201 mg/dL      * - Cholesterol units were assumed for this score calculation     PLAN  The following are interventions that have been identified:  - Patient overdue refilling atorvastatin and active on home medication list   · Was new start rx - taking/tolerating? Attempting to reach patient to review. Unable to leave message asking for return call. No future appointments.     Kailyn Whitaker, PharmD, [facility]  Department, toll free: [phone], option 2

## 2021-11-02 NOTE — LETTER
[address] [place] [address]  [address] [address]  
 
 
 
 
11/03/21 Dear Vasiliy Newell, We tried to reach you recently regarding your atorvastatin 20mg daily, but were unable to reach you on the telephone. If you are no longer taking or taking differently, please call us at the number below so that we can discuss this and update your medication profile. It appears that this medication has not been filled at proper times. We are worried you might be missing doses or not taking it as directed. It is important that you take your medications regularly and try not to miss a single dose. Some ways to help you remember to take and refill your medications are to: · Use a pill box, set an alarm, and/or keep your medication near something that you do every day · Ask your pharmacy if they participate in [facility]", a program where you can  all of your medications on the same day · Ask your pharmacy if you can be set up with automatic refill, where they will automatically refill your prescription when it is due and let you know it's ready to  Sincerely, Prasanna Mckoy, PharmD, [address], toll free: [phone], option 2

## 2021-11-03 NOTE — TELEPHONE ENCOUNTER
Attempting again to reach patient; unsuccessful at both #s. Spoke to [employer] - report patient received 90-ds refill of atorvastatin on 10/25, was billed to Gabi.     Will send patient letter and close encounter.    =========================================================   For Pharmacy Admin Tracking Only     Gap Closed?: Yes   Time Spent (min): 10

## 2021-11-08 ENCOUNTER — TELEPHONE (OUTPATIENT)
Dept: INTERNAL MEDICINE CLINIC | Age: 56
End: 2021-11-08

## 2021-11-08 NOTE — TELEPHONE ENCOUNTER
----- Message from Matthewdeepjonathan Harrington sent at 11/8/2021 12:51 PM EST -----  Subject: Appointment Request    Reason for Call: Routine Existing Condition Follow Up    QUESTIONS  Type of Appointment? Established Patient  Reason for appointment request? Available appointments did not meet   patient need  Additional Information for Provider? The patient is calling today with BP   issues and concerns regarding his cough. Patient states that his BP   medication needs to be adjusted. His BP has been fluctuating 150-180, and   he is seeing some blood in his cough. Patient states that he has been   doing the breathing treatments as directed, but they are not helping. Patient is asking for a call back to schedule an appointment.  ---------------------------------------------------------------------------  --------------  Dona Cross INFO  What is the best way for the office to contact you? OK to leave message on   voicemail  Preferred Call Back Phone Number? [phone]  ---------------------------------------------------------------------------  --------------  SCRIPT ANSWERS  Relationship to Patient? Self  (Is the patient requesting to be seen urgently for their symptoms?)? No  Is this follow up request related to routine Diabetes Management? No  Are you having any new concerns about your existing condition? No  Have you been diagnosed with, awaiting test results for, or told that you   are suspected of having COVID-19 (Coronavirus)? (If patient has tested   negative or was tested as a requirement for work, school, or travel and   not based on symptoms, answer no)? No  Within the past two weeks have you developed any of the following symptoms   (answer no if symptoms have been present longer than 2 weeks or began   more than 2 weeks ago)?  Fever or Chills, Cough, Shortness of breath or   difficulty breathing, Loss of taste or smell, Sore throat, Nasal   congestion, Sneezing or runny nose, Fatigue or generalized body aches (answer no if pain is specific to a body part e.g. back pain), Diarrhea,   Headache? No  Have you had close contact with someone with COVID-19 in the last 14 days? No  (Service Expert  click yes below to proceed with [employer] As Usual   Scheduling)?  Yes

## 2021-11-11 ENCOUNTER — NURSE TRIAGE (OUTPATIENT)
Dept: OTHER | Facility: CLINIC | Age: 56
End: 2021-11-11

## 2021-11-11 NOTE — TELEPHONE ENCOUNTER
Received call from Yany Duffy at [facility] with Red Flag Complaint high blood pressure. Pt disconnected while waiting for nurse triage. Phone number verified by Yany Duffy. Called pt back. Non-working number. Unable to leave message for pt. Attention Provider: Thank you for allowing me to participate in the care of your patient. The patient was connected to triage in response to information provided to the ECC. Please do not respond through this encounter as the response is not directed to a shared pool.       Reason for Disposition   Caller has cancelled the call before the first contact    Protocols used: NO CONTACT OR DUPLICATE CONTACT CALL-ADULT-[place]

## 2021-11-12 ENCOUNTER — VIRTUAL VISIT (OUTPATIENT)
Dept: INTERNAL MEDICINE CLINIC | Age: 56
End: 2021-11-12
Payer: MEDICARE

## 2021-11-12 DIAGNOSIS — G89.29 CHRONIC LEFT-SIDED LOW BACK PAIN WITHOUT SCIATICA: ICD-10-CM

## 2021-11-12 DIAGNOSIS — J45.51 SEVERE PERSISTENT ASTHMA WITH ACUTE EXACERBATION: ICD-10-CM

## 2021-11-12 DIAGNOSIS — M54.50 CHRONIC LEFT-SIDED LOW BACK PAIN WITHOUT SCIATICA: ICD-10-CM

## 2021-11-12 DIAGNOSIS — K21.9 GASTROESOPHAGEAL REFLUX DISEASE, UNSPECIFIED WHETHER ESOPHAGITIS PRESENT: ICD-10-CM

## 2021-11-12 DIAGNOSIS — I10 ESSENTIAL HYPERTENSION: Primary | Chronic | ICD-10-CM

## 2021-11-12 DIAGNOSIS — Z00.00 MEDICARE ANNUAL WELLNESS VISIT, SUBSEQUENT: ICD-10-CM

## 2021-11-12 PROCEDURE — G0439 PPPS, SUBSEQ VISIT: HCPCS | Performed by: INTERNAL MEDICINE

## 2021-11-12 PROCEDURE — G8427 DOCREV CUR MEDS BY ELIG CLIN: HCPCS | Performed by: INTERNAL MEDICINE

## 2021-11-12 PROCEDURE — 3017F COLORECTAL CA SCREEN DOC REV: CPT | Performed by: INTERNAL MEDICINE

## 2021-11-12 PROCEDURE — G9717 DOC PT DX DEP/BP F/U NT REQ: HCPCS | Performed by: INTERNAL MEDICINE

## 2021-11-12 PROCEDURE — G8756 NO BP MEASURE DOC: HCPCS | Performed by: INTERNAL MEDICINE

## 2021-11-12 PROCEDURE — G8417 CALC BMI ABV UP PARAM F/U: HCPCS | Performed by: INTERNAL MEDICINE

## 2021-11-12 RX ORDER — INDOMETHACIN 50 MG/1
50 CAPSULE ORAL
Qty: 90 CAPSULE | Refills: 2 | Status: SHIPPED | OUTPATIENT
Start: 2021-11-12

## 2021-11-12 RX ORDER — NEBULIZER AND COMPRESSOR
EACH MISCELLANEOUS
Qty: 1 EACH | Refills: 0 | Status: SHIPPED | OUTPATIENT
Start: 2021-11-12

## 2021-11-12 RX ORDER — LISINOPRIL AND HYDROCHLOROTHIAZIDE 20; 25 MG/1; MG/1
1 TABLET ORAL DAILY
Qty: 90 TABLET | Refills: 3 | Status: SHIPPED | OUTPATIENT
Start: 2021-11-12 | End: 2022-01-27 | Stop reason: SDUPTHER

## 2021-11-12 RX ORDER — AMLODIPINE BESYLATE 10 MG/1
10 TABLET ORAL DAILY
Qty: 90 TABLET | Refills: 3 | Status: SHIPPED | OUTPATIENT
Start: 2021-11-12 | End: 2022-01-27 | Stop reason: SDUPTHER

## 2021-11-12 RX ORDER — ALBUTEROL SULFATE 90 UG/1
1 AEROSOL, METERED RESPIRATORY (INHALATION)
Qty: 1 EACH | Refills: 5 | Status: SHIPPED | OUTPATIENT
Start: 2021-11-12 | End: 2022-09-23

## 2021-11-12 RX ORDER — ALBUTEROL SULFATE 0.83 MG/ML
2.5 SOLUTION RESPIRATORY (INHALATION)
Qty: 90 NEBULE | Refills: 0 | Status: SHIPPED | OUTPATIENT
Start: 2021-11-12 | End: 2022-09-23

## 2021-11-12 NOTE — PROGRESS NOTES
Vasiliy Newell is a 64 y.o. male who was seen by synchronous (real-time) audio-video technology on 11/12/2021 for Cough (coughing up blood and flem), Blood Pressure Check, Medication Evaluation (For BP), Wheezing, and Asthma        Progress Note           SUBJECTIVE:   Mr. Vasiliy Newell is a 64 y.o. male who is here for follow up of routine medical issues. He previously saw Dr. Karen Vick. Chief Complaint   Patient presents with    Cough     coughing up blood and flem    Blood Pressure Check    Medication Evaluation     For BP    Wheezing    Asthma       \"My blood pressure is staying high the last few days; the bottom number has been over 100. \"    One of his concerns is weight gain: Weighs 340. \"The steroids put all this weight on me. \" He was 286 lb in 2019. Over the past 6 months:   Wt Readings from Last 10 Encounters:   07/02/21 333 lb 9.6 oz (151.3 kg)   06/23/21 330 lb (149.7 kg)   06/11/21 329 lb (149.2 kg)   06/07/21 329 lb (149.2 kg)   12/01/20 339 lb 11.7 oz (154.1 kg)   11/01/20 339 lb 11.7 oz (154.1 kg)   10/12/20 335 lb 3.2 oz (152 kg)   10/03/20 332 lb 7.3 oz (150.8 kg)   09/22/20 330 lb 0.5 oz (149.7 kg)   08/10/20 324 lb 11.8 oz (147.3 kg)     Asthma has been flaring a lot. Uses albuterol 3-4 times a day. He is on inhalers below. Back pain: Ongoing, severe. Not interested in surgery. He has had pain management in the past.   He is in methadone clinic, due to opioid dependence. Knee pain:   At this time, he is otherwise doing well and has brought no other complaints to my attention today. For a list of the medical issues addressed today, see the assessment and plan below. PMH:   Past Medical History:   Diagnosis Date    Arthritis     Asthma     Chronic low back pain     Hypertension        Past Surgical History:   Procedure Laterality Date    HX ORTHOPAEDIC         All: He is allergic to vicodin [hydrocodone-acetaminophen].    Current Outpatient Medications   Medication Sig    Linzess 290 mcg cap capsule TAKE ONE CAPSULE BY MOUTH EVERY DAY    amLODIPine (NORVASC) 10 mg tablet Take 1 Tablet by mouth daily. Indications: high blood pressure    lisinopriL (PRINIVIL, ZESTRIL) 20 mg tablet Take 1 Tablet by mouth daily.  montelukast (Singulair) 10 mg tablet Take 1 Tablet by mouth daily.  atorvastatin (LIPITOR) 20 mg tablet Take 1 Tablet by mouth daily.  tadalafiL (Cialis) 5 mg tablet Take 1 Tablet by mouth daily.  pregabalin (LYRICA) 100 mg capsule Take 1 Capsule by mouth two (2) times a day. Max Daily Amount: 200 mg.    albuterol (PROVENTIL HFA, VENTOLIN HFA, PROAIR HFA) 90 mcg/actuation inhaler Take 1 Puff by inhalation every four (4) hours as needed for Wheezing.  aspirin delayed-release 81 mg tablet Take 1 Tab by mouth daily.  fluticasone propionate (Flovent HFA) 220 mcg/actuation inhaler INHALE 1 OR 2 PUFFS 2 TIMES A DAY.  fluticasone propionate (Flonase Allergy Relief) 50 mcg/actuation nasal spray 2 Sprays by Both Nostrils route daily.  cyclobenzaprine (FLEXERIL) 10 mg tablet Take 1 Tab by mouth nightly.  cetirizine (ZyrTEC) 10 mg tablet Take 1 Tab by mouth daily.  sildenafil citrate (Viagra) 100 mg tablet Take 1 Tab by mouth as needed for Erectile Dysfunction.  albuterol (PROVENTIL VENTOLIN) 2.5 mg /3 mL (0.083 %) nebu 3 mL by Nebulization route every four (4) hours as needed for Wheezing.  guaiFENesin-dextromethorphan (TUSSI-ORGANIDIN DM)  mg/5 mL liqd Take 10 mL by mouth every four (4) hours as needed for Cough or Congestion.  pantoprazole (PROTONIX) 40 mg tablet Take 1 Tab by mouth daily.  polyethylene glycol (MIRALAX) 17 gram packet Take 1 Packet by mouth daily.  Nebulizer Accessories kit Nebulizer cup w/ tubing; use every 4 hrs prn wheezing     No current facility-administered medications for this visit. FH: His family history includes Cancer in his mother; Heart Disease in his father.    SH: He  reports that he has quit smoking. He has a 2.50 pack-year smoking history. He has never used smokeless tobacco. He reports current alcohol use. He reports that he does not use drugs. ROS: See above; Complete ROS otherwise negative. OBJECTIVE:   Vitals: There were no vitals taken for this visit. Gen: Pleasant, obese 64 y.o.  male in NAD. Lab Results   Component Value Date/Time    Sodium 142 06/27/2021 07:28 AM    Potassium 4.6 06/27/2021 07:28 AM    Chloride 109 (H) 06/27/2021 07:28 AM    CO2 27 06/27/2021 07:28 AM    Anion gap 6 06/27/2021 07:28 AM    Glucose 101 (H) 06/27/2021 07:28 AM    BUN 10 06/27/2021 07:28 AM    Creatinine 0.97 06/27/2021 07:28 AM    BUN/Creatinine ratio 10 (L) 06/27/2021 07:28 AM    GFR est AA >60 06/27/2021 07:28 AM    GFR est non-AA >60 06/27/2021 07:28 AM    Calcium 9.7 06/27/2021 07:28 AM    Bilirubin, total 0.4 06/27/2021 07:28 AM    ALT (SGPT) 70 06/27/2021 07:28 AM    Alk. phosphatase 81 06/27/2021 07:28 AM    Protein, total 7.4 06/27/2021 07:28 AM    Albumin 3.7 06/27/2021 07:28 AM    Globulin 3.7 06/27/2021 07:28 AM    A-G Ratio 1.0 (L) 06/27/2021 07:28 AM       Lab Results   Component Value Date/Time    Cholesterol, total 201 (H) 04/15/2019 10:40 AM    HDL Cholesterol 55 04/15/2019 10:40 AM    LDL, calculated 124 (H) 04/15/2019 10:40 AM    Triglyceride 108 04/15/2019 10:40 AM        Lab Results   Component Value Date/Time    Hemoglobin A1c 5.8 (H) 04/15/2019 10:40 AM       Lab Results   Component Value Date/Time    WBC 5.5 06/27/2021 07:28 AM    HGB 13.8 06/27/2021 07:28 AM    HCT 41.5 06/27/2021 07:28 AM    PLATELET 362 39/77/7420 07:28 AM    MCV 87.2 06/27/2021 07:28 AM         ASSESSMENT/ PLAN:     Essential hypertension: High. I'll add HCTZ. Work on diet, etc.     Asthma: Mod persistent. Refer back to Pulmonology.    -     albuterol (PROVENTIL VENTOLIN) 2.5 mg /3 mL (0.083 %) nebu; 3 mL by Nebulization route every four (4) hours as needed for Wheezing.  -     fluticasone propionate (Flovent HFA) 220 mcg/actuation inhaler; INHALE 1 OR 2 PUFFS 2 TIMES A DAY. -     montelukast (Singulair) 10 mg tablet; Take 1 Tab by mouth daily. Chronic low back pain with probable sciatica: He complains of tingling in R foot. He wants the back brace. -     cyclobenzaprine (FLEXERIL) 10 mg tablet; Take 1 Tab by mouth nightly. -     diclofenac EC (VOLTAREN) 50 mg EC tablet; Take 1 Tab by mouth two (2) times a day. -     REFERRAL TO ORTHOPEDICS    Hyperglycemia: We'll check A1C. Allergic rhinitis, unspecified seasonality, unspecified trigger  -     cetirizine (ZyrTEC) 10 mg tablet; Take 1 Tab by mouth daily. -     fluticasone propionate (Flonase Allergy Relief) 50 mcg/actuation nasal spray; 2 Sprays by Both Nostrils route daily. -     montelukast (Singulair) 10 mg tablet; Take 1 Tab by mouth daily. Erectile dysfunction, unspecified erectile dysfunction type  -     sildenafil citrate (Viagra) 100 mg tablet; Take 1 Tab by mouth as needed for Erectile Dysfunction. Anxiety: Stable. Chronic bronchitis, unspecified chronic bronchitis type (HCC)  -     Ventolin HFA 90 mcg/actuation inhaler; Take 2 Puffs by inhalation every four (4) hours as needed for Wheezing or Shortness of Breath. Indications: bronchospasm prevention    Irritable bowel syndrome with constipation: Not discussed today. -     Linzess 290 mcg cap capsule; Take 1 Cap by mouth daily. Gastroesophageal reflux disease without esophagitis  -     pantoprazole (PROTONIX) 40 mg tablet; Take 1 Tab by mouth daily. Edema, unspecified type:   -     HCTZ 25 daily     Chronic pain of left knee:     Gout: Requests rx for indomethacin which he has taken before. We'll check his labs, including kidney function. If it's abnormal, then would ask him to avoid NSAIDs. RTC 6 months. I have reviewed the patient's medications and risks/side effects/benefits were discussed. Diagnosis(-es) explained to patient and questions answered.  Literature provided where appropriate. Objective:     Patient-Reported Vitals 11/12/2021   Patient-Reported Weight 335lb   Patient-Reported Height -   Patient-Reported Pulse -   Patient-Reported Systolic  035   Patient-Reported Diastolic 96        [INSTRUCTIONS:  \"[x]\" Indicates a positive item  \"[]\" Indicates a negative item  -- DELETE ALL ITEMS NOT EXAMINED]    Constitutional: [x] Appears well-developed and well-nourished [x] No apparent distress      [] Abnormal -     Mental status: [x] Alert and awake  [x] Oriented to person/place/time [x] Able to follow commands    [] Abnormal -     Eyes:   EOM    [x]  Normal    [] Abnormal -   Sclera  [x]  Normal    [] Abnormal -          Discharge [x]  None visible   [] Abnormal -     HENT: [x] Normocephalic, atraumatic  [] Abnormal -   [x] Mouth/Throat: Mucous membranes are moist    External Ears [x] Normal  [] Abnormal -    Neck: [x] No visualized mass [] Abnormal -     Pulmonary/Chest: [x] Respiratory effort normal   [x] No visualized signs of difficulty breathing or respiratory distress        [] Abnormal -      Musculoskeletal:   [x] Normal gait with no signs of ataxia         [x] Normal range of motion of neck        [] Abnormal -     Neurological:        [x] No Facial Asymmetry (Cranial nerve 7 motor function) (limited exam due to video visit)          [x] No gaze palsy        [] Abnormal -          Skin:        [x] No significant exanthematous lesions or discoloration noted on facial skin         [] Abnormal -            Psychiatric:       [x] Normal Affect [] Abnormal -       Other pertinent observable physical exam findings:-        We discussed the expected course, resolution and complications of the diagnosis(es) in detail. Medication risks, benefits, costs, interactions, and alternatives were discussed as indicated. I advised him to contact the office if his condition worsens, changes or fails to improve as anticipated.  He expressed understanding with the diagnosis(es) and plan. Chandrika Meza, who was evaluated through a patient-initiated, synchronous (real-time) audio-video encounter, and/or his healthcare decision maker, is aware that it is a billable service, with coverage as determined by his insurance carrier. He provided verbal consent to proceed: YES, and patient identification was verified. It was conducted pursuant to the emergency declaration under the [address] [place] [address] and the [employer] and [employer] Act. A caregiver was present when appropriate. Ability to conduct physical exam was limited. I was in office. The patient was at home or otherwise outside the office.       Uche Jett MD

## 2021-11-12 NOTE — PROGRESS NOTES
Snow Linder is a 64 y.o. male  Chief Complaint   Patient presents with    Cough     coughing up blood and flem    Blood Pressure Check    Medication Evaluation     For BP    Wheezing    Asthma     Health Maintenance Due   Topic Date Due    Pneumococcal 0-64 years (1 of 2 - PPSV23) Never done    COVID-19 Vaccine (1) Never done    DTaP/Tdap/Td series (1 - Tdap) Never done    Colorectal Cancer Screening Combo  Never done    Shingrix Vaccine Age 50> (1 of 2) Never done    Medicare Yearly Exam  Never done    Lipid Screen  11/12/2020    Flu Vaccine (1) Never done     There were no vitals taken for this visit.     Patient-Reported Vitals 11/12/2021   Patient-Reported Weight 335lb   Patient-Reported Height -   Patient-Reported Pulse -   Patient-Reported Systolic  173   Patient-Reported Diastolic 96       Patient Phone Number/Email:  [phone]

## 2021-11-12 NOTE — PATIENT INSTRUCTIONS
Medicare Wellness Visit, Male    The best way to live healthy is to have a lifestyle where you eat a well-balanced diet, exercise regularly, limit alcohol use, and quit all forms of tobacco/nicotine, if applicable. Regular preventive services are another way to keep healthy. Preventive services (vaccines, screening tests, monitoring & exams) can help personalize your care plan, which helps you manage your own care. Screening tests can find health problems at the earliest stages, when they are easiest to treat. [facility] follows the current, evidence-based guidelines published by the [facility] ([facility]) when recommending preventive services for our patients. Because we follow these guidelines, sometimes recommendations change over time as research supports it. (For example, a prostate screening blood test is no longer routinely recommended for men with no symptoms). Of course, you and your doctor may decide to screen more often for some diseases, based on your risk and co-morbidities (chronic disease you are already diagnosed with). Preventive services for you include:  - Medicare offers their members a free annual wellness visit, which is time for you and your primary care provider to discuss and plan for your preventive service needs. Take advantage of this benefit every year!  -All adults over age 72 should receive the recommended pneumonia vaccines. Current USPSTF guidelines recommend a series of two vaccines for the best pneumonia protection.   -All adults should have a flu vaccine yearly and tetanus vaccine every 10 years.  -All adults age 48 and older should receive the shingles vaccines (series of two vaccines).        -All adults age 38-68 who are overweight should have a diabetes screening test once every three years.   -Other screening tests & preventive services for persons with diabetes include: an eye exam to screen for diabetic retinopathy, a kidney function test, a foot exam, and stricter control over your cholesterol.   -Cardiovascular screening for adults with routine risk involves an electrocardiogram (ECG) at intervals determined by the provider.   -Colorectal cancer screening should be done for adults age 54-65 with no increased risk factors for colorectal cancer. There are a number of acceptable methods of screening for this type of cancer. Each test has its own benefits and drawbacks. Discuss with your provider what is most appropriate for you during your annual wellness visit. The different tests include: colonoscopy (considered the best screening method), a fecal occult blood test, a fecal DNA test, and sigmoidoscopy.  -All adults born between [facility] should be screened once for Hepatitis C.  -An Abdominal Aortic Aneurysm (AAA) Screening is recommended for men age 73-68 who has ever smoked in their lifetime.      Here is a list of your current Health Maintenance items (your personalized list of preventive services) with a due date:  Health Maintenance Due   Topic Date Due    Pneumococcal Vaccine (1 of 2 - PPSV23) Never done    DTaP/Tdap/Td  (1 - Tdap) Never done    Colorectal Screening  Never done    Shingles Vaccine (1 of 2) Never done    Cholesterol Test   11/12/2020

## 2021-11-12 NOTE — PROGRESS NOTES
This is the Subsequent Medicare Annual Wellness Exam, performed 12 months or more after the Initial AWV or the last Subsequent AWV    I have reviewed the patient's medical history in detail and updated the computerized patient record. Assessment/Plan   Education and counseling provided:  Are appropriate based on today's review and evaluation    1. Essential hypertension  -     amLODIPine (NORVASC) 10 mg tablet; Take 1 Tablet by mouth daily. Indications: high blood pressure, Normal, Disp-90 Tablet, R-3  2. Severe persistent asthma with acute exacerbation  -     albuterol (PROVENTIL HFA, VENTOLIN HFA, PROAIR HFA) 90 mcg/actuation inhaler; Take 1 Puff by inhalation every four (4) hours as needed for Wheezing., Normal, Disp-1 Each, R-5  -     REFERRAL TO PULMONARY DISEASE  3. Chronic left-sided low back pain without sciatica  4. Gastroesophageal reflux disease, unspecified whether esophagitis present       Depression Risk Factor Screening     3 most recent PHQ Screens 2/11/2020   Little interest or pleasure in doing things Not at all   Feeling down, depressed, irritable, or hopeless Not at all   Total Score PHQ 2 0   Trouble falling or staying asleep, or sleeping too much -   Feeling tired or having little energy -   Poor appetite, weight loss, or overeating -   Feeling bad about yourself - or that you are a failure or have let yourself or your family down -   Trouble concentrating on things such as school, work, reading, or watching TV -   Moving or speaking so slowly that other people could have noticed; or the opposite being so fidgety that others notice -   Thoughts of being better off dead, or hurting yourself in some way -   PHQ 9 Score -       Alcohol Risk Screen     Drinks 2 beers per day    Functional Ability and Level of Safety    Hearing: Hearing is good. Activities of Daily Living: The home contains: no safety equipment.   Patient does total self care      Ambulation: with difficulty, uses a cane     Fall Risk:  No flowsheet data found. Abuse Screen:  Patient is not abused       Cognitive Screening    Has your family/caregiver stated any concerns about your memory: no     Cognitive Screening: Normal    Health Maintenance Due     Health Maintenance Due   Topic Date Due    Pneumococcal 0-64 years (1 of 2 - PPSV23) Never done    DTaP/Tdap/Td series (1 - Tdap) Never done    Colorectal Cancer Screening Combo  Never done    Shingrix Vaccine Age 50> (1 of 2) Never done    Medicare Yearly Exam  Never done    Lipid Screen  11/12/2020       Patient Care Team   Patient Care Team:  Ting Haney MD as PCP - General (Internal Medicine)  Ting Haney [facility] Empaneled Provider    History     Patient Active Problem List   Diagnosis Code    Tobacco abuse Z72.0    Essential hypertension I10    GERD (gastroesophageal reflux disease) K21.9    Chronic left-sided low back pain without sciatica M54.50, G89.29    Acute respiratory failure with hypoxia ([address] J96.01    Anxiety F41.9    Severe obesity ([address] E66.01    Depression F32. A    Insomnia G47.00    Alcohol abuse, daily use F10.10    Arthralgia M25.50    Edema of both legs R60.0    Acute pain of both shoulders M25.511, M25.512    Chronic constipation K59.09    Allergic rhinitis J30.9    Erectile dysfunction N52.9    Severe persistent asthma with acute exacerbation J45.51    Abnormal weight gain R63.5    Sprain of right ankle L14.581V    Umbilical hernia without obstruction and without gangrene K42.9    Hypotension I95.9    ALEJA (acute kidney injury) ([address] N17.9     Past Medical History:   Diagnosis Date    Arthritis     Asthma     Chronic low back pain     Hypertension       Past Surgical History:   Procedure Laterality Date    HX ORTHOPAEDIC       Current Outpatient Medications   Medication Sig Dispense Refill    lisinopril-hydroCHLOROthiazide (PRINZIDE, ZESTORETIC) 20-25 mg per tablet Take 1 Tablet by mouth daily.  80 Tablet 3    amLODIPine (NORVASC) 10 mg tablet Take 1 Tablet by mouth daily. Indications: high blood pressure 90 Tablet 3    albuterol (PROVENTIL HFA, VENTOLIN HFA, PROAIR HFA) 90 mcg/actuation inhaler Take 1 Puff by inhalation every four (4) hours as needed for Wheezing. 1 Each 5    Linzess 290 mcg cap capsule TAKE ONE CAPSULE BY MOUTH EVERY DAY 90 Capsule 0    montelukast (Singulair) 10 mg tablet Take 1 Tablet by mouth daily. 90 Tablet 0    atorvastatin (LIPITOR) 20 mg tablet Take 1 Tablet by mouth daily. 90 Tablet 3    tadalafiL (Cialis) 5 mg tablet Take 1 Tablet by mouth daily. 30 Tablet 5    pregabalin (LYRICA) 100 mg capsule Take 1 Capsule by mouth two (2) times a day. Max Daily Amount: 200 mg. 60 Capsule 5    aspirin delayed-release 81 mg tablet Take 1 Tab by mouth daily. 100 Tab 3    fluticasone propionate (Flovent HFA) 220 mcg/actuation inhaler INHALE 1 OR 2 PUFFS 2 TIMES A DAY. 3 Inhaler 0    fluticasone propionate (Flonase Allergy Relief) 50 mcg/actuation nasal spray 2 Sprays by Both Nostrils route daily. 1 Bottle 5    cyclobenzaprine (FLEXERIL) 10 mg tablet Take 1 Tab by mouth nightly. 30 Tab 0    cetirizine (ZyrTEC) 10 mg tablet Take 1 Tab by mouth daily. 20 Tab 0    sildenafil citrate (Viagra) 100 mg tablet Take 1 Tab by mouth as needed for Erectile Dysfunction. 6 Tab 5    albuterol (PROVENTIL VENTOLIN) 2.5 mg /3 mL (0.083 %) nebu 3 mL by Nebulization route every four (4) hours as needed for Wheezing. 90 Nebule 0    guaiFENesin-dextromethorphan (TUSSI-ORGANIDIN DM)  mg/5 mL liqd Take 10 mL by mouth every four (4) hours as needed for Cough or Congestion. 236 mL 0    pantoprazole (PROTONIX) 40 mg tablet Take 1 Tab by mouth daily. 90 Tab 3    polyethylene glycol (MIRALAX) 17 gram packet Take 1 Packet by mouth daily.  1 Each 5    Nebulizer Accessories kit Nebulizer cup w/ tubing; use every 4 hrs prn wheezing 1 Kit 5     Allergies   Allergen Reactions    Vicodin [Hydrocodone-Acetaminophen] Other (comments)     Headache       Family History   Problem Relation Age of Onset    Cancer Mother     Heart Disease Father      Social History     Tobacco Use    Smoking status: Former Smoker     Packs/day: 0.25     Years: 10.00     Pack years: 2.50    Smokeless tobacco: Never Used   Substance Use Topics    Alcohol use: Yes     Comment: Fritzi Sacks, MD

## 2021-11-13 DIAGNOSIS — J45.51 SEVERE PERSISTENT ASTHMA WITH ACUTE EXACERBATION: ICD-10-CM

## 2021-11-13 DIAGNOSIS — J30.9 ALLERGIC RHINITIS, UNSPECIFIED SEASONALITY, UNSPECIFIED TRIGGER: ICD-10-CM

## 2021-11-15 RX ORDER — MONTELUKAST SODIUM 10 MG/1
TABLET ORAL
Qty: 90 TABLET | Refills: 0 | Status: SHIPPED | OUTPATIENT
Start: 2021-11-15 | End: 2022-02-24

## 2021-11-18 ENCOUNTER — TELEPHONE (OUTPATIENT)
Dept: INTERNAL MEDICINE CLINIC | Age: 56
End: 2021-11-18

## 2021-11-18 NOTE — TELEPHONE ENCOUNTER
FAxed last note over to [facility] from [address] called   stating that they need the clinical notes for the mobility back braces   ordered for patient, Fax number is [phone] and if there are any questions please call [phone]

## 2021-11-22 NOTE — TELEPHONE ENCOUNTER
[facility] that fax was not received,  States the number was incorrect.     States please fax office note again

## 2021-11-26 ENCOUNTER — DOCUMENTATION ONLY (OUTPATIENT)
Dept: INTERNAL MEDICINE CLINIC | Age: 56
End: 2021-11-26

## 2021-12-01 ENCOUNTER — TELEPHONE (OUTPATIENT)
Dept: INTERNAL MEDICINE CLINIC | Age: 56
End: 2021-12-01

## 2021-12-01 NOTE — TELEPHONE ENCOUNTER
Zack Elizabeth with [facility]  [phone]      States that they received the signed authorization from 8/27 (see scanned media)  however they now need the last chart note sent and the attestation to be signed. States the request for the chart note and attestation have been faxed. States will re-fax to dr Walsh nurse station fax machine. Please be aware and return as soon as able.

## 2021-12-02 ENCOUNTER — TELEPHONE (OUTPATIENT)
Dept: INTERNAL MEDICINE CLINIC | Age: 56
End: 2021-12-02

## 2021-12-02 NOTE — TELEPHONE ENCOUNTER
----- Message from Mine Castro sent at 12/1/2021  4:54 PM EST -----  Subject: Message to Provider    QUESTIONS  Information for Provider? Betsy Gonzalez from [facility] called to get a   chart note and testation signature for Cardio test on paperwork that was   sent on 11/29, 11/30, and today 12/1. Please respond to them as soon as   possible. Please fax to [phone].  ---------------------------------------------------------------------------  --------------  Jovanni HILL  What is the best way for the office to contact you? Do not leave any   message, patient will call back for answer  Preferred Call Back Phone Number? [phone]  ---------------------------------------------------------------------------  --------------  SCRIPT ANSWERS  Relationship to Patient? Third Party  Representative Name?  Betsy Gonzalez from [facility]

## 2021-12-02 NOTE — TELEPHONE ENCOUNTER
[employer] was called and spoke with Vipul Pereira  And asked for an alternate fax number Because the one that they gave us ([phone]) is not going through. She stated we could send it to [phone]. Vipul Pereira thanked and call was ended.

## 2021-12-02 NOTE — TELEPHONE ENCOUNTER
[employer] called and spoke to Rebecca and notified that the fax was sent and confirmation received. Bebeto Quach and call was ended.

## 2021-12-03 NOTE — TELEPHONE ENCOUNTER
----- Message from Neftali Bennett sent at 12/3/2021 10:21 AM EST -----  Subject: Message to Provider    QUESTIONS  Information for Provider? Villa Welch from [employer] called today stating   that the attestation has been received but, they just need the chart note   from the last office visit. Please fax the information to [phone]. Phone number is and anyone will be available to assist [phone]   ---------------------------------------------------------------------------  --------------  CALL BACK INFO  What is the best way for the office to contact you? OK to leave message on   voicemail  Preferred Call Back Phone Number? [phone]  ---------------------------------------------------------------------------  --------------  SCRIPT ANSWERS  Relationship to Patient? Third Party  Representative Name?  Boone Parnell [id number] Copy/Pasted from the [facility] ([facility]

## 2021-12-03 NOTE — TELEPHONE ENCOUNTER
[facility] states they are still waiting on last Chart note to be faxed over. Please call if any questions.  Thank you

## 2021-12-03 NOTE — TELEPHONE ENCOUNTER
Completed the requested information and it was given to someone to fax over. I don't know where it is now. May re-fax over his last chart note.    BRANDI

## 2021-12-05 ENCOUNTER — APPOINTMENT (OUTPATIENT)
Dept: GENERAL RADIOLOGY | Age: 56
End: 2021-12-05
Attending: EMERGENCY MEDICINE
Payer: MEDICARE

## 2021-12-05 VITALS
HEART RATE: 102 BPM | TEMPERATURE: 98.1 F | HEIGHT: 75 IN | DIASTOLIC BLOOD PRESSURE: 106 MMHG | BODY MASS INDEX: 39.17 KG/M2 | RESPIRATION RATE: 22 BRPM | SYSTOLIC BLOOD PRESSURE: 143 MMHG | WEIGHT: 315 LBS | OXYGEN SATURATION: 98 %

## 2021-12-05 LAB
ALBUMIN SERPL-MCNC: 3.6 G/DL (ref 3.5–5)
ALBUMIN/GLOB SERPL: 1 {RATIO} (ref 1.1–2.2)
ALP SERPL-CCNC: 107 U/L (ref 45–117)
ALT SERPL-CCNC: 49 U/L (ref 12–78)
ANION GAP SERPL CALC-SCNC: 6 MMOL/L (ref 5–15)
AST SERPL-CCNC: 40 U/L (ref 15–37)
BASOPHILS # BLD: 0 K/UL (ref 0–0.1)
BASOPHILS NFR BLD: 0 % (ref 0–1)
BILIRUB SERPL-MCNC: 0.2 MG/DL (ref 0.2–1)
BNP SERPL-MCNC: 20 PG/ML
BUN SERPL-MCNC: 11 MG/DL (ref 6–20)
BUN/CREAT SERPL: 14 (ref 12–20)
CALCIUM SERPL-MCNC: 9.3 MG/DL (ref 8.5–10.1)
CHLORIDE SERPL-SCNC: 106 MMOL/L (ref 97–108)
CO2 SERPL-SCNC: 27 MMOL/L (ref 21–32)
CREAT SERPL-MCNC: 0.76 MG/DL (ref 0.7–1.3)
DIFFERENTIAL METHOD BLD: NORMAL
EOSINOPHIL # BLD: 0.3 K/UL (ref 0–0.4)
EOSINOPHIL NFR BLD: 4 % (ref 0–7)
ERYTHROCYTE [DISTWIDTH] IN BLOOD BY AUTOMATED COUNT: 13.6 % (ref 11.5–14.5)
GLOBULIN SER CALC-MCNC: 3.6 G/DL (ref 2–4)
GLUCOSE SERPL-MCNC: 131 MG/DL (ref 65–100)
HCT VFR BLD AUTO: 42.6 % (ref 36.6–50.3)
HGB BLD-MCNC: 13.3 G/DL (ref 12.1–17)
IMM GRANULOCYTES # BLD AUTO: 0 K/UL (ref 0–0.04)
IMM GRANULOCYTES NFR BLD AUTO: 0 % (ref 0–0.5)
LYMPHOCYTES # BLD: 2 K/UL (ref 0.8–3.5)
LYMPHOCYTES NFR BLD: 26 % (ref 12–49)
MCH RBC QN AUTO: 27.7 PG (ref 26–34)
MCHC RBC AUTO-ENTMCNC: 31.2 G/DL (ref 30–36.5)
MCV RBC AUTO: 88.6 FL (ref 80–99)
MONOCYTES # BLD: 0.7 K/UL (ref 0–1)
MONOCYTES NFR BLD: 9 % (ref 5–13)
NEUTS SEG # BLD: 4.9 K/UL (ref 1.8–8)
NEUTS SEG NFR BLD: 61 % (ref 32–75)
NRBC # BLD: 0 K/UL (ref 0–0.01)
NRBC BLD-RTO: 0 PER 100 WBC
PLATELET # BLD AUTO: 188 K/UL (ref 150–400)
PMV BLD AUTO: 11.1 FL (ref 8.9–12.9)
POTASSIUM SERPL-SCNC: 3.7 MMOL/L (ref 3.5–5.1)
PROT SERPL-MCNC: 7.2 G/DL (ref 6.4–8.2)
RBC # BLD AUTO: 4.81 M/UL (ref 4.1–5.7)
SODIUM SERPL-SCNC: 139 MMOL/L (ref 136–145)
TROPONIN-HIGH SENSITIVITY: 9 NG/L (ref 0–76)
WBC # BLD AUTO: 8 K/UL (ref 4.1–11.1)

## 2021-12-05 PROCEDURE — 83880 ASSAY OF NATRIURETIC PEPTIDE: CPT

## 2021-12-05 PROCEDURE — 93005 ELECTROCARDIOGRAM TRACING: CPT

## 2021-12-05 PROCEDURE — 99283 EMERGENCY DEPT VISIT LOW MDM: CPT

## 2021-12-05 PROCEDURE — 85025 COMPLETE CBC W/AUTO DIFF WBC: CPT

## 2021-12-05 PROCEDURE — 84484 ASSAY OF TROPONIN QUANT: CPT

## 2021-12-05 PROCEDURE — 36415 COLL VENOUS BLD VENIPUNCTURE: CPT

## 2021-12-05 PROCEDURE — 71046 X-RAY EXAM CHEST 2 VIEWS: CPT

## 2021-12-05 PROCEDURE — 80053 COMPREHEN METABOLIC PANEL: CPT

## 2021-12-06 ENCOUNTER — HOSPITAL ENCOUNTER (EMERGENCY)
Age: 56
Discharge: HOME | End: 2021-12-06
Attending: EMERGENCY MEDICINE
Payer: MEDICARE

## 2021-12-06 DIAGNOSIS — M79.89 LEG SWELLING: Primary | ICD-10-CM

## 2021-12-06 LAB
ATRIAL RATE: 94 BPM
CALCULATED P AXIS, ECG09: 72 DEGREES
CALCULATED R AXIS, ECG10: 59 DEGREES
CALCULATED T AXIS, ECG11: 44 DEGREES
DIAGNOSIS, 93000: NORMAL
P-R INTERVAL, ECG05: 150 MS
Q-T INTERVAL, ECG07: 370 MS
QRS DURATION, ECG06: 88 MS
QTC CALCULATION (BEZET), ECG08: 462 MS
VENTRICULAR RATE, ECG03: 94 BPM

## 2021-12-06 RX ORDER — FUROSEMIDE 20 MG/1
20 TABLET ORAL DAILY
Qty: 21 TABLET | Refills: 0 | Status: SHIPPED | OUTPATIENT
Start: 2021-12-06 | End: 2021-12-27

## 2021-12-06 RX ORDER — POTASSIUM CHLORIDE 750 MG/1
10 TABLET, FILM COATED, EXTENDED RELEASE ORAL DAILY
Qty: 30 TABLET | Refills: 0 | Status: SHIPPED | OUTPATIENT
Start: 2021-12-06

## 2021-12-06 NOTE — TELEPHONE ENCOUNTER
Called, unable to get a hold of anybody as \"I'm sorry, the number you have dialed is not working now. Goodbye\" and had a busy tone after that. Fax number is still saying Error Occurred. Scanning fax to chart to have on file.  They will have to call back to give a better phone and fax number that is a working #

## 2021-12-06 NOTE — TELEPHONE ENCOUNTER
On 12/02/2021 [employer] was called and I spoke to Gemini Freitas and notified them that the fax was sent through and a confirmation was received. She Acknowledged that and the call was ended. This was one 12/02/2021.
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
## 2021-12-06 NOTE — DISCHARGE INSTRUCTIONS
Your lab work today demonstrated normal potassium. However, you are being started on lasix which can lower your potassium. Please supplement your diet with the following foods which are high in potassium.      Dried fruits (raisins, apricots)  Beans, lentils  Potatoes  Winter squash (acorn, butternut)  Spinach, broccoli  Beet greens  Avocado  Bananas  Cantaloupe  Oranges, orange juice  Coconut water  Tomatoes  Dairy and plant milks (soy, almond)  Yogurt  Cashews, almonds  Chicken  [place]

## 2021-12-06 NOTE — ED NOTES
26 - Patient discharge by Pramod White MD - pt sent to the front lobby, with strong and steady gait -  Discharge information / home RX / and reasons to return to the ED were reviewed by the doctor.       This RN was not able to assess / document on the patient prior to discharge;;

## 2021-12-07 ENCOUNTER — TELEPHONE (OUTPATIENT)
Dept: INTERNAL MEDICINE CLINIC | Age: 56
End: 2021-12-07

## 2021-12-07 NOTE — TELEPHONE ENCOUNTER
Lorena//Promodity states she's returning your call with Alternative/Back Up Fax#.      Back Up Fax# is [phone]    ATTN: Villa Welch

## 2021-12-07 NOTE — TELEPHONE ENCOUNTER
Lance Murray states she is still waiting on Last Office notes to be faxed over. Please call if any questions.  Thank you      Phone#  [phone]      FAX #  [phone]   ATTN:  [place]

## 2021-12-07 NOTE — TELEPHONE ENCOUNTER
Been trying to send fax but the number keep sending as an error. Then called the number and received a message saying \"Sorry their has been an application error. Goodbye. \" Then proceeds with a busy tone. I need a better phone # and fax # to send notes.

## 2021-12-07 NOTE — TELEPHONE ENCOUNTER
----- Message from Yulissa Farheenisaiah sent at 12/6/2021  4:55 PM EST -----  Subject: Message to Provider    QUESTIONS  Information for Provider? Please fax the last chart note for appt. 11/12/2021 to [facility] fax? [phone] call Villa Welch if you have further   questions, [phone]  ---------------------------------------------------------------------------  --------------  CALL BACK INFO  What is the best way for the office to contact you? OK to leave message on   voicemail  Preferred Call Back Phone Number? [phone]  ---------------------------------------------------------------------------  --------------  SCRIPT ANSWERS  Relationship to Patient? Third Party  Representative Name?  [facility]

## 2021-12-07 NOTE — TELEPHONE ENCOUNTER
Called, FILEMON for Villa Welch stating that I have been receiving error messages from Fax and I am not able to send fax. Waiting for a call back with a better fax # to send it to.

## 2021-12-07 NOTE — TELEPHONE ENCOUNTER
I have called and got the email for the O/N to be emailed to. I have sent O/N to email [email or web address]. Waiting for a response from company once O/N has been received.

## 2021-12-11 PROBLEM — E78.5 HLD (HYPERLIPIDEMIA): Status: ACTIVE | Noted: 2021-12-11

## 2021-12-21 DIAGNOSIS — G57.91 NEUROPATHY OF RIGHT LOWER EXTREMITY: ICD-10-CM

## 2021-12-21 RX ORDER — PREGABALIN 100 MG/1
CAPSULE ORAL
Qty: 60 CAPSULE | Refills: 0 | Status: SHIPPED | OUTPATIENT
Start: 2021-12-21 | End: 2022-01-27

## 2022-01-04 DIAGNOSIS — R60.9 EDEMA, UNSPECIFIED TYPE: ICD-10-CM

## 2022-01-04 DIAGNOSIS — K21.9 GASTROESOPHAGEAL REFLUX DISEASE WITHOUT ESOPHAGITIS: ICD-10-CM

## 2022-01-04 RX ORDER — FUROSEMIDE 40 MG/1
TABLET ORAL
Qty: 5 TABLET | Refills: 0 | Status: SHIPPED | OUTPATIENT
Start: 2022-01-04 | End: 2022-01-27

## 2022-01-04 RX ORDER — PANTOPRAZOLE SODIUM 40 MG/1
TABLET, DELAYED RELEASE ORAL
Qty: 90 TABLET | Refills: 0 | Status: SHIPPED | OUTPATIENT
Start: 2022-01-04 | End: 2022-04-25

## 2022-01-27 ENCOUNTER — OFFICE VISIT (OUTPATIENT)
Dept: INTERNAL MEDICINE CLINIC | Age: 57
End: 2022-01-27
Payer: MEDICARE

## 2022-01-27 VITALS
BODY MASS INDEX: 39.17 KG/M2 | TEMPERATURE: 97 F | OXYGEN SATURATION: 97 % | HEART RATE: 80 BPM | SYSTOLIC BLOOD PRESSURE: 133 MMHG | HEIGHT: 75 IN | WEIGHT: 315 LBS | DIASTOLIC BLOOD PRESSURE: 77 MMHG | RESPIRATION RATE: 18 BRPM

## 2022-01-27 DIAGNOSIS — M54.50 CHRONIC LEFT-SIDED LOW BACK PAIN WITHOUT SCIATICA: ICD-10-CM

## 2022-01-27 DIAGNOSIS — M25.511 RIGHT SHOULDER PAIN, UNSPECIFIED CHRONICITY: ICD-10-CM

## 2022-01-27 DIAGNOSIS — G57.91 NEUROPATHY OF RIGHT LOWER EXTREMITY: ICD-10-CM

## 2022-01-27 DIAGNOSIS — G89.29 CHRONIC LEFT-SIDED LOW BACK PAIN WITHOUT SCIATICA: ICD-10-CM

## 2022-01-27 DIAGNOSIS — I50.9 HEART FAILURE, UNSPECIFIED HF CHRONICITY, UNSPECIFIED HEART FAILURE TYPE (HCC): ICD-10-CM

## 2022-01-27 DIAGNOSIS — E66.01 OBESITY, CLASS III, BMI 40-49.9 (MORBID OBESITY) (HCC): ICD-10-CM

## 2022-01-27 DIAGNOSIS — M25.562 LEFT KNEE PAIN, UNSPECIFIED CHRONICITY: Primary | ICD-10-CM

## 2022-01-27 DIAGNOSIS — I67.9 CEREBROVASCULAR DISEASE: ICD-10-CM

## 2022-01-27 DIAGNOSIS — I10 ESSENTIAL HYPERTENSION: Chronic | ICD-10-CM

## 2022-01-27 DIAGNOSIS — J42 CHRONIC BRONCHITIS, UNSPECIFIED CHRONIC BRONCHITIS TYPE (HCC): ICD-10-CM

## 2022-01-27 PROCEDURE — G8417 CALC BMI ABV UP PARAM F/U: HCPCS | Performed by: INTERNAL MEDICINE

## 2022-01-27 PROCEDURE — G8752 SYS BP LESS 140: HCPCS | Performed by: INTERNAL MEDICINE

## 2022-01-27 PROCEDURE — G9717 DOC PT DX DEP/BP F/U NT REQ: HCPCS | Performed by: INTERNAL MEDICINE

## 2022-01-27 PROCEDURE — 3017F COLORECTAL CA SCREEN DOC REV: CPT | Performed by: INTERNAL MEDICINE

## 2022-01-27 PROCEDURE — 99214 OFFICE O/P EST MOD 30 MIN: CPT | Performed by: INTERNAL MEDICINE

## 2022-01-27 PROCEDURE — G8754 DIAS BP LESS 90: HCPCS | Performed by: INTERNAL MEDICINE

## 2022-01-27 PROCEDURE — G8427 DOCREV CUR MEDS BY ELIG CLIN: HCPCS | Performed by: INTERNAL MEDICINE

## 2022-01-27 RX ORDER — ATORVASTATIN CALCIUM 20 MG/1
20 TABLET, FILM COATED ORAL DAILY
Qty: 90 TABLET | Refills: 3 | Status: SHIPPED | OUTPATIENT
Start: 2022-01-27

## 2022-01-27 RX ORDER — CYCLOBENZAPRINE HCL 10 MG
10 TABLET ORAL
Qty: 30 TABLET | Refills: 0 | Status: SHIPPED | OUTPATIENT
Start: 2022-01-27 | End: 2022-02-24

## 2022-01-27 RX ORDER — VARDENAFIL HYDROCHLORIDE 20 MG/1
20 TABLET ORAL
Qty: 6 TABLET | Refills: 11 | Status: SHIPPED | OUTPATIENT
Start: 2022-01-27

## 2022-01-27 RX ORDER — AMLODIPINE BESYLATE 10 MG/1
10 TABLET ORAL DAILY
Qty: 90 TABLET | Refills: 3 | Status: SHIPPED | OUTPATIENT
Start: 2022-01-27

## 2022-01-27 RX ORDER — LISINOPRIL AND HYDROCHLOROTHIAZIDE 20; 25 MG/1; MG/1
1 TABLET ORAL DAILY
Qty: 90 TABLET | Refills: 3 | Status: SHIPPED | OUTPATIENT
Start: 2022-01-27

## 2022-01-27 RX ORDER — PREGABALIN 150 MG/1
150 CAPSULE ORAL 2 TIMES DAILY
Qty: 60 CAPSULE | Refills: 5 | Status: SHIPPED | OUTPATIENT
Start: 2022-01-27 | End: 2022-09-17

## 2022-01-27 NOTE — PROGRESS NOTES
SUBJECTIVE:   Mr. Ashia Nguyen is a 64 y.o. male who is here for follow up of routine medical issues. He previously saw Dr. Mihai Jones. Chief Complaint   Patient presents with    Follow-up    Medication Refill    Pain (Chronic)    Referral Request    Back Pain       \"Right shoulder pain is like a tooth ache. \" Limits his ROM--elevation and internal rotation. \"This left knee is getting to the point where I can hardly walk. \"     \"Back pain has been flaring up some kind of bad lately. \"     He is mostly off methadone now. Goes to methadone clinic every other Tuesday. One of his concerns is weight gain: he was 286 lb in . Over the past 6 months:   Wt Readings from Last 3 Encounters:   22 343 lb (155.6 kg)   21 (!) 362 lb 7 oz (164.4 kg)   21 333 lb 9.6 oz (151.3 kg)       Asthma has been doing fine lately. He is on inhalers below. At this time, he is otherwise doing well and has brought no other complaints to my attention today. For a list of the medical issues addressed today, see the assessment and plan below. PMH:   Past Medical History:   Diagnosis Date    Arthritis     Asthma     Chronic low back pain     Hypertension        Past Surgical History:   Procedure Laterality Date    HX ORTHOPAEDIC         All: He is allergic to vicodin [hydrocodone-acetaminophen]. Current Outpatient Medications   Medication Sig    pantoprazole (PROTONIX) 40 mg tablet TAKE ONE TABLET BY MOUTH EVERY DAY    pregabalin (LYRICA) 100 mg capsule TAKE ONECAPSULE BY MOUTH 2 TIMES A DAY    potassium chloride SR (KLOR-CON 10) 10 mEq tablet Take 1 Tablet by mouth daily.  montelukast (SINGULAIR) 10 mg tablet TAKE ONE TABLET BY MOUTH EVERY DAY    lisinopril-hydroCHLOROthiazide (PRINZIDE, ZESTORETIC) 20-25 mg per tablet Take 1 Tablet by mouth daily.  amLODIPine (NORVASC) 10 mg tablet Take 1 Tablet by mouth daily.  Indications: high blood pressure    albuterol (PROVENTIL HFA, VENTOLIN HFA, PROAIR HFA) 90 mcg/actuation inhaler Take 1 Puff by inhalation every four (4) hours as needed for Wheezing.  Nebulizer & Compressor machine Use as directed    indomethacin (INDOCIN) 50 mg capsule Take 1 Capsule by mouth three (3) times daily as needed for Gout or Pain.  albuterol (PROVENTIL VENTOLIN) 2.5 mg /3 mL (0.083 %) nebu 3 mL by Nebulization route every four (4) hours as needed for Wheezing.  Linzess 290 mcg cap capsule TAKE ONE CAPSULE BY MOUTH EVERY DAY    atorvastatin (LIPITOR) 20 mg tablet Take 1 Tablet by mouth daily.  aspirin delayed-release 81 mg tablet Take 1 Tab by mouth daily.  fluticasone propionate (Flonase Allergy Relief) 50 mcg/actuation nasal spray 2 Sprays by Both Nostrils route daily.  cetirizine (ZyrTEC) 10 mg tablet Take 1 Tab by mouth daily.  polyethylene glycol (MIRALAX) 17 gram packet Take 1 Packet by mouth daily.  Nebulizer Accessories kit Nebulizer cup w/ tubing; use every 4 hrs prn wheezing    furosemide (LASIX) 40 mg tablet Increase your morning dose to 40 mg for 5 days (Patient not taking: Reported on 1/27/2022)    tadalafiL (Cialis) 5 mg tablet Take 1 Tablet by mouth daily. (Patient not taking: Reported on 1/27/2022)    fluticasone propionate (Flovent HFA) 220 mcg/actuation inhaler INHALE 1 OR 2 PUFFS 2 TIMES A DAY. (Patient not taking: Reported on 1/27/2022)    cyclobenzaprine (FLEXERIL) 10 mg tablet Take 1 Tab by mouth nightly. (Patient not taking: Reported on 1/27/2022)    sildenafil citrate (Viagra) 100 mg tablet Take 1 Tab by mouth as needed for Erectile Dysfunction. (Patient not taking: Reported on 1/27/2022)    guaiFENesin-dextromethorphan (TUSSI-ORGANIDIN DM)  mg/5 mL liqd Take 10 mL by mouth every four (4) hours as needed for Cough or Congestion. No current facility-administered medications for this visit. FH: His family history includes Cancer in his mother; Heart Disease in his father.    SH: He is on disability, previously . He  reports that he has quit smoking. He has a 2.50 pack-year smoking history. He has never used smokeless tobacco. He reports current alcohol use. He reports that he does not use drugs. ROS: See above; Complete ROS otherwise negative. OBJECTIVE:   Vitals:   Visit Vitals  /77   Pulse 80   Temp 97 °F (36.1 °C) (Temporal)   Resp 18   Ht 6' 3\" (1.905 m)   Wt 343 lb (155.6 kg)   SpO2 97%   BMI 42.87 kg/m²      Gen: Pleasant, obese 64 y.o.  male in NAD.   HEENT: PERRLA. EOMI. OP moist and pink.  Neck: Supple.  No LAD.  HEART: RRR, No M/G/R.   LUNGS: CTAB No W/R.   ABDOMEN: S, NT, ND, BS+.   EXTREMITIES: Warm. No C/C/E.  L knee swelling and pain noted. Lab Results   Component Value Date/Time    Sodium 139 12/05/2021 10:25 PM    Potassium 3.7 12/05/2021 10:25 PM    Chloride 106 12/05/2021 10:25 PM    CO2 27 12/05/2021 10:25 PM    Anion gap 6 12/05/2021 10:25 PM    Glucose 131 (H) 12/05/2021 10:25 PM    BUN 11 12/05/2021 10:25 PM    Creatinine 0.76 12/05/2021 10:25 PM    BUN/Creatinine ratio 14 12/05/2021 10:25 PM    GFR est AA >60 12/05/2021 10:25 PM    GFR est non-AA >60 12/05/2021 10:25 PM    Calcium 9.3 12/05/2021 10:25 PM    Bilirubin, total 0.2 12/05/2021 10:25 PM    ALT (SGPT) 49 12/05/2021 10:25 PM    Alk.  phosphatase 107 12/05/2021 10:25 PM    Protein, total 7.2 12/05/2021 10:25 PM    Albumin 3.6 12/05/2021 10:25 PM    Globulin 3.6 12/05/2021 10:25 PM    A-G Ratio 1.0 (L) 12/05/2021 10:25 PM       Lab Results   Component Value Date/Time    Cholesterol, total 201 (H) 04/15/2019 10:40 AM    HDL Cholesterol 55 04/15/2019 10:40 AM    LDL, calculated 124 (H) 04/15/2019 10:40 AM    Triglyceride 108 04/15/2019 10:40 AM        Lab Results   Component Value Date/Time    Hemoglobin A1c 5.8 (H) 04/15/2019 10:40 AM       Lab Results   Component Value Date/Time    WBC 8.0 12/05/2021 10:25 PM    HGB 13.3 12/05/2021 10:25 PM    HCT 42.6 12/05/2021 10:25 PM    PLATELET 986 33/33/0927 10:25 PM    MCV 88.6 12/05/2021 10:25 PM         ASSESSMENT/ PLAN:     Left knee pain, unspecified chronicity  -     REFERRAL TO ORTHOPEDICS    Right shoulder pain, unspecified chronicity  -     REFERRAL TO ORTHOPEDICS    Heart failure, unspecified HF chronicity, unspecified heart failure type (HCC)    Obesity, Class III, BMI 40-49.9 (morbid obesity) ([facility])    Erectile dysfunction. -     vardenafiL (Levitra) 20 mg tablet; Take 20 mg by mouth daily as needed for Erectile Dysfunction. , Normal, Disp-6 Tablet, R-11    Asthma/chronic bronchitis: Currently controlled. -     albuterol (PROVENTIL VENTOLIN) 2.5 mg /3 mL (0.083 %) nebu; 3 mL by Nebulization route every four (4) hours as needed for Wheezing.  -     fluticasone propionate (Flovent HFA) 220 mcg/actuation inhaler; INHALE 1 OR 2 PUFFS 2 TIMES A DAY. -     montelukast (Singulair) 10 mg tablet; Take 1 Tab by mouth daily. Anxiety: Stable. Sees psychiatrist. Rarely takes clonazepam.     Chronic low back pain with probable sciatica: He complains of tingling in R foot. -     cyclobenzaprine (FLEXERIL) 10 mg tablet; Take 1 Tab by mouth nightly. -     diclofenac EC (VOLTAREN) 50 mg EC tablet; Take 1 Tab by mouth two (2) times a day. -     REFERRAL TO ORTHOPEDICS    Hyperglycemia: We'll recheck A1C. Allergic rhinitis, unspecified seasonality, unspecified trigger  -     cetirizine (ZyrTEC) 10 mg tablet; Take 1 Tab by mouth daily. -     fluticasone propionate (Flonase Allergy Relief) 50 mcg/actuation nasal spray; 2 Sprays by Both Nostrils route daily. -     montelukast (Singulair) 10 mg tablet; Take 1 Tab by mouth daily. Essential hypertension: BP borderline. Continue current meds. Work on diet, etc.   -     aspirin delayed-release 81 mg tablet; Take 1 Tab by mouth daily. Irritable bowel syndrome with constipation: Doing okay lately. -     Linzess 290 mcg cap capsule; Take 1 Cap by mouth daily.     Gastroesophageal reflux disease without esophagitis  - pantoprazole (PROTONIX) 40 mg tablet; Take 1 Tab by mouth daily. RTC 6 months, HTN. I have reviewed the patient's medications and risks/side effects/benefits were discussed. Diagnosis(-es) explained to patient and questions answered. Literature provided where appropriate.

## 2022-01-27 NOTE — PROGRESS NOTES
Daquan Montano is a 64 y.o. male  Chief Complaint   Patient presents with    Follow-up    Medication Refill    Pain (Chronic)    Referral Request    Back Pain     Health Maintenance Due   Topic Date Due    Pneumococcal 0-64 years (1 of 2 - PPSV23) Never done    DTaP/Tdap/Td series (1 - Tdap) Never done    Colorectal Cancer Screening Combo  Never done    Shingrix Vaccine Age 50> (1 of 2) Never done    Lipid Screen  11/12/2020    COVID-19 Vaccine (3 - Booster for Moderna series) 10/19/2021     Visit Vitals  /77   Pulse 80   Temp 97 °F (36.1 °C) (Temporal)   Resp 18   Ht 6' 3\" (1.905 m)   Wt 343 lb (155.6 kg)   SpO2 97%   BMI 42.87 kg/m²

## 2022-01-28 LAB
ALBUMIN SERPL-MCNC: 4.8 G/DL (ref 3.8–4.9)
ALBUMIN/GLOB SERPL: 1.8 {RATIO} (ref 1.2–2.2)
ALP SERPL-CCNC: 91 IU/L (ref 44–121)
ALT SERPL-CCNC: 31 IU/L (ref 0–44)
AST SERPL-CCNC: 25 IU/L (ref 0–40)
BASOPHILS # BLD AUTO: 0 X10E3/UL (ref 0–0.2)
BASOPHILS NFR BLD AUTO: 1 %
BILIRUB SERPL-MCNC: 0.3 MG/DL (ref 0–1.2)
BUN SERPL-MCNC: 14 MG/DL (ref 6–24)
BUN/CREAT SERPL: 11 (ref 9–20)
CALCIUM SERPL-MCNC: 10.4 MG/DL (ref 8.7–10.2)
CHLORIDE SERPL-SCNC: 101 MMOL/L (ref 96–106)
CHOLEST SERPL-MCNC: 140 MG/DL (ref 100–199)
CO2 SERPL-SCNC: 22 MMOL/L (ref 20–29)
CREAT SERPL-MCNC: 1.23 MG/DL (ref 0.76–1.27)
EOSINOPHIL # BLD AUTO: 0.1 X10E3/UL (ref 0–0.4)
EOSINOPHIL NFR BLD AUTO: 1 %
ERYTHROCYTE [DISTWIDTH] IN BLOOD BY AUTOMATED COUNT: 13.1 % (ref 11.6–15.4)
GLOBULIN SER CALC-MCNC: 2.7 G/DL (ref 1.5–4.5)
GLUCOSE SERPL-MCNC: 121 MG/DL (ref 65–99)
HCT VFR BLD AUTO: 48.1 % (ref 37.5–51)
HDLC SERPL-MCNC: 44 MG/DL
HGB BLD-MCNC: 15.6 G/DL (ref 13–17.7)
IMM GRANULOCYTES # BLD AUTO: 0 X10E3/UL (ref 0–0.1)
IMM GRANULOCYTES NFR BLD AUTO: 0 %
LDLC SERPL CALC-MCNC: 74 MG/DL (ref 0–99)
LYMPHOCYTES # BLD AUTO: 2 X10E3/UL (ref 0.7–3.1)
LYMPHOCYTES NFR BLD AUTO: 23 %
MCH RBC QN AUTO: 27.7 PG (ref 26.6–33)
MCHC RBC AUTO-ENTMCNC: 32.4 G/DL (ref 31.5–35.7)
MCV RBC AUTO: 85 FL (ref 79–97)
MONOCYTES # BLD AUTO: 0.6 X10E3/UL (ref 0.1–0.9)
MONOCYTES NFR BLD AUTO: 7 %
NEUTROPHILS # BLD AUTO: 6 X10E3/UL (ref 1.4–7)
NEUTROPHILS NFR BLD AUTO: 68 %
PLATELET # BLD AUTO: 236 X10E3/UL (ref 150–450)
POTASSIUM SERPL-SCNC: 4.3 MMOL/L (ref 3.5–5.2)
PROT SERPL-MCNC: 7.5 G/DL (ref 6–8.5)
RBC # BLD AUTO: 5.64 X10E6/UL (ref 4.14–5.8)
SODIUM SERPL-SCNC: 140 MMOL/L (ref 134–144)
TESTOST SERPL-MCNC: 279 NG/DL (ref 264–916)
TRIGL SERPL-MCNC: 125 MG/DL (ref 0–149)
VLDLC SERPL CALC-MCNC: 22 MG/DL (ref 5–40)
WBC # BLD AUTO: 8.8 X10E3/UL (ref 3.4–10.8)

## 2022-02-24 DIAGNOSIS — G89.29 CHRONIC LEFT-SIDED LOW BACK PAIN WITHOUT SCIATICA: ICD-10-CM

## 2022-02-24 DIAGNOSIS — M54.50 CHRONIC LEFT-SIDED LOW BACK PAIN WITHOUT SCIATICA: ICD-10-CM

## 2022-02-24 DIAGNOSIS — J30.9 ALLERGIC RHINITIS, UNSPECIFIED SEASONALITY, UNSPECIFIED TRIGGER: ICD-10-CM

## 2022-02-24 DIAGNOSIS — J45.51 SEVERE PERSISTENT ASTHMA WITH ACUTE EXACERBATION: ICD-10-CM

## 2022-02-24 RX ORDER — CYCLOBENZAPRINE HCL 10 MG
TABLET ORAL
Qty: 30 TABLET | Refills: 0 | Status: SHIPPED | OUTPATIENT
Start: 2022-02-24 | End: 2022-03-30

## 2022-02-24 RX ORDER — MONTELUKAST SODIUM 10 MG/1
TABLET ORAL
Qty: 90 TABLET | Refills: 0 | Status: SHIPPED | OUTPATIENT
Start: 2022-02-24 | End: 2022-06-16

## 2022-03-18 PROBLEM — J96.01 ACUTE RESPIRATORY FAILURE WITH HYPOXIA (HCC): Status: ACTIVE | Noted: 2019-06-13

## 2022-03-18 PROBLEM — I10 ESSENTIAL HYPERTENSION: Status: ACTIVE | Noted: 2019-04-15

## 2022-03-18 PROBLEM — F41.9 ANXIETY: Status: ACTIVE | Noted: 2019-11-22

## 2022-03-18 PROBLEM — R60.0 EDEMA OF BOTH LEGS: Status: ACTIVE | Noted: 2020-02-20

## 2022-03-18 PROBLEM — N17.9 AKI (ACUTE KIDNEY INJURY) (HCC): Status: ACTIVE | Noted: 2021-06-23

## 2022-03-19 PROBLEM — G89.29 CHRONIC LEFT-SIDED LOW BACK PAIN WITHOUT SCIATICA: Status: ACTIVE | Noted: 2019-04-15

## 2022-03-19 PROBLEM — J45.51 SEVERE PERSISTENT ASTHMA WITH ACUTE EXACERBATION: Status: ACTIVE | Noted: 2020-06-03

## 2022-03-19 PROBLEM — S93.401A SPRAIN OF RIGHT ANKLE: Status: ACTIVE | Noted: 2020-08-12

## 2022-03-19 PROBLEM — F10.10 ALCOHOL ABUSE, DAILY USE: Status: ACTIVE | Noted: 2020-02-20

## 2022-03-19 PROBLEM — R63.5 ABNORMAL WEIGHT GAIN: Status: ACTIVE | Noted: 2020-07-13

## 2022-03-19 PROBLEM — E78.5 HLD (HYPERLIPIDEMIA): Status: ACTIVE | Noted: 2021-12-11

## 2022-03-19 PROBLEM — G47.00 INSOMNIA: Status: ACTIVE | Noted: 2020-02-20

## 2022-03-19 PROBLEM — N52.9 ERECTILE DYSFUNCTION: Status: ACTIVE | Noted: 2020-03-04

## 2022-03-19 PROBLEM — M25.50 ARTHRALGIA: Status: ACTIVE | Noted: 2020-02-20

## 2022-03-19 PROBLEM — I95.9 HYPOTENSION: Status: ACTIVE | Noted: 2021-06-23

## 2022-03-19 PROBLEM — K21.9 GERD (GASTROESOPHAGEAL REFLUX DISEASE): Status: ACTIVE | Noted: 2019-04-15

## 2022-03-19 PROBLEM — M54.50 CHRONIC LEFT-SIDED LOW BACK PAIN WITHOUT SCIATICA: Status: ACTIVE | Noted: 2019-04-15

## 2022-03-19 PROBLEM — Z72.0 TOBACCO ABUSE: Status: ACTIVE | Noted: 2019-04-15

## 2022-03-19 PROBLEM — E66.01 SEVERE OBESITY (HCC): Status: ACTIVE | Noted: 2020-01-15

## 2022-03-19 PROBLEM — K59.09 CHRONIC CONSTIPATION: Status: ACTIVE | Noted: 2020-03-04

## 2022-03-19 PROBLEM — J30.9 ALLERGIC RHINITIS: Status: ACTIVE | Noted: 2020-03-04

## 2022-03-20 PROBLEM — F32.A DEPRESSION: Status: ACTIVE | Noted: 2020-02-20

## 2022-03-20 PROBLEM — K42.9 UMBILICAL HERNIA WITHOUT OBSTRUCTION AND WITHOUT GANGRENE: Status: ACTIVE | Noted: 2020-08-12

## 2022-03-20 PROBLEM — M25.511 ACUTE PAIN OF BOTH SHOULDERS: Status: ACTIVE | Noted: 2020-03-04

## 2022-03-20 PROBLEM — M25.512 ACUTE PAIN OF BOTH SHOULDERS: Status: ACTIVE | Noted: 2020-03-04

## 2022-03-30 DIAGNOSIS — G89.29 CHRONIC LEFT-SIDED LOW BACK PAIN WITHOUT SCIATICA: ICD-10-CM

## 2022-03-30 DIAGNOSIS — M54.50 CHRONIC LEFT-SIDED LOW BACK PAIN WITHOUT SCIATICA: ICD-10-CM

## 2022-03-30 RX ORDER — CYCLOBENZAPRINE HCL 10 MG
TABLET ORAL
Qty: 30 TABLET | Refills: 0 | Status: SHIPPED | OUTPATIENT
Start: 2022-03-30 | End: 2022-07-18 | Stop reason: CLARIF

## 2022-04-19 ENCOUNTER — TELEPHONE (OUTPATIENT)
Dept: INTERNAL MEDICINE CLINIC | Age: 57
End: 2022-04-19

## 2022-04-19 RX ORDER — CHLORPHENIRAMINE MALEATE 4 MG
TABLET ORAL 2 TIMES DAILY
Qty: 15 G | Refills: 0 | Status: SHIPPED | OUTPATIENT
Start: 2022-04-19 | End: 2022-09-15

## 2022-04-19 NOTE — TELEPHONE ENCOUNTER
Patient states that he has 2 issues, the first is immediate, the second not. 1. Immediate need:  Patient requests med for genital rash  States since gaining weight, he has been developing a rash between the scrotum and leg. States lots of moisture/sweat and all rubs together. States like jock itch but worse than he ever had when playing sports. States itches bad to point of burning. States tried otc powders and even vagisil (which gave minimal relief)    Requests medication as soon as possible as he is \"miserable and in pain\"    Please send asap to:  [id number], [address] [facility]  [phone]    2. Non-Urgent: Weight Gain  Pt states has gained 109 lbs since being on steroids. States now up to 345 lbs. Requests a medication, a visit, or referral...states \"anything\" that can help him start getting this weight off. States he will do anything dr tells him to do. States cannot get his knee surgery until he drops weight and states knees are getting really bad to where he can hardly walk. Please call pt to advise what can be done regarding requests.

## 2022-04-25 DIAGNOSIS — K21.9 GASTROESOPHAGEAL REFLUX DISEASE WITHOUT ESOPHAGITIS: ICD-10-CM

## 2022-04-25 RX ORDER — PANTOPRAZOLE SODIUM 40 MG/1
TABLET, DELAYED RELEASE ORAL
Qty: 90 TABLET | Refills: 0 | Status: SHIPPED | OUTPATIENT
Start: 2022-04-25 | End: 2022-08-07

## 2022-04-28 ENCOUNTER — TELEPHONE (OUTPATIENT)
Dept: SLEEP MEDICINE | Age: 57
End: 2022-04-28

## 2022-04-28 ENCOUNTER — TELEPHONE (OUTPATIENT)
Dept: INTERNAL MEDICINE CLINIC | Age: 57
End: 2022-04-28

## 2022-04-28 DIAGNOSIS — R06.83 SNORING: Primary | ICD-10-CM

## 2022-04-28 DIAGNOSIS — G47.00 INSOMNIA, UNSPECIFIED TYPE: ICD-10-CM

## 2022-04-28 DIAGNOSIS — R06.89 DIFFICULTY BREATHING: ICD-10-CM

## 2022-04-28 NOTE — TELEPHONE ENCOUNTER
States that he had home health nurse from insurance do a visit and she stated that he should request a referral for a sleep study. States \"it was something about pt not able to sleep thru the night, and loud snoring, and trouble breathing that wakes him up\".       States can this be done please    [phone]

## 2022-05-11 ENCOUNTER — DOCUMENTATION ONLY (OUTPATIENT)
Dept: INTERNAL MEDICINE CLINIC | Age: 57
End: 2022-05-11

## 2022-05-11 NOTE — PROGRESS NOTES
Received medical record request from [facility] on 5/6/22  Faxed request to Yaya Medel on 5/11/22 and scanned in chart

## 2022-05-11 NOTE — TELEPHONE ENCOUNTER
Patient called stating he is having withdrawals from his klonopin, which was stopped after he failed his drug test. He states the Buspar which he was given instead is not working, and his mind will not calm down enough to allow him to rest. Patient has not made an appt with another provider to manage his controlled substance, despite having been followed by the nurse care manager at Dr. Hallie Scott office. Patient is requesting a change to his treatment plan to allow him get some relief while he is making arrangements to see another provider.
Prior Authorization for Cialis has been sent to pts plan as of today 9/9/2020. KEY: [id number] for Covermymeds.  PA could take up to 72 hours for approval or denial.
No

## 2022-05-20 ENCOUNTER — DOCUMENTATION ONLY (OUTPATIENT)
Dept: INTERNAL MEDICINE CLINIC | Age: 57
End: 2022-05-20

## 2022-05-20 NOTE — PROGRESS NOTES
Received medical record request from [facility] on 5/17/22  Faxed request to Yaya Medel on 5/20/22 and scanned in chart

## 2022-06-27 ENCOUNTER — TELEPHONE (OUTPATIENT)
Dept: INTERNAL MEDICINE CLINIC | Age: 57
End: 2022-06-27

## 2022-06-27 NOTE — TELEPHONE ENCOUNTER
----- Message from Pina Santiago sent at 6/27/2022  1:05 PM EDT -----  Subject: Message to Provider    QUESTIONS  Information for Provider? pt is needing information again pertaining to   sleep study that he was to do he lost the information and needs it   ---------------------------------------------------------------------------  --------------  CALL BACK INFO  What is the best way for the office to contact you? OK to leave message on   voicemail  Preferred Call Back Phone Number? [phone]  ---------------------------------------------------------------------------  --------------  SCRIPT ANSWERS  Relationship to Patient?  Self

## 2022-06-27 NOTE — TELEPHONE ENCOUNTER
Called, spoke to pt  Received two pt identifiers  Pt given info for sleep med referral  Pt verbalizes understanding of the instructions and has no further questions at this time.

## 2022-07-18 ENCOUNTER — APPOINTMENT (OUTPATIENT)
Dept: GENERAL RADIOLOGY | Age: 57
End: 2022-07-18
Attending: EMERGENCY MEDICINE
Payer: MEDICARE

## 2022-07-18 ENCOUNTER — HOSPITAL ENCOUNTER (EMERGENCY)
Age: 57
Discharge: HOME | End: 2022-07-18
Attending: EMERGENCY MEDICINE
Payer: MEDICARE

## 2022-07-18 ENCOUNTER — APPOINTMENT (OUTPATIENT)
Dept: CT IMAGING | Age: 57
End: 2022-07-18
Attending: EMERGENCY MEDICINE
Payer: MEDICARE

## 2022-07-18 VITALS
SYSTOLIC BLOOD PRESSURE: 155 MMHG | HEART RATE: 70 BPM | OXYGEN SATURATION: 98 % | DIASTOLIC BLOOD PRESSURE: 92 MMHG | RESPIRATION RATE: 16 BRPM | TEMPERATURE: 97.8 F

## 2022-07-18 DIAGNOSIS — R07.9 ACUTE CHEST PAIN: Primary | ICD-10-CM

## 2022-07-18 DIAGNOSIS — R20.0 NUMBNESS AND TINGLING: ICD-10-CM

## 2022-07-18 DIAGNOSIS — R20.2 NUMBNESS AND TINGLING: ICD-10-CM

## 2022-07-18 LAB
ALBUMIN SERPL-MCNC: 4.2 G/DL (ref 3.5–5)
ALBUMIN/GLOB SERPL: 1.3 {RATIO} (ref 1.1–2.2)
ALP SERPL-CCNC: 93 U/L (ref 45–117)
ALT SERPL-CCNC: 61 U/L (ref 12–78)
ANION GAP SERPL CALC-SCNC: 3 MMOL/L (ref 5–15)
AST SERPL-CCNC: 46 U/L (ref 15–37)
BASOPHILS # BLD: 0 K/UL (ref 0–0.1)
BASOPHILS NFR BLD: 1 % (ref 0–1)
BILIRUB SERPL-MCNC: 0.9 MG/DL (ref 0.2–1)
BUN SERPL-MCNC: 13 MG/DL (ref 6–20)
BUN/CREAT SERPL: 11 (ref 12–20)
CALCIUM SERPL-MCNC: 9.5 MG/DL (ref 8.5–10.1)
CHLORIDE SERPL-SCNC: 106 MMOL/L (ref 97–108)
CO2 SERPL-SCNC: 31 MMOL/L (ref 21–32)
CREAT SERPL-MCNC: 1.14 MG/DL (ref 0.7–1.3)
DIFFERENTIAL METHOD BLD: ABNORMAL
EOSINOPHIL # BLD: 0.1 K/UL (ref 0–0.4)
EOSINOPHIL NFR BLD: 2 % (ref 0–7)
ERYTHROCYTE [DISTWIDTH] IN BLOOD BY AUTOMATED COUNT: 13.5 % (ref 11.5–14.5)
GLOBULIN SER CALC-MCNC: 3.2 G/DL (ref 2–4)
GLUCOSE SERPL-MCNC: 106 MG/DL (ref 65–100)
HCT VFR BLD AUTO: 49 % (ref 36.6–50.3)
HGB BLD-MCNC: 16.1 G/DL (ref 12.1–17)
IMM GRANULOCYTES # BLD AUTO: 0 K/UL (ref 0–0.04)
IMM GRANULOCYTES NFR BLD AUTO: 0 % (ref 0–0.5)
LYMPHOCYTES # BLD: 1.4 K/UL (ref 0.8–3.5)
LYMPHOCYTES NFR BLD: 20 % (ref 12–49)
MCH RBC QN AUTO: 28.1 PG (ref 26–34)
MCHC RBC AUTO-ENTMCNC: 32.9 G/DL (ref 30–36.5)
MCV RBC AUTO: 85.5 FL (ref 80–99)
MONOCYTES # BLD: 0.5 K/UL (ref 0–1)
MONOCYTES NFR BLD: 7 % (ref 5–13)
NEUTS SEG # BLD: 4.9 K/UL (ref 1.8–8)
NEUTS SEG NFR BLD: 70 % (ref 32–75)
NRBC # BLD: 0 K/UL (ref 0–0.01)
NRBC BLD-RTO: 0 PER 100 WBC
PLATELET # BLD AUTO: 180 K/UL (ref 150–400)
PMV BLD AUTO: 11.2 FL (ref 8.9–12.9)
POTASSIUM SERPL-SCNC: 4.6 MMOL/L (ref 3.5–5.1)
PROT SERPL-MCNC: 7.4 G/DL (ref 6.4–8.2)
RBC # BLD AUTO: 5.73 M/UL (ref 4.1–5.7)
SODIUM SERPL-SCNC: 140 MMOL/L (ref 136–145)
TROPONIN-HIGH SENSITIVITY: 9 NG/L (ref 0–76)
WBC # BLD AUTO: 6.9 K/UL (ref 4.1–11.1)

## 2022-07-18 PROCEDURE — 85025 COMPLETE CBC W/AUTO DIFF WBC: CPT

## 2022-07-18 PROCEDURE — 36415 COLL VENOUS BLD VENIPUNCTURE: CPT

## 2022-07-18 PROCEDURE — 74011250636 HC RX REV CODE- 250/636: Performed by: EMERGENCY MEDICINE

## 2022-07-18 PROCEDURE — 74011000250 HC RX REV CODE- 250: Performed by: EMERGENCY MEDICINE

## 2022-07-18 PROCEDURE — 96375 TX/PRO/DX INJ NEW DRUG ADDON: CPT

## 2022-07-18 PROCEDURE — 93005 ELECTROCARDIOGRAM TRACING: CPT

## 2022-07-18 PROCEDURE — 99285 EMERGENCY DEPT VISIT HI MDM: CPT

## 2022-07-18 PROCEDURE — 74011250637 HC RX REV CODE- 250/637: Performed by: EMERGENCY MEDICINE

## 2022-07-18 PROCEDURE — 84484 ASSAY OF TROPONIN QUANT: CPT

## 2022-07-18 PROCEDURE — 71275 CT ANGIOGRAPHY CHEST: CPT

## 2022-07-18 PROCEDURE — 96374 THER/PROPH/DIAG INJ IV PUSH: CPT

## 2022-07-18 PROCEDURE — 74011000636 HC RX REV CODE- 636: Performed by: EMERGENCY MEDICINE

## 2022-07-18 PROCEDURE — 71046 X-RAY EXAM CHEST 2 VIEWS: CPT

## 2022-07-18 PROCEDURE — 80053 COMPREHEN METABOLIC PANEL: CPT

## 2022-07-18 RX ORDER — ONDANSETRON 2 MG/ML
4 INJECTION INTRAMUSCULAR; INTRAVENOUS
Status: COMPLETED | OUTPATIENT
Start: 2022-07-18 | End: 2022-07-18

## 2022-07-18 RX ORDER — METHOCARBAMOL 750 MG/1
750 TABLET, FILM COATED ORAL
Qty: 20 TABLET | Refills: 0 | Status: SHIPPED | OUTPATIENT
Start: 2022-07-18

## 2022-07-18 RX ORDER — FENTANYL CITRATE 50 UG/ML
25 INJECTION, SOLUTION INTRAMUSCULAR; INTRAVENOUS
Status: COMPLETED | OUTPATIENT
Start: 2022-07-18 | End: 2022-07-18

## 2022-07-18 RX ORDER — FAMOTIDINE 20 MG/1
20 TABLET, FILM COATED ORAL 2 TIMES DAILY
Qty: 20 TABLET | Refills: 0 | Status: SHIPPED | OUTPATIENT
Start: 2022-07-18

## 2022-07-18 RX ADMIN — IOPAMIDOL 100 ML: 755 INJECTION, SOLUTION INTRAVENOUS at 14:57

## 2022-07-18 RX ADMIN — FENTANYL CITRATE 25 MCG: 50 INJECTION, SOLUTION INTRAMUSCULAR; INTRAVENOUS at 14:33

## 2022-07-18 RX ADMIN — ONDANSETRON 4 MG: 2 INJECTION INTRAMUSCULAR; INTRAVENOUS at 14:33

## 2022-07-18 RX ADMIN — ALUMINUM HYDROXIDE AND MAGNESIUM HYDROXIDE 40 ML: 200; 200 SUSPENSION ORAL at 15:30

## 2022-07-18 NOTE — ED PROVIDER NOTES
EMERGENCY DEPARTMENT HISTORY AND PHYSICAL EXAM      Date: 7/18/2022  Patient Name: Brooke Wharton    History of Presenting Illness     Chief Complaint   Patient presents with    Chest Pain     x 2 days hx acid reflux,     Hand Pain     left hand     Shoulder Pain     left shoulder \"pulling\" increased stress at home over last week       History Provided By: Patient and EMS    HPI: Brooke Wharton, 62 y.o. male presents to the ED with cc of pain, hand tingling and shoulder pain. Patient states that his symptoms started 2 days ago. He has had chest pain which was constant yesterday located in left chest.  Pain did radiate to the left shoulder and he states that his left hand was numb. Pain was an 8 out of 10 prior to arrival.  He received 243 mg of aspirin in route, and the pain went down to a 5 out of 10. He denies any radiation to the neck or jaw. He did have some pain radiate to his back intermittently. He denies shortness of breath, leg pain or leg edema. He denies nausea or diaphoresis. He states that he had a stress test \"a long time ago\". He is slightly lightheaded. Denies abdominal pain, diarrhea or dysuria. There are no other complaints, changes, or physical findings at this time. PCP: Gissel Santos MD    No current facility-administered medications on file prior to encounter. Current Outpatient Medications on File Prior to Encounter   Medication Sig Dispense Refill    montelukast (SINGULAIR) 10 mg tablet TAKE ONE TABLET BY MOUTH EVERY DAY 90 Tablet 3    atenoloL (TENORMIN) 50 mg tablet Take 1 Tab by mouth daily. 30 Tablet 11    pantoprazole (PROTONIX) 40 mg tablet TAKE ONE TABLET BY MOUTH EVERY DAY 90 Tablet 0    clotrimazole (LOTRIMIN) 1 % topical cream Apply  to affected area two (2) times a day.  15 g 0    cyclobenzaprine (FLEXERIL) 10 mg tablet TAKE ONE TABLET BY MOUTH EVERY NIGHT 30 Tablet 0    vardenafiL (Levitra) 20 mg tablet Take 20 mg by mouth daily as needed for Erectile Dysfunction. 6 Tablet 11    pregabalin (LYRICA) 150 mg capsule Take 1 Capsule by mouth two (2) times a day. Max Daily Amount: 300 mg. 60 Capsule 5    lisinopril-hydroCHLOROthiazide (PRINZIDE, ZESTORETIC) 20-25 mg per tablet Take 1 Tablet by mouth daily. 90 Tablet 3    atorvastatin (LIPITOR) 20 mg tablet Take 1 Tablet by mouth daily. 90 Tablet 3    amLODIPine (NORVASC) 10 mg tablet Take 1 Tablet by mouth daily. Indications: high blood pressure 90 Tablet 3    potassium chloride SR (KLOR-CON 10) 10 mEq tablet Take 1 Tablet by mouth daily. 30 Tablet 0    albuterol (PROVENTIL HFA, VENTOLIN HFA, PROAIR HFA) 90 mcg/actuation inhaler Take 1 Puff by inhalation every four (4) hours as needed for Wheezing. 1 Each 5    Nebulizer & Compressor machine Use as directed 1 Each 0    indomethacin (INDOCIN) 50 mg capsule Take 1 Capsule by mouth three (3) times daily as needed for Gout or Pain. 90 Capsule 2    albuterol (PROVENTIL VENTOLIN) 2.5 mg /3 mL (0.083 %) nebu 3 mL by Nebulization route every four (4) hours as needed for Wheezing. 90 Nebule 0    Linzess 290 mcg cap capsule TAKE ONE CAPSULE BY MOUTH EVERY DAY 90 Capsule 0    aspirin delayed-release 81 mg tablet Take 1 Tab by mouth daily. 100 Tab 3    fluticasone propionate (Flonase Allergy Relief) 50 mcg/actuation nasal spray 2 Sprays by Both Nostrils route daily. 1 Bottle 5    cetirizine (ZyrTEC) 10 mg tablet Take 1 Tab by mouth daily. 20 Tab 0    polyethylene glycol (MIRALAX) 17 gram packet Take 1 Packet by mouth daily.  1 Each 5    Nebulizer Accessories kit Nebulizer cup w/ tubing; use every 4 hrs prn wheezing 1 Kit 5       Past History     Past Medical History:  Past Medical History:   Diagnosis Date    Arthritis     Asthma     Chronic low back pain     Hypertension        Past Surgical History:  Past Surgical History:   Procedure Laterality Date    HX ORTHOPAEDIC         Family History:  Family History   Problem Relation Age of Onset    Cancer Mother     Heart Disease Father Social History:  Social History     Tobacco Use    Smoking status: Former Smoker     Packs/day: 0.25     Years: 10.00     Pack years: 2.50    Smokeless tobacco: Never Used   Vaping Use    Vaping Use: Never used   Substance Use Topics    Alcohol use: Yes     Comment: occ    Drug use: No       Allergies: Allergies   Allergen Reactions    Vicodin [Hydrocodone-Acetaminophen] Other (comments)     Headache         Review of Systems   Review of Systems   Constitutional:  Negative for fever. HENT:  Negative for congestion. Eyes: Negative. Respiratory:  Negative for shortness of breath. Cardiovascular:  Positive for chest pain. Gastrointestinal:  Negative for abdominal pain. Endocrine: Negative for heat intolerance. Genitourinary: Negative. Musculoskeletal:  Positive for back pain. Skin:  Negative for rash. Allergic/Immunologic: Negative for immunocompromised state. Neurological:  Positive for numbness. Hematological:  Does not bruise/bleed easily. Psychiatric/Behavioral: Negative. All other systems reviewed and are negative. Physical Exam   Physical Exam  Vitals and nursing note reviewed. Constitutional:       General: He is not in acute distress. Appearance: He is well-developed. HENT:      Head: Normocephalic and atraumatic. Cardiovascular:      Rate and Rhythm: Normal rate and regular rhythm. Heart sounds: Normal heart sounds. Pulmonary:      Effort: Pulmonary effort is normal.      Breath sounds: Normal breath sounds. Abdominal:      General: Bowel sounds are normal.      Palpations: Abdomen is soft. Musculoskeletal:         General: Normal range of motion. Cervical back: Normal range of motion. Skin:     General: Skin is warm and dry. Neurological:      General: No focal deficit present. Mental Status: He is alert and oriented to person, place, and time.       Coordination: Coordination normal.   Psychiatric:         Mood and Affect: Mood normal.         Behavior: Behavior normal.       Diagnostic Study Results     Labs -     Recent Results (from the past 12 hour(s))   CBC WITH AUTOMATED DIFF    Collection Time: 07/18/22 11:44 AM   Result Value Ref Range    WBC 6.9 4.1 - 11.1 K/uL    RBC 5.73 (H) 4.10 - 5.70 M/uL    HGB 16.1 12.1 - 17.0 g/dL    HCT 49.0 36.6 - 50.3 %    MCV 85.5 80.0 - 99.0 FL    MCH 28.1 26.0 - 34.0 PG    MCHC 32.9 30.0 - 36.5 g/dL    RDW 13.5 11.5 - 14.5 %    PLATELET 579 736 - 017 K/uL    MPV 11.2 8.9 - 12.9 FL    NRBC 0.0 0  WBC    ABSOLUTE NRBC 0.00 0.00 - 0.01 K/uL    NEUTROPHILS 70 32 - 75 %    LYMPHOCYTES 20 12 - 49 %    MONOCYTES 7 5 - 13 %    EOSINOPHILS 2 0 - 7 %    BASOPHILS 1 0 - 1 %    IMMATURE GRANULOCYTES 0 0.0 - 0.5 %    ABS. NEUTROPHILS 4.9 1.8 - 8.0 K/UL    ABS. LYMPHOCYTES 1.4 0.8 - 3.5 K/UL    ABS. MONOCYTES 0.5 0.0 - 1.0 K/UL    ABS. EOSINOPHILS 0.1 0.0 - 0.4 K/UL    ABS. BASOPHILS 0.0 0.0 - 0.1 K/UL    ABS. IMM. GRANS. 0.0 0.00 - 0.04 K/UL    DF AUTOMATED     TROPONIN-HIGH SENSITIVITY    Collection Time: 07/18/22 11:44 AM   Result Value Ref Range    Troponin-High Sensitivity 9 0 - 76 ng/L   METABOLIC PANEL, COMPREHENSIVE    Collection Time: 07/18/22 11:44 AM   Result Value Ref Range    Sodium 140 136 - 145 mmol/L    Potassium 4.6 3.5 - 5.1 mmol/L    Chloride 106 97 - 108 mmol/L    CO2 31 21 - 32 mmol/L    Anion gap 3 (L) 5 - 15 mmol/L    Glucose 106 (H) 65 - 100 mg/dL    BUN 13 6 - 20 MG/DL    Creatinine 1.14 0.70 - 1.30 MG/DL    BUN/Creatinine ratio 11 (L) 12 - 20      GFR est AA >60 >60 ml/min/1.73m2    GFR est non-AA >60 >60 ml/min/1.73m2    Calcium 9.5 8.5 - 10.1 MG/DL    Bilirubin, total 0.9 0.2 - 1.0 MG/DL    ALT (SGPT) 61 12 - 78 U/L    AST (SGOT) 46 (H) 15 - 37 U/L    Alk.  phosphatase 93 45 - 117 U/L    Protein, total 7.4 6.4 - 8.2 g/dL    Albumin 4.2 3.5 - 5.0 g/dL    Globulin 3.2 2.0 - 4.0 g/dL    A-G Ratio 1.3 1.1 - 2.2         Radiologic Studies -   CTA CHEST W OR W WO CONT   Final Result No evidence for pulmonary embolism. XR CHEST PA LAT   Final Result   No acute process or change compared to the prior exam.           CT Results  (Last 48 hours)                 07/18/22 1457  CTA CHEST W OR W WO CONT Final result    Impression:  No evidence for pulmonary embolism. Narrative:  INDICATION: chest pain       COMPARISON: Earlier chest x-ray, chest CT 6/23/2021       EXAM: Precontrast localizer was first performed to verify the levels of the   pulmonary arteries. Subsequently, contiguous axial images were obtained after   the rapid IV bolus administration of 100 cc Isovue-370, followed by thin section   axial reconstructions with multiplanar reformations. Three-dimensional post -   processing was performed. CT dose reduction was achieved through use of a   standardized protocol tailored for this examination and automatic exposure   control for dose modulation. FINDINGS: No pulmonary embolism is demonstrated. There is no pleural or   pericardial effusion. Cardiac size is normal. No mediastinal or hilar mass is   shown. Small mediastinal lymph nodes are incidentally noted measuring up to 9 mm   in short axis dimension. The lungs are clear. Mild thoracic spine degenerative   changes are shown. CXR Results  (Last 48 hours)                 07/18/22 1203  XR CHEST PA LAT Final result    Impression:  No acute process or change compared to the prior exam.           Narrative:  Exam:  2 view chest       Indication: Chest pain       Comparison to 12/5/2021. PA and lateral views demonstrate normal heart size. There is no acute process in   the lung fields. The osseous structures are unremarkable. Medical Decision Making   I am the first provider for this patient. I reviewed the vital signs, available nursing notes, past medical history, past surgical history, family history and social history.     Vital Signs-Reviewed the patient's vital signs. Patient Vitals for the past 12 hrs:   Temp Pulse Resp BP SpO2   07/18/22 1142 97.8 °F (36.6 °C) 70 16 (!) 155/92 98 %       EKG interpretation: (Preliminary)  Rhythm: normal sinus rhythm; and regular . Rate (approx.): 70; Axis: normal; CT interval: normal; QRS interval: normal ; ST/T wave: normal; Other findings: normal.    Records Reviewed: Nursing Notes, Old Medical Records, Previous electrocardiograms and Ambulance Run Sheet    Provider Notes (Medical Decision Making):   ACS, costochondritis, reflux, gastritis, dissection    ED Course:   Initial assessment performed. The patients presenting problems have been discussed, and they are in agreement with the care plan formulated and outlined with them. I have encouraged them to ask questions as they arise throughout their visit. Progress note:    Patient's results were reviewed. The patient is feeling better. He is advised to follow-up, return to ER if worse           Critical Care Time:   0      Disposition:  home    DISCHARGE PLAN:  1. Discharge Medication List as of 7/18/2022  3:29 PM        START taking these medications    Details   methocarbamoL (Robaxin-750) 750 mg tablet Take 1 Tablet by mouth four (4) times daily as needed for Muscle Spasm(s). , Normal, Disp-20 Tablet, R-0      famotidine (Pepcid) 20 mg tablet Take 1 Tablet by mouth two (2) times a day., Normal, Disp-20 Tablet, R-0           CONTINUE these medications which have NOT CHANGED    Details   montelukast (SINGULAIR) 10 mg tablet TAKE ONE TABLET BY MOUTH EVERY DAY, Normal, Disp-90 Tablet, R-3      atenoloL (TENORMIN) 50 mg tablet Take 1 Tab by mouth daily. , Normal, Disp-30 Tablet, R-11      pantoprazole (PROTONIX) 40 mg tablet TAKE ONE TABLET BY MOUTH EVERY DAY, Normal, Disp-90 Tablet, R-0      clotrimazole (LOTRIMIN) 1 % topical cream Apply  to affected area two (2) times a day., Normal, Disp-15 g, R-0      vardenafiL (Levitra) 20 mg tablet Take 20 mg by mouth daily as needed for Erectile Dysfunction. , Normal, Disp-6 Tablet, R-11      pregabalin (LYRICA) 150 mg capsule Take 1 Capsule by mouth two (2) times a day. Max Daily Amount: 300 mg., Normal, Disp-60 Capsule, R-5      lisinopril-hydroCHLOROthiazide (PRINZIDE, ZESTORETIC) 20-25 mg per tablet Take 1 Tablet by mouth daily. , Normal, Disp-90 Tablet, R-3      atorvastatin (LIPITOR) 20 mg tablet Take 1 Tablet by mouth daily. , Normal, Disp-90 Tablet, R-3      amLODIPine (NORVASC) 10 mg tablet Take 1 Tablet by mouth daily. Indications: high blood pressure, Normal, Disp-90 Tablet, R-3      potassium chloride SR (KLOR-CON 10) 10 mEq tablet Take 1 Tablet by mouth daily. , Normal, Disp-30 Tablet, R-0      albuterol (PROVENTIL HFA, VENTOLIN HFA, PROAIR HFA) 90 mcg/actuation inhaler Take 1 Puff by inhalation every four (4) hours as needed for Wheezing., Normal, Disp-1 Each, R-5      Nebulizer & Compressor machine Use as directed, Print, Disp-1 Each, R-0      indomethacin (INDOCIN) 50 mg capsule Take 1 Capsule by mouth three (3) times daily as needed for Gout or Pain., Normal, Disp-90 Capsule, R-2      albuterol (PROVENTIL VENTOLIN) 2.5 mg /3 mL (0.083 %) nebu 3 mL by Nebulization route every four (4) hours as needed for Wheezing., Normal, Disp-90 Nebule, R-0      Linzess 290 mcg cap capsule TAKE ONE CAPSULE BY MOUTH EVERY DAY, Normal, Disp-90 Capsule, R-0, NIKO      aspirin delayed-release 81 mg tablet Take 1 Tab by mouth daily. , Normal, Disp-100 Tab, R-3      fluticasone propionate (Flonase Allergy Relief) 50 mcg/actuation nasal spray 2 Sprays by Both Nostrils route daily. , Normal, Disp-1 Bottle, R-5      cetirizine (ZyrTEC) 10 mg tablet Take 1 Tab by mouth daily. , Normal, Disp-20 Tab, R-0      polyethylene glycol (MIRALAX) 17 gram packet Take 1 Packet by mouth daily. , Normal, Disp-1 Each, R-5      Nebulizer Accessories kit Nebulizer cup w/ tubing; use every 4 hrs prn wheezing, Normal, Disp-1 Kit, R-5           2.    Follow-up Information       Follow up With Specialties Details Why Contact Info    Chantel Hernandez MD Internal Medicine Physician  As needed [address] [facility]  [address] [id number]      Evie Shin, [address] [facility] Vascular Surgery, Cardiovascular Disease Physician Call in 1 day  [address]  [place]  [address] [facility] EMERGENCY DEPT Emergency Medicine  If symptoms worsen [address]  [address] [facility]  [phone]          3. Return to ED if worse     Diagnosis     Clinical Impression:   1. Acute chest pain    2. Numbness and tingling        Attestations:    Abhishek Cunha MD        Please note that this dictation was completed with [employer], the computer voice recognition software. Quite often unanticipated grammatical, syntax, homophones, and other interpretive errors are inadvertently transcribed by the computer software. Please disregard these errors. Please excuse any errors that have escaped final proofreading. Thank you.

## 2022-07-18 NOTE — ED NOTES
I have reviewed discharge instructions with the patient. The patient verbalized understanding. Pt walked out of ER with steady gait. Ride was called for pt and pt is waiting in the lobby for ride.

## 2022-07-19 ENCOUNTER — TELEPHONE (OUTPATIENT)
Dept: INTERNAL MEDICINE CLINIC | Age: 57
End: 2022-07-19

## 2022-07-19 LAB
ATRIAL RATE: 70 BPM
CALCULATED P AXIS, ECG09: 16 DEGREES
CALCULATED R AXIS, ECG10: 20 DEGREES
CALCULATED T AXIS, ECG11: 24 DEGREES
DIAGNOSIS, 93000: NORMAL
P-R INTERVAL, ECG05: 170 MS
Q-T INTERVAL, ECG07: 392 MS
QRS DURATION, ECG06: 84 MS
QTC CALCULATION (BEZET), ECG08: 423 MS
VENTRICULAR RATE, ECG03: 70 BPM

## 2022-07-19 NOTE — TELEPHONE ENCOUNTER
Patient states he was seen in [facility] ER on yesterday, 7/18/22 for Chest & was advised to F/U with PCP. Patient has upcoming appt on 8/1/22 for Routine F/U. Please call to advise if patient needs to be seen sooner. Thank you        Patient states he needs 4 days Notice due to Transportation if needs to be seen sooner.      Patient states to also advise he has scheduled F/U appt with Cardiovascular Doctor on 8/18/22

## 2022-07-19 NOTE — TELEPHONE ENCOUNTER
Called, spoke with Pt. Two pt identifiers confirmed. Patient notified of response from Dr Anali Carbajal to keep 8/1/22 appt. Patient advised if symptoms worsen or persist to return to the emergency room. Pt verbalized understanding of information discussed w/ no further questions at this time.

## 2022-07-28 ENCOUNTER — APPOINTMENT (OUTPATIENT)
Dept: GENERAL RADIOLOGY | Age: 57
End: 2022-07-28
Attending: STUDENT IN AN ORGANIZED HEALTH CARE EDUCATION/TRAINING PROGRAM
Payer: MEDICARE

## 2022-07-28 ENCOUNTER — HOSPITAL ENCOUNTER (EMERGENCY)
Age: 57
Discharge: HOME | End: 2022-07-28
Attending: EMERGENCY MEDICINE
Payer: MEDICARE

## 2022-07-28 VITALS
BODY MASS INDEX: 39.17 KG/M2 | OXYGEN SATURATION: 92 % | SYSTOLIC BLOOD PRESSURE: 119 MMHG | TEMPERATURE: 98.1 F | WEIGHT: 315 LBS | DIASTOLIC BLOOD PRESSURE: 79 MMHG | HEIGHT: 75 IN | RESPIRATION RATE: 20 BRPM | HEART RATE: 70 BPM

## 2022-07-28 DIAGNOSIS — L02.239 CARBUNCLE AND FURUNCLE OF TRUNK: ICD-10-CM

## 2022-07-28 DIAGNOSIS — Z20.822 PERSON UNDER INVESTIGATION FOR COVID-19: ICD-10-CM

## 2022-07-28 DIAGNOSIS — J01.90 ACUTE RHINOSINUSITIS: Primary | ICD-10-CM

## 2022-07-28 DIAGNOSIS — L02.229 CARBUNCLE AND FURUNCLE OF TRUNK: ICD-10-CM

## 2022-07-28 LAB
ALBUMIN SERPL-MCNC: 4.1 G/DL (ref 3.5–5)
ALBUMIN/GLOB SERPL: 1.2 {RATIO} (ref 1.1–2.2)
ALP SERPL-CCNC: 87 U/L (ref 45–117)
ALT SERPL-CCNC: 64 U/L (ref 12–78)
ANION GAP SERPL CALC-SCNC: 5 MMOL/L (ref 5–15)
AST SERPL-CCNC: 58 U/L (ref 15–37)
BASOPHILS # BLD: 0 K/UL (ref 0–0.1)
BASOPHILS NFR BLD: 0 % (ref 0–1)
BILIRUB SERPL-MCNC: 0.9 MG/DL (ref 0.2–1)
BNP SERPL-MCNC: 39 PG/ML
BUN SERPL-MCNC: 22 MG/DL (ref 6–20)
BUN/CREAT SERPL: 18 (ref 12–20)
CALCIUM SERPL-MCNC: 9 MG/DL (ref 8.5–10.1)
CHLORIDE SERPL-SCNC: 105 MMOL/L (ref 97–108)
CO2 SERPL-SCNC: 27 MMOL/L (ref 21–32)
CREAT SERPL-MCNC: 1.22 MG/DL (ref 0.7–1.3)
DIFFERENTIAL METHOD BLD: ABNORMAL
EOSINOPHIL # BLD: 0.1 K/UL (ref 0–0.4)
EOSINOPHIL NFR BLD: 1 % (ref 0–7)
ERYTHROCYTE [DISTWIDTH] IN BLOOD BY AUTOMATED COUNT: 13.2 % (ref 11.5–14.5)
GLOBULIN SER CALC-MCNC: 3.3 G/DL (ref 2–4)
GLUCOSE SERPL-MCNC: 100 MG/DL (ref 65–100)
HCT VFR BLD AUTO: 46.1 % (ref 36.6–50.3)
HGB BLD-MCNC: 15 G/DL (ref 12.1–17)
IMM GRANULOCYTES # BLD AUTO: 0 K/UL (ref 0–0.04)
IMM GRANULOCYTES NFR BLD AUTO: 0 % (ref 0–0.5)
LYMPHOCYTES # BLD: 0.6 K/UL (ref 0.8–3.5)
LYMPHOCYTES NFR BLD: 9 % (ref 12–49)
MCH RBC QN AUTO: 28 PG (ref 26–34)
MCHC RBC AUTO-ENTMCNC: 32.5 G/DL (ref 30–36.5)
MCV RBC AUTO: 86 FL (ref 80–99)
MONOCYTES # BLD: 0.6 K/UL (ref 0–1)
MONOCYTES NFR BLD: 9 % (ref 5–13)
NEUTS SEG # BLD: 5 K/UL (ref 1.8–8)
NEUTS SEG NFR BLD: 81 % (ref 32–75)
NRBC # BLD: 0 K/UL (ref 0–0.01)
NRBC BLD-RTO: 0 PER 100 WBC
PLATELET # BLD AUTO: 154 K/UL (ref 150–400)
PMV BLD AUTO: 10.8 FL (ref 8.9–12.9)
POTASSIUM SERPL-SCNC: 4 MMOL/L (ref 3.5–5.1)
PROT SERPL-MCNC: 7.4 G/DL (ref 6.4–8.2)
RBC # BLD AUTO: 5.36 M/UL (ref 4.1–5.7)
RBC MORPH BLD: ABNORMAL
SARS-COV-2, XPLCVT: DETECTED
SODIUM SERPL-SCNC: 137 MMOL/L (ref 136–145)
SOURCE, COVRS: ABNORMAL
TROPONIN-HIGH SENSITIVITY: 7 NG/L (ref 0–76)
WBC # BLD AUTO: 6.3 K/UL (ref 4.1–11.1)

## 2022-07-28 PROCEDURE — 36415 COLL VENOUS BLD VENIPUNCTURE: CPT

## 2022-07-28 PROCEDURE — U0005 INFEC AGEN DETEC AMPLI PROBE: HCPCS

## 2022-07-28 PROCEDURE — 83880 ASSAY OF NATRIURETIC PEPTIDE: CPT

## 2022-07-28 PROCEDURE — 93005 ELECTROCARDIOGRAM TRACING: CPT

## 2022-07-28 PROCEDURE — 85025 COMPLETE CBC W/AUTO DIFF WBC: CPT

## 2022-07-28 PROCEDURE — 74011250636 HC RX REV CODE- 250/636: Performed by: EMERGENCY MEDICINE

## 2022-07-28 PROCEDURE — 71045 X-RAY EXAM CHEST 1 VIEW: CPT

## 2022-07-28 PROCEDURE — 96374 THER/PROPH/DIAG INJ IV PUSH: CPT

## 2022-07-28 PROCEDURE — 94640 AIRWAY INHALATION TREATMENT: CPT

## 2022-07-28 PROCEDURE — 99285 EMERGENCY DEPT VISIT HI MDM: CPT

## 2022-07-28 PROCEDURE — 80053 COMPREHEN METABOLIC PANEL: CPT

## 2022-07-28 PROCEDURE — 84484 ASSAY OF TROPONIN QUANT: CPT

## 2022-07-28 PROCEDURE — 74011000250 HC RX REV CODE- 250: Performed by: EMERGENCY MEDICINE

## 2022-07-28 PROCEDURE — 96375 TX/PRO/DX INJ NEW DRUG ADDON: CPT

## 2022-07-28 RX ORDER — BENZONATATE 100 MG/1
100 CAPSULE ORAL
Qty: 21 CAPSULE | Refills: 0 | Status: SHIPPED | OUTPATIENT
Start: 2022-07-28 | End: 2022-08-04

## 2022-07-28 RX ORDER — DOXYCYCLINE HYCLATE 100 MG
100 TABLET ORAL 2 TIMES DAILY
Qty: 14 TABLET | Refills: 0 | Status: SHIPPED | OUTPATIENT
Start: 2022-07-28 | End: 2022-08-04

## 2022-07-28 RX ORDER — KETOROLAC TROMETHAMINE 30 MG/ML
30 INJECTION, SOLUTION INTRAMUSCULAR; INTRAVENOUS
Status: COMPLETED | OUTPATIENT
Start: 2022-07-28 | End: 2022-07-28

## 2022-07-28 RX ORDER — IPRATROPIUM BROMIDE AND ALBUTEROL SULFATE 2.5; .5 MG/3ML; MG/3ML
3 SOLUTION RESPIRATORY (INHALATION)
Status: COMPLETED | OUTPATIENT
Start: 2022-07-28 | End: 2022-07-28

## 2022-07-28 RX ORDER — DEXAMETHASONE SODIUM PHOSPHATE 10 MG/ML
6 INJECTION INTRAMUSCULAR; INTRAVENOUS ONCE
Status: COMPLETED | OUTPATIENT
Start: 2022-07-28 | End: 2022-07-28

## 2022-07-28 RX ADMIN — DEXAMETHASONE SODIUM PHOSPHATE 6 MG: 10 INJECTION, SOLUTION INTRAMUSCULAR; INTRAVENOUS at 12:05

## 2022-07-28 RX ADMIN — IPRATROPIUM BROMIDE AND ALBUTEROL SULFATE 3 ML: .5; 3 SOLUTION RESPIRATORY (INHALATION) at 13:11

## 2022-07-28 RX ADMIN — KETOROLAC TROMETHAMINE 30 MG: 30 INJECTION, SOLUTION INTRAMUSCULAR at 12:08

## 2022-07-28 NOTE — DISCHARGE INSTRUCTIONS
You were seen in the ER for your symptoms. Please use the cough medicine. You will given long-term steroids which should hopefully help with your cough and shortness of breath. Please continue to use the inhaler. You will be called if your COVID test is positive. Please use the antibiotics to help with the rash on your stomach. Return for new or worsening symptoms anytime especially fevers or chills, increased shortness of breath or pain.

## 2022-07-28 NOTE — ED NOTES
I have reviewed discharge instructions with the patient. The patient verbalized understanding. Pt alert and ambulatory at discharge.

## 2022-07-28 NOTE — ED PROVIDER NOTES
EMERGENCY DEPARTMENT HISTORY AND PHYSICAL EXAM      Date: 7/28/2022  Patient Name: Gonzalo Hoyt    History of Presenting Illness     Chief Complaint   Patient presents with    Shortness of Breath     Pt arrives via EMS with reports SOB, cough, generalized body aches and chills starting yesterday. Patient reports hx of asthma. Generalized Body Aches    Chills       History Provided By: Patient    HPI: Gonzalo Hoyt, 62 y.o. male presents to the ED with cc of shortness of breath and skin problem. 54-year-old male with history of asthma presents emergency department with cough and shortness of breath. Patient reports symptoms began 1.5 days ago with a \"tickle\" in his throat. He reports multiple episodes of coughing and sneezing. The symptoms are moderate in intensity and constant. They disrupt his sleep. They do not improve with his MDI. He denies any fevers or chills. Does report a chest \"tightness. \"  Denies any abdominal pain, nausea, vomiting or diarrhea. Denies any leg swelling. Patient does report 1 week prior to the symptoms he noted a \"pimple\" on his abdomen. He reports this subsequently became inflamed and a hard nodule. It is approximately 1 cm under his umbilicus with some surrounding erythema. There are no other complaints, changes, or physical findings at this time. PCP: Olga Keller MD    No current facility-administered medications on file prior to encounter. Current Outpatient Medications on File Prior to Encounter   Medication Sig Dispense Refill    Linzess 290 mcg cap capsule TAKE ONE CAPSULE BY MOUTH EVERY DAY 90 Capsule 3    methocarbamoL (Robaxin-750) 750 mg tablet Take 1 Tablet by mouth four (4) times daily as needed for Muscle Spasm(s). 20 Tablet 0    famotidine (Pepcid) 20 mg tablet Take 1 Tablet by mouth two (2) times a day.  20 Tablet 0    montelukast (SINGULAIR) 10 mg tablet TAKE ONE TABLET BY MOUTH EVERY DAY 90 Tablet 3    atenoloL (TENORMIN) 50 mg tablet Take 1 Tab by mouth daily. 30 Tablet 11    pantoprazole (PROTONIX) 40 mg tablet TAKE ONE TABLET BY MOUTH EVERY DAY 90 Tablet 0    clotrimazole (LOTRIMIN) 1 % topical cream Apply  to affected area two (2) times a day. 15 g 0    vardenafiL (Levitra) 20 mg tablet Take 20 mg by mouth daily as needed for Erectile Dysfunction. 6 Tablet 11    pregabalin (LYRICA) 150 mg capsule Take 1 Capsule by mouth two (2) times a day. Max Daily Amount: 300 mg. 60 Capsule 5    lisinopril-hydroCHLOROthiazide (PRINZIDE, ZESTORETIC) 20-25 mg per tablet Take 1 Tablet by mouth daily. 90 Tablet 3    atorvastatin (LIPITOR) 20 mg tablet Take 1 Tablet by mouth daily. 90 Tablet 3    amLODIPine (NORVASC) 10 mg tablet Take 1 Tablet by mouth daily. Indications: high blood pressure 90 Tablet 3    potassium chloride SR (KLOR-CON 10) 10 mEq tablet Take 1 Tablet by mouth daily. 30 Tablet 0    albuterol (PROVENTIL HFA, VENTOLIN HFA, PROAIR HFA) 90 mcg/actuation inhaler Take 1 Puff by inhalation every four (4) hours as needed for Wheezing. 1 Each 5    Nebulizer & Compressor machine Use as directed 1 Each 0    indomethacin (INDOCIN) 50 mg capsule Take 1 Capsule by mouth three (3) times daily as needed for Gout or Pain. 90 Capsule 2    albuterol (PROVENTIL VENTOLIN) 2.5 mg /3 mL (0.083 %) nebu 3 mL by Nebulization route every four (4) hours as needed for Wheezing. 90 Nebule 0    aspirin delayed-release 81 mg tablet Take 1 Tab by mouth daily. 100 Tab 3    fluticasone propionate (Flonase Allergy Relief) 50 mcg/actuation nasal spray 2 Sprays by Both Nostrils route daily. 1 Bottle 5    cetirizine (ZyrTEC) 10 mg tablet Take 1 Tab by mouth daily. 20 Tab 0    polyethylene glycol (MIRALAX) 17 gram packet Take 1 Packet by mouth daily.  1 Each 5    Nebulizer Accessories kit Nebulizer cup w/ tubing; use every 4 hrs prn wheezing 1 Kit 5       Past History     Past Medical History:  Past Medical History:   Diagnosis Date    Arthritis     Asthma     Chronic low back pain     Hypertension        Past Surgical History:  Past Surgical History:   Procedure Laterality Date    HX ORTHOPAEDIC         Family History:  Family History   Problem Relation Age of Onset    Cancer Mother     Heart Disease Father        Social History:  Social History     Tobacco Use    Smoking status: Former     Packs/day: 0.25     Years: 10.00     Pack years: 2.50     Types: Cigarettes    Smokeless tobacco: Never   Vaping Use    Vaping Use: Never used   Substance Use Topics    Alcohol use: Yes     Comment: occ    Drug use: No       Allergies: Allergies   Allergen Reactions    Vicodin [Hydrocodone-Acetaminophen] Other (comments)     Headache         Review of Systems   Review of Systems   Constitutional:  Positive for chills. Negative for fever. HENT:  Positive for sneezing and sore throat. Negative for voice change. Eyes:  Negative for pain and redness. Respiratory:  Positive for cough. Negative for chest tightness. Cardiovascular:  Positive for chest pain. Negative for leg swelling. Gastrointestinal:  Negative for abdominal pain, diarrhea, nausea and vomiting. Genitourinary:  Negative for hematuria. Musculoskeletal:  Negative for gait problem. Skin:  Negative for color change, pallor and rash. Neurological:  Negative for facial asymmetry, weakness and headaches. Hematological:  Does not bruise/bleed easily. Psychiatric/Behavioral:  Negative for behavioral problems. All other systems reviewed and are negative. Physical Exam   Physical Exam  Vitals and nursing note reviewed. Constitutional:       Comments: 49-year-old male, resting in stretcher, no acute distress   HENT:      Head: Normocephalic and atraumatic. Nose: Congestion and rhinorrhea present. Mouth/Throat:      Mouth: Mucous membranes are moist.   Eyes:      Pupils: Pupils are equal, round, and reactive to light. Cardiovascular:      Rate and Rhythm: Normal rate and regular rhythm.       Pulses: Normal pulses. Heart sounds: No murmur heard. No friction rub. No gallop. Pulmonary:      Effort: Pulmonary effort is normal. No tachypnea. Breath sounds: Normal breath sounds. No decreased breath sounds, wheezing, rhonchi or rales. Comments: Not hypoxic on room air, occasional cough  Abdominal:      General: Abdomen is flat. There is no distension. Palpations: Abdomen is soft. Tenderness: There is no abdominal tenderness. Comments: Easily reducible umbilical hernia   Musculoskeletal:         General: Normal range of motion. Cervical back: Normal range of motion. Right lower leg: No edema. Left lower leg: No edema. Skin:     General: Skin is warm and dry. Capillary Refill: Capillary refill takes less than 2 seconds. Comments: There is a small carbuncle below the umbilicus with mild surrounding erythema   Neurological:      General: No focal deficit present. Mental Status: He is alert.    Psychiatric:         Mood and Affect: Mood normal.       Diagnostic Study Results     Labs -     Recent Results (from the past 12 hour(s))   EKG, 12 LEAD, INITIAL    Collection Time: 07/28/22  9:47 AM   Result Value Ref Range    Ventricular Rate 77 BPM    Atrial Rate 77 BPM    P-R Interval 158 ms    QRS Duration 86 ms    Q-T Interval 378 ms    QTC Calculation (Bezet) 427 ms    Calculated P Axis 0 degrees    Calculated R Axis 16 degrees    Calculated T Axis 11 degrees    Diagnosis       Normal sinus rhythm  Normal ECG  When compared with ECG of 18-JUL-2022 11:34,  No significant change was found     CBC WITH AUTOMATED DIFF    Collection Time: 07/28/22 11:40 AM   Result Value Ref Range    WBC 6.3 4.1 - 11.1 K/uL    RBC 5.36 4.10 - 5.70 M/uL    HGB 15.0 12.1 - 17.0 g/dL    HCT 46.1 36.6 - 50.3 %    MCV 86.0 80.0 - 99.0 FL    MCH 28.0 26.0 - 34.0 PG    MCHC 32.5 30.0 - 36.5 g/dL    RDW 13.2 11.5 - 14.5 %    PLATELET 515 315 - 642 K/uL    MPV 10.8 8.9 - 12.9 FL    NRBC 0.0 0 PER 100 WBC    ABSOLUTE NRBC 0.00 0.00 - 0.01 K/uL    NEUTROPHILS 81 (H) 32 - 75 %    LYMPHOCYTES 9 (L) 12 - 49 %    MONOCYTES 9 5 - 13 %    EOSINOPHILS 1 0 - 7 %    BASOPHILS 0 0 - 1 %    IMMATURE GRANULOCYTES 0 0.0 - 0.5 %    ABS. NEUTROPHILS 5.0 1.8 - 8.0 K/UL    ABS. LYMPHOCYTES 0.6 (L) 0.8 - 3.5 K/UL    ABS. MONOCYTES 0.6 0.0 - 1.0 K/UL    ABS. EOSINOPHILS 0.1 0.0 - 0.4 K/UL    ABS. BASOPHILS 0.0 0.0 - 0.1 K/UL    ABS. IMM. GRANS. 0.0 0.00 - 0.04 K/UL    DF SMEAR SCANNED      RBC COMMENTS NORMOCYTIC, NORMOCHROMIC     METABOLIC PANEL, COMPREHENSIVE    Collection Time: 07/28/22 11:40 AM   Result Value Ref Range    Sodium 137 136 - 145 mmol/L    Potassium 4.0 3.5 - 5.1 mmol/L    Chloride 105 97 - 108 mmol/L    CO2 27 21 - 32 mmol/L    Anion gap 5 5 - 15 mmol/L    Glucose 100 65 - 100 mg/dL    BUN 22 (H) 6 - 20 MG/DL    Creatinine 1.22 0.70 - 1.30 MG/DL    BUN/Creatinine ratio 18 12 - 20      GFR est AA >60 >60 ml/min/1.73m2    GFR est non-AA >60 >60 ml/min/1.73m2    Calcium 9.0 8.5 - 10.1 MG/DL    Bilirubin, total 0.9 0.2 - 1.0 MG/DL    ALT (SGPT) 64 12 - 78 U/L    AST (SGOT) 58 (H) 15 - 37 U/L    Alk. phosphatase 87 45 - 117 U/L    Protein, total 7.4 6.4 - 8.2 g/dL    Albumin 4.1 3.5 - 5.0 g/dL    Globulin 3.3 2.0 - 4.0 g/dL    A-G Ratio 1.2 1.1 - 2.2     TROPONIN-HIGH SENSITIVITY    Collection Time: 07/28/22 11:40 AM   Result Value Ref Range    Troponin-High Sensitivity 7 0 - 76 ng/L   NT-PRO BNP    Collection Time: 07/28/22 11:40 AM   Result Value Ref Range    NT pro-BNP 39 <125 PG/ML       Radiologic Studies -   XR CHEST PORT   Final Result   No acute process. CT Results  (Last 48 hours)      None          CXR Results  (Last 48 hours)                 07/28/22 1009  XR CHEST PORT Final result    Impression:  No acute process. Narrative: Indication: Shortness of breath       Comparison: 7/18/2022       Portable exam of the chest obtained at 1009 demonstrates normal heart size.    There is no acute process in the lung fields. The osseous structures are   unremarkable. Medical Decision Making   I am the first provider for this patient. I reviewed the vital signs, available nursing notes, past medical history, past surgical history, family history and social history. Vital Signs-Reviewed the patient's vital signs. Patient Vitals for the past 12 hrs:   Temp Pulse Resp BP SpO2   07/28/22 1320 -- 70 20 119/79 92 %   07/28/22 1311 -- -- -- -- 93 %   07/28/22 1210 -- 83 17 (!) 121/102 95 %   07/28/22 1147 -- -- -- -- 94 %   07/28/22 1145 -- 81 20 137/81 93 %   07/28/22 1135 -- -- 18 -- 96 %   07/28/22 0940 98.1 °F (36.7 °C) 87 20 100/61 100 %     Records Reviewed: Nursing Notes and Old Medical Records    Provider Notes (Medical Decision Making):     49-year-old male with a history of asthma presents emergency department with upper nasal congestion and postnasal drip. Vitals are unremarkable. He is not hypoxic. He does have a frequent cough. I am very suspicious for acute rhinosinusitis, will rule out COVID. This is likely triggering patient's underlying lung disease. I will give a dose of Decadron as patient states he does not want to be on long-term steroids. I discussed this will help with his cough and respiratory symptoms. Plain film negative for pneumonia. No significant leukocytosis. I doubt that this is a CHF exacerbation given infectious symptoms, additionally doubt PE. Of note, patient does have a small carbuncle on his abdomen, I will cover with short course of doxycycline. Anticipate patient will be able to be discharged. ED Course:   Initial assessment performed. The patients presenting problems have been discussed, and they are in agreement with the care plan formulated and outlined with them. I have encouraged them to ask questions as they arise throughout their visit. ED Course as of 07/28/22 1447   Thu Jul 28, 2022   1149 EKG interpreted by me. Shows NSR with a HR of 77. No ST elevations or depressions concerning for ischemia. Normal intervals. [MB]      ED Course User Index  [MB] MD Sahra Pierre MD      Disposition:    Discharged    DISCHARGE PLAN:  1. Discharge Medication List as of 7/28/2022 12:52 PM        START taking these medications    Details   benzonatate (Tessalon Perles) 100 mg capsule Take 1 Capsule by mouth three (3) times daily as needed for Cough for up to 7 days. , Normal, Disp-21 Capsule, R-0      doxycycline (VIBRA-TABS) 100 mg tablet Take 1 Tablet by mouth two (2) times a day for 7 days. , Normal, Disp-14 Tablet, R-0           CONTINUE these medications which have NOT CHANGED    Details   Linzess 290 mcg cap capsule TAKE ONE CAPSULE BY MOUTH EVERY DAY, Normal, Disp-90 Capsule, R-3, NIKO      methocarbamoL (Robaxin-750) 750 mg tablet Take 1 Tablet by mouth four (4) times daily as needed for Muscle Spasm(s). , Normal, Disp-20 Tablet, R-0      famotidine (Pepcid) 20 mg tablet Take 1 Tablet by mouth two (2) times a day., Normal, Disp-20 Tablet, R-0      montelukast (SINGULAIR) 10 mg tablet TAKE ONE TABLET BY MOUTH EVERY DAY, Normal, Disp-90 Tablet, R-3      atenoloL (TENORMIN) 50 mg tablet Take 1 Tab by mouth daily. , Normal, Disp-30 Tablet, R-11      pantoprazole (PROTONIX) 40 mg tablet TAKE ONE TABLET BY MOUTH EVERY DAY, Normal, Disp-90 Tablet, R-0      clotrimazole (LOTRIMIN) 1 % topical cream Apply  to affected area two (2) times a day., Normal, Disp-15 g, R-0      vardenafiL (Levitra) 20 mg tablet Take 20 mg by mouth daily as needed for Erectile Dysfunction. , Normal, Disp-6 Tablet, R-11      pregabalin (LYRICA) 150 mg capsule Take 1 Capsule by mouth two (2) times a day. Max Daily Amount: 300 mg., Normal, Disp-60 Capsule, R-5      lisinopril-hydroCHLOROthiazide (PRINZIDE, ZESTORETIC) 20-25 mg per tablet Take 1 Tablet by mouth daily. , Normal, Disp-90 Tablet, R-3      atorvastatin (LIPITOR) 20 mg tablet Take 1 Tablet by mouth daily. , Normal, Disp-90 Tablet, R-3      amLODIPine (NORVASC) 10 mg tablet Take 1 Tablet by mouth daily. Indications: high blood pressure, Normal, Disp-90 Tablet, R-3      potassium chloride SR (KLOR-CON 10) 10 mEq tablet Take 1 Tablet by mouth daily. , Normal, Disp-30 Tablet, R-0      albuterol (PROVENTIL HFA, VENTOLIN HFA, PROAIR HFA) 90 mcg/actuation inhaler Take 1 Puff by inhalation every four (4) hours as needed for Wheezing., Normal, Disp-1 Each, R-5      Nebulizer & Compressor machine Use as directed, Print, Disp-1 Each, R-0      indomethacin (INDOCIN) 50 mg capsule Take 1 Capsule by mouth three (3) times daily as needed for Gout or Pain., Normal, Disp-90 Capsule, R-2      albuterol (PROVENTIL VENTOLIN) 2.5 mg /3 mL (0.083 %) nebu 3 mL by Nebulization route every four (4) hours as needed for Wheezing., Normal, Disp-90 Nebule, R-0      aspirin delayed-release 81 mg tablet Take 1 Tab by mouth daily. , Normal, Disp-100 Tab, R-3      fluticasone propionate (Flonase Allergy Relief) 50 mcg/actuation nasal spray 2 Sprays by Both Nostrils route daily. , Normal, Disp-1 Bottle, R-5      cetirizine (ZyrTEC) 10 mg tablet Take 1 Tab by mouth daily. , Normal, Disp-20 Tab, R-0      polyethylene glycol (MIRALAX) 17 gram packet Take 1 Packet by mouth daily. , Normal, Disp-1 Each, R-5      Nebulizer Accessories kit Nebulizer cup w/ tubing; use every 4 hrs prn wheezing, Normal, Disp-1 Kit, R-5           2. Follow-up Information       Follow up With Specialties Details Why Contact Info    Leslie Michele MD Internal Medicine Physician In 3 days  [address]  [address] [facility]      [address] [facility] DEPT Emergency Medicine  If symptoms worsen [address]  [address] [facility]  [phone]          3. Return to ED if worse     Diagnosis     Clinical Impression:   1. Acute rhinosinusitis    2. Person under investigation for COVID-19    3.  Carbuncle and furuncle of trunk        Attestations:    Emiliana De La Rosa MD        Please note that this dictation was completed with [employer], the computer voice recognition software. Quite often unanticipated grammatical, syntax, homophones, and other interpretive errors are inadvertently transcribed by the computer software. Please disregard these errors. Please excuse any errors that have escaped final proofreading. Thank you.

## 2022-07-29 ENCOUNTER — VIRTUAL VISIT (OUTPATIENT)
Dept: INTERNAL MEDICINE CLINIC | Age: 57
End: 2022-07-29
Payer: MEDICARE

## 2022-07-29 DIAGNOSIS — I10 ESSENTIAL HYPERTENSION: ICD-10-CM

## 2022-07-29 DIAGNOSIS — U07.1 COVID-19: Primary | ICD-10-CM

## 2022-07-29 LAB
ATRIAL RATE: 77 BPM
CALCULATED P AXIS, ECG09: 0 DEGREES
CALCULATED R AXIS, ECG10: 16 DEGREES
CALCULATED T AXIS, ECG11: 11 DEGREES
DIAGNOSIS, 93000: NORMAL
P-R INTERVAL, ECG05: 158 MS
Q-T INTERVAL, ECG07: 378 MS
QRS DURATION, ECG06: 86 MS
QTC CALCULATION (BEZET), ECG08: 427 MS
VENTRICULAR RATE, ECG03: 77 BPM

## 2022-07-29 PROCEDURE — 99213 OFFICE O/P EST LOW 20 MIN: CPT | Performed by: INTERNAL MEDICINE

## 2022-07-29 PROCEDURE — G8427 DOCREV CUR MEDS BY ELIG CLIN: HCPCS | Performed by: INTERNAL MEDICINE

## 2022-07-29 PROCEDURE — G8417 CALC BMI ABV UP PARAM F/U: HCPCS | Performed by: INTERNAL MEDICINE

## 2022-07-29 PROCEDURE — 3017F COLORECTAL CA SCREEN DOC REV: CPT | Performed by: INTERNAL MEDICINE

## 2022-07-29 PROCEDURE — G9717 DOC PT DX DEP/BP F/U NT REQ: HCPCS | Performed by: INTERNAL MEDICINE

## 2022-07-29 PROCEDURE — G8756 NO BP MEASURE DOC: HCPCS | Performed by: INTERNAL MEDICINE

## 2022-07-29 RX ORDER — ACETAMINOPHEN 500 MG
500 TABLET ORAL
Qty: 60 TABLET | Refills: 1 | Status: SHIPPED | OUTPATIENT
Start: 2022-07-29

## 2022-07-29 RX ORDER — NIRMATRELVIR AND RITONAVIR 300-100 MG
KIT ORAL
Qty: 1 BOX | Refills: 0 | Status: SHIPPED | OUTPATIENT
Start: 2022-07-29

## 2022-07-29 NOTE — PROGRESS NOTES
Wendy Franz is a 62 y.o. male who was seen by synchronous (real-time) audio-video technology on 2022 for Positive For Covid-19        Progress Note         PROGRESS NOTE  Name: Wendy Franz   : 1965       ASSESSMENT/ PLAN:     Assessment:   COVID-19:     Plan:   Paxlovid, take as directed. I explained that hospitalization would require meeting certain criteria, such as hypoxia. If he feels unsafe he should seek an alternative arrangement such as a hotel or shelter. Right now, my advice for patients with COVID-19 is as follows:   Get plenty of rest, drink plenty of fluids. Take over the counter vitamin C and zinc supplements. Tylenol may be taken as needed for pain and fever. Obtain a pulse oximeter; go to the emergency department if oxygen saturation goes below than 90%, or if heart rate (pulse) is higher than 125 beats per minute. Go to the emergency department if fever goes higher than 101.5 degrees. F    Follow-up and Dispositions    Return if symptoms worsen or fail to improve. I have reviewed the patient's medications and risks/side effects/benefits were discussed. Diagnosis(-es) explained to patient and questions answered. Literature provided where appropriate. SUBJECTIVE  Mr. Wendy Franz presents today acutely for     Chief Complaint   Patient presents with    Positive For Covid-19       He went to ER about 9 days ago, with chest pains. Tests were negative. \"I'm stressed because my girlfriend is bipolar and has been screaming at me for 2 weeks. I think I need to go into some place safer, like a hospital, to recover. \"     Yesterday he went to ER. HPI: Wendy Franz, 62 y.o. male presents to the ED with cc of shortness of breath and skin problem. 80-year-old male with history of asthma presents emergency department with cough and shortness of breath. Patient reports symptoms began 1.5 days ago with a \"tickle\" in his throat.   He reports multiple episodes of coughing and sneezing. The symptoms are moderate in intensity and constant. They disrupt his sleep. They do not improve with his MDI. He denies any fevers or chills. Does report a chest \"tightness. \"  Denies any abdominal pain, nausea, vomiting or diarrhea. Denies any leg swelling. Patient does report 1 week prior to the symptoms he noted a \"pimple\" on his abdomen. He reports this subsequently became inflamed and a hard nodule. It is approximately 1 cm under his umbilicus with some surrounding erythema. He tested positive for COVID-19. He was given benzonatate, tessalon, and a one time dose of dexamethasone. OBJECTIVE  There were no vitals taken for this visit. Gen: Pleasant 62 y.o.  male in NAD.              Objective:     Patient-Reported Vitals 11/12/2021   Patient-Reported Weight 335lb   Patient-Reported Height -   Patient-Reported Pulse -   Patient-Reported Systolic  597   Patient-Reported Diastolic 96        [INSTRUCTIONS:  \"[x]\" Indicates a positive item  \"[]\" Indicates a negative item  -- DELETE ALL ITEMS NOT EXAMINED]    Constitutional: [x] Appears well-developed and well-nourished [x] No apparent distress      [] Abnormal -     Mental status: [x] Alert and awake  [x] Oriented to person/place/time [x] Able to follow commands    [] Abnormal -     Eyes:   EOM    [x]  Normal    [] Abnormal -   Sclera  [x]  Normal    [] Abnormal -          Discharge [x]  None visible   [] Abnormal -     HENT: [x] Normocephalic, atraumatic  [] Abnormal -   [x] Mouth/Throat: Mucous membranes are moist    External Ears [x] Normal  [] Abnormal -    Neck: [x] No visualized mass [] Abnormal -     Pulmonary/Chest: [x] Respiratory effort normal   [x] No visualized signs of difficulty breathing or respiratory distress        [] Abnormal -      Musculoskeletal:   [x] Normal gait with no signs of ataxia         [x] Normal range of motion of neck        [] Abnormal -     Neurological: [x] No Facial Asymmetry (Cranial nerve 7 motor function) (limited exam due to video visit)          [x] No gaze palsy        [] Abnormal -          Skin:        [x] No significant exanthematous lesions or discoloration noted on facial skin         [] Abnormal -            Psychiatric:       [x] Normal Affect [] Abnormal -       Other pertinent observable physical exam findings:-        We discussed the expected course, resolution and complications of the diagnosis(es) in detail. Medication risks, benefits, costs, interactions, and alternatives were discussed as indicated. I advised him to contact the office if his condition worsens, changes or fails to improve as anticipated. He expressed understanding with the diagnosis(es) and plan. Pierre Haney, who was evaluated through a patient-initiated, synchronous (real-time) audio-video encounter, and/or his healthcare decision maker, is aware that it is a billable service, with coverage as determined by his insurance carrier. He provided verbal consent to proceed: YES, and patient identification was verified. It was conducted pursuant to the emergency declaration under the [address] [place] [address], [address] [place] [address] authority and the [employer] Act. A caregiver was present when appropriate. Ability to conduct physical exam was limited. I was in office. The patient was at home or otherwise outside the office.       Aisha Briceño MD

## 2022-07-29 NOTE — PROGRESS NOTES
1. \"Have you been to the ER, urgent care clinic since your last visit? Hospitalized since your last visit? \" Yes When: COVID Chest pain    2. \"Have you seen or consulted any other health care providers outside of the [address] [place] [address] since your last visit? \" No     3. For patients aged 39-70: Has the patient had a colonoscopy / FIT/ Cologuard?  Past Due

## 2022-08-05 DIAGNOSIS — I10 ESSENTIAL HYPERTENSION: Chronic | ICD-10-CM

## 2022-08-05 DIAGNOSIS — K21.9 GASTROESOPHAGEAL REFLUX DISEASE WITHOUT ESOPHAGITIS: ICD-10-CM

## 2022-08-07 RX ORDER — PANTOPRAZOLE SODIUM 40 MG/1
TABLET, DELAYED RELEASE ORAL
Qty: 90 TABLET | Refills: 0 | Status: SHIPPED | OUTPATIENT
Start: 2022-08-07

## 2022-08-07 RX ORDER — ASPIRIN 81 MG/1
81 TABLET ORAL DAILY
Qty: 30 TABLET | Refills: 0 | Status: SHIPPED | OUTPATIENT
Start: 2022-08-07

## 2022-09-15 RX ORDER — CHLORPHENIRAMINE MALEATE 4 MG
TABLET ORAL 2 TIMES DAILY
Qty: 15 G | Refills: 0 | Status: SHIPPED | OUTPATIENT
Start: 2022-09-15

## 2022-09-16 DIAGNOSIS — G57.91 NEUROPATHY OF RIGHT LOWER EXTREMITY: ICD-10-CM

## 2022-09-17 RX ORDER — PREGABALIN 150 MG/1
CAPSULE ORAL
Qty: 60 CAPSULE | Refills: 0 | Status: SHIPPED | OUTPATIENT
Start: 2022-09-17

## 2022-10-04 ENCOUNTER — HOSPITAL ENCOUNTER (EMERGENCY)
Age: 57
Discharge: HOME | End: 2022-10-04
Attending: STUDENT IN AN ORGANIZED HEALTH CARE EDUCATION/TRAINING PROGRAM
Payer: MEDICARE

## 2022-10-04 VITALS
BODY MASS INDEX: 29.59 KG/M2 | HEIGHT: 75 IN | SYSTOLIC BLOOD PRESSURE: 139 MMHG | WEIGHT: 238 LBS | OXYGEN SATURATION: 92 % | DIASTOLIC BLOOD PRESSURE: 82 MMHG | RESPIRATION RATE: 23 BRPM | HEART RATE: 92 BPM | TEMPERATURE: 97.7 F

## 2022-10-04 DIAGNOSIS — T40.2X1A OPIOID OVERDOSE, ACCIDENTAL OR UNINTENTIONAL, INITIAL ENCOUNTER (HCC): Primary | ICD-10-CM

## 2022-10-04 DIAGNOSIS — F19.10 POLYSUBSTANCE ABUSE (HCC): ICD-10-CM

## 2022-10-04 PROCEDURE — 99284 EMERGENCY DEPT VISIT MOD MDM: CPT

## 2022-10-04 RX ORDER — NALOXONE HYDROCHLORIDE 4 MG/.1ML
SPRAY NASAL
Qty: 1 EACH | Refills: 0 | Status: SHIPPED | OUTPATIENT
Start: 2022-10-04

## 2022-10-04 NOTE — ED PROVIDER NOTES
EMERGENCY DEPARTMENT HISTORY AND PHYSICAL EXAM      Date: 10/4/2022  Patient Name: Rodrigo Woody    History of Presenting Illness     Chief Complaint   Patient presents with    Drug Overdose     Via EMS. Initially unresponsive to low O2 sats and slow repsiratory rate. Family gave 4 mg intranasal narcan. EMS gave 1 mg IV and pt started to recover. They gave a second IV dose and pt woke up fully. Pt A&O x 4 on presentation to ER. History Provided By: Patient    HPI: Rodrigo Woody, 62 y.o. male with a past medical history significant for hypertension, asthma, polysubstance abuse presents to the ED with cc of overdose. He notes he drank approximately half a gallon of bourbon today as well as did a number of drugs including smoking crack, smoking marijuana and snorting heroin. He notes he does not remember much of what happened today but per EMS he was found down by his friends. They gave intranasal Narcan with no response. EMS arrived on scene and gave 1 mg of Narcan IV with no relief and then another milligram with full return to baseline. Patient has no complaints right now except for dry mouth and feeling intoxicated. He notes an friend who was using drugs with him also overdose and he did 6 intranasal Narcan to wake up. He denies any chest pain, shortness of breath, headache, dizziness. There are no associated symptoms. No other exacerbating or ameliorating factors. PCP: Zoë Chu MD    No current facility-administered medications on file prior to encounter. Current Outpatient Medications on File Prior to Encounter   Medication Sig Dispense Refill    ProAir HFA 90 mcg/actuation inhaler INHALE 1 PUFF EVERY 4 HOURS AS NEEDED FOR WHEEZING. 8.5 g 5    albuterol (PROVENTIL VENTOLIN) 2.5 mg /3 mL (0.083 %) nebu USE 1 VIAL VIA NEBULIZER EVERY 4 HOURS AS NEEDED FOR WHEEZING.  150 mL 5    pregabalin (LYRICA) 150 mg capsule TAKE ONE CAPSULE BY MOUTH 2 TIMES A DAY 60 Capsule 0 clotrimazole (LOTRIMIN) 1 % topical cream Apply to affected area two (2) times a day. 15 g 0    pantoprazole (PROTONIX) 40 mg tablet TAKE ONE TABLET BY MOUTH EVERY DAY 90 Tablet 0    aspirin delayed-release 81 mg tablet Take 1 Tab by mouth daily. 30 Tablet 0    nirmatrelvir-ritonavir (Paxlovid, EUA,) 300 mg (150 mg x 2)-100 mg tablet Use as directed 1 Box 0    acetaminophen (TYLENOL) 500 mg tablet Take 1 Tablet by mouth every six (6) hours as needed for Pain. 60 Tablet 1    Linzess 290 mcg cap capsule TAKE ONE CAPSULE BY MOUTH EVERY DAY 90 Capsule 3    methocarbamoL (Robaxin-750) 750 mg tablet Take 1 Tablet by mouth four (4) times daily as needed for Muscle Spasm(s). 20 Tablet 0    famotidine (Pepcid) 20 mg tablet Take 1 Tablet by mouth two (2) times a day. (Patient not taking: Reported on 7/29/2022) 20 Tablet 0    montelukast (SINGULAIR) 10 mg tablet TAKE ONE TABLET BY MOUTH EVERY DAY (Patient not taking: Reported on 7/29/2022) 90 Tablet 3    atenoloL (TENORMIN) 50 mg tablet Take 1 Tab by mouth daily. 30 Tablet 11    vardenafiL (Levitra) 20 mg tablet Take 20 mg by mouth daily as needed for Erectile Dysfunction. (Patient not taking: Reported on 7/29/2022) 6 Tablet 11    lisinopril-hydroCHLOROthiazide (PRINZIDE, ZESTORETIC) 20-25 mg per tablet Take 1 Tablet by mouth daily. 90 Tablet 3    atorvastatin (LIPITOR) 20 mg tablet Take 1 Tablet by mouth daily. 90 Tablet 3    amLODIPine (NORVASC) 10 mg tablet Take 1 Tablet by mouth daily. Indications: high blood pressure 90 Tablet 3    potassium chloride SR (KLOR-CON 10) 10 mEq tablet Take 1 Tablet by mouth daily. (Patient not taking: Reported on 7/29/2022) 30 Tablet 0    Nebulizer & Compressor machine Use as directed 1 Each 0    indomethacin (INDOCIN) 50 mg capsule Take 1 Capsule by mouth three (3) times daily as needed for Gout or Pain. 90 Capsule 2    fluticasone propionate (Flonase Allergy Relief) 50 mcg/actuation nasal spray 2 Sprays by Both Nostrils route daily. (Patient not taking: Reported on 7/29/2022) 1 Bottle 5    cetirizine (ZyrTEC) 10 mg tablet Take 1 Tab by mouth daily. 20 Tab 0    polyethylene glycol (MIRALAX) 17 gram packet Take 1 Packet by mouth daily. 1 Each 5    Nebulizer Accessories kit Nebulizer cup w/ tubing; use every 4 hrs prn wheezing 1 Kit 5       Past History     Past Medical History:  Past Medical History:   Diagnosis Date    Arthritis     Asthma     Chronic low back pain     Hypertension        Past Surgical History:  Past Surgical History:   Procedure Laterality Date    HX ORTHOPAEDIC         Family History:  Family History   Problem Relation Age of Onset    Cancer Mother     Heart Disease Father        Social History:  Social History     Tobacco Use    Smoking status: Former     Packs/day: 0.25     Years: 10.00     Pack years: 2.50     Types: Cigarettes    Smokeless tobacco: Never   Vaping Use    Vaping Use: Never used   Substance Use Topics    Alcohol use: Yes     Comment: occ    Drug use: No       Allergies: Allergies   Allergen Reactions    Vicodin [Hydrocodone-Acetaminophen] Other (comments)     Headache         Review of Systems   Review of Systems   Constitutional:  Negative for chills and fever. HENT:  Negative for congestion and sore throat. Dry mouth   Respiratory:  Negative for cough and shortness of breath. Cardiovascular:  Negative for chest pain and leg swelling. Gastrointestinal:  Negative for abdominal pain, blood in stool, diarrhea and nausea. Genitourinary:  Negative for dysuria and testicular pain. Musculoskeletal:  Negative for arthralgias, joint swelling and myalgias. Skin:  Negative for rash. Neurological:  Negative for weakness and headaches. Hematological:  Does not bruise/bleed easily. Physical Exam   Physical Exam  Vitals and nursing note reviewed. Constitutional:       Appearance: Normal appearance. HENT:      Head: Normocephalic and atraumatic.       Nose: Nose normal.      Mouth/Throat: Mouth: Mucous membranes are moist.      Pharynx: Oropharynx is clear. Eyes:      Extraocular Movements: Extraocular movements intact. Pupils: Pupils are equal, round, and reactive to light. Cardiovascular:      Rate and Rhythm: Normal rate and regular rhythm. Pulmonary:      Effort: Pulmonary effort is normal.      Breath sounds: Normal breath sounds. Abdominal:      General: Abdomen is flat. Palpations: Abdomen is soft. Musculoskeletal:         General: No swelling or deformity. Cervical back: Normal range of motion. Skin:     General: Skin is dry. Neurological:      General: No focal deficit present. Mental Status: He is alert and oriented to person, place, and time. Psychiatric:         Mood and Affect: Mood normal.         Behavior: Behavior normal.       Diagnostic Study Results     Labs -   No results found for this or any previous visit (from the past 24 hour(s)). Radiologic Studies -   No orders to display     CT Results  (Last 48 hours)      None          CXR Results  (Last 48 hours)      None              Medical Decision Making   I am the first provider for this patient. I reviewed the vital signs, available nursing notes, past medical history, past surgical history, family history and social history. Vital Signs-Reviewed the patient's vital signs.   Patient Vitals for the past 12 hrs:   Temp Pulse Resp BP SpO2   10/04/22 1815 -- 91 18 132/88 97 %   10/04/22 1800 -- 94 19 (!) 151/75 96 %   10/04/22 1745 -- 89 18 (!) 144/82 96 %   10/04/22 1730 -- 92 18 -- 95 %   10/04/22 1715 -- 90 19 135/84 95 %   10/04/22 1700 -- 91 22 133/81 94 %   10/04/22 1645 -- 89 20 124/86 94 %   10/04/22 1630 -- 91 21 -- 92 %   10/04/22 1615 -- 92 25 120/79 --   10/04/22 1614 -- 91 23 -- 90 %   10/04/22 1600 -- 93 25 131/87 90 %   10/04/22 1546 97.7 °F (36.5 °C) 93 21 130/85 93 %   10/04/22 1545 -- 93 21 130/85 91 %       Records Reviewed: Nursing records and medical records reviewed    MDM:  DDx includes opioid intoxication, opiate overdose, altered mental status, syncope    Provider Notes (Medical Decision Making):   57-year-old male with history of hypertension and asthma presents after presumed opioid overdose. Vital signs are stable upon arrival with O2 saturation hovering in the low 90s. He appears well is in no acute distress at this time. Pupils are 2 mm and reactive at this time. He does admit to using a large number of drugs this morning. He did resolve with Narcan supporting an opioid overdose this is because. Given his history, exam and response to Narcan very low suspicion for additional causes for his unresponsive event. No negation for lab work at this time. We will continue observe patient for 2 hours to see if the Narcan wears off. Otherwise okay for discharge. ED Course:   Initial assessment performed. The patients presenting problems have been discussed, and they are in agreement with the care plan formulated and outlined with them. I have encouraged them to ask questions as they arise throughout their visit. ED Course as of 10/04/22 1856   Tue Oct 04, 2022   1854 Patient one-point desaturated to 86% was on 4 L. Patient subsequently taken off and is on room air saturating 92%. He is resting comfortably with respirate of 22. At this time Narcan would have worn off and has not. Will discharge with return precautions. We will send naloxone prescription. [JS]      ED Course User Index  [JS] Leona Pearson MD           Disposition:  Discharge Note:  6:56 PM  The patient has been re-evaluated and is ready for discharge. Reviewed available results with patient. Counseled patient on diagnosis and care plan. Patient has expressed understanding, and all questions have been answered. Patient agrees with plan and agrees to follow up as recommended, or to return to the ED if their symptoms worsen.  Discharge instructions have been provided and explained to the patient, along with reasons to return to the ED. DISCHARGE PLAN:  1. Current Discharge Medication List        START taking these medications    Details   naloxone (Narcan) 4 mg/actuation nasal spray Use 1 spray intranasally, then discard. Repeat with new spray every 2 min as needed for opioid overdose symptoms, alternating nostrils. Qty: 1 Each, Refills: 0  Start date: 10/4/2022           2. Follow-up Information       Follow up With Specialties Details Why Contact Info    [facility] EMERGENCY DEPT Emergency Medicine  If symptoms worsen [address] [facility]    Trini Roche MD Internal Medicine Physician In 1 week  [address] [facility] [address].  [phone]            3. Return to ED if worse     Diagnosis     Clinical Impression:   1. Opioid overdose, accidental or unintentional, initial encounter ([address]    2. Polysubstance abuse [facility])        Attestations:    Susan Schilling MD    Please note that this dictation was completed with [employer], the computer voice recognition software. Quite often unanticipated grammatical, syntax, homophones, and other interpretive errors are inadvertently transcribed by the computer software. Please disregard these errors. Please excuse any errors that have escaped final proofreading. Thank you.

## 2022-10-04 NOTE — ED NOTES
193.0: Assumed care of pt at this time. Pt d/c but waiting for medicaid ride    1957: Patient discharged by [facility] RAMANDEEP HOUGH MD P - pt sent to the front lobby, with strong and steady gait, no acute distress noted at time of discharge - Discharge information / home RX / and reasons to return to the ED were reviewed by the ED provider.       Narcan information provided and read-back was done to insure pt had understanding

## 2022-10-14 ENCOUNTER — TRANSCRIBE ORDER (OUTPATIENT)
Dept: SCHEDULING | Age: 57
End: 2022-10-14

## 2022-10-14 DIAGNOSIS — R74.01 NONSPECIFIC ELEVATION OF LEVELS OF TRANSAMINASE OR LACTIC ACID DEHYDROGENASE (LDH): Primary | ICD-10-CM

## 2022-10-14 DIAGNOSIS — R74.02 NONSPECIFIC ELEVATION OF LEVELS OF TRANSAMINASE OR LACTIC ACID DEHYDROGENASE (LDH): Primary | ICD-10-CM

## 2022-10-24 ENCOUNTER — HOSPITAL ENCOUNTER (OUTPATIENT)
Dept: ULTRASOUND IMAGING | Age: 57
Discharge: HOME | End: 2022-10-24
Attending: INTERNAL MEDICINE
Payer: MEDICARE

## 2022-10-24 DIAGNOSIS — R74.02 NONSPECIFIC ELEVATION OF LEVELS OF TRANSAMINASE OR LACTIC ACID DEHYDROGENASE (LDH): ICD-10-CM

## 2022-10-24 DIAGNOSIS — R74.01 NONSPECIFIC ELEVATION OF LEVELS OF TRANSAMINASE OR LACTIC ACID DEHYDROGENASE (LDH): ICD-10-CM

## 2022-10-24 PROCEDURE — 76700 US EXAM ABDOM COMPLETE: CPT

## 2022-11-04 DIAGNOSIS — G57.91 NEUROPATHY OF RIGHT LOWER EXTREMITY: ICD-10-CM

## 2022-11-04 DIAGNOSIS — K21.9 GASTROESOPHAGEAL REFLUX DISEASE WITHOUT ESOPHAGITIS: ICD-10-CM

## 2022-11-05 RX ORDER — PANTOPRAZOLE SODIUM 40 MG/1
TABLET, DELAYED RELEASE ORAL
Qty: 90 TABLET | Refills: 0 | Status: SHIPPED | OUTPATIENT
Start: 2022-11-05

## 2022-11-05 RX ORDER — PREGABALIN 150 MG/1
CAPSULE ORAL
Qty: 60 CAPSULE | Refills: 0 | Status: SHIPPED | OUTPATIENT
Start: 2022-11-05

## 2023-03-07 NOTE — TELEPHONE ENCOUNTER
Called, left vm for pt to return call to office.
Patient states he needs a call back in reference to getting the status of inhaler & constipation medication being called into his Pharmacy which is [facility]/[place]. Please call to update.  Thank you
Pt would like to provide the names of the medication to Nurse Dilma Latham which are: Ventolin and Albuterol Inhalers, also pt is requesting a prescription for \"Linzess\".  Pt's Best Contact Number:   [phone]     Copy/Paste from [facility]
negative

## 2023-03-15 NOTE — PROGRESS NOTES
End of Shift Note    Bedside shift change report given to (oncoming nurse) by Aminah Lyman RN (offgoing nurse). Report included the following information SBAR    Shift worked:  7a-7p     Shift summary and any significant changes:    IVF infusing, medications administered per MAR, pt up ad terri, pt endorsed some right sided pain on right arm with movement see significant event note, pt otherwise resting comfortably in room   Concerns for physician to address:    Zone phone for oncoming shift:  [phone]     Activity:  Activity Level: Up with Assistance  Number times ambulated in hallways past shift: 0  Number of times OOB to chair past shift: 2    Cardiac:   Cardiac Monitoring: Yes      Cardiac Rhythm: Sinus Rhythm    Access:   Current line(s): PIV     Genitourinary:   Urinary status: voiding    Respiratory:      Chronic home O2 use?: NO  Incentive spirometer at bedside: NO     GI:     Current diet:  ADULT DIET Regular; Low Fat/Low Chol/High Fiber/2 gm Na; Low Potassium (Less than 3000 mg/day); Low Phosphorus (Less than 1000 mg)  DIET ONE TIME MESSAGE  Passing flatus: YES  Tolerating current diet: YES       Pain Management:   Patient states pain is manageable on current regimen: YES    Skin:  [place] Score: 20  Interventions: increase time out of bed    Patient Safety:  Fall Score:  Total Score: 1  Interventions: assistive device (walker, cane, etc), gripper socks and pt to call before getting OOB       Length of Stay:  Expected LOS: - - -  Actual LOS: [address], RN Histology Selection Override (Optional- Will Default To Parent Diagnosis If N/A): Keratotic Basal Cell Carcinoma

## 2023-06-22 RX ORDER — MONTELUKAST SODIUM 10 MG/1
TABLET ORAL
Qty: 90 TABLET | Refills: 3 | Status: SHIPPED | OUTPATIENT
Start: 2023-06-22

## 2023-08-14 ENCOUNTER — APPOINTMENT (OUTPATIENT)
Facility: HOSPITAL | Age: 58
End: 2023-08-14
Payer: MEDICARE

## 2023-08-14 ENCOUNTER — HOSPITAL ENCOUNTER (EMERGENCY)
Facility: HOSPITAL | Age: 58
Discharge: HOME | End: 2023-08-14
Attending: EMERGENCY MEDICINE
Payer: MEDICARE

## 2023-08-14 VITALS
SYSTOLIC BLOOD PRESSURE: 171 MMHG | RESPIRATION RATE: 18 BRPM | OXYGEN SATURATION: 98 % | TEMPERATURE: 97.7 F | DIASTOLIC BLOOD PRESSURE: 92 MMHG | HEART RATE: 76 BPM

## 2023-08-14 DIAGNOSIS — I10 ESSENTIAL HYPERTENSION: ICD-10-CM

## 2023-08-14 DIAGNOSIS — G44.209 ACUTE NON INTRACTABLE TENSION-TYPE HEADACHE: Primary | ICD-10-CM

## 2023-08-14 LAB
ALBUMIN SERPL-MCNC: 4 G/DL (ref 3.5–5)
ALBUMIN/GLOB SERPL: 1.2 (ref 1.1–2.2)
ALP SERPL-CCNC: 83 U/L (ref 45–117)
ALT SERPL-CCNC: 80 U/L (ref 12–78)
ANION GAP SERPL CALC-SCNC: 3 MMOL/L (ref 5–15)
AST SERPL-CCNC: 48 U/L (ref 15–37)
BASOPHILS # BLD: 0 K/UL (ref 0–0.1)
BASOPHILS NFR BLD: 0 % (ref 0–1)
BILIRUB SERPL-MCNC: 0.5 MG/DL (ref 0.2–1)
BUN SERPL-MCNC: 12 MG/DL (ref 6–20)
BUN/CREAT SERPL: 9 (ref 12–20)
CALCIUM SERPL-MCNC: 10.1 MG/DL (ref 8.5–10.1)
CHLORIDE SERPL-SCNC: 109 MMOL/L (ref 97–108)
CO2 SERPL-SCNC: 28 MMOL/L (ref 21–32)
CREAT SERPL-MCNC: 1.35 MG/DL (ref 0.7–1.3)
DIFFERENTIAL METHOD BLD: NORMAL
EOSINOPHIL # BLD: 0.1 K/UL (ref 0–0.4)
EOSINOPHIL NFR BLD: 1 % (ref 0–7)
ERYTHROCYTE [DISTWIDTH] IN BLOOD BY AUTOMATED COUNT: 12.8 % (ref 11.5–14.5)
GLOBULIN SER CALC-MCNC: 3.4 G/DL (ref 2–4)
GLUCOSE SERPL-MCNC: 115 MG/DL (ref 65–100)
HCT VFR BLD AUTO: 46 % (ref 36.6–50.3)
HGB BLD-MCNC: 15.4 G/DL (ref 12.1–17)
IMM GRANULOCYTES # BLD AUTO: 0 K/UL (ref 0–0.04)
IMM GRANULOCYTES NFR BLD AUTO: 0 % (ref 0–0.5)
LYMPHOCYTES # BLD: 1.7 K/UL (ref 0.8–3.5)
LYMPHOCYTES NFR BLD: 27 % (ref 12–49)
MCH RBC QN AUTO: 28.9 PG (ref 26–34)
MCHC RBC AUTO-ENTMCNC: 33.5 G/DL (ref 30–36.5)
MCV RBC AUTO: 86.5 FL (ref 80–99)
MONOCYTES # BLD: 0.6 K/UL (ref 0–1)
MONOCYTES NFR BLD: 9 % (ref 5–13)
NEUTS SEG # BLD: 4 K/UL (ref 1.8–8)
NEUTS SEG NFR BLD: 63 % (ref 32–75)
NRBC # BLD: 0 K/UL (ref 0–0.01)
NRBC BLD-RTO: 0 PER 100 WBC
PLATELET # BLD AUTO: 181 K/UL (ref 150–400)
PMV BLD AUTO: 11.3 FL (ref 8.9–12.9)
POTASSIUM SERPL-SCNC: 4.2 MMOL/L (ref 3.5–5.1)
PROT SERPL-MCNC: 7.4 G/DL (ref 6.4–8.2)
RBC # BLD AUTO: 5.32 M/UL (ref 4.1–5.7)
SODIUM SERPL-SCNC: 140 MMOL/L (ref 136–145)
WBC # BLD AUTO: 6.3 K/UL (ref 4.1–11.1)

## 2023-08-14 PROCEDURE — 70450 CT HEAD/BRAIN W/O DYE: CPT

## 2023-08-14 PROCEDURE — 96361 HYDRATE IV INFUSION ADD-ON: CPT

## 2023-08-14 PROCEDURE — 80053 COMPREHEN METABOLIC PANEL: CPT

## 2023-08-14 PROCEDURE — 36415 COLL VENOUS BLD VENIPUNCTURE: CPT

## 2023-08-14 PROCEDURE — 96374 THER/PROPH/DIAG INJ IV PUSH: CPT

## 2023-08-14 PROCEDURE — 6360000002 HC RX W HCPCS: Performed by: EMERGENCY MEDICINE

## 2023-08-14 PROCEDURE — 2580000003 HC RX 258: Performed by: EMERGENCY MEDICINE

## 2023-08-14 PROCEDURE — 99284 EMERGENCY DEPT VISIT MOD MDM: CPT

## 2023-08-14 PROCEDURE — 93005 ELECTROCARDIOGRAM TRACING: CPT | Performed by: EMERGENCY MEDICINE

## 2023-08-14 PROCEDURE — 96375 TX/PRO/DX INJ NEW DRUG ADDON: CPT

## 2023-08-14 PROCEDURE — 85025 COMPLETE CBC W/AUTO DIFF WBC: CPT

## 2023-08-14 RX ORDER — 0.9 % SODIUM CHLORIDE 0.9 %
500 INTRAVENOUS SOLUTION INTRAVENOUS ONCE
Status: COMPLETED | OUTPATIENT
Start: 2023-08-14 | End: 2023-08-14

## 2023-08-14 RX ORDER — DROPERIDOL 2.5 MG/ML
0.62 INJECTION, SOLUTION INTRAMUSCULAR; INTRAVENOUS
Status: COMPLETED | OUTPATIENT
Start: 2023-08-14 | End: 2023-08-14

## 2023-08-14 RX ORDER — BUTALBITAL, ACETAMINOPHEN AND CAFFEINE 50; 325; 40 MG/1; MG/1; MG/1
1 TABLET ORAL EVERY 6 HOURS PRN
Qty: 20 TABLET | Refills: 0 | Status: SHIPPED | OUTPATIENT
Start: 2023-08-14

## 2023-08-14 RX ORDER — KETOROLAC TROMETHAMINE 30 MG/ML
15 INJECTION, SOLUTION INTRAMUSCULAR; INTRAVENOUS
Status: COMPLETED | OUTPATIENT
Start: 2023-08-14 | End: 2023-08-14

## 2023-08-14 RX ADMIN — KETOROLAC TROMETHAMINE 15 MG: 30 INJECTION, SOLUTION INTRAMUSCULAR; INTRAVENOUS at 18:24

## 2023-08-14 RX ADMIN — SODIUM CHLORIDE 500 ML: 9 INJECTION, SOLUTION INTRAVENOUS at 18:23

## 2023-08-14 RX ADMIN — DROPERIDOL 0.62 MG: 2.5 INJECTION, SOLUTION INTRAMUSCULAR; INTRAVENOUS at 18:25

## 2023-08-14 ASSESSMENT — LIFESTYLE VARIABLES
HOW OFTEN DO YOU HAVE A DRINK CONTAINING ALCOHOL: NEVER
HOW MANY STANDARD DRINKS CONTAINING ALCOHOL DO YOU HAVE ON A TYPICAL DAY: PATIENT DOES NOT DRINK

## 2023-08-14 ASSESSMENT — PAIN SCALES - GENERAL
PAINLEVEL_OUTOF10: 8
PAINLEVEL_OUTOF10: 7

## 2023-08-15 LAB
EKG ATRIAL RATE: 80 BPM
EKG DIAGNOSIS: NORMAL
EKG P AXIS: 63 DEGREES
EKG P-R INTERVAL: 140 MS
EKG Q-T INTERVAL: 374 MS
EKG QRS DURATION: 78 MS
EKG QTC CALCULATION (BAZETT): 431 MS
EKG R AXIS: 20 DEGREES
EKG T AXIS: 21 DEGREES
EKG VENTRICULAR RATE: 80 BPM

## 2023-08-15 NOTE — ED PROVIDER NOTES
ONE TABLET BY MOUTH EVERY DAY     naloxone 4 MG/0.1ML Liqd nasal spray     pantoprazole 40 MG tablet  Commonly known as: PROTONIX     polyethylene glycol 17 GM/SCOOP powder  Commonly known as: GLYCOLAX     potassium chloride 10 MEQ extended release tablet  Commonly known as: KLOR-CON     pregabalin 150 MG capsule  Commonly known as: LYRICA     vardenafil 20 MG tablet  Commonly known as: LEVITRA           * This list has 2 medication(s) that are the same as other medications prescribed for you. Read the directions carefully, and ask your doctor or other care provider to review them with you. Where to Get Your Medications        These medications were sent to [facility], [address] [place] [address] Tj Reed, [address] [id number]      Phone: [phone]   butalbital-acetaminophen-caffeine -40 MG per tablet           DISCONTINUED MEDICATIONS:  Discharge Medication List as of 8/14/2023  7:38 PM          I am the Primary Clinician of Record. Srinivasa Perales DO (electronically signed)    (Please note that parts of this dictation were completed with voice recognition software. Quite often unanticipated grammatical, syntax, homophones, and other interpretive errors are inadvertently transcribed by the computer software. Please disregards these errors.  Please excuse any errors that have escaped final proofreading.)          Antonio Comer DO  08/17/23 3684

## 2024-04-10 ENCOUNTER — APPOINTMENT (OUTPATIENT)
Facility: HOSPITAL | Age: 59
End: 2024-04-10
Payer: MEDICARE

## 2024-04-10 ENCOUNTER — HOSPITAL ENCOUNTER (EMERGENCY)
Facility: HOSPITAL | Age: 59
Discharge: HOME | End: 2024-04-10
Attending: EMERGENCY MEDICINE
Payer: MEDICARE

## 2024-04-10 VITALS
WEIGHT: 315 LBS | SYSTOLIC BLOOD PRESSURE: 137 MMHG | DIASTOLIC BLOOD PRESSURE: 80 MMHG | TEMPERATURE: 97.7 F | BODY MASS INDEX: 42.6 KG/M2 | HEART RATE: 70 BPM | OXYGEN SATURATION: 98 % | RESPIRATION RATE: 15 BRPM

## 2024-04-10 DIAGNOSIS — R07.89 ATYPICAL CHEST PAIN: Primary | ICD-10-CM

## 2024-04-10 LAB
ALBUMIN SERPL-MCNC: 3.7 G/DL (ref 3.5–5)
ALBUMIN/GLOB SERPL: 1.1 (ref 1.1–2.2)
ALP SERPL-CCNC: 94 U/L (ref 45–117)
ALT SERPL-CCNC: 84 U/L (ref 12–78)
ANION GAP SERPL CALC-SCNC: 6 MMOL/L (ref 5–15)
AST SERPL-CCNC: 86 U/L (ref 15–37)
BASOPHILS # BLD: 0 K/UL (ref 0–0.1)
BASOPHILS NFR BLD: 0 % (ref 0–1)
BILIRUB SERPL-MCNC: 0.9 MG/DL (ref 0.2–1)
BUN SERPL-MCNC: 15 MG/DL (ref 6–20)
BUN/CREAT SERPL: 10 (ref 12–20)
CALCIUM SERPL-MCNC: 8.8 MG/DL (ref 8.5–10.1)
CHLORIDE SERPL-SCNC: 106 MMOL/L (ref 97–108)
CO2 SERPL-SCNC: 28 MMOL/L (ref 21–32)
CREAT SERPL-MCNC: 1.47 MG/DL (ref 0.7–1.3)
DIFFERENTIAL METHOD BLD: NORMAL
EOSINOPHIL # BLD: 0.1 K/UL (ref 0–0.4)
EOSINOPHIL NFR BLD: 1 % (ref 0–7)
ERYTHROCYTE [DISTWIDTH] IN BLOOD BY AUTOMATED COUNT: 14.4 % (ref 11.5–14.5)
GLOBULIN SER CALC-MCNC: 3.4 G/DL (ref 2–4)
GLUCOSE SERPL-MCNC: 118 MG/DL (ref 65–100)
HCT VFR BLD AUTO: 44.7 % (ref 36.6–50.3)
HGB BLD-MCNC: 14.4 G/DL (ref 12.1–17)
IMM GRANULOCYTES # BLD AUTO: 0 K/UL (ref 0–0.04)
IMM GRANULOCYTES NFR BLD AUTO: 0 % (ref 0–0.5)
LYMPHOCYTES # BLD: 1.9 K/UL (ref 0.8–3.5)
LYMPHOCYTES NFR BLD: 28 % (ref 12–49)
MCH RBC QN AUTO: 28.9 PG (ref 26–34)
MCHC RBC AUTO-ENTMCNC: 32.2 G/DL (ref 30–36.5)
MCV RBC AUTO: 89.6 FL (ref 80–99)
MONOCYTES # BLD: 0.5 K/UL (ref 0–1)
MONOCYTES NFR BLD: 7 % (ref 5–13)
NEUTS SEG # BLD: 4.2 K/UL (ref 1.8–8)
NEUTS SEG NFR BLD: 64 % (ref 32–75)
NRBC # BLD: 0 K/UL (ref 0–0.01)
NRBC BLD-RTO: 0 PER 100 WBC
NT PRO BNP: 23 PG/ML
PLATELET # BLD AUTO: 156 K/UL (ref 150–400)
PMV BLD AUTO: 10.8 FL (ref 8.9–12.9)
POTASSIUM SERPL-SCNC: 4.1 MMOL/L (ref 3.5–5.1)
PROT SERPL-MCNC: 7.1 G/DL (ref 6.4–8.2)
RBC # BLD AUTO: 4.99 M/UL (ref 4.1–5.7)
SODIUM SERPL-SCNC: 140 MMOL/L (ref 136–145)
TROPONIN I SERPL HS-MCNC: 5 NG/L (ref 0–76)
TROPONIN I SERPL HS-MCNC: 5 NG/L (ref 0–76)
WBC # BLD AUTO: 6.7 K/UL (ref 4.1–11.1)

## 2024-04-10 PROCEDURE — 71045 X-RAY EXAM CHEST 1 VIEW: CPT

## 2024-04-10 PROCEDURE — 83880 ASSAY OF NATRIURETIC PEPTIDE: CPT

## 2024-04-10 PROCEDURE — 85025 COMPLETE CBC W/AUTO DIFF WBC: CPT

## 2024-04-10 PROCEDURE — 84484 ASSAY OF TROPONIN QUANT: CPT

## 2024-04-10 PROCEDURE — 80053 COMPREHEN METABOLIC PANEL: CPT

## 2024-04-10 PROCEDURE — 99285 EMERGENCY DEPT VISIT HI MDM: CPT

## 2024-04-10 PROCEDURE — 93005 ELECTROCARDIOGRAM TRACING: CPT | Performed by: EMERGENCY MEDICINE

## 2024-04-10 PROCEDURE — 36415 COLL VENOUS BLD VENIPUNCTURE: CPT

## 2024-04-10 ASSESSMENT — PAIN - FUNCTIONAL ASSESSMENT
PAIN_FUNCTIONAL_ASSESSMENT: NONE - DENIES PAIN
PAIN_FUNCTIONAL_ASSESSMENT: NONE - DENIES PAIN

## 2024-04-10 ASSESSMENT — HEART SCORE: ECG: NORMAL

## 2024-04-10 ASSESSMENT — LIFESTYLE VARIABLES
HOW OFTEN DO YOU HAVE A DRINK CONTAINING ALCOHOL: 2-4 TIMES A MONTH
HOW MANY STANDARD DRINKS CONTAINING ALCOHOL DO YOU HAVE ON A TYPICAL DAY: 1 OR 2

## 2024-04-10 ASSESSMENT — PAIN SCALES - GENERAL
PAINLEVEL_OUTOF10: 0
PAINLEVEL_OUTOF10: 10

## 2024-04-11 LAB
EKG ATRIAL RATE: 76 BPM
EKG DIAGNOSIS: NORMAL
EKG P AXIS: 20 DEGREES
EKG P-R INTERVAL: 144 MS
EKG Q-T INTERVAL: 374 MS
EKG QRS DURATION: 88 MS
EKG QTC CALCULATION (BAZETT): 420 MS
EKG R AXIS: 39 DEGREES
EKG T AXIS: 6 DEGREES
EKG VENTRICULAR RATE: 76 BPM

## 2024-04-11 NOTE — ED PROVIDER NOTES
[facility] EMERGENCY DEPT  EMERGENCY DEPARTMENT ENCOUNTER       Pt Name: Aubrey Harris Jr  MRN: [id number]  Birthdate 1965  Date of evaluation: 4/10/2024  Provider: Bushra Herrera MD   PCP: No primary care provider on file.  Note Started: 10:08 PM EDT 4/10/24     CHIEF COMPLAINT       Chief Complaint   Patient presents with    Chest Pain     Starting about 20 minutes ago, denies history of cardiac issues just HTN, states he felt sudden sharp chest pain, he has history of acid reflux and states this pain felt different and left him feeling short of breath        HISTORY OF PRESENT ILLNESS: 1 or more elements      History From: patient, History limited by: none     Aubrey Harris Jr is a 58 y.o. male who presents with a chief complaint of now resolved chest pain.       Please See MDM for Additional Details of the HPI/PMH  Nursing Notes were all reviewed and agreed with or any disagreements were addressed in the HPI.     REVIEW OF SYSTEMS        Positives and Pertinent negatives as per HPI.    PAST HISTORY     Past Medical History:  Past Medical History:   Diagnosis Date    Arthritis     Asthma     Chronic low back pain     Hypertension        Past Surgical History:  Past Surgical History:   Procedure Laterality Date    ORTHOPEDIC SURGERY         Family History:  Family History   Problem Relation Age of Onset    Heart Disease Father     Cancer Mother        Social History:  Social History     Tobacco Use    Smoking status: Former     Current packs/day: 0.25     Types: Cigarettes    Smokeless tobacco: Never   Substance Use Topics    Alcohol use: Yes    Drug use: No       Allergies:  Allergies   Allergen Reactions    Hydrocodone-Acetaminophen Other (See Comments)     Headache       CURRENT MEDICATIONS      Discharge Medication List as of 4/10/2024 11:42 PM        CONTINUE these medications which have NOT CHANGED    Details   butalbital-acetaminophen-caffeine (FIORICET, ESGIC) -40 MG per tablet Take 1 tablet by

## 2024-08-21 ENCOUNTER — HOSPITAL ENCOUNTER (EMERGENCY)
Facility: HOSPITAL | Age: 59
Discharge: HOME | End: 2024-08-21
Payer: MEDICARE

## 2024-08-21 ENCOUNTER — APPOINTMENT (OUTPATIENT)
Facility: HOSPITAL | Age: 59
End: 2024-08-21
Payer: MEDICARE

## 2024-08-21 VITALS
RESPIRATION RATE: 18 BRPM | WEIGHT: 315 LBS | HEIGHT: 75 IN | BODY MASS INDEX: 39.17 KG/M2 | TEMPERATURE: 98.1 F | SYSTOLIC BLOOD PRESSURE: 173 MMHG | HEART RATE: 77 BPM | DIASTOLIC BLOOD PRESSURE: 97 MMHG | OXYGEN SATURATION: 97 %

## 2024-08-21 DIAGNOSIS — M79.641 HAND PAIN, RIGHT: Primary | ICD-10-CM

## 2024-08-21 LAB
GLUCOSE BLD STRIP.AUTO-MCNC: 121 MG/DL (ref 65–117)
SERVICE CMNT-IMP: ABNORMAL

## 2024-08-21 PROCEDURE — 82962 GLUCOSE BLOOD TEST: CPT

## 2024-08-21 PROCEDURE — 6370000000 HC RX 637 (ALT 250 FOR IP)

## 2024-08-21 PROCEDURE — 99283 EMERGENCY DEPT VISIT LOW MDM: CPT

## 2024-08-21 PROCEDURE — 73130 X-RAY EXAM OF HAND: CPT

## 2024-08-21 RX ORDER — INDOMETHACIN 25 MG/1
50 CAPSULE ORAL
Status: COMPLETED | OUTPATIENT
Start: 2024-08-21 | End: 2024-08-21

## 2024-08-21 RX ORDER — INDOMETHACIN 50 MG/1
50 CAPSULE ORAL 2 TIMES DAILY WITH MEALS
Qty: 20 CAPSULE | Refills: 0 | Status: SHIPPED | OUTPATIENT
Start: 2024-08-21 | End: 2024-08-21

## 2024-08-21 RX ORDER — TRAMADOL HYDROCHLORIDE 50 MG/1
50 TABLET ORAL EVERY 6 HOURS PRN
Qty: 12 TABLET | Refills: 0 | Status: SHIPPED | OUTPATIENT
Start: 2024-08-21 | End: 2024-08-24

## 2024-08-21 RX ORDER — INDOMETHACIN 50 MG/1
50 CAPSULE ORAL 2 TIMES DAILY WITH MEALS
Qty: 20 CAPSULE | Refills: 0 | Status: SHIPPED | OUTPATIENT
Start: 2024-08-21

## 2024-08-21 RX ORDER — OXYCODONE HYDROCHLORIDE AND ACETAMINOPHEN 5; 325 MG/1; MG/1
1 TABLET ORAL
Status: COMPLETED | OUTPATIENT
Start: 2024-08-21 | End: 2024-08-21

## 2024-08-21 RX ORDER — TRAMADOL HYDROCHLORIDE 50 MG/1
50 TABLET ORAL EVERY 6 HOURS PRN
Qty: 12 TABLET | Refills: 0 | Status: SHIPPED | OUTPATIENT
Start: 2024-08-21 | End: 2024-08-21

## 2024-08-21 RX ADMIN — INDOMETHACIN 50 MG: 25 CAPSULE ORAL at 18:17

## 2024-08-21 RX ADMIN — OXYCODONE HYDROCHLORIDE AND ACETAMINOPHEN 1 TABLET: 5; 325 TABLET ORAL at 19:22

## 2024-08-21 ASSESSMENT — PAIN DESCRIPTION - ORIENTATION: ORIENTATION: RIGHT

## 2024-08-21 ASSESSMENT — PAIN DESCRIPTION - LOCATION: LOCATION: HAND

## 2024-08-21 ASSESSMENT — PAIN - FUNCTIONAL ASSESSMENT: PAIN_FUNCTIONAL_ASSESSMENT: ACTIVITIES ARE NOT PREVENTED

## 2024-08-21 ASSESSMENT — PAIN SCALES - GENERAL: PAINLEVEL_OUTOF10: 10

## 2024-08-21 ASSESSMENT — PAIN DESCRIPTION - DESCRIPTORS: DESCRIPTORS: ACHING

## 2024-08-21 ASSESSMENT — LIFESTYLE VARIABLES
HOW MANY STANDARD DRINKS CONTAINING ALCOHOL DO YOU HAVE ON A TYPICAL DAY: 1 OR 2
HOW OFTEN DO YOU HAVE A DRINK CONTAINING ALCOHOL: MONTHLY OR LESS

## 2024-08-21 NOTE — ED PROVIDER NOTES
5 digits.  Intact wrist range of motion without pain.   Skin:     General: Skin is warm and dry.   Neurological:      General: No focal deficit present.      Mental Status: He is alert and oriented to person, place, and time.   Psychiatric:         Mood and Affect: Mood normal.         Behavior: Behavior normal.          DIAGNOSTIC RESULTS   LABS:     Recent Results (from the past 24 hour(s))   POCT Glucose    Collection Time: 08/21/24  8:07 PM   Result Value Ref Range    POC Glucose 121 (H) 65 - 117 mg/dL    Performed by: Raad Cook RN          EKG: When ordered, EKG's are interpreted by the Emergency Department Physician in the absence of a cardiologist.  Please see their note for interpretation of EKG.      RADIOLOGY:  Non-plain film images such as CT, Ultrasound and MRI are read by the radiologist. Plain radiographic images are visualized and preliminarily interpreted by the ED Provider with the below findings:       Interpretation per the Radiologist below, if available at the time of this note:     XR HAND RIGHT (MIN 3 VIEWS)    Result Date: 8/21/2024  EXAM: XR HAND RIGHT (MIN 3 VIEWS) INDICATION: right hand pain. COMPARISON: None. FINDINGS: Three views of the right hand. No acute fracture or dislocation. Moderate second MCP osteoarthritis. Mild third MCP osteoarthritis.     No acute abnormality. Electronically signed by LILY LIU      PROCEDURES   Unless otherwise noted below, none  Procedures     CRITICAL CARE TIME   Patient does not meet Critical Care Time, 0 minutes     EMERGENCY DEPARTMENT COURSE and DIFFERENTIAL DIAGNOSIS/MDM   Vitals:    Vitals:    08/21/24 1830 08/21/24 1845 08/21/24 1930 08/21/24 1945   BP: (!) 196/106 (!) 216/118 (!) 172/103 (!) 173/97   Pulse:       Resp:       Temp:       TempSrc:       SpO2: 98% 98% 99% 97%   Weight:       Height:            Patient was given the following medications:  Medications   indomethacin (INDOCIN) capsule 50 mg (50 mg Oral Given 8/21/24 1817)

## 2024-09-20 ENCOUNTER — TRANSCRIBE ORDERS (OUTPATIENT)
Facility: HOSPITAL | Age: 59
End: 2024-09-20

## 2024-09-20 DIAGNOSIS — R07.89 OTHER CHEST PAIN: Primary | ICD-10-CM

## 2025-02-12 ENCOUNTER — APPOINTMENT (OUTPATIENT)
Facility: HOSPITAL | Age: 60
End: 2025-02-12
Payer: MEDICAID

## 2025-02-12 ENCOUNTER — HOSPITAL ENCOUNTER (EMERGENCY)
Facility: HOSPITAL | Age: 60
Discharge: HOME | End: 2025-02-12
Attending: STUDENT IN AN ORGANIZED HEALTH CARE EDUCATION/TRAINING PROGRAM
Payer: MEDICAID

## 2025-02-12 VITALS
HEIGHT: 75 IN | OXYGEN SATURATION: 99 % | RESPIRATION RATE: 20 BRPM | HEART RATE: 82 BPM | WEIGHT: 315 LBS | DIASTOLIC BLOOD PRESSURE: 96 MMHG | BODY MASS INDEX: 39.17 KG/M2 | TEMPERATURE: 99 F | SYSTOLIC BLOOD PRESSURE: 176 MMHG

## 2025-02-12 DIAGNOSIS — Z77.22 SECOND HAND SMOKE EXPOSURE: ICD-10-CM

## 2025-02-12 DIAGNOSIS — B34.9 VIRAL ILLNESS: Primary | ICD-10-CM

## 2025-02-12 DIAGNOSIS — J45.901 EXACERBATION OF ASTHMA, UNSPECIFIED ASTHMA SEVERITY, UNSPECIFIED WHETHER PERSISTENT: ICD-10-CM

## 2025-02-12 PROCEDURE — 94640 AIRWAY INHALATION TREATMENT: CPT

## 2025-02-12 PROCEDURE — 6370000000 HC RX 637 (ALT 250 FOR IP): Performed by: STUDENT IN AN ORGANIZED HEALTH CARE EDUCATION/TRAINING PROGRAM

## 2025-02-12 PROCEDURE — 93005 ELECTROCARDIOGRAM TRACING: CPT | Performed by: STUDENT IN AN ORGANIZED HEALTH CARE EDUCATION/TRAINING PROGRAM

## 2025-02-12 PROCEDURE — 99284 EMERGENCY DEPT VISIT MOD MDM: CPT

## 2025-02-12 PROCEDURE — 71045 X-RAY EXAM CHEST 1 VIEW: CPT

## 2025-02-12 RX ORDER — BENZONATATE 200 MG/1
200 CAPSULE ORAL 3 TIMES DAILY PRN
Qty: 30 CAPSULE | Refills: 0 | Status: SHIPPED | OUTPATIENT
Start: 2025-02-12 | End: 2025-02-22

## 2025-02-12 RX ORDER — OXYCODONE HYDROCHLORIDE 5 MG/1
5 TABLET ORAL
Status: COMPLETED | OUTPATIENT
Start: 2025-02-12 | End: 2025-02-12

## 2025-02-12 RX ORDER — GUAIFENESIN/DEXTROMETHORPHAN 100-10MG/5
5 SYRUP ORAL 3 TIMES DAILY PRN
Qty: 120 ML | Refills: 0 | Status: SHIPPED | OUTPATIENT
Start: 2025-02-12 | End: 2025-02-22

## 2025-02-12 RX ORDER — LIDOCAINE 50 MG/G
1 PATCH TOPICAL DAILY
Qty: 10 PATCH | Refills: 0 | Status: SHIPPED | OUTPATIENT
Start: 2025-02-12 | End: 2025-02-22

## 2025-02-12 RX ORDER — ALBUTEROL SULFATE 5 MG/ML
2.5 SOLUTION RESPIRATORY (INHALATION) EVERY 6 HOURS PRN
Qty: 120 EACH | Refills: 0 | Status: SHIPPED | OUTPATIENT
Start: 2025-02-12

## 2025-02-12 RX ORDER — BENZONATATE 100 MG/1
200 CAPSULE ORAL
Status: COMPLETED | OUTPATIENT
Start: 2025-02-12 | End: 2025-02-12

## 2025-02-12 RX ORDER — IPRATROPIUM BROMIDE AND ALBUTEROL SULFATE 2.5; .5 MG/3ML; MG/3ML
1 SOLUTION RESPIRATORY (INHALATION)
Status: COMPLETED | OUTPATIENT
Start: 2025-02-12 | End: 2025-02-12

## 2025-02-12 RX ADMIN — IPRATROPIUM BROMIDE AND ALBUTEROL SULFATE 1 DOSE: 2.5; .5 SOLUTION RESPIRATORY (INHALATION) at 09:20

## 2025-02-12 RX ADMIN — OXYCODONE 5 MG: 5 TABLET ORAL at 09:27

## 2025-02-12 RX ADMIN — BENZONATATE 200 MG: 100 CAPSULE ORAL at 09:27

## 2025-02-12 ASSESSMENT — PAIN DESCRIPTION - ORIENTATION: ORIENTATION: MID

## 2025-02-12 ASSESSMENT — PAIN DESCRIPTION - DESCRIPTORS: DESCRIPTORS: SHARP

## 2025-02-12 ASSESSMENT — PAIN SCALES - GENERAL: PAINLEVEL_OUTOF10: 4

## 2025-02-12 ASSESSMENT — LIFESTYLE VARIABLES
HOW MANY STANDARD DRINKS CONTAINING ALCOHOL DO YOU HAVE ON A TYPICAL DAY: 1 OR 2
HOW OFTEN DO YOU HAVE A DRINK CONTAINING ALCOHOL: 2-4 TIMES A MONTH

## 2025-02-12 ASSESSMENT — PAIN DESCRIPTION - LOCATION: LOCATION: CHEST

## 2025-02-12 NOTE — ED PROVIDER NOTES
17 GM/SCOOP POWDER    Take 17 g by mouth daily    POTASSIUM CHLORIDE (KLOR-CON) 10 MEQ EXTENDED RELEASE TABLET    Take 10 mEq by mouth daily    PREGABALIN (LYRICA) 150 MG CAPSULE    TAKE ONE CAPSULE BY MOUTH 2 TIMES A DAY    VARDENAFIL (LEVITRA) 20 MG TABLET    Take 20 mg by mouth daily as needed         PHYSICAL EXAM      ED Triage Vitals [02/12/25 0854]   Encounter Vitals Group      BP (!) 176/96      Systolic BP Percentile       Diastolic BP Percentile       Pulse 77      Respirations 24      Temp 99 °F (37.2 °C)      Temp Source Oral      SpO2 98 %      Weight - Scale (!) 154.2 kg (340 lb)      Height 1.905 m (6' 3\")      Head Circumference       Peak Flow       Pain Score       Pain Loc       Pain Education       Exclude from Growth Chart               Physical Exam  Constitutional:       Appearance: Normal appearance.   HENT:      Head: Normocephalic and atraumatic.      Right Ear: External ear normal.      Left Ear: External ear normal.      Nose: Nose normal.      Mouth/Throat:      Mouth: Mucous membranes are moist.   Eyes:      Extraocular Movements: Extraocular movements intact.      Conjunctiva/sclera: Conjunctivae normal.   Cardiovascular:      Rate and Rhythm: Normal rate and regular rhythm.      Heart sounds: No murmur heard.     No friction rub. No gallop.   Pulmonary:      Effort: Pulmonary effort is normal. No respiratory distress.      Breath sounds: No stridor. No wheezing, rhonchi or rales.   Abdominal:      General: Abdomen is flat. There is no distension.      Palpations: Abdomen is soft. There is no mass.      Tenderness: There is no abdominal tenderness. There is no guarding or rebound.   Musculoskeletal:      Cervical back: Neck supple.      Right lower leg: No edema.      Left lower leg: No edema.   Skin:     General: Skin is warm and dry.   Neurological:      General: No focal deficit present.      Mental Status: He is alert.   Psychiatric:         Mood and Affect: Mood normal.            butalbital-acetaminophen-caffeine -40 MG per tablet  Commonly known as: FIORICET, ESGIC  Take 1 tablet by mouth every 6 hours as needed for Headaches     cetirizine 10 MG tablet  Commonly known as: ZYRTEC     clotrimazole 1 % cream  Commonly known as: LOTRIMIN     famotidine 20 MG tablet  Commonly known as: PEPCID     fluticasone 50 MCG/ACT nasal spray  Commonly known as: FLONASE     indomethacin 50 MG capsule  Commonly known as: INDOCIN  Take 1 capsule by mouth 2 times daily (with meals)     Linzess 290 MCG Caps capsule  Generic drug: linaclotide     lisinopril-hydroCHLOROthiazide 20-25 MG per tablet  Commonly known as: PRINZIDE;ZESTORETIC     methocarbamol 750 MG tablet  Commonly known as: ROBAXIN     montelukast 10 MG tablet  Commonly known as: SINGULAIR  TAKE ONE TABLET BY MOUTH EVERY DAY     naloxone 4 MG/0.1ML Liqd nasal spray     pantoprazole 40 MG tablet  Commonly known as: PROTONIX     polyethylene glycol 17 GM/SCOOP powder  Commonly known as: GLYCOLAX     potassium chloride 10 MEQ extended release tablet  Commonly known as: KLOR-CON     pregabalin 150 MG capsule  Commonly known as: LYRICA     vardenafil 20 MG tablet  Commonly known as: LEVITRA           * This list has 2 medication(s) that are the same as other medications prescribed for you. Read the directions carefully, and ask your doctor or other care provider to review them with you.                    DISCONTINUED MEDICATIONS:  Current Discharge Medication List          I am the Primary Clinician of Record.   Marshall Aj MD (electronically signed)      (Please note that parts of this dictation were completed with voice recognition software. Quite often unanticipated grammatical, syntax, homophones, and other interpretive errors are inadvertently transcribed by the computer software. Please disregards these errors. Please excuse any errors that have escaped final proofreading.)         Marshall Aj MD  02/15/25 2242

## 2025-02-12 NOTE — DISCHARGE INSTRUCTIONS
Thank You!    It was a pleasure taking care of you in our Emergency Department today. We know that when you come to our Emergency Department, you are entrusting us with your health, comfort, and safety. Our physicians and nurses honor that trust, and truly appreciate the opportunity to care for you and your loved ones.      We also value your feedback. If you receive a survey about your Emergency Department experience today, please fill it out.  We care about our patients' feedback, and we listen to what you have to say.  Thank you.    Marshall Aj MD  ________________________________________________________________________  I have included a copy of your lab results and/or radiologic studies from today's visit so you can have them easily available at your follow-up visit. We hope you feel better and please do not hesitate to contact the ED if you have any questions at all!    Recent Results (from the past 12 hour(s))   EKG 12 Lead    Collection Time: 02/12/25  9:03 AM   Result Value Ref Range    Ventricular Rate 87 BPM    Atrial Rate 87 BPM    P-R Interval 164 ms    QRS Duration 82 ms    Q-T Interval 362 ms    QTc Calculation (Bazett) 435 ms    P Axis 12 degrees    R Axis 38 degrees    T Axis 30 degrees    Diagnosis       Normal sinus rhythm  Normal ECG  When compared with ECG of 10-APR-2024 20:56,  No significant change was found       XR CHEST PORTABLE   Final Result   No acute cardiopulmonary process.         Electronically signed by Yoni Daniel          The exam and treatment you received in the Emergency Department were for an urgent problem and are not intended as complete care. It is important that you follow up with a doctor, nurse practitioner, or physician assistant for ongoing care. If your symptoms become worse or you do not improve as expected and you are unable to reach your usual health care provider, you should return to the Emergency Department. We are available 24 hours a day.    Please

## 2025-02-13 LAB
EKG ATRIAL RATE: 87 BPM
EKG DIAGNOSIS: NORMAL
EKG P AXIS: 12 DEGREES
EKG P-R INTERVAL: 164 MS
EKG Q-T INTERVAL: 362 MS
EKG QRS DURATION: 82 MS
EKG QTC CALCULATION (BAZETT): 435 MS
EKG R AXIS: 38 DEGREES
EKG T AXIS: 30 DEGREES
EKG VENTRICULAR RATE: 87 BPM

## 2025-03-27 ENCOUNTER — TRANSCRIBE ORDERS (OUTPATIENT)
Facility: HOSPITAL | Age: 60
End: 2025-03-27

## 2025-03-27 ENCOUNTER — HOSPITAL ENCOUNTER (OUTPATIENT)
Facility: HOSPITAL | Age: 60
Discharge: HOME | End: 2025-03-30
Payer: MEDICAID

## 2025-03-27 DIAGNOSIS — M25.522 LEFT ELBOW PAIN: Primary | ICD-10-CM

## 2025-03-27 DIAGNOSIS — M25.522 LEFT ELBOW PAIN: ICD-10-CM

## 2025-03-27 PROCEDURE — 73080 X-RAY EXAM OF ELBOW: CPT

## 2025-06-29 ENCOUNTER — HOSPITAL ENCOUNTER (EMERGENCY)
Facility: HOSPITAL | Age: 60
Discharge: HOME | End: 2025-06-30
Attending: EMERGENCY MEDICINE
Payer: MEDICAID

## 2025-06-29 ENCOUNTER — APPOINTMENT (OUTPATIENT)
Facility: HOSPITAL | Age: 60
End: 2025-06-29
Payer: MEDICAID

## 2025-06-29 DIAGNOSIS — F10.920 ACUTE ALCOHOLIC INTOXICATION WITHOUT COMPLICATION: Primary | ICD-10-CM

## 2025-06-29 PROCEDURE — 99284 EMERGENCY DEPT VISIT MOD MDM: CPT

## 2025-06-29 PROCEDURE — 70450 CT HEAD/BRAIN W/O DYE: CPT

## 2025-06-29 NOTE — ED NOTES
Upon arrival pt noted to be covered in dirt and in wet clothes from the puddle of rain/mud he was laying in. This RN informed pt of his condition and pt states \"I ain't wet! I'm not changing!\".

## 2025-06-29 NOTE — ED NOTES
Bedside report given to DEXTER Lazaro by DEXTER Belle. Nurse was informed of reason for arrival, vitals, labs, medications, orders, procedures, results, any outstanding and pending orders and plan of care. Opportunity for questions were provided for receiving RN at this time.

## 2025-06-29 NOTE — ED PROVIDER NOTES
[facility] EMERGENCY DEPARTMENT  EMERGENCY DEPARTMENT ENCOUNTER       Pt Name: Aubrey Harris Jr  MRN: [id number]  Birthdate 1965  Date of evaluation: 6/29/2025  Provider: Bushra Herrera MD   PCP: No primary care provider on file.  Note Started: 6:16 PM EDT 6/29/25     CHIEF COMPLAINT       Chief Complaint   Patient presents with    Alcohol Intoxication     Pt BIBEMS from home with reports of being found in the yard with ETOH on board. Pt was laying in a puddle for an unknown amount of time. Pt has no complaints. Pt states he was \"with Nigel DRINKIN\"        HISTORY OF PRESENT ILLNESS: 1 or more elements      History From: patient, History limited by: none     Aubrey Harris Jr is a 60 y.o. male who presents via EMS for alcohol intoxication       Please See MDM for Additional Details of the HPI/PMH  Nursing Notes were all reviewed and agreed with or any disagreements were addressed in the HPI.     REVIEW OF SYSTEMS        Positives and Pertinent negatives as per HPI.    PAST HISTORY     Past Medical History:  Past Medical History:   Diagnosis Date    Arthritis     Asthma     Chronic low back pain     Hypertension        Past Surgical History:  Past Surgical History:   Procedure Laterality Date    ORTHOPEDIC SURGERY         Family History:  Family History   Problem Relation Age of Onset    Heart Disease Father     Cancer Mother        Social History:  Social History     Tobacco Use    Smoking status: Former     Current packs/day: 0.25     Types: Cigarettes    Smokeless tobacco: Never   Substance Use Topics    Alcohol use: Yes    Drug use: No       Allergies:  Allergies   Allergen Reactions    Hydrocodone-Acetaminophen Other (See Comments)     Headache       CURRENT MEDICATIONS      Previous Medications    ACETAMINOPHEN (TYLENOL) 500 MG TABLET    Take 500 mg by mouth every 6 hours as needed    ALBUTEROL (PROVENTIL) (5 MG/ML) 0.5% NEBULIZER SOLUTION    Take 0.5 mLs by nebulization every 6 hours as needed for

## 2025-06-30 VITALS
TEMPERATURE: 97.5 F | DIASTOLIC BLOOD PRESSURE: 77 MMHG | HEIGHT: 75 IN | OXYGEN SATURATION: 95 % | BODY MASS INDEX: 42.5 KG/M2 | HEART RATE: 77 BPM | RESPIRATION RATE: 16 BRPM | SYSTOLIC BLOOD PRESSURE: 140 MMHG

## 2025-06-30 NOTE — ED NOTES
Pt calling out, at bedside to speak with him. He states he would like to go home. Spoke with Dr. Herrera and made her aware of pt's statements.